# Patient Record
Sex: MALE | Race: WHITE | NOT HISPANIC OR LATINO | Employment: OTHER | URBAN - METROPOLITAN AREA
[De-identification: names, ages, dates, MRNs, and addresses within clinical notes are randomized per-mention and may not be internally consistent; named-entity substitution may affect disease eponyms.]

---

## 2020-10-22 ENCOUNTER — OFFICE VISIT (OUTPATIENT)
Dept: NEUROLOGY | Facility: MEDICAL CENTER | Age: 75
End: 2020-10-22
Payer: MEDICARE

## 2020-10-22 VITALS
WEIGHT: 210.76 LBS | RESPIRATION RATE: 16 BRPM | TEMPERATURE: 98.5 F | BODY MASS INDEX: 28.55 KG/M2 | SYSTOLIC BLOOD PRESSURE: 118 MMHG | HEART RATE: 61 BPM | OXYGEN SATURATION: 97 % | DIASTOLIC BLOOD PRESSURE: 64 MMHG | HEIGHT: 72 IN

## 2020-10-22 DIAGNOSIS — R51.9 CHRONIC NONINTRACTABLE HEADACHE, UNSPECIFIED HEADACHE TYPE: ICD-10-CM

## 2020-10-22 DIAGNOSIS — G89.29 CHRONIC NONINTRACTABLE HEADACHE, UNSPECIFIED HEADACHE TYPE: ICD-10-CM

## 2020-10-22 DIAGNOSIS — G44.40 MEDICATION OVERUSE HEADACHE: ICD-10-CM

## 2020-10-22 PROCEDURE — 99205 OFFICE O/P NEW HI 60 MIN: CPT | Performed by: NURSE PRACTITIONER

## 2020-10-22 RX ORDER — AMLODIPINE BESYLATE 5 MG/1
5 TABLET ORAL DAILY
Status: ON HOLD | COMMUNITY
End: 2021-02-03

## 2020-10-22 RX ORDER — TOPIRAMATE 25 MG/1
50 TABLET ORAL
Qty: 180 TAB | Refills: 3 | Status: SHIPPED | OUTPATIENT
Start: 2020-10-22 | End: 2021-10-22

## 2020-10-22 RX ORDER — LOSARTAN POTASSIUM 50 MG/1
50 TABLET ORAL DAILY
COMMUNITY
End: 2023-05-18

## 2020-10-22 ASSESSMENT — ENCOUNTER SYMPTOMS
MYALGIAS: 0
DEPRESSION: 1
CHILLS: 0
HEADACHES: 1
WHEEZING: 0
COUGH: 0
BRUISES/BLEEDS EASILY: 1
DIARRHEA: 0
NERVOUS/ANXIOUS: 1
BLURRED VISION: 0
PALPITATIONS: 1
DOUBLE VISION: 0
FEVER: 0
NECK PAIN: 1
CONSTIPATION: 0
HEARTBURN: 0
BACK PAIN: 1
NAUSEA: 0

## 2020-10-22 ASSESSMENT — PATIENT HEALTH QUESTIONNAIRE - PHQ9
CLINICAL INTERPRETATION OF PHQ2 SCORE: 1
SUM OF ALL RESPONSES TO PHQ QUESTIONS 1-9: 14
5. POOR APPETITE OR OVEREATING: 0 - NOT AT ALL
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
## 2020-10-22 NOTE — PATIENT INSTRUCTIONS
Headaches are likely made worse by overuse of acetaminophen (tylenol) and caffeine    Wean caffeine to no more than 1-2 cups per day    Wean acetaminophen slowly (wean by 1 pill per day until you are at no more than 2000mg  4 of the extra strength tablets) per week.      I sent a prescription for a medication called topiramate, this is a medication used for prevention of headaches, you take 25mg every night for the first week, then take 50mg every night( 2 tabs).  The side effects are usually mild at these low doses; however, if you develop problems with your speech or painful burning and tingling of your hands or feet, go back down to 1 tablet (25mg) every night for 1 week and then stop the medication.  Also call me if this occurs so that I can try something else.      Take the following supplements every evening for prevention:     Start magnesium oxide 400mg gel cap (Nature Made)  by mouth every night, may take extra dose if needed for headache (over the counter), hold for diarrhea         Start Riboflavin (Vitamin B2) 400mg by mouth every night (over the counter),may turn urine bright yellow         Start COQ 10, take 300mg every night. (over the counter)          Attempt to go to bed and get up at the same time every night           Eat meals on regular basis            Stay hydrated.             Aerobic exercise 30 minutes daily             Avoid aged or smoked foods, avoid processed foods, red wine, aged cheese              Keep headache diary, include foods that you may have eaten.             Avoid overusing over the counter medications:  Do not take more than 500mg acetaminophen (tylenol), more than 4 times weekly, more frequent or larger doses are associated with medication overuse headache.              Migraine Headache  A migraine headache is a very strong throbbing pain on one side or both sides of your head. This type of headache can also cause other symptoms. It can last from 4 hours to 3 days. Talk  with your doctor about what things may bring on (trigger) this condition.  What are the causes?  The exact cause of this condition is not known. This condition may be triggered or caused by:  · Drinking alcohol.  · Smoking.  · Taking medicines, such as:  ? Medicine used to treat chest pain (nitroglycerin).  ? Birth control pills.  ? Estrogen.  ? Some blood pressure medicines.  · Eating or drinking certain products.  · Doing physical activity.  Other things that may trigger a migraine headache include:  · Having a menstrual period.  · Pregnancy.  · Hunger.  · Stress.  · Not getting enough sleep or getting too much sleep.  · Weather changes.  · Tiredness (fatigue).  What increases the risk?  · Being 25-55 years old.  · Being female.  · Having a family history of migraine headaches.  · Being .  · Having depression or anxiety.  · Being very overweight.  What are the signs or symptoms?  · A throbbing pain. This pain may:  ? Happen in any area of the head, such as on one side or both sides.  ? Make it hard to do daily activities.  ? Get worse with physical activity.  ? Get worse around bright lights or loud noises.  · Other symptoms may include:  ? Feeling sick to your stomach (nauseous).  ? Vomiting.  ? Dizziness.  ? Being sensitive to bright lights, loud noises, or smells.  · Before you get a migraine headache, you may get warning signs (an aura). An aura may include:  ? Seeing flashing lights or having blind spots.  ? Seeing bright spots, halos, or zigzag lines.  ? Having tunnel vision or blurred vision.  ? Having numbness or a tingling feeling.  ? Having trouble talking.  ? Having weak muscles.  · Some people have symptoms after a migraine headache (postdromal phase), such as:  ? Tiredness.  ? Trouble thinking (concentrating).  How is this treated?  · Taking medicines that:  ? Relieve pain.  ? Relieve the feeling of being sick to your stomach.  ? Prevent migraine headaches.  · Treatment may also  include:  ? Having acupuncture.  ? Avoiding foods that bring on migraine headaches.  ? Learning ways to control your body functions (biofeedback).  ? Therapy to help you know and deal with negative thoughts (cognitive behavioral therapy).  Follow these instructions at home:  Medicines  · Take over-the-counter and prescription medicines only as told by your doctor.  · Ask your doctor if the medicine prescribed to you:  ? Requires you to avoid driving or using heavy machinery.  ? Can cause trouble pooping (constipation). You may need to take these steps to prevent or treat trouble pooping:  § Drink enough fluid to keep your pee (urine) pale yellow.  § Take over-the-counter or prescription medicines.  § Eat foods that are high in fiber. These include beans, whole grains, and fresh fruits and vegetables.  § Limit foods that are high in fat and sugar. These include fried or sweet foods.  Lifestyle  · Do not drink alcohol.  · Do not use any products that contain nicotine or tobacco, such as cigarettes, e-cigarettes, and chewing tobacco. If you need help quitting, ask your doctor.  · Get at least 8 hours of sleep every night.  · Limit and deal with stress.  General instructions         · Keep a journal to find out what may bring on your migraine headaches. For example, write down:  ? What you eat and drink.  ? How much sleep you get.  ? Any change in what you eat or drink.  ? Any change in your medicines.  · If you have a migraine headache:  ? Avoid things that make your symptoms worse, such as bright lights.  ? It may help to lie down in a dark, quiet room.  ? Do not drive or use heavy machinery.  ? Ask your doctor what activities are safe for you.  · Keep all follow-up visits as told by your doctor. This is important.  Contact a doctor if:  · You get a migraine headache that is different or worse than others you have had.  · You have more than 15 headache days in one month.  Get help right away if:  · Your migraine  headache gets very bad.  · Your migraine headache lasts longer than 72 hours.  · You have a fever.  · You have a stiff neck.  · You have trouble seeing.  · Your muscles feel weak or like you cannot control them.  · You start to lose your balance a lot.  · You start to have trouble walking.  · You pass out (faint).  · You have a seizure.  Summary  · A migraine headache is a very strong throbbing pain on one side or both sides of your head. These headaches can also cause other symptoms.  · This condition may be treated with medicines and changes to your lifestyle.  · Keep a journal to find out what may bring on your migraine headaches.  · Contact a doctor if you get a migraine headache that is different or worse than others you have had.  · Contact your doctor if you have more than 15 headache days in a month.  This information is not intended to replace advice given to you by your health care provider. Make sure you discuss any questions you have with your health care provider.  Document Released: 09/26/2009 Document Revised: 04/10/2020 Document Reviewed: 01/30/2020  Elsevier Patient Education © 2020 [employer]    Analgesic Rebound Headache  An analgesic rebound headache, sometimes called a medication overuse headache, is a headache that comes after pain medicine (analgesic) taken to treat the original (primary) headache has worn off. Any type of primary headache can return as a rebound headache if a person regularly takes analgesics more than three times a week to treat it. The types of primary headaches that are commonly associated with rebound headaches include:  · Migraines.  · Headaches that arise from tense muscles in the head and neck area (tension headaches).  · Headaches that develop and happen again (recur) on one side of the head and around the eye (cluster headaches).  If rebound headaches continue, they become chronic daily headaches.  What are the causes?  This condition may be caused by frequent use  of:  · Over-the-counter medicines such as aspirin, ibuprofen, and acetaminophen.  · Sinus relief medicines and other medicines that contain caffeine.  · Narcotic pain medicines such as codeine and oxycodone.  What are the signs or symptoms?  The symptoms of a rebound headache are the same as the symptoms of the original headache. Some of the symptoms of specific types of headaches include:  Migraine headache  · Pulsing or throbbing pain on one or both sides of the head.  · Severe pain that interferes with daily activities.  · Pain that is worsened by physical activity.  · Nausea, vomiting, or both.  · Pain with exposure to bright light, loud noises, or strong smells.  · General sensitivity to bright light, loud noises, or strong smells.  · Visual changes.  · Numbness of one or both arms.  Tension headache  · Pressure around the head.  · Dull, aching head pain.  · Pain felt over the front and sides of the head.  · Tenderness in the muscles of the head, neck, and shoulders.  Cluster headache  · Severe pain that begins in or around one eye or temple.  · Redness and tearing in the eye on the same side as the pain.  · Droopy or swollen eyelid.  · One-sided head pain.  · Nausea.  · Runny nose.  · Sweaty, pale facial skin.  · Restlessness.  How is this diagnosed?  This condition is diagnosed by:  · Reviewing your medical history. This includes the nature of your primary headaches.  · Reviewing the types of pain medicines that you have been using to treat your headaches and how often you take them.  How is this treated?  This condition may be treated or managed by:  · Discontinuing frequent use of the analgesic medicine. Doing this may worsen your headaches at first, but the pain should eventually become more manageable, less frequent, and less severe.  · Seeing a headache specialist. He or she may be able to help you manage your headaches and help make sure there is not another cause of the headaches.  · Using methods of  stress relief, such as acupuncture, counseling, biofeedback, and massage. Talk with your health care provider about which methods might be good for you.  Follow these instructions at home:  · Take over-the-counter and prescription medicines only as told by your health care provider.  · Stop the repeated use of pain medicine as told by your health care provider. Stopping can be difficult. Carefully follow instructions from your health care provider.  · Avoid triggers that are known to cause your primary headaches.  · Keep all follow-up visits as told by your health care provider. This is important.  Contact a health care provider if:  · You continue to experience headaches after following treatments that your health care provider recommended.  Get help right away if:  · You develop new headache pain.  · You develop headache pain that is different than what you have experienced in the past.  · You develop numbness or tingling in your arms or legs.  · You develop changes in your speech or vision.  This information is not intended to replace advice given to you by your health care provider. Make sure you discuss any questions you have with your health care provider.  Document Released: 03/09/2005 Document Revised: 11/30/2018 Document Reviewed: 05/22/2017  Elsevier Patient Education © 2020 Elsevier Inc.

## 2020-11-12 ENCOUNTER — OFFICE VISIT (OUTPATIENT)
Dept: NEUROLOGY | Facility: MEDICAL CENTER | Age: 75
End: 2020-11-12
Payer: MEDICARE

## 2020-11-12 VITALS
HEART RATE: 61 BPM | DIASTOLIC BLOOD PRESSURE: 64 MMHG | OXYGEN SATURATION: 97 % | RESPIRATION RATE: 16 BRPM | TEMPERATURE: 98.2 F | SYSTOLIC BLOOD PRESSURE: 108 MMHG

## 2020-11-12 DIAGNOSIS — M54.81 BILATERAL OCCIPITAL NEURALGIA: ICD-10-CM

## 2020-11-12 DIAGNOSIS — G89.29 CHRONIC NONINTRACTABLE HEADACHE, UNSPECIFIED HEADACHE TYPE: ICD-10-CM

## 2020-11-12 DIAGNOSIS — R51.9 CHRONIC NONINTRACTABLE HEADACHE, UNSPECIFIED HEADACHE TYPE: ICD-10-CM

## 2020-11-12 PROCEDURE — 96372 THER/PROPH/DIAG INJ SC/IM: CPT | Mod: 59 | Performed by: NURSE PRACTITIONER

## 2020-11-12 PROCEDURE — 64405 NJX AA&/STRD GR OCPL NRV: CPT | Mod: 50 | Performed by: NURSE PRACTITIONER

## 2020-11-12 PROCEDURE — S0020 INJECTION, BUPIVICAINE HYDRO: HCPCS | Performed by: NURSE PRACTITIONER

## 2020-11-12 RX ORDER — BUPIVACAINE HYDROCHLORIDE 5 MG/ML
8 INJECTION, SOLUTION EPIDURAL; INTRACAUDAL ONCE
Status: COMPLETED | OUTPATIENT
Start: 2020-11-12 | End: 2020-11-12

## 2020-11-12 RX ORDER — KETOROLAC TROMETHAMINE 30 MG/ML
30 INJECTION, SOLUTION INTRAMUSCULAR; INTRAVENOUS ONCE
Status: COMPLETED | OUTPATIENT
Start: 2020-11-12 | End: 2020-11-12

## 2020-11-12 RX ORDER — TRIAMCINOLONE ACETONIDE 40 MG/ML
80 INJECTION, SUSPENSION INTRA-ARTICULAR; INTRAMUSCULAR ONCE
Status: COMPLETED | OUTPATIENT
Start: 2020-11-12 | End: 2020-11-12

## 2020-11-12 RX ADMIN — BUPIVACAINE HYDROCHLORIDE 8 ML: 5 INJECTION, SOLUTION EPIDURAL; INTRACAUDAL at 09:35

## 2020-11-12 RX ADMIN — TRIAMCINOLONE ACETONIDE 80 MG: 40 INJECTION, SUSPENSION INTRA-ARTICULAR; INTRAMUSCULAR at 09:36

## 2020-11-12 RX ADMIN — KETOROLAC TROMETHAMINE 30 MG: 30 INJECTION, SOLUTION INTRAMUSCULAR; INTRAVENOUS at 11:51

## 2020-11-12 NOTE — PROGRESS NOTES
Diagnosis: Bilateral occipital neuralgia right >left and medication overuse for chronic headache.    After obtaining consent, the patient received GONB's with 40 mg of Kenalog and 4-5 ml of Marcaine 0.5% over the trajectory of each greater occipital nerve. The patient tolerated the procedure well.    Toradol 30mg IM provided per MA under direct physician supervision.    I explained to the patient the relativess risks and benefits of the procedure. I also discussed possible side effects and allowed time to answer all questions to their satisfaction. They agree to the plan of action.    Very irritable and agitated prior to procedure.  Ambulated out of office with wife pain free!

## 2021-01-13 DIAGNOSIS — Z23 NEED FOR VACCINATION: ICD-10-CM

## 2021-01-26 ENCOUNTER — APPOINTMENT (OUTPATIENT)
Dept: CARDIOLOGY | Facility: MEDICAL CENTER | Age: 76
DRG: 250 | End: 2021-01-26
Attending: INTERNAL MEDICINE
Payer: MEDICARE

## 2021-01-26 ENCOUNTER — APPOINTMENT (OUTPATIENT)
Dept: RADIOLOGY | Facility: MEDICAL CENTER | Age: 76
DRG: 250 | End: 2021-01-26
Attending: INTERNAL MEDICINE
Payer: MEDICARE

## 2021-01-26 ENCOUNTER — APPOINTMENT (OUTPATIENT)
Dept: RADIOLOGY | Facility: MEDICAL CENTER | Age: 76
DRG: 250 | End: 2021-01-26
Attending: EMERGENCY MEDICINE
Payer: MEDICARE

## 2021-01-26 ENCOUNTER — HOSPITAL ENCOUNTER (OUTPATIENT)
Dept: RADIOLOGY | Facility: MEDICAL CENTER | Age: 76
End: 2021-01-26
Payer: MEDICARE

## 2021-01-26 ENCOUNTER — HOSPITAL ENCOUNTER (INPATIENT)
Facility: MEDICAL CENTER | Age: 76
LOS: 8 days | DRG: 250 | End: 2021-02-03
Attending: EMERGENCY MEDICINE | Admitting: INTERNAL MEDICINE
Payer: MEDICARE

## 2021-01-26 DIAGNOSIS — J96.00 ACUTE RESPIRATORY FAILURE, UNSPECIFIED WHETHER WITH HYPOXIA OR HYPERCAPNIA (HCC): ICD-10-CM

## 2021-01-26 DIAGNOSIS — I46.9 CARDIAC ARREST (HCC): ICD-10-CM

## 2021-01-26 DIAGNOSIS — I21.3 ACUTE ST ELEVATION MYOCARDIAL INFARCTION (STEMI), UNSPECIFIED ARTERY (HCC): ICD-10-CM

## 2021-01-26 DIAGNOSIS — I21.11 ST ELEVATION MYOCARDIAL INFARCTION INVOLVING RIGHT CORONARY ARTERY (HCC): ICD-10-CM

## 2021-01-26 PROBLEM — J96.01 ACUTE RESPIRATORY FAILURE WITH HYPOXIA (HCC): Status: ACTIVE | Noted: 2021-01-26

## 2021-01-26 PROBLEM — I47.20 V-TACH (HCC): Status: ACTIVE | Noted: 2021-01-26

## 2021-01-26 LAB
ACT BLD: 131 SEC (ref 74–137)
ACTION RANGE TRIGGERED IACRT: YES
ALBUMIN SERPL BCP-MCNC: 3.4 G/DL (ref 3.2–4.9)
ALBUMIN/GLOB SERPL: 1.3 G/DL
ALP SERPL-CCNC: 147 U/L (ref 30–99)
ALT SERPL-CCNC: 574 U/L (ref 2–50)
ANION GAP SERPL CALC-SCNC: 12 MMOL/L (ref 7–16)
APTT PPP: 70.4 SEC (ref 24.7–36)
AST SERPL-CCNC: 787 U/L (ref 12–45)
BASE EXCESS BLDA CALC-SCNC: -7 MMOL/L (ref -4–3)
BASOPHILS # BLD AUTO: 0.9 % (ref 0–1.8)
BASOPHILS # BLD: 0.19 K/UL (ref 0–0.12)
BILIRUB SERPL-MCNC: 0.5 MG/DL (ref 0.1–1.5)
BODY TEMPERATURE: ABNORMAL DEGREES
BUN SERPL-MCNC: 17 MG/DL (ref 8–22)
BURR CELLS BLD QL SMEAR: NORMAL
CA-I BLD ISE-SCNC: 1.25 MMOL/L (ref 1.1–1.3)
CALCIUM SERPL-MCNC: 9 MG/DL (ref 8.5–10.5)
CHLORIDE SERPL-SCNC: 109 MMOL/L (ref 96–112)
CO2 BLDA-SCNC: 21 MMOL/L (ref 20–33)
CO2 SERPL-SCNC: 19 MMOL/L (ref 20–33)
CREAT SERPL-MCNC: 0.91 MG/DL (ref 0.5–1.4)
EKG IMPRESSION: NORMAL
EKG IMPRESSION: NORMAL
EOSINOPHIL # BLD AUTO: 0.19 K/UL (ref 0–0.51)
EOSINOPHIL NFR BLD: 0.9 % (ref 0–6.9)
ERYTHROCYTE [DISTWIDTH] IN BLOOD BY AUTOMATED COUNT: 46.5 FL (ref 35.9–50)
GLOBULIN SER CALC-MCNC: 2.6 G/DL (ref 1.9–3.5)
GLUCOSE SERPL-MCNC: 217 MG/DL (ref 65–99)
HCO3 BLDA-SCNC: 19.3 MMOL/L (ref 17–25)
HCT VFR BLD AUTO: 46.6 % (ref 42–52)
HCT VFR BLD CALC: 46 % (ref 42–52)
HGB BLD-MCNC: 15.3 G/DL (ref 14–18)
HGB BLD-MCNC: 15.6 G/DL (ref 14–18)
HOROWITZ INDEX BLDA+IHG-RTO: 150 MM[HG]
INR PPP: 1.16 (ref 0.87–1.13)
INST. QUALIFIED PATIENT IIQPT: YES
LG PLATELETS BLD QL SMEAR: NORMAL
LIPASE SERPL-CCNC: 29 U/L (ref 11–82)
LYMPHOCYTES # BLD AUTO: 0.72 K/UL (ref 1–4.8)
LYMPHOCYTES NFR BLD: 3.4 % (ref 22–41)
MANUAL DIFF BLD: NORMAL
MCH RBC QN AUTO: 31.3 PG (ref 27–33)
MCHC RBC AUTO-ENTMCNC: 32.8 G/DL (ref 33.7–35.3)
MCV RBC AUTO: 95.3 FL (ref 81.4–97.8)
MONOCYTES # BLD AUTO: 0.36 K/UL (ref 0–0.85)
MONOCYTES NFR BLD AUTO: 1.7 % (ref 0–13.4)
MORPHOLOGY BLD-IMP: NORMAL
NEUTROPHILS # BLD AUTO: 19.67 K/UL (ref 1.82–7.42)
NEUTROPHILS NFR BLD: 93.2 % (ref 44–72)
NRBC # BLD AUTO: 0 K/UL
NRBC BLD-RTO: 0 /100 WBC
NT-PROBNP SERPL IA-MCNC: 617 PG/ML (ref 0–125)
O2/TOTAL GAS SETTING VFR VENT: 50 %
PCO2 BLDA: 41.5 MMHG (ref 26–37)
PCO2 TEMP ADJ BLDA: 36.5 MMHG (ref 26–37)
PH BLDA: 7.28 [PH] (ref 7.4–7.5)
PH TEMP ADJ BLDA: 7.32 [PH] (ref 7.4–7.5)
PLATELET # BLD AUTO: 505 K/UL (ref 164–446)
PLATELET BLD QL SMEAR: NORMAL
PMV BLD AUTO: 11.6 FL (ref 9–12.9)
PO2 BLDA: 75 MMHG (ref 64–87)
PO2 TEMP ADJ BLDA: 62 MMHG (ref 64–87)
POIKILOCYTOSIS BLD QL SMEAR: NORMAL
POTASSIUM BLD-SCNC: 3.2 MMOL/L (ref 3.6–5.5)
POTASSIUM SERPL-SCNC: 3.1 MMOL/L (ref 3.6–5.5)
PROT SERPL-MCNC: 6 G/DL (ref 6–8.2)
PROTHROMBIN TIME: 15.2 SEC (ref 12–14.6)
RBC # BLD AUTO: 4.89 M/UL (ref 4.7–6.1)
RBC BLD AUTO: PRESENT
SAO2 % BLDA: 93 % (ref 93–99)
SODIUM BLD-SCNC: 141 MMOL/L (ref 135–145)
SODIUM SERPL-SCNC: 140 MMOL/L (ref 135–145)
SPECIMEN DRAWN FROM PATIENT: ABNORMAL
TROPONIN T SERPL-MCNC: 130 NG/L (ref 6–19)
WBC # BLD AUTO: 21.1 K/UL (ref 4.8–10.8)

## 2021-01-26 PROCEDURE — 700101 HCHG RX REV CODE 250

## 2021-01-26 PROCEDURE — 92941 PRQ TRLML REVSC TOT OCCL AMI: CPT | Mod: RC | Performed by: INTERNAL MEDICINE

## 2021-01-26 PROCEDURE — B2111ZZ FLUOROSCOPY OF MULTIPLE CORONARY ARTERIES USING LOW OSMOLAR CONTRAST: ICD-10-PCS | Performed by: INTERNAL MEDICINE

## 2021-01-26 PROCEDURE — 93005 ELECTROCARDIOGRAM TRACING: CPT | Performed by: INTERNAL MEDICINE

## 2021-01-26 PROCEDURE — 99223 1ST HOSP IP/OBS HIGH 75: CPT | Performed by: INTERNAL MEDICINE

## 2021-01-26 PROCEDURE — B2151ZZ FLUOROSCOPY OF LEFT HEART USING LOW OSMOLAR CONTRAST: ICD-10-PCS | Performed by: INTERNAL MEDICINE

## 2021-01-26 PROCEDURE — 84295 ASSAY OF SERUM SODIUM: CPT

## 2021-01-26 PROCEDURE — A9270 NON-COVERED ITEM OR SERVICE: HCPCS

## 2021-01-26 PROCEDURE — 85347 COAGULATION TIME ACTIVATED: CPT

## 2021-01-26 PROCEDURE — 82330 ASSAY OF CALCIUM: CPT

## 2021-01-26 PROCEDURE — 5A1945Z RESPIRATORY VENTILATION, 24-96 CONSECUTIVE HOURS: ICD-10-PCS | Performed by: INTERNAL MEDICINE

## 2021-01-26 PROCEDURE — 71045 X-RAY EXAM CHEST 1 VIEW: CPT

## 2021-01-26 PROCEDURE — 36600 WITHDRAWAL OF ARTERIAL BLOOD: CPT

## 2021-01-26 PROCEDURE — 02HV33Z INSERTION OF INFUSION DEVICE INTO SUPERIOR VENA CAVA, PERCUTANEOUS APPROACH: ICD-10-PCS | Performed by: INTERNAL MEDICINE

## 2021-01-26 PROCEDURE — 36556 INSERT NON-TUNNEL CV CATH: CPT | Mod: RT | Performed by: INTERNAL MEDICINE

## 2021-01-26 PROCEDURE — 84484 ASSAY OF TROPONIN QUANT: CPT

## 2021-01-26 PROCEDURE — 94002 VENT MGMT INPAT INIT DAY: CPT

## 2021-01-26 PROCEDURE — 84478 ASSAY OF TRIGLYCERIDES: CPT

## 2021-01-26 PROCEDURE — 93010 ELECTROCARDIOGRAM REPORT: CPT | Mod: 59 | Performed by: INTERNAL MEDICINE

## 2021-01-26 PROCEDURE — 700105 HCHG RX REV CODE 258: Performed by: INTERNAL MEDICINE

## 2021-01-26 PROCEDURE — 36556 INSERT NON-TUNNEL CV CATH: CPT

## 2021-01-26 PROCEDURE — 4A023N7 MEASUREMENT OF CARDIAC SAMPLING AND PRESSURE, LEFT HEART, PERCUTANEOUS APPROACH: ICD-10-PCS | Performed by: INTERNAL MEDICINE

## 2021-01-26 PROCEDURE — 94799 UNLISTED PULMONARY SVC/PX: CPT

## 2021-01-26 PROCEDURE — 99291 CRITICAL CARE FIRST HOUR: CPT | Mod: 25 | Performed by: INTERNAL MEDICINE

## 2021-01-26 PROCEDURE — 700101 HCHG RX REV CODE 250: Performed by: INTERNAL MEDICINE

## 2021-01-26 PROCEDURE — 700117 HCHG RX CONTRAST REV CODE 255: Performed by: INTERNAL MEDICINE

## 2021-01-26 PROCEDURE — 84132 ASSAY OF SERUM POTASSIUM: CPT

## 2021-01-26 PROCEDURE — 85014 HEMATOCRIT: CPT

## 2021-01-26 PROCEDURE — 02703ZZ DILATION OF CORONARY ARTERY, ONE ARTERY, PERCUTANEOUS APPROACH: ICD-10-PCS | Performed by: INTERNAL MEDICINE

## 2021-01-26 PROCEDURE — 99291 CRITICAL CARE FIRST HOUR: CPT

## 2021-01-26 PROCEDURE — 93005 ELECTROCARDIOGRAM TRACING: CPT | Performed by: EMERGENCY MEDICINE

## 2021-01-26 PROCEDURE — 3E043XZ INTRODUCTION OF VASOPRESSOR INTO CENTRAL VEIN, PERCUTANEOUS APPROACH: ICD-10-PCS | Performed by: INTERNAL MEDICINE

## 2021-01-26 PROCEDURE — C1751 CATH, INF, PER/CENT/MIDLINE: HCPCS

## 2021-01-26 PROCEDURE — 83690 ASSAY OF LIPASE: CPT

## 2021-01-26 PROCEDURE — 83880 ASSAY OF NATRIURETIC PEPTIDE: CPT

## 2021-01-26 PROCEDURE — 700111 HCHG RX REV CODE 636 W/ 250 OVERRIDE (IP)

## 2021-01-26 PROCEDURE — 85007 BL SMEAR W/DIFF WBC COUNT: CPT

## 2021-01-26 PROCEDURE — 700111 HCHG RX REV CODE 636 W/ 250 OVERRIDE (IP): Performed by: INTERNAL MEDICINE

## 2021-01-26 PROCEDURE — 02C03ZZ EXTIRPATION OF MATTER FROM CORONARY ARTERY, ONE ARTERY, PERCUTANEOUS APPROACH: ICD-10-PCS | Performed by: INTERNAL MEDICINE

## 2021-01-26 PROCEDURE — 700102 HCHG RX REV CODE 250 W/ 637 OVERRIDE(OP)

## 2021-01-26 PROCEDURE — 770022 HCHG ROOM/CARE - ICU (200)

## 2021-01-26 PROCEDURE — 85610 PROTHROMBIN TIME: CPT

## 2021-01-26 PROCEDURE — 93458 L HRT ARTERY/VENTRICLE ANGIO: CPT | Mod: 26,59 | Performed by: INTERNAL MEDICINE

## 2021-01-26 PROCEDURE — C1887 CATHETER, GUIDING: HCPCS

## 2021-01-26 PROCEDURE — 85027 COMPLETE CBC AUTOMATED: CPT

## 2021-01-26 PROCEDURE — 80053 COMPREHEN METABOLIC PANEL: CPT

## 2021-01-26 PROCEDURE — 82962 GLUCOSE BLOOD TEST: CPT

## 2021-01-26 PROCEDURE — 85730 THROMBOPLASTIN TIME PARTIAL: CPT

## 2021-01-26 PROCEDURE — 700111 HCHG RX REV CODE 636 W/ 250 OVERRIDE (IP): Mod: JG

## 2021-01-26 PROCEDURE — 82803 BLOOD GASES ANY COMBINATION: CPT

## 2021-01-26 PROCEDURE — 3E073PZ INTRODUCTION OF PLATELET INHIBITOR INTO CORONARY ARTERY, PERCUTANEOUS APPROACH: ICD-10-PCS | Performed by: INTERNAL MEDICINE

## 2021-01-26 RX ORDER — HEPARIN SODIUM 1000 [USP'U]/ML
INJECTION, SOLUTION INTRAVENOUS; SUBCUTANEOUS
Status: COMPLETED
Start: 2021-01-26 | End: 2021-01-26

## 2021-01-26 RX ORDER — LIDOCAINE HYDROCHLORIDE 20 MG/ML
INJECTION, SOLUTION INFILTRATION; PERINEURAL
Status: COMPLETED
Start: 2021-01-26 | End: 2021-01-26

## 2021-01-26 RX ORDER — MIDAZOLAM HYDROCHLORIDE 1 MG/ML
INJECTION INTRAMUSCULAR; INTRAVENOUS
Status: COMPLETED
Start: 2021-01-26 | End: 2021-01-26

## 2021-01-26 RX ORDER — CLOPIDOGREL BISULFATE 75 MG/1
75 TABLET ORAL DAILY
Status: DISCONTINUED | OUTPATIENT
Start: 2021-01-27 | End: 2021-01-27

## 2021-01-26 RX ORDER — CLOPIDOGREL 300 MG/1
600 TABLET, FILM COATED ORAL ONCE
Status: DISCONTINUED | OUTPATIENT
Start: 2021-01-26 | End: 2021-01-26

## 2021-01-26 RX ORDER — FAMOTIDINE 20 MG/1
20 TABLET, FILM COATED ORAL EVERY 12 HOURS
Status: DISCONTINUED | OUTPATIENT
Start: 2021-01-26 | End: 2021-01-28

## 2021-01-26 RX ORDER — BISACODYL 10 MG
10 SUPPOSITORY, RECTAL RECTAL
Status: DISCONTINUED | OUTPATIENT
Start: 2021-01-26 | End: 2021-01-28

## 2021-01-26 RX ORDER — ATORVASTATIN CALCIUM 80 MG/1
80 TABLET, FILM COATED ORAL EVERY EVENING
Status: DISCONTINUED | OUTPATIENT
Start: 2021-01-26 | End: 2021-01-26

## 2021-01-26 RX ORDER — SODIUM CHLORIDE, SODIUM LACTATE, POTASSIUM CHLORIDE, CALCIUM CHLORIDE 600; 310; 30; 20 MG/100ML; MG/100ML; MG/100ML; MG/100ML
1000 INJECTION, SOLUTION INTRAVENOUS ONCE
Status: COMPLETED | OUTPATIENT
Start: 2021-01-26 | End: 2021-01-27

## 2021-01-26 RX ORDER — HEPARIN SODIUM 5000 [USP'U]/100ML
0-30 INJECTION, SOLUTION INTRAVENOUS CONTINUOUS
Status: DISPENSED | OUTPATIENT
Start: 2021-01-26 | End: 2021-01-28

## 2021-01-26 RX ORDER — CLOPIDOGREL 300 MG/1
TABLET, FILM COATED ORAL
Status: COMPLETED
Start: 2021-01-26 | End: 2021-01-26

## 2021-01-26 RX ORDER — POTASSIUM CHLORIDE 29.8 MG/ML
40 INJECTION INTRAVENOUS ONCE
Status: COMPLETED | OUTPATIENT
Start: 2021-01-26 | End: 2021-01-27

## 2021-01-26 RX ORDER — IPRATROPIUM BROMIDE AND ALBUTEROL SULFATE 2.5; .5 MG/3ML; MG/3ML
3 SOLUTION RESPIRATORY (INHALATION)
Status: DISCONTINUED | OUTPATIENT
Start: 2021-01-26 | End: 2021-02-03 | Stop reason: HOSPADM

## 2021-01-26 RX ORDER — VERAPAMIL HYDROCHLORIDE 2.5 MG/ML
INJECTION, SOLUTION INTRAVENOUS
Status: COMPLETED
Start: 2021-01-26 | End: 2021-01-26

## 2021-01-26 RX ORDER — HEPARIN SODIUM 1000 [USP'U]/ML
2000 INJECTION, SOLUTION INTRAVENOUS; SUBCUTANEOUS PRN
Status: ACTIVE | OUTPATIENT
Start: 2021-01-26 | End: 2021-01-28

## 2021-01-26 RX ORDER — HEPARIN SODIUM 200 [USP'U]/100ML
INJECTION, SOLUTION INTRAVENOUS
Status: COMPLETED
Start: 2021-01-26 | End: 2021-01-26

## 2021-01-26 RX ORDER — POLYETHYLENE GLYCOL 3350 17 G/17G
1 POWDER, FOR SOLUTION ORAL
Status: DISCONTINUED | OUTPATIENT
Start: 2021-01-26 | End: 2021-01-28

## 2021-01-26 RX ORDER — ASPIRIN 81 MG/1
81 TABLET, CHEWABLE ORAL DAILY
Status: DISCONTINUED | OUTPATIENT
Start: 2021-01-27 | End: 2021-01-27

## 2021-01-26 RX ORDER — NOREPINEPHRINE BITARTRATE 0.03 MG/ML
0-30 INJECTION, SOLUTION INTRAVENOUS CONTINUOUS
Status: DISCONTINUED | OUTPATIENT
Start: 2021-01-26 | End: 2021-01-29

## 2021-01-26 RX ORDER — EPTIFIBATIDE 2 MG/ML
INJECTION, SOLUTION INTRAVENOUS
Status: COMPLETED
Start: 2021-01-26 | End: 2021-01-26

## 2021-01-26 RX ORDER — AMOXICILLIN 250 MG
2 CAPSULE ORAL 2 TIMES DAILY
Status: DISCONTINUED | OUTPATIENT
Start: 2021-01-27 | End: 2021-01-28

## 2021-01-26 RX ADMIN — NITROGLYCERIN 10 ML: 20 INJECTION INTRAVENOUS at 20:13

## 2021-01-26 RX ADMIN — NOREPINEPHRINE BITARTRATE 9 MCG/MIN: 1 INJECTION INTRAVENOUS at 23:30

## 2021-01-26 RX ADMIN — HEPARIN SODIUM 2000 UNITS: 200 INJECTION, SOLUTION INTRAVENOUS at 20:12

## 2021-01-26 RX ADMIN — LIDOCAINE HYDROCHLORIDE: 20 INJECTION, SOLUTION INFILTRATION; PERINEURAL at 20:12

## 2021-01-26 RX ADMIN — CLOPIDOGREL BISULFATE 600 MG: 300 TABLET, FILM COATED ORAL at 20:54

## 2021-01-26 RX ADMIN — HEPARIN SODIUM: 1000 INJECTION, SOLUTION INTRAVENOUS; SUBCUTANEOUS at 20:37

## 2021-01-26 RX ADMIN — EPTIFIBATIDE 40000 MCG: 2 INJECTION, SOLUTION INTRAVENOUS at 20:19

## 2021-01-26 RX ADMIN — VERAPAMIL HYDROCHLORIDE 2.5 MG: 2.5 INJECTION, SOLUTION INTRAVENOUS at 20:12

## 2021-01-26 RX ADMIN — POTASSIUM CHLORIDE 40 MEQ: 29.8 INJECTION, SOLUTION INTRAVENOUS at 23:41

## 2021-01-26 RX ADMIN — FENTANYL CITRATE 50 MCG: 50 INJECTION, SOLUTION INTRAMUSCULAR; INTRAVENOUS at 23:20

## 2021-01-26 RX ADMIN — IOHEXOL 88 ML: 350 INJECTION, SOLUTION INTRAVENOUS at 20:23

## 2021-01-26 RX ADMIN — HEPARIN SODIUM 12 UNITS/KG/HR: 5000 INJECTION, SOLUTION INTRAVENOUS at 23:24

## 2021-01-26 RX ADMIN — SODIUM CHLORIDE, POTASSIUM CHLORIDE, SODIUM LACTATE AND CALCIUM CHLORIDE 1000 ML: 600; 310; 30; 20 INJECTION, SOLUTION INTRAVENOUS at 23:29

## 2021-01-26 RX ADMIN — PROPOFOL 15 MCG/KG/MIN: 10 INJECTION, EMULSION INTRAVENOUS at 20:13

## 2021-01-26 RX ADMIN — HEPARIN SODIUM: 1000 INJECTION, SOLUTION INTRAVENOUS; SUBCUTANEOUS at 20:38

## 2021-01-26 ASSESSMENT — FIBROSIS 4 INDEX: FIB4 SCORE: 5.01

## 2021-01-27 ENCOUNTER — APPOINTMENT (OUTPATIENT)
Dept: RADIOLOGY | Facility: MEDICAL CENTER | Age: 76
DRG: 250 | End: 2021-01-27
Attending: INTERNAL MEDICINE
Payer: MEDICARE

## 2021-01-27 ENCOUNTER — APPOINTMENT (OUTPATIENT)
Dept: CARDIOLOGY | Facility: MEDICAL CENTER | Age: 76
DRG: 250 | End: 2021-01-27
Attending: INTERNAL MEDICINE
Payer: MEDICARE

## 2021-01-27 PROBLEM — I48.19 PERSISTENT ATRIAL FIBRILLATION (HCC): Status: ACTIVE | Noted: 2021-01-27

## 2021-01-27 PROBLEM — R57.0 CARDIOGENIC SHOCK (HCC): Status: ACTIVE | Noted: 2021-01-27

## 2021-01-27 LAB
ACTION RANGE TRIGGERED IACRT: NO
ANION GAP SERPL CALC-SCNC: 10 MMOL/L (ref 7–16)
APTT PPP: 115.4 SEC (ref 24.7–36)
APTT PPP: 148.8 SEC (ref 24.7–36)
BASE EXCESS BLDA CALC-SCNC: -7 MMOL/L (ref -4–3)
BASOPHILS # BLD AUTO: 1.7 % (ref 0–1.8)
BASOPHILS # BLD: 0.3 K/UL (ref 0–0.12)
BODY TEMPERATURE: ABNORMAL DEGREES
BUN SERPL-MCNC: 19 MG/DL (ref 8–22)
CALCIUM SERPL-MCNC: 8.3 MG/DL (ref 8.5–10.5)
CHLORIDE SERPL-SCNC: 106 MMOL/L (ref 96–112)
CO2 BLDA-SCNC: 18 MMOL/L (ref 20–33)
CO2 SERPL-SCNC: 19 MMOL/L (ref 20–33)
CREAT SERPL-MCNC: 0.97 MG/DL (ref 0.5–1.4)
EOSINOPHIL # BLD AUTO: 0.14 K/UL (ref 0–0.51)
EOSINOPHIL NFR BLD: 0.8 % (ref 0–6.9)
ERYTHROCYTE [DISTWIDTH] IN BLOOD BY AUTOMATED COUNT: 45.9 FL (ref 35.9–50)
FLUAV RNA SPEC QL NAA+PROBE: NEGATIVE
FLUBV RNA SPEC QL NAA+PROBE: NEGATIVE
GLUCOSE BLD-MCNC: 183 MG/DL (ref 65–99)
GLUCOSE SERPL-MCNC: 172 MG/DL (ref 65–99)
HCO3 BLDA-SCNC: 16.9 MMOL/L (ref 17–25)
HCT VFR BLD AUTO: 43.8 % (ref 42–52)
HGB BLD-MCNC: 14.8 G/DL (ref 14–18)
HOROWITZ INDEX BLDA+IHG-RTO: 184 MM[HG]
INR PPP: 1.27 (ref 0.87–1.13)
INST. QUALIFIED PATIENT IIQPT: YES
LV EJECT FRACT  99904: 60
LV EJECT FRACT MOD 4C 99902: 62.03
LYMPHOCYTES # BLD AUTO: 0.14 K/UL (ref 1–4.8)
LYMPHOCYTES NFR BLD: 0.8 % (ref 22–41)
MAGNESIUM SERPL-MCNC: 1.9 MG/DL (ref 1.5–2.5)
MANUAL DIFF BLD: NORMAL
MCH RBC QN AUTO: 31.8 PG (ref 27–33)
MCHC RBC AUTO-ENTMCNC: 33.8 G/DL (ref 33.7–35.3)
MCV RBC AUTO: 94.2 FL (ref 81.4–97.8)
METAMYELOCYTES NFR BLD MANUAL: 1.7 %
MONOCYTES # BLD AUTO: 1.78 K/UL (ref 0–0.85)
MONOCYTES NFR BLD AUTO: 10.1 % (ref 0–13.4)
MORPHOLOGY BLD-IMP: NORMAL
NEUTROPHILS # BLD AUTO: 14.94 K/UL (ref 1.82–7.42)
NEUTROPHILS NFR BLD: 79 % (ref 44–72)
NEUTS BAND NFR BLD MANUAL: 5.9 % (ref 0–10)
NRBC # BLD AUTO: 0 K/UL
NRBC BLD-RTO: 0 /100 WBC
O2/TOTAL GAS SETTING VFR VENT: 70 %
PCO2 BLDA: 28.1 MMHG (ref 26–37)
PCO2 TEMP ADJ BLDA: 27.6 MMHG (ref 26–37)
PH BLDA: 7.39 [PH] (ref 7.4–7.5)
PH TEMP ADJ BLDA: 7.39 [PH] (ref 7.4–7.5)
PHOSPHATE SERPL-MCNC: 1.3 MG/DL (ref 2.5–4.5)
PHOSPHATE SERPL-MCNC: 3.4 MG/DL (ref 2.5–4.5)
PLATELET # BLD AUTO: 531 K/UL (ref 164–446)
PLATELET BLD QL SMEAR: NORMAL
PMV BLD AUTO: 11.4 FL (ref 9–12.9)
PO2 BLDA: 129 MMHG (ref 64–87)
PO2 TEMP ADJ BLDA: 127 MMHG (ref 64–87)
POTASSIUM SERPL-SCNC: 4 MMOL/L (ref 3.6–5.5)
PROTHROMBIN TIME: 16.3 SEC (ref 12–14.6)
RBC # BLD AUTO: 4.65 M/UL (ref 4.7–6.1)
RBC BLD AUTO: NORMAL
RSV RNA SPEC QL NAA+PROBE: NEGATIVE
SAO2 % BLDA: 99 % (ref 93–99)
SARS-COV-2 RNA RESP QL NAA+PROBE: NOTDETECTED
SODIUM SERPL-SCNC: 135 MMOL/L (ref 135–145)
SPECIMEN DRAWN FROM PATIENT: ABNORMAL
SPECIMEN SOURCE: NORMAL
TRIGL SERPL-MCNC: 68 MG/DL (ref 0–149)
UFH PPP CHRO-ACNC: 0.46 IU/ML
UFH PPP CHRO-ACNC: 0.61 IU/ML
UFH PPP CHRO-ACNC: 0.79 IU/ML
WBC # BLD AUTO: 17.6 K/UL (ref 4.8–10.8)

## 2021-01-27 PROCEDURE — 84100 ASSAY OF PHOSPHORUS: CPT

## 2021-01-27 PROCEDURE — 93325 DOPPLER ECHO COLOR FLOW MAPG: CPT | Mod: 26 | Performed by: INTERNAL MEDICINE

## 2021-01-27 PROCEDURE — 700102 HCHG RX REV CODE 250 W/ 637 OVERRIDE(OP): Performed by: INTERNAL MEDICINE

## 2021-01-27 PROCEDURE — A9270 NON-COVERED ITEM OR SERVICE: HCPCS | Performed by: INTERNAL MEDICINE

## 2021-01-27 PROCEDURE — 700111 HCHG RX REV CODE 636 W/ 250 OVERRIDE (IP): Performed by: INTERNAL MEDICINE

## 2021-01-27 PROCEDURE — 85007 BL SMEAR W/DIFF WBC COUNT: CPT

## 2021-01-27 PROCEDURE — 99291 CRITICAL CARE FIRST HOUR: CPT | Performed by: INTERNAL MEDICINE

## 2021-01-27 PROCEDURE — 82803 BLOOD GASES ANY COMBINATION: CPT

## 2021-01-27 PROCEDURE — 700105 HCHG RX REV CODE 258: Performed by: INTERNAL MEDICINE

## 2021-01-27 PROCEDURE — 85027 COMPLETE CBC AUTOMATED: CPT

## 2021-01-27 PROCEDURE — 85610 PROTHROMBIN TIME: CPT

## 2021-01-27 PROCEDURE — 80048 BASIC METABOLIC PNL TOTAL CA: CPT

## 2021-01-27 PROCEDURE — 93308 TTE F-UP OR LMTD: CPT | Mod: 26 | Performed by: INTERNAL MEDICINE

## 2021-01-27 PROCEDURE — 93325 DOPPLER ECHO COLOR FLOW MAPG: CPT

## 2021-01-27 PROCEDURE — 94760 N-INVAS EAR/PLS OXIMETRY 1: CPT

## 2021-01-27 PROCEDURE — 85520 HEPARIN ASSAY: CPT | Mod: 91

## 2021-01-27 PROCEDURE — 700111 HCHG RX REV CODE 636 W/ 250 OVERRIDE (IP): Performed by: EMERGENCY MEDICINE

## 2021-01-27 PROCEDURE — 36600 WITHDRAWAL OF ARTERIAL BLOOD: CPT

## 2021-01-27 PROCEDURE — 0241U HCHG SARS-COV-2 COVID-19 NFCT DS RESP RNA 4 TRGT MIC: CPT

## 2021-01-27 PROCEDURE — 94003 VENT MGMT INPAT SUBQ DAY: CPT

## 2021-01-27 PROCEDURE — 770022 HCHG ROOM/CARE - ICU (200)

## 2021-01-27 PROCEDURE — 700101 HCHG RX REV CODE 250: Performed by: INTERNAL MEDICINE

## 2021-01-27 PROCEDURE — 99233 SBSQ HOSP IP/OBS HIGH 50: CPT | Performed by: INTERNAL MEDICINE

## 2021-01-27 PROCEDURE — 94799 UNLISTED PULMONARY SVC/PX: CPT

## 2021-01-27 PROCEDURE — 85730 THROMBOPLASTIN TIME PARTIAL: CPT | Mod: 91

## 2021-01-27 PROCEDURE — 83735 ASSAY OF MAGNESIUM: CPT

## 2021-01-27 PROCEDURE — 700117 HCHG RX CONTRAST REV CODE 255: Performed by: INTERNAL MEDICINE

## 2021-01-27 RX ORDER — MAGNESIUM SULFATE HEPTAHYDRATE 40 MG/ML
2 INJECTION, SOLUTION INTRAVENOUS ONCE
Status: COMPLETED | OUTPATIENT
Start: 2021-01-27 | End: 2021-01-27

## 2021-01-27 RX ORDER — DEXTROSE MONOHYDRATE 50 MG/ML
INJECTION, SOLUTION INTRAVENOUS CONTINUOUS
Status: DISCONTINUED | OUTPATIENT
Start: 2021-01-27 | End: 2021-01-29

## 2021-01-27 RX ORDER — ASPIRIN 81 MG/1
81 TABLET, CHEWABLE ORAL DAILY
Status: DISCONTINUED | OUTPATIENT
Start: 2021-01-27 | End: 2021-01-28

## 2021-01-27 RX ORDER — ASPIRIN 81 MG/1
81 TABLET, CHEWABLE ORAL DAILY
Status: DISCONTINUED | OUTPATIENT
Start: 2021-01-28 | End: 2021-01-27

## 2021-01-27 RX ORDER — ATORVASTATIN CALCIUM 80 MG/1
80 TABLET, FILM COATED ORAL EVERY EVENING
Status: DISCONTINUED | OUTPATIENT
Start: 2021-01-27 | End: 2021-02-03 | Stop reason: HOSPADM

## 2021-01-27 RX ORDER — CLOPIDOGREL BISULFATE 75 MG/1
75 TABLET ORAL DAILY
Status: DISCONTINUED | OUTPATIENT
Start: 2021-01-28 | End: 2021-01-27

## 2021-01-27 RX ORDER — CLOPIDOGREL BISULFATE 75 MG/1
75 TABLET ORAL DAILY
Status: DISCONTINUED | OUTPATIENT
Start: 2021-01-27 | End: 2021-01-28

## 2021-01-27 RX ADMIN — DOCUSATE SODIUM 50 MG AND SENNOSIDES 8.6 MG 2 TABLET: 8.6; 5 TABLET, FILM COATED ORAL at 17:18

## 2021-01-27 RX ADMIN — DOCUSATE SODIUM 50 MG AND SENNOSIDES 8.6 MG 2 TABLET: 8.6; 5 TABLET, FILM COATED ORAL at 06:06

## 2021-01-27 RX ADMIN — ATORVASTATIN CALCIUM 80 MG: 80 TABLET, FILM COATED ORAL at 18:00

## 2021-01-27 RX ADMIN — DEXTROSE MONOHYDRATE: 50 INJECTION, SOLUTION INTRAVENOUS at 12:37

## 2021-01-27 RX ADMIN — FAMOTIDINE 20 MG: 10 INJECTION INTRAVENOUS at 00:01

## 2021-01-27 RX ADMIN — FENTANYL CITRATE 2500 MCG: 50 INJECTION, SOLUTION INTRAMUSCULAR; INTRAVENOUS at 01:53

## 2021-01-27 RX ADMIN — FAMOTIDINE 20 MG: 10 INJECTION INTRAVENOUS at 17:18

## 2021-01-27 RX ADMIN — METOPROLOL TARTRATE 12.5 MG: 25 TABLET, FILM COATED ORAL at 12:36

## 2021-01-27 RX ADMIN — CLOPIDOGREL BISULFATE 75 MG: 75 TABLET ORAL at 10:28

## 2021-01-27 RX ADMIN — SODIUM PHOSPHATE, MONOBASIC, MONOHYDRATE AND SODIUM PHOSPHATE, DIBASIC, ANHYDROUS 30 MMOL: 276; 142 INJECTION, SOLUTION INTRAVENOUS at 09:57

## 2021-01-27 RX ADMIN — AMIODARONE HYDROCHLORIDE 1 MG/MIN: 1.8 INJECTION, SOLUTION INTRAVENOUS at 12:37

## 2021-01-27 RX ADMIN — AMIODARONE HYDROCHLORIDE 0.5 MG/MIN: 1.8 INJECTION, SOLUTION INTRAVENOUS at 17:48

## 2021-01-27 RX ADMIN — PROPOFOL 15 MCG/KG/MIN: 10 INJECTION, EMULSION INTRAVENOUS at 17:19

## 2021-01-27 RX ADMIN — HUMAN ALBUMIN MICROSPHERES AND PERFLUTREN 3 ML: 10; .22 INJECTION, SOLUTION INTRAVENOUS at 17:18

## 2021-01-27 RX ADMIN — NOREPINEPHRINE BITARTRATE 14 MCG/MIN: 1 INJECTION INTRAVENOUS at 07:08

## 2021-01-27 RX ADMIN — PROPOFOL 30 MCG/KG/MIN: 10 INJECTION, EMULSION INTRAVENOUS at 09:57

## 2021-01-27 RX ADMIN — NOREPINEPHRINE BITARTRATE 14 MCG/MIN: 1 INJECTION INTRAVENOUS at 17:18

## 2021-01-27 RX ADMIN — ASPIRIN 81 MG: 81 TABLET, CHEWABLE ORAL at 10:28

## 2021-01-27 RX ADMIN — SODIUM PHOSPHATE, MONOBASIC, MONOHYDRATE AND SODIUM PHOSPHATE, DIBASIC, ANHYDROUS 15 MMOL: 276; 142 INJECTION, SOLUTION INTRAVENOUS at 13:17

## 2021-01-27 RX ADMIN — FENTANYL CITRATE 100 MCG/HR: 50 INJECTION, SOLUTION INTRAMUSCULAR; INTRAVENOUS at 01:09

## 2021-01-27 RX ADMIN — MAGNESIUM SULFATE 2 G: 2 INJECTION INTRAVENOUS at 08:41

## 2021-01-27 RX ADMIN — PROPOFOL 30 MCG/KG/MIN: 10 INJECTION, EMULSION INTRAVENOUS at 04:32

## 2021-01-27 RX ADMIN — FENTANYL CITRATE 100 MCG/HR: 50 INJECTION, SOLUTION INTRAMUSCULAR; INTRAVENOUS at 17:48

## 2021-01-27 ASSESSMENT — FIBROSIS 4 INDEX: FIB4 SCORE: 4.76

## 2021-01-27 NOTE — PROCEDURES
REFERRING PHYSICIAN: Dr. Montoya    PREOPERATIVE DIAGNOSIS:  1.  Acute inferoposterior STEMI  2.  Cardiac arrest  3.  Ventilator dependent respiratory failure  4.  History of CAD    POSTOPERATIVE DIAGNOSIS:  1.  100% thrombotically occluded right posterior lateral branch  2.  Widely patent previously placed proximal LAD stent  3.  Successful percutaneous coronary intervention with thrombectomy of the right posterior lateral branch with restoration of LONNIE-3 flow and 0% residual stenosis.      PROCEDURE PERFORMED:  Selective coronary angiography of the native vessels  Left heart catheterization  Left ventriculogram  PCI of posterior lateral branch    DESCRIPTION OF PROCEDURE:  Patient presents requiring two-physician consent due to cardiac arrest and emergent intubation related to inferoposterior STEMI.    The patient was transported to the catheterization laboratory in the emergency state. The right radial area and right groin were prepped and draped in the usual fashion. Right radial area was entered with a single through and through puncture under direct ultrasound guidance and a 6F glide sheath was placed. An intra-arterial cocktail of heparin, verapamil and nitroglycerine was administered. Over a wire, a 5F [place]  catheter was passed to the aortic root and used to engage the right and left coronaries without difficulty. Contrast was administered and multiple images obtained. This catheter was then used to cross the aortic valve for LHC and contrast was administered at 8cc/s for 24cc's for left ventriculogram.     DESCRIPTION OF PCI:  The decision was made to intervene on the culprit coronary artery.  Heparin was administered to achieve an adequate ACT.  The diagnostic catheter was exchanged over a wire for an 6 [place] AL 0.75 guide seated appropriately in the RCA ostium. A BMW 0.014 floppy tip wire was navigated across the culprit stenosis.  A manual aspiration thrombectomy catheter was then passed into the  distal vessel and manual aspiration was performed with retrieval of copious quantities of dark red and white thrombus.  Integrilin bolus x2 was initiated.  Following this there was restoration of LONNIE-3 flow to the distal bed.  The previously noted distal RCA stenosis was relieved after administration of medical therapy and consistent with spasm.  No residual stenosis is noted.  All catheters and guidewires were removed and a TR band was applied to achieve patent hemostasis. Patient left the cath lab in guarded condition.      FINDINGS:  I. HEMODYNAMICS:    Ao: 97/73 mmHg   LEDP: 26 mmHg   Gradient on LV pullback: No    II. LEFT VENTRICULOGRAM:   LVEF RANGEL PROJECTION: 40%, possible left ventricular mural thrombus     III. CORONARY ANGIOGRAPHY:  Left Main: Large vessel bifurcating no CAD.  Left Anterior Descending: Large vessel wrapping around the apex.  Mild nonobstructive disease.  There is a widely patent proximal LAD stent.  Usual complement of diagonals.  Left Circumflex: Small to moderate size nondominant no CAD.  Right Coronary: Large dominant vessel supplying a very large posterior lateral and posterior descending vessel.  Posterior lateral vessel is 100 and thrombotically occluded in its distal portion.  There is an 80% midportion stenosis which is relieved with pharmacotherapy consistent with spasm.  Postintervention there is 0% residual stenosis and LONNIE-3 flow.    COMPLICATIONS: none apparent    CONCLUSIONS:  1.  100% thrombotically occluded right posterior lateral branch  2.  Widely patent previously placed proximal LAD stent  3.  Successful percutaneous coronary intervention with thrombectomy of the right posterior lateral branch with restoration of LONNIE-3 flow and 0% residual stenosis.    RECOMMENDATIONS:  DAPT  Continue IV heparin GTT  Echocardiogram with contrast to rule out LV thrombus

## 2021-01-27 NOTE — ASSESSMENT & PLAN NOTE
Secondary to stemi  S/p heart cath with thrombectomy  S/p Amio drip-->cont PO amio  Mg goal > 2  K goal > 4

## 2021-01-27 NOTE — PROGRESS NOTES
TR Band  2320: 2 cc let out. No bleeding present  @2338:2 cc let out.  No bleeding present  @ 2353: 2 cc let out.  No bleeding present  @00:08: 2 cc let out.  No bleeding present  @ 00:23 : 2 cc let out.  No bleeding present  @ 0038: 2 cc let out.  No bleeding present  TR band taken off @ 0038. No bleeding/ hematoma present

## 2021-01-27 NOTE — CONSULTS
Cardiology Initial Consultation    Date of Service  1/26/2021    Referring Physician  LIBORIO Mosqueda II*    Reason for Consultation  STEMI    History of Presenting Illness  Hi Montes De Oca is a 75 y.o. male with a past medical history of CAD, PCI 2012 proximal LAD, mid circumflex, hypertension, dyslipidemia who was transferred from [place], [place] after presenting with an acute inferior/posterior STEMI.    Reportedly developed chest pain approximately 2:30 PM.  Presented to [facility] ER around 3:30 PM.  Apparently stabilized on heparin.  Received 324 mg aspirin.  Immediately prior to transfer patient began seizing and was found to be in ventricular fibrillation.  Received immediate CPR, IV epinephrine x2, defibrillation x2, ROSC and subsequent purposeful movement but agitated and required sedation and intubation.  In route has required IV Levophed.  IV heparin continued.  Sedated with intermittent ketamine and fentanyl.    Review of Systems  Review of Systems   Unable to perform ROS: Intubated       Past Medical History   has a past medical history of Arthritis, CATARACT (2006), Heart murmur (1974), Hypertension, Myocardial infarct (HCC) (7/11/2012), Pain, Renal disorder, Unspecified urinary incontinence, and Urinary bladder disorder.    Surgical History   has a past surgical history that includes pr lap,cholecystectomy (2001); arthroscopy, knee; tonsillectomy; appendectomy (1958); vasectomy (1984); other orthopedic surgery (3/1/2011); knee arthroplasty total (3/1/2011); and recovery (12/21/2012).    Family History  family history includes Heart Attack in his father; Heart Failure in his mother.    Social History   reports that he has never smoked. His smokeless tobacco use includes chew. He reports that he does not drink alcohol or use drugs.    Medications  Prior to Admission Medications   Prescriptions Last Dose Informant Patient Reported? Taking?   amLODIPine (NORVASC) 5 MG  Tab   Yes No   Sig: Take 5 mg by mouth every day.   citalopram (CELEXA) 40 MG TABS   Yes No   Sig: Take 40 mg by mouth every day.    Indications: Panic Disorder   clopidogrel (PLAVIX) 75 MG TABS   Yes No   Sig: Take 75 mg by mouth every day.    Indications: Heart Attack   finasteride (PROSCAR) 5 MG TABS   Yes No   Sig: Take 5 mg by mouth every day.   losartan (COZAAR) 50 MG Tab   Yes No   Sig: Take 50 mg by mouth every day.   magnesium oxide (MAG-OX) 400 MG TABS   Yes No   Sig: Take 400 mg by mouth every day.     metoprolol SR (TOPROL XL) 50 MG TB24   Yes No   Sig: Take 50 mg by mouth every day.   nitroglycerin (NITROSTAT) 0.4 MG SUBL   Yes No   Sig: Place 0.4 mg under tongue every 5 minutes as needed.     simvastatin (ZOCOR) 10 MG TABS   Yes No   Sig: Take 10 mg by mouth every evening.     topiramate (TOPAMAX) 25 MG Tab   No No   Sig: Take 2 Tabs by mouth every bedtime.      Facility-Administered Medications: None       Allergies  No Known Allergies    Vital signs in last 24 hours       Physical Exam  Physical Exam  Constitutional:       General: He is not in acute distress.     Comments: Intubated.  Sedated.  Unresponsive.   HENT:      Head: Normocephalic.   Eyes:      General: No scleral icterus.     Conjunctiva/sclera: Conjunctivae normal.      Comments: Pupils pinpoint   Neck:      Thyroid: No thyromegaly.      Vascular: No carotid bruit.      Comments: Normal jugular venous pressure.  Cardiovascular:      Rate and Rhythm: Normal rate and regular rhythm.      Pulses:           Carotid pulses are 2+ on the right side and 1+ on the left side.       Radial pulses are 2+ on the right side and 2+ on the left side.        Femoral pulses are 2+ on the right side and 2+ on the left side.       Posterior tibial pulses are 1+ on the right side and 1+ on the left side.      Heart sounds: S1 normal and S2 normal. No murmur. No friction rub. No gallop.    Pulmonary:      Effort: Pulmonary effort is normal.      Breath  sounds: Normal breath sounds. No wheezing, rhonchi or rales.   Abdominal:      General: Bowel sounds are normal. There is no abdominal bruit.      Palpations: Abdomen is soft. There is no mass or pulsatile mass.      Tenderness: There is no abdominal tenderness.      Comments: Tortuous right upper quadrant scar   Musculoskeletal:      Comments: Left knee scar   Lymphadenopathy:      Cervical: No cervical adenopathy.   Skin:     General: Skin is dry.      Nails: There is no clubbing.        Comments: Feet cold   Psychiatric:         Behavior: Behavior normal.         Lab Review  Lab Results   Component Value Date/Time    WBC 5.9 12/22/2012 03:57 AM    RBC 4.27 (L) 12/22/2012 03:57 AM    HEMOGLOBIN 13.1 (L) 12/22/2012 03:57 AM    HEMATOCRIT 40.0 (L) 12/22/2012 03:57 AM    MCV 93.6 12/22/2012 03:57 AM    MCH 30.7 12/22/2012 03:57 AM    MCHC 32.8 12/22/2012 03:57 AM    MPV 8.2 12/22/2012 03:57 AM      Lab Results   Component Value Date/Time    SODIUM 133 (L) 12/22/2012 03:57 AM    POTASSIUM 3.5 (L) 12/22/2012 03:57 AM    CHLORIDE 105 12/22/2012 03:57 AM    CO2 20 12/22/2012 03:57 AM    GLUCOSE 107 (H) 12/22/2012 03:57 AM    BUN 12 12/22/2012 03:57 AM    CREATININE 1.03 12/22/2012 03:57 AM      No results found for: ASTSGOT, ALTSGPT  No results found for: CHOLSTRLTOT, LDL, HDL, TRIGLYCERIDE, TROPONINT    No results for input(s): NTPROBNP in the last 72 hours.    Cardiac Imaging and Procedures Review  EKG:  My personal interpretation of the EKG dated 1/26/2021 is normal sinus rhythm inferior posterior injury current.  PVC.    Cardiac Catheterization: :  12/21/2012  REFERRING PHYSICIAN:  Dr. Amber Franz in [place].  PREOPERATIVE DIAGNOSES:  1.  Coronary artery disease with class III Gage cardiovascular [employer]   classification angina.  2.  Status post recent non-ST segment elevation myocardial infarction in July 2012.  3.  Hypertension.  4.  Dyslipidemia.  5.  Benign prostatic hypertrophy in need of prostate  surgery.  POSTOPERATIVE DIAGNOSES:  1.  Two-vessel coronary artery disease with 70-80% proximal left anterior   descending artery stenosis, 60-70% stenosis in the mid left circumflex artery   and 80-90% distal left circumflex artery stenoses.  2.  Status post stenting of the proximal left anterior descending artery with   3.5 mm bare metal stent and 2.25 bare metal stent x2 to the mid and distal   left circumflex artery.  3.  Hypertension.  4.  Dyslipidemia.  5.  Benign prostatic hypertrophy, in need of prostate surgery in the near   future.    Imaging  Chest X-Ray: 1/26/2021  No acute pulmonary abnormality.  Cardiomegaly.    Assessment/Plan  1.  STEMI inferior posterior  2.  Cardiac arrest ventricular fibrillation  3.  Cardiogenic shock on vasopressor.  4.  VDRF  5.  PCI proximal LAD 3.5 x 12 mm BRANDI 12/21/2012  6.  PCI mid/distal circumflex 2.25 x 12 mm/2.25 x 15 mm BMS.  12/21/2012  7.  Hypertension  8.  Dyslipidemia.    Recommendation Discussion  1.  Emergent cardiac catheterization.  2.  IV normal saline 500 cc fluid bolus.  3.  Continue IV Levophed  4.  Continue IV heparin.  5.  Received 324 mg p.o. aspirin prearrival.    Thank you for allowing me to participate in the care of this patient.    Please contact me with any questions.    Gutierrez Montoya M.D.   Cardiologist, [facility]  [phone]

## 2021-01-27 NOTE — ASSESSMENT & PLAN NOTE
Secondary to cardiac arrest with vtach  Intubated on 1/26, extubated on 1/28  Cont RT/O2 protocols  Wean O2 as tolerated

## 2021-01-27 NOTE — PROGRESS NOTES
Reason for consultation /admission : STEMI    Underwent thrombectomy PLB of RCA last PM  No stent placed  Previously placed LAD stent was patent  No VT but has been in AF with normal vent rate since admission  Sedated on vent  BP low normal  HR 70 or so  On IV heparin  Not on antiarrhythmic    EKG from [facility] showed SR      Review of systems; unable to perform due to sedation and intubation    Pulse:  [58-86] 69  Resp:  [18-25] 20  BP: ()/(59-86) 104/68  SpO2:  [92 %-100 %] 100 %  GENERAL not in acute distress, not dyspnic at rest  Head atraumatic, normocephalic  Eyes no dischaarge  ENT neck supple, no JVD, no carotid bruits or thyromegaly  Lung good expansion, distant sound, no rales or wheezing  Heart RRR, normal rate, no murmur, no gallop or rub  Abd soft, no mass or bruits  Ext no edema  Skin no ecchymosis or petechiae  Musculoskeletal no deformity  Neuro unable to perform     Monitor reviewed by me showed AF but no significant ventricular dysrhythmias.    Labs: Cr 0.9, K+ 4  Hb 14.8 Plt 531      Assessment and plans    1. Acute inf MI, s/p thrombectomy of posterolateral branch of RCA  Stable, BP low NL probably mostly from sedation  Continue DAPT and statin  Metoprolol as BP and HR allows  ? Coronary embolism from PAFwith now AF    2. AF, not previously known  On IV heparin, Will swicth to Eliquis after extubate  Start amiodarone    3. Anticoagulation for AF  Now on triple Rx. Will d/c ASA in 1-2 weeks depends on bleeding status    4. S/p OOH VF. Primary VF due to STEMI  Should not require ICD or long tern antiarrhythmic unless VT recurs  Per RN was awake without sedation    Will follow    Please note that this dictation was created using voice recognition software. I have worked with consultants from the vendor as well as technical experts from [email or web address] to optimize the interface. I have made every reasonable attempt to correct obvious errors, but I expect that there are errors of grammar  and possibly content I did not discover before finalizing the note

## 2021-01-27 NOTE — PROCEDURES
Procedures  Procedure: right internal jugular central line insertion, us guided    : Dr Girard    Reason: Shock requiring vasopressors and respiratory failure with hypoxia    The patient's right neck region was prepped and draped in sterile fashion using chlorhexidine scrub. Anesthesia was achieved with 1% lidocaine. The right internal jugular vein was accessed under ultrasound guidance using a finder needle Venous blood was withdrawn and the needle was withdrawn after a guidewire was advanced through the needle catheter. A small incision was made with a blade scalpel and the needle was exchanged for a dilator over the guidewire until appropriate dilation was obtained. The dilator was removed and a central venous triple-lumen catheter was advanced over the guidewire and secured into place with 2 sutures at 17 cm. At time of procedure completion, all ports aspirated and flushed properly. Post-procedure x-ray pending.  Sterile procedure done throughout entire procedure by all participating.    Complications: none    Blood loss: < 3 cc

## 2021-01-27 NOTE — CONSULTS
Critical Care Consultation    Date of consult: 1/26/2021    Referring Physician  LIBORIO Mosqueda II*    Reason for Consultation  STEMI and cardiac arrest    History of Presenting Illness  75 y.o. male who presented 1/26/2021 with transfer from [facility] for posterior STEMI.  Presented with chest pain.  On asa and heparin drip.  V tach arrest prior to transfer and intubated.  2 defibrillations and 2 rounds of epinephrine with cpr.  ROSC with confusion but awake.  On levophed prior to arrival.  No thrombolytics given.  He went to cath lab and had 100% occlusion right posterior lateral branch and patent prior lad stent.  Had pca with thrombectomy of occluded vessel with federico 3 flow with no residual stenosis.  EF 40%.  ON DAPT and on heparin drip.  ECHO with contrast pending to rule out LV thrombus ordered per cards.  ON ventilator. Propofol drip.      Code Status  Full code    Review of Systems  Review of Systems   Unable to perform ROS: Intubated       Past Medical History   has a past medical history of Arthritis, CATARACT (2006), Heart murmur (1974), Hypertension, Myocardial infarct (HCC) (7/11/2012), Pain, Renal disorder, Unspecified urinary incontinence, and Urinary bladder disorder.    Surgical History   has a past surgical history that includes pr lap,cholecystectomy (2001); arthroscopy, knee; tonsillectomy; appendectomy (1958); vasectomy (1984); other orthopedic surgery (3/1/2011); knee arthroplasty total (3/1/2011); and recovery (12/21/2012).    Family History  family history includes Heart Attack in his father; Heart Failure in his mother.    Social History   reports that he has never smoked. His smokeless tobacco use includes chew. He reports that he does not drink alcohol or use drugs.    Medications  Home Medications    **Home medications have not yet been reviewed for this encounter**       Current Facility-Administered Medications   Medication Dose Route Frequency Provider Last Rate Last  Admin   • HEPARIN SODIUM (PORCINE) 1000 UNIT/ML INJ SOLN            • EPTIFIBATIDE 20 MG/10ML IV SOLN            • HEPARIN SODIUM (PORCINE) 1000 UNIT/ML INJ SOLN            • FENTANYL CITRATE (PF) 0.05 MG/ML INJ SOLN (WRAPPED)            • MIDAZOLAM HCL 2 MG/2ML INJ SOLN (WRAPPED)            • iohexol (OMNIPAQUE) 350 mg/mL  1-200 mL Intra-arterial Once Jose Palumbo M.D.       • propofol (DIPRIVAN) injection  0-80 mcg/kg/min Intravenous Continuous Juan Carlos Araya II, M.D. 7.8 mL/hr at 01/26/21 2013 15 mcg/kg/min at 01/26/21 2013     Current Outpatient Medications   Medication Sig Dispense Refill   • amLODIPine (NORVASC) 5 MG Tab Take 5 mg by mouth every day.     • losartan (COZAAR) 50 MG Tab Take 50 mg by mouth every day.     • topiramate (TOPAMAX) 25 MG Tab Take 2 Tabs by mouth every bedtime. 180 Tab 3   • metoprolol SR (TOPROL XL) 50 MG TB24 Take 50 mg by mouth every day.     • finasteride (PROSCAR) 5 MG TABS Take 5 mg by mouth every day.     • citalopram (CELEXA) 40 MG TABS Take 40 mg by mouth every day.    Indications: Panic Disorder     • clopidogrel (PLAVIX) 75 MG TABS Take 75 mg by mouth every day.    Indications: Heart Attack     • nitroglycerin (NITROSTAT) 0.4 MG SUBL Place 0.4 mg under tongue every 5 minutes as needed.       • simvastatin (ZOCOR) 10 MG TABS Take 10 mg by mouth every evening.       • magnesium oxide (MAG-OX) 400 MG TABS Take 400 mg by mouth every day.           Allergies  No Known Allergies    Vital Signs last 24 hours       Physical Exam  Physical Exam  Vitals signs and nursing note reviewed.   Constitutional:       General: He is not in acute distress.     Appearance: He is not ill-appearing, toxic-appearing or diaphoretic.      Comments: On vent sedated   HENT:      Head: Normocephalic and atraumatic.      Right Ear: External ear normal.      Left Ear: External ear normal.      Nose: No congestion or rhinorrhea.      Mouth/Throat:      Mouth: Mucous membranes are moist.       Pharynx: Oropharynx is clear. No oropharyngeal exudate or posterior oropharyngeal erythema.   Eyes:      General: No scleral icterus.        Right eye: No discharge.         Left eye: No discharge.      Extraocular Movements: Extraocular movements intact.      Conjunctiva/sclera: Conjunctivae normal.      Pupils: Pupils are equal, round, and reactive to light.   Neck:      Musculoskeletal: Normal range of motion. No neck rigidity or muscular tenderness.   Cardiovascular:      Rate and Rhythm: Normal rate and regular rhythm.      Pulses: Normal pulses.      Heart sounds: Normal heart sounds. No murmur.   Pulmonary:      Effort: No respiratory distress.      Breath sounds: No stridor. No wheezing, rhonchi or rales.   Chest:      Chest wall: No tenderness.   Abdominal:      General: There is no distension.      Palpations: There is no mass.      Tenderness: There is no abdominal tenderness. There is no guarding.   Musculoskeletal:         General: No swelling, tenderness or deformity.      Right lower leg: No edema.      Left lower leg: No edema.   Lymphadenopathy:      Cervical: No cervical adenopathy.   Skin:     Coloration: Skin is not jaundiced or pale.      Findings: No bruising, erythema, lesion or rash.   Neurological:      Cranial Nerves: No cranial nerve deficit.      Sensory: No sensory deficit.      Motor: No weakness.      Comments: Intubated and sedated         Fluids  No intake or output data in the 24 hours ending 21 2016    Laboratory  Recent Results (from the past 48 hour(s))   EKG    Collection Time: 21  7:26 PM   Result Value Ref Range    Report       [facility]    Test Date:  2021  Pt Name:    MARIAM VANESSA              Department: ER  MRN:        [id number]                      Room:       [facility]  Gender:     Male                         Technician: [id number]  :        1945                   Requested By:KHALIF STOUT  Order #:     [id number]                    Reading MD: Juan Carlos Araya II, MD    Measurements  Intervals                                Axis  Rate:       84                           P:  AK:                                      QRS:        51  QRSD:       90                           T:          96  QT:         348  QTc:        412    Interpretive Statements  ATRIAL FIBRILLATION  Normal rate of 84  MULTIFORM VENTRICULAR PREMATURE COMPLEXES  ST depression in V1-V3. Diffuse twave flattening.  Impression: Atrial fibrillation with findings very concerning for acute  posterior MI.  Compared to ECG 12/22/2012 08:03:56  Ventricular premature complex(es) now present  Low  QRS voltage now present  Possible ischemia now present  Sinus rhythm no longer present  First degree AV block no longer present  Prolonged QT interval no longer present  Electronically Signed On 1- 19:48:52 PST by Juan Carlos Araya II, MD         Imaging  DX-CHEST-PORTABLE (1 VIEW)   Final Result      Limited examination with endotracheal tube in place.      Bibasilar atelectasis.      OUTSIDE IMAGES-DX CHEST   Final Result      CL-LEFT HEART CATHETERIZATION WITH POSSIBLE INTERVENTION    (Results Pending)   CL-LEFT HEART CATHETERIZATION WITH POSSIBLE INTERVENTION    (Results Pending)       Assessment/Plan  V-tach ([facility])  Assessment & Plan  Secondary to stemi, now post heart cath with thrombectomy      Cardiac arrest ([facility])  Assessment & Plan  From vtach secondary to stemi  Stent placed  Responsive post arrest per report  No hypothermic cooling required  S/p cath    Acute respiratory failure with hypoxia ([facility])  Assessment & Plan  Secondary to cardiac arrest with vtach  Propofol and fentanyl drips for sedation  Rt/o2 protocols    STEMI (ST elevation myocardial infarction) ([facility])  Assessment & Plan  Right posterior, thrombectomy  Prior hx lad stent that is open  LVEF 40% per cath  Pending echo  On ventilator  dapt  Statin  On bb, losartan, amlodipine, statin  outpt  Heparin drip  Hold bb as on pressors    Central line placed, per my bedside echo IVC no significant variation, no significant overload.  1 L IVF overnight once.  No LV clot noted, pending formal echo.    Discussed patient condition and risk of morbidity and/or mortality with Hospitalist, RN, RT, Pharmacy, Code status disscussed, Charge nurse / hot rounds, Patient and cardiology.      The patient remains critically ill.  On mechanical ventilator.  Propofol and fentanyl drips for sedation.  Levophed for map > 65 and sbp > 90.  Critical care time = 55 minutes in directly providing and coordinating critical care and extensive data review.  No time overlap and excludes procedures.

## 2021-01-27 NOTE — ASSESSMENT & PLAN NOTE
Right posterior lateral thrombectomy with LONNIE 3 flow-->some reperfusion rhythms noted  Prior hx lad stent that is open  LVEF 40% per cath  ASA/Plavix/Statin  BB   S/p heparin for 48 hours

## 2021-01-27 NOTE — DISCHARGE PLANNING
Medical Social Work    Referral: STEMI Transfer    Intervention: Pt is a 75 year old male brought in by [employer] from [facility] for STEMI.  Pt is Hi Montes De Oca (: 1945).  Per flight crew pt's family will be coming but not until tomorrow.  Per chart pt's sonStevie can be reached at: [phone].  Pt to cath lab per Cardiology.    Plan: SW will follow as needed.

## 2021-01-27 NOTE — HOSPITAL COURSE
Mr. Montes De Oca is a 75 year old male with the past medical history of CAD s/p PCI in 2012 to proximal LAD and mid circumflex, hypertension, and dyslipidemia who was transferred from [facility] in [place], [place] after he presented with chest pain and was found to be having an acute inferior/posterior STEMI.  Prior to transport, the patient had a ventricular fibrillation arrest requiring CPR, epinephrine x 2, defibrillation x 2, and intubated.  The patient went immediately to cath lab s/p thrombectomy of the right posterior lateral branch.  He remains intubated.

## 2021-01-27 NOTE — ED PROVIDER NOTES
ED Provider Note     1/26/2021  7:46 PM    Means of Arrival: EMS transfer via flight from [facility]  History obtained by: EMS and outside facility  Limitations: intubated  PCP: Mario Lindquist  CODE STATUS: Full    CHIEF COMPLAINT  Acute MI    HPI  Hi Montes De Oca is a 75 y.o. male history of hypertension, prior coronary stenting of his LAD and circumflex who presents via transfer from [facility] for posterior STEMI.  He presented to their facility with complaints of chest pain.  EKG showed significant ST depressions in V1 V2 V3.  He was given aspirin, heparin infusion started and arranged were made for transfer to our facility.  While preparing him to be transported he went into V. tach arrest.  He required defibrillation x2.  He was given 2 rounds of epinephrine.  He did awaken after ROSC but was confused with decreased level consciousness.  Decision made to intubate for airway protection.  Norepinephrine infusion was started for hypotension.  Thrombolytics were not given since air transportation was there so quickly.    REVIEW OF SYSTEMS  Review of Systems   Unable to perform ROS: Critical illness     See HPI for further details.    PAST MEDICAL HISTORY   has a past medical history of Arthritis, CATARACT (2006), Heart murmur (1974), Hypertension, Myocardial infarct (HCC) (7/11/2012), Pain, Renal disorder, Unspecified urinary incontinence, and Urinary bladder disorder.    SOCIAL HISTORY  Social History     Tobacco Use   • Smoking status: Never Smoker   • Smokeless tobacco: Current User     Types: Chew   • Tobacco comment: chews snuff / tobacco   Substance and Sexual Activity   • Alcohol use: No   • Drug use: No   • Sexual activity: Not on file       SURGICAL HISTORY   has a past surgical history that includes lap,cholecystectomy (2001); arthroscopy, knee; tonsillectomy; appendectomy (1958); vasectomy (1984); other orthopedic surgery (3/1/2011); knee arthroplasty total (3/1/2011); and recovery  "(2012).    CURRENT MEDICATIONS  Home Medications    **Home medications have not yet been reviewed for this encounter**         ALLERGIES  No Known Allergies    PHYSICAL EXAM  VITAL SIGNS: Ht 1.803 m (5' 11\")   Wt 87 kg (191 lb 12.8 oz)   BMI 26.75 kg/m²   See nursing documentation for full vitals  Pulse ox interpretation: I interpret this pulse ox as normal, with good waveform.  Constitutional: Intubated and sedated  HENT: No signs of trauma, Bilateral external ears normal, Nose normal.  Endotracheal tube in place.  Eyes: Pupils are equal and reactive bilaterally, Conjunctiva normal, Non-icteric.   Neck: Normal range of motion, No tenderness, Supple, No stridor.  Mild JVD bilaterally  Cardiovascular: Irregularly irregular rhythm with a normal rate and rhythm, no murmurs. Symmetric distal pulses. No cyanosis of extremities. No peripheral edema of extremities.  Thorax & Lungs: Clear breath sounds bilaterally, equal chest rise.  Abdomen: Old abdominal surgical scars.  Abdomen is soft and nondistended.  Skin: Warm, Dry, No erythema, No rash.   Musculoskeletal: No deformities  Neurologic: Sedated, no spontaneous movements  Psychiatric: Unable to evaluate  Physical Exam      DIAGNOSTIC STUDIES / PROCEDURES    EKG  Results for orders placed or performed during the hospital encounter of 21   EKG   Result Value Ref Range    Report       [facility] Emergency Dept.    Test Date:  2021  Pt Name:    MARIAM VANESSA              Department: ER  MRN:        [id number]                      Room:       [facility]  Gender:     Male                         Technician: [id number]  :        1945                   Requested By:KHALIF STOUT  Order #:    [id number]                    Reading MD: Juan Carlos Araya II, MD    Measurements  Intervals                                Axis  Rate:       84                           P:  MS:                                      QRS:        51  QRSD:       " 90                           T:          96  QT:         348  QTc:        412    Interpretive Statements  ATRIAL FIBRILLATION  Normal rate of 84  MULTIFORM VENTRICULAR PREMATURE COMPLEXES  ST depression in V1-V3. Diffuse twave flattening.  Impression: Atrial fibrillation with findings very concerning for acute  posterior MI.  Compared to ECG 12/22/2012 08:03:56  Ventricular premature complex(es) now present  Low  QRS voltage now present  Possible ischemia now present  Sinus rhythm no longer present  First degree AV block no longer present  Prolonged QT interval no longer present  Electronically Signed On 1- 19:48:52 PST by Juan Carlos Araya II, MD         LABS  Pertinent Labs & Imaging studies reviewed. (See chart for details)    RADIOLOGY  Pertinent Labs & Imaging studies reviewed. (See chart for details)    COURSE & MEDICAL DECISION MAKING  Pertinent Labs & Imaging studies reviewed. (See chart for details)    7:46 PM This is an emergent evaluation of a  75 y.o. male with known cardiac disease now with acute MI, likely posterior STEMI status post cardiac arrest with V. tach.  ROSC achieved and was intubated for airway protection.  On norepinephrine and our initial blood pressure here between high 80s and 90s systolic.  Will be given a 500 cc bolus of saline.  Heparin is infusing.  EKG here consistent with previous with findings concerning for posterior MI.  Dr. Pineda, cardiology, at bedside.  Plan to take to Cath Lab for intervention.  We will continue propofol for sedation.  Chest x-ray was done to confirm endotracheal tube placement, there is no significant findings of mediastinal widening or fluid overload.  Anticipate ICU hospitalization following procedure.    Cardiac monitoring and pulse ox placed.  Cardiac monitoring shows a irregular irregular rhythm at a normal rate in the 80s.  Pulse ox with good waveform and saturations in the upper 90s.      CRITICAL CARE  The very real possibilty of a  deterioration of this patient's condition required the highest level of my preparedness for sudden, emergent intervention.  I provided critical care services, which included medication orders, frequent reevaluations of the patient's condition and response to treatment, ordering and reviewing test results, and discussing the case with various consultants.  The critical care time associated with the care of the patient was 30 minutes. Review chart for interventions. This time is exclusive of any other billable procedures.          FINAL IMPRESSION    ICD-10-CM   1. Acute ST elevation myocardial infarction (STEMI), unspecified artery ([facility])  I21.3   2. Acute respiratory failure, unspecified whether with hypoxia or hypercapnia ([facility])  J96.00   3. Cardiac arrest ([facility])  I46.9            This dictation was created using voice recognition software. The accuracy of the dictation is limited to the abilities of the software. I expect there may be some errors of grammar and possibly content. The nursing notes were reviewed and certain aspects of this information were incorporated into this note.    Electronically signed by: Juan Carlos Araya II, M.D., 1/26/2021 7:46 PM

## 2021-01-27 NOTE — ASSESSMENT & PLAN NOTE
From vtach secondary to stemi  S/p cath with stent placed  Responsive post arrest per report  No hypothermic cooling required  Cont supportive care.

## 2021-01-27 NOTE — DISCHARGE PLANNING
"Care Transition Team Assessment  NOK dtr Vera @ [phone].  Lsw spoke to dtr, see above, and collected information below. Pt lives w/ his exwife. She is his roommate and assists w/ medication management. Pt has [facility] listed as his pharmacy in [place].    Pt is independent w/ I/ADLs including driving his private vehicle for transportation. Pt has no DME. Pt is sober (ETOH) since 1982. He attended a support group and had therapy in [place] to become sober. Pt had depression 10+ years ago when he was dxed w/ prostate cancer. He was prescribed prozac (?) then, but has not had this concern since.    Dtr indicates she was told by her parents years ago she was the POA. She does not have a copy of this, but believes her mom could acquire the document. Dtr [1st emergency contact on face sheet] indicates it is highly possible her brother [second emergency contact on face sheet] is on the POA as well. Dtr indicates they both/all get along very well. Everyone in the family is friendly with each other.        Information Source  Orientation : Unable to Assess  Information Given By: Relative  Informant's Name: Vera dawkins's dtr(phone [phone])  Who is responsible for making decisions for patient? : Patient    Readmission Evaluation  Is this a readmission?: No(last d/c December 2012 from T[facility])         Interdisciplinary Discharge Planning  Primary Care Physician: Mario Lindquist MD  Lives with - Patient's Self Care Capacity: (roomate/CG is pt's exwife Kimberly)  Support Systems: Family Member(s)(dtr, son and exwife)  Housing / Facility: (mobile home over a basement)  Do You Take your Prescribed Medications Regularly: (nitroglycerin? dtr said \"mom helps him w/ nitrous\"  )  Mobility Issues: No  Durable Medical Equipment: Not Applicable    Discharge Preparedness  What are your discharge supports?: (exwife is roommate and CG when needed)  Prior Functional Level: Ambulatory, Drives Self, Independent with Activities of Daily " Living, Needs Assist with Medication Management  Difficulity with IADLs: (exwife assists w/ med management)    Functional Assesment  Prior Functional Level: Ambulatory, Drives Self, Independent with Activities of Daily Living, Needs Assist with Medication Management    Finances  Prescription Coverage: Yes([facility] in [place])                        Psychological Assessment  History of Substance Abuse: (sober ETOH since 1982, support group/counseling)  History of Psychiatric Problems: (Depression 10+ yrs ago dx prostatecancer and med prozac?)         Anticipated Discharge Information  Discharge Address: physical address: [address] [place] [address]

## 2021-01-27 NOTE — PROGRESS NOTES
Critical Care Progress Note    Date of admission  1/26/2021    Chief Complaint  75 y.o. male admitted 1/26/2021 with STEMI and ventricular fibrillation arrest.    Hospital Course  Mr. Montes De Oca is a 75 year old male with the past medical history of CAD s/p PCI in 2012 to proximal LAD and mid circumflex, hypertension, and dyslipidemia who was transferred from [facility] in [place], [place] after he presented with chest pain and was found to be having an acute inferior/posterior STEMI.  Prior to transport, the patient had a ventricular fibrillation arrest requiring CPR, epinephrine x 2, defibrillation x 2, and intubated.  The patient went immediately to cath lab s/p thrombectomy of the right posterior lateral branch.  He remains intubated.      Interval Problem Update  Reviewed last 24 hour events:   - intubated last night for a STEMI s/p VT arrest    - following commands   - prop at 30, fent at 100   - SR 60-70s with ectopy   - SBP 80-100s   - levo at 14   - afebrile   - OG that is clamped   - raya with marginal UOP overnight   - right IJ TLC   - NPO   - BM pta   - vent day #2   - CXR(reviewed): slight cephalizaiton   - FiO2 50%, PEEP 8   - asa/plavix   - heparin drip   - pepcid   - phos at 1.3   - Mg at 1.9    Review of Systems  Review of Systems   Unable to perform ROS: Intubated        Vital Signs for last 24 hours   Pulse:  [60-86] 75  Resp:  [18-25] 20  BP: ()/(59-86) 102/65  SpO2:  [92 %-100 %] 100 %    Hemodynamic parameters for last 24 hours       Respiratory Information for the last 24 hours  Vent Mode: APVCMV  Rate (breaths/min): 20  Vt Target (mL): 470  PEEP/CPAP: 8  MAP: 12  Control VTE (exp VT): 447    Physical Exam   Physical Exam  Vitals signs and nursing note reviewed.   Constitutional:       General: He is not in acute distress.     Appearance: He is normal weight. He is ill-appearing. He is not toxic-appearing.   HENT:      Head: Normocephalic and atraumatic.      Right Ear:  External ear normal.      Left Ear: External ear normal.      Nose: Nose normal. No rhinorrhea.      Mouth/Throat:      Mouth: Mucous membranes are moist.      Comments: ETT in place  Eyes:      General: No scleral icterus.     Conjunctiva/sclera: Conjunctivae normal.      Pupils: Pupils are equal, round, and reactive to light.   Neck:      Musculoskeletal: Normal range of motion and neck supple.      Comments: Right IJ TLC  Cardiovascular:      Rate and Rhythm: Normal rate. Rhythm irregularly irregular.      Pulses: Normal pulses.      Heart sounds: Normal heart sounds. No murmur.   Pulmonary:      Effort: No respiratory distress.      Breath sounds: Normal breath sounds. No wheezing.   Chest:      Chest wall: No tenderness.   Abdominal:      General: There is no distension.      Palpations: Abdomen is soft.      Tenderness: There is abdominal tenderness. There is rebound. There is no guarding.   Genitourinary:     Comments: Paredes in place  Musculoskeletal: Normal range of motion.      Right lower leg: No edema.      Left lower leg: No edema.   Skin:     General: Skin is warm and dry.      Capillary Refill: Capillary refill takes less than 2 seconds.      Findings: No rash.   Neurological:      Cranial Nerves: No cranial nerve deficit.      Sensory: No sensory deficit.      Motor: No weakness.      Comments: Sedated, but follows   Psychiatric:      Comments: Unable to assess         Medications  Current Facility-Administered Medications   Medication Dose Route Frequency Provider Last Rate Last Admin   • propofol (DIPRIVAN) injection  0-80 mcg/kg/min Intravenous Continuous Juan Carlos Araya II, M.D. 15.7 mL/hr at 01/27/21 0432 30 mcg/kg/min at 01/27/21 0432   • clopidogrel (PLAVIX) tablet 75 mg  75 mg Oral DAILY Jose Palumbo M.D.   Stopped at 01/27/21 0600   • aspirin (ASA) chewable tab 81 mg  81 mg Oral DAILY Jose Palumbo M.D.   Stopped at 01/27/21 0600   • Respiratory Therapy Consult   Nebulization  Continuous RT Jakob Girard M.D.       • ipratropium-albuterol (DUONEB) nebulizer solution  3 mL Nebulization Q2HRS PRN (RT) Jakob Girard M.D.       • famotidine (PEPCID) tablet 20 mg  20 mg Enteral Tube Q12HRS Jakob Girard M.D.   Stopped at 01/27/21 0600    Or   • famotidine (PEPCID) injection 20 mg  20 mg Intravenous Q12HRS Jakob Girard M.D.   20 mg at 01/27/21 0001   • senna-docusate (PERICOLACE or SENOKOT S) 8.6-50 MG per tablet 2 Tab  2 Tab Enteral Tube BID Jakob Girard M.D.   2 Tab at 01/27/21 0606    And   • polyethylene glycol/lytes (MIRALAX) PACKET 1 Packet  1 Packet Enteral Tube QDAY PRN Jakob Girard M.D.        And   • magnesium hydroxide (MILK OF MAGNESIA) suspension 30 mL  30 mL Enteral Tube QDAY PRN Jakob Girard M.D.        And   • bisacodyl (DULCOLAX) suppository 10 mg  10 mg Rectal QDAY PRN Jakob Girard M.D.       • MD Alert...ICU Electrolyte Replacement per Pharmacy   Other PHARMACY TO DOSE Jakob Girard M.D.       • lidocaine (XYLOCAINE) 1 % injection 1-2 mL  1-2 mL Tracheal Tube Q30 MIN PRN Jakob Girard M.D.       • fentaNYL (SUBLIMAZE) injection 25 mcg  25 mcg Intravenous Q HOUR PRN Jakob Girard M.D.        Or   • fentaNYL (SUBLIMAZE) injection 50 mcg  50 mcg Intravenous Q HOUR PRN Jakob Girard M.D.   50 mcg at 01/26/21 2320    Or   • fentaNYL (SUBLIMAZE) injection 100 mcg  100 mcg Intravenous Q HOUR PRN Jakob Girard M.D.       • fentaNYL (SUBLIMAZE) 50 mcg/mL in 50mL   Intravenous Continuous Jakob Girard M.D. 2 mL/hr at 01/27/21 0109 2,500 mcg at 01/27/21 0153   • heparin infusion 25,000 units in 500 mL 0.45% NACL  0-30 Units/kg/hr Intravenous Continuous Jakob Girard M.D. 17.4 mL/hr at 01/27/21 0634 10 Units/kg/hr at 01/27/21 0634   • heparin injection 2,000 Units  2,000 Units Intravenous PRN Jakob Girard M.D.       • norepinephrine (Levophed) infusion 8 mg/250 mL (premix)  0-30 mcg/min Intravenous Continuous Jakob Girard M.D. 26.3 mL/hr at 01/27/21 0353 14  mcg/min at 01/27/21 0353       Fluids    Intake/Output Summary (Last 24 hours) at 1/27/2021 0707  Last data filed at 1/27/2021 0524  Gross per 24 hour   Intake 1235 ml   Output 455 ml   Net 780 ml       Laboratory  Recent Labs     01/26/21 2120 01/27/21  0243   ISTATAPH 7.277* 7.387*   ISTATAPCO2 41.5* 28.1   ISTATAPO2 75 129*   ISTATATCO2 21 18*   [id number] 93 99   ISTATARTHCO3 19.3 16.9*   ISTATARTBE -7* -7*   ISTATTEMP 34.1 C 36.6 C   ISTATFIO2 50 70   ISTATSPEC Arterial Arterial   ISTATAPHTC 7.317* 7.393*   [id number] 62* 127*         Recent Labs     01/26/21 1935 01/27/21  0545   SODIUM 140 135   POTASSIUM 3.1* 4.0   CHLORIDE 109 106   CO2 19* 19*   BUN 17 19   CREATININE 0.91 0.97   MAGNESIUM  --  1.9   PHOSPHORUS  --  1.3*   CALCIUM 9.0 8.3*     Recent Labs     01/26/21 1935 01/27/21  0545   ALTSGPT 574*  --    ASTSGOT 787*  --    ALKPHOSPHAT 147*  --    TBILIRUBIN 0.5  --    LIPASE 29  --    GLUCOSE 217* 172*     Recent Labs     01/26/21 1935 01/27/21  0545   WBC 21.1* 17.6*   NEUTSPOLYS 93.20* 79.00*   LYMPHOCYTES 3.40* 0.80*   MONOCYTES 1.70 10.10   EOSINOPHILS 0.90 0.80   BASOPHILS 0.90 1.70   ASTSGOT 787*  --    ALTSGPT 574*  --    ALKPHOSPHAT 147*  --    TBILIRUBIN 0.5  --      Recent Labs     01/26/21 1935 01/27/21  0006 01/27/21  0545   RBC 4.89  --  4.65*   HEMOGLOBIN 15.3  --  14.8   HEMATOCRIT 46.6  --  43.8   PLATELETCT 505*  --  531*   PROTHROMBTM 15.2* 16.3*  --    APTT 70.4* 148.8*  --    INR 1.16* 1.27*  --        Imaging  Reviewed    Assessment/Plan  Cardiogenic shock ([facility])  Assessment & Plan  Due to STEMI/VF arrest  Cont levo for MAP goal >65  Actively titrating Levo for MAP goal    V-tach ([facility])- (present on admission)  Assessment & Plan  Secondary to stemi  S/p heart cath with thrombectomy  Mg goal > 2  K goal > 4      STEMI (ST elevation myocardial infarction) ([facility])- (present on admission)  Assessment & Plan  Right posterior lateral thrombectomy with LONNIE 3 flow  Prior hx lad stent  that is open  LVEF 40% per cath  ECHO pending  On ventilator  ASA/Plavix/Statin  Cont heparin drip and monitor Xa levels    Persistent atrial fibrillation (HCC)- (present on admission)  Assessment & Plan  Cont amiodarone drip    Cardiac arrest (HCC)  Assessment & Plan  From [facility] secondary to stemi  S/p cath with stent placed  Responsive post arrest per report  No hypothermic cooling required  Cont supportive care      Acute respiratory failure with hypoxia (HCC)  Assessment & Plan  Secondary to cardiac arrest with vtach  Propofol and fentanyl drips for sedation  Rt/o2 protocols  Cont full vent support  SAT/SBTs       VTE:  Heparin  Ulcer: H2 Antagonist  Lines: Central Line  Ongoing indication addressed and Paredes Catheter  Ongoing indication addressed    I have performed a physical exam and reviewed and updated ROS and Plan today (1/27/2021). In review of yesterday's note (1/26/2021), there are no changes except as documented above.     Discussed patient condition and risk of morbidity and/or mortality with RN, RT, Pharmacy, Charge nurse / hot rounds, Patient and cardiology  The patient remains critically ill with ongoing titration of vasopressors for cardiogenic shock as well as active titration of mechanical ventilation settings for acute hypoxic respiratory failure.  Patient remains at high risk of clinical deterioration, worsening vital organ dysfunction, death without the above critical care interventions.    Critical care time = 42 minutes in directly providing and coordinating critical care and extensive data review.  No time overlap and excludes procedures.

## 2021-01-28 ENCOUNTER — APPOINTMENT (OUTPATIENT)
Dept: RADIOLOGY | Facility: MEDICAL CENTER | Age: 76
DRG: 250 | End: 2021-01-28
Attending: INTERNAL MEDICINE
Payer: MEDICARE

## 2021-01-28 LAB
ACTION RANGE TRIGGERED IACRT: NO
ANION GAP SERPL CALC-SCNC: 9 MMOL/L (ref 7–16)
BASE EXCESS BLDA CALC-SCNC: -4 MMOL/L (ref -4–3)
BASOPHILS # BLD AUTO: 1.1 % (ref 0–1.8)
BASOPHILS # BLD: 0.16 K/UL (ref 0–0.12)
BODY TEMPERATURE: NORMAL DEGREES
BUN SERPL-MCNC: 15 MG/DL (ref 8–22)
CALCIUM SERPL-MCNC: 7.7 MG/DL (ref 8.5–10.5)
CHLORIDE SERPL-SCNC: 104 MMOL/L (ref 96–112)
CO2 BLDA-SCNC: 20 MMOL/L (ref 20–33)
CO2 SERPL-SCNC: 21 MMOL/L (ref 20–33)
CREAT SERPL-MCNC: 0.82 MG/DL (ref 0.5–1.4)
EOSINOPHIL # BLD AUTO: 0 K/UL (ref 0–0.51)
EOSINOPHIL NFR BLD: 0 % (ref 0–6.9)
ERYTHROCYTE [DISTWIDTH] IN BLOOD BY AUTOMATED COUNT: 46.5 FL (ref 35.9–50)
GLUCOSE SERPL-MCNC: 165 MG/DL (ref 65–99)
HCO3 BLDA-SCNC: 19.4 MMOL/L (ref 17–25)
HCT VFR BLD AUTO: 39.4 % (ref 42–52)
HGB BLD-MCNC: 13.2 G/DL (ref 14–18)
HOROWITZ INDEX BLDA+IHG-RTO: 211 MM[HG]
IMM GRANULOCYTES # BLD AUTO: 0.08 K/UL (ref 0–0.11)
IMM GRANULOCYTES NFR BLD AUTO: 0.5 % (ref 0–0.9)
INST. QUALIFIED PATIENT IIQPT: YES
LYMPHOCYTES # BLD AUTO: 2.16 K/UL (ref 1–4.8)
LYMPHOCYTES NFR BLD: 14.8 % (ref 22–41)
MAGNESIUM SERPL-MCNC: 2 MG/DL (ref 1.5–2.5)
MCH RBC QN AUTO: 31.5 PG (ref 27–33)
MCHC RBC AUTO-ENTMCNC: 33.5 G/DL (ref 33.7–35.3)
MCV RBC AUTO: 94 FL (ref 81.4–97.8)
MONOCYTES # BLD AUTO: 1.61 K/UL (ref 0–0.85)
MONOCYTES NFR BLD AUTO: 11 % (ref 0–13.4)
NEUTROPHILS # BLD AUTO: 10.57 K/UL (ref 1.82–7.42)
NEUTROPHILS NFR BLD: 72.6 % (ref 44–72)
NRBC # BLD AUTO: 0 K/UL
NRBC BLD-RTO: 0 /100 WBC
O2/TOTAL GAS SETTING VFR VENT: 35 %
PCO2 BLDA: 29.4 MMHG (ref 26–37)
PCO2 TEMP ADJ BLDA: 29.9 MMHG (ref 26–37)
PH BLDA: 7.43 [PH] (ref 7.4–7.5)
PH TEMP ADJ BLDA: 7.42 [PH] (ref 7.4–7.5)
PHOSPHATE SERPL-MCNC: 2.9 MG/DL (ref 2.5–4.5)
PLATELET # BLD AUTO: 435 K/UL (ref 164–446)
PMV BLD AUTO: 11.7 FL (ref 9–12.9)
PO2 BLDA: 74 MMHG (ref 64–87)
PO2 TEMP ADJ BLDA: 76 MMHG (ref 64–87)
POTASSIUM SERPL-SCNC: 3.2 MMOL/L (ref 3.6–5.5)
RBC # BLD AUTO: 4.19 M/UL (ref 4.7–6.1)
SAO2 % BLDA: 95 % (ref 93–99)
SODIUM SERPL-SCNC: 134 MMOL/L (ref 135–145)
SPECIMEN DRAWN FROM PATIENT: NORMAL
TRIGL SERPL-MCNC: 129 MG/DL (ref 0–149)
UFH PPP CHRO-ACNC: <0.1 IU/ML
WBC # BLD AUTO: 14.6 K/UL (ref 4.8–10.8)

## 2021-01-28 PROCEDURE — 770022 HCHG ROOM/CARE - ICU (200)

## 2021-01-28 PROCEDURE — 700102 HCHG RX REV CODE 250 W/ 637 OVERRIDE(OP): Performed by: INTERNAL MEDICINE

## 2021-01-28 PROCEDURE — 700111 HCHG RX REV CODE 636 W/ 250 OVERRIDE (IP): Performed by: INTERNAL MEDICINE

## 2021-01-28 PROCEDURE — 85520 HEPARIN ASSAY: CPT

## 2021-01-28 PROCEDURE — 700111 HCHG RX REV CODE 636 W/ 250 OVERRIDE (IP): Performed by: EMERGENCY MEDICINE

## 2021-01-28 PROCEDURE — 71045 X-RAY EXAM CHEST 1 VIEW: CPT

## 2021-01-28 PROCEDURE — 85025 COMPLETE CBC W/AUTO DIFF WBC: CPT

## 2021-01-28 PROCEDURE — 99291 CRITICAL CARE FIRST HOUR: CPT | Performed by: INTERNAL MEDICINE

## 2021-01-28 PROCEDURE — 94150 VITAL CAPACITY TEST: CPT

## 2021-01-28 PROCEDURE — 94760 N-INVAS EAR/PLS OXIMETRY 1: CPT

## 2021-01-28 PROCEDURE — 84478 ASSAY OF TRIGLYCERIDES: CPT

## 2021-01-28 PROCEDURE — 94799 UNLISTED PULMONARY SVC/PX: CPT

## 2021-01-28 PROCEDURE — 82803 BLOOD GASES ANY COMBINATION: CPT

## 2021-01-28 PROCEDURE — 700101 HCHG RX REV CODE 250: Performed by: INTERNAL MEDICINE

## 2021-01-28 PROCEDURE — 51798 US URINE CAPACITY MEASURE: CPT

## 2021-01-28 PROCEDURE — A9270 NON-COVERED ITEM OR SERVICE: HCPCS | Performed by: INTERNAL MEDICINE

## 2021-01-28 PROCEDURE — 99233 SBSQ HOSP IP/OBS HIGH 50: CPT | Performed by: INTERNAL MEDICINE

## 2021-01-28 PROCEDURE — 700105 HCHG RX REV CODE 258: Performed by: INTERNAL MEDICINE

## 2021-01-28 PROCEDURE — 84100 ASSAY OF PHOSPHORUS: CPT

## 2021-01-28 PROCEDURE — 94003 VENT MGMT INPAT SUBQ DAY: CPT

## 2021-01-28 PROCEDURE — 80048 BASIC METABOLIC PNL TOTAL CA: CPT

## 2021-01-28 PROCEDURE — 83735 ASSAY OF MAGNESIUM: CPT

## 2021-01-28 RX ORDER — AMOXICILLIN 250 MG
2 CAPSULE ORAL 2 TIMES DAILY
Status: DISCONTINUED | OUTPATIENT
Start: 2021-01-28 | End: 2021-02-03 | Stop reason: HOSPADM

## 2021-01-28 RX ORDER — BISACODYL 10 MG
10 SUPPOSITORY, RECTAL RECTAL
Status: DISCONTINUED | OUTPATIENT
Start: 2021-01-28 | End: 2021-02-03 | Stop reason: HOSPADM

## 2021-01-28 RX ORDER — POLYETHYLENE GLYCOL 3350 17 G/17G
1 POWDER, FOR SOLUTION ORAL
Status: DISCONTINUED | OUTPATIENT
Start: 2021-01-28 | End: 2021-02-03 | Stop reason: HOSPADM

## 2021-01-28 RX ORDER — CLOPIDOGREL BISULFATE 75 MG/1
75 TABLET ORAL DAILY
Status: DISCONTINUED | OUTPATIENT
Start: 2021-01-29 | End: 2021-02-03 | Stop reason: HOSPADM

## 2021-01-28 RX ORDER — POTASSIUM CHLORIDE 20 MEQ/1
40 TABLET, EXTENDED RELEASE ORAL EVERY 6 HOURS
Status: COMPLETED | OUTPATIENT
Start: 2021-01-28 | End: 2021-01-28

## 2021-01-28 RX ADMIN — DOCUSATE SODIUM 50 MG AND SENNOSIDES 8.6 MG 2 TABLET: 8.6; 5 TABLET, FILM COATED ORAL at 17:39

## 2021-01-28 RX ADMIN — AMIODARONE HYDROCHLORIDE 0.5 MG/MIN: 1.8 INJECTION, SOLUTION INTRAVENOUS at 07:30

## 2021-01-28 RX ADMIN — NOREPINEPHRINE BITARTRATE 16 MCG/MIN: 1 INJECTION INTRAVENOUS at 02:37

## 2021-01-28 RX ADMIN — POTASSIUM CHLORIDE 40 MEQ: 1500 TABLET, EXTENDED RELEASE ORAL at 17:39

## 2021-01-28 RX ADMIN — APIXABAN 5 MG: 5 TABLET, FILM COATED ORAL at 18:47

## 2021-01-28 RX ADMIN — POTASSIUM CHLORIDE 40 MEQ: 1500 TABLET, EXTENDED RELEASE ORAL at 13:10

## 2021-01-28 RX ADMIN — METOPROLOL TARTRATE 12.5 MG: 25 TABLET, FILM COATED ORAL at 17:39

## 2021-01-28 RX ADMIN — PROPOFOL 20 MCG/KG/MIN: 10 INJECTION, EMULSION INTRAVENOUS at 01:25

## 2021-01-28 RX ADMIN — ASPIRIN 81 MG: 81 TABLET, CHEWABLE ORAL at 05:08

## 2021-01-28 RX ADMIN — HEPARIN SODIUM 10 UNITS/KG/HR: 5000 INJECTION, SOLUTION INTRAVENOUS at 02:15

## 2021-01-28 RX ADMIN — CLOPIDOGREL BISULFATE 75 MG: 75 TABLET ORAL at 05:08

## 2021-01-28 RX ADMIN — AMIODARONE HYDROCHLORIDE 0.5 MG/MIN: 1.8 INJECTION, SOLUTION INTRAVENOUS at 18:15

## 2021-01-28 RX ADMIN — DOCUSATE SODIUM 50 MG AND SENNOSIDES 8.6 MG 2 TABLET: 8.6; 5 TABLET, FILM COATED ORAL at 05:08

## 2021-01-28 RX ADMIN — FAMOTIDINE 20 MG: 20 TABLET ORAL at 05:08

## 2021-01-28 RX ADMIN — ATORVASTATIN CALCIUM 80 MG: 80 TABLET, FILM COATED ORAL at 17:39

## 2021-01-28 RX ADMIN — AMIODARONE HYDROCHLORIDE 150 MG: 1.5 INJECTION, SOLUTION INTRAVENOUS at 18:05

## 2021-01-28 ASSESSMENT — CHA2DS2 SCORE
AGE 65 TO 74: NO
CHA2DS2 VASC SCORE: 3
VASCULAR DISEASE: NO
AGE 75 OR GREATER: YES
DIABETES: NO
HYPERTENSION: YES
SEX: MALE
PRIOR STROKE OR TIA OR THROMBOEMBOLISM: NO
CHF OR LEFT VENTRICULAR DYSFUNCTION: NO

## 2021-01-28 ASSESSMENT — COGNITIVE AND FUNCTIONAL STATUS - GENERAL
MOBILITY SCORE: 20
DRESSING REGULAR LOWER BODY CLOTHING: A LITTLE
SUGGESTED CMS G CODE MODIFIER MOBILITY: CJ
DAILY ACTIVITIY SCORE: 21
HELP NEEDED FOR BATHING: A LITTLE
MOVING FROM LYING ON BACK TO SITTING ON SIDE OF FLAT BED: A LITTLE
CLIMB 3 TO 5 STEPS WITH RAILING: A LITTLE
MOVING TO AND FROM BED TO CHAIR: A LITTLE
SUGGESTED CMS G CODE MODIFIER DAILY ACTIVITY: CJ
STANDING UP FROM CHAIR USING ARMS: A LITTLE
DRESSING REGULAR UPPER BODY CLOTHING: A LITTLE

## 2021-01-28 ASSESSMENT — PAIN DESCRIPTION - PAIN TYPE
TYPE: ACUTE PAIN
TYPE: ACUTE PAIN

## 2021-01-28 ASSESSMENT — PULMONARY FUNCTION TESTS: FVC: 1.2

## 2021-01-28 NOTE — PROGRESS NOTES
Patient passed swallow evaluation with water, no cough. Patient also tolerated pills whole with water. Plan to advance diet.

## 2021-01-28 NOTE — PROGRESS NOTES
Received report from Samreen VILLANUEVA. Pt sponting, sedation paused. Patient tolerating, calm and cooperative.

## 2021-01-28 NOTE — CARE PLAN
Problem: Safety  Goal: Will remain free from injury  Outcome: PROGRESSING AS EXPECTED  Goal: Will remain free from falls  Outcome: PROGRESSING AS EXPECTED  Intervention: Implement fall precautions  Flowsheets  Taken 1/28/2021 0425  Bed Alarm: Yes - Alarm On  Taken 1/27/2021 2000  Environmental Precautions:   Treaded Slipper Socks on Patient   Personal Belongings, Wastebasket, [employer] etc. in Easy Reach   Transferred to Stronger Side   Report Given to Other Health Care Providers Regarding Fall Risk   Bed in Low Position   Communication Sign for Patients & Families   Mobility Assessed & Appropriate Sign Placed     Problem: Skin Integrity  Goal: Risk for impaired skin integrity will decrease  Outcome: PROGRESSING AS EXPECTED  Intervention: Assess risk factors for impaired skin integrity and/or pressure ulcers  Note: Assess pt skin every shift and as needed.  Turn pt every 2 hours.  Keep skin clean and dry.  Use barrier creams as needed.

## 2021-01-28 NOTE — RESPIRATORY CARE
Ventilator Daily Summary     Vent Day # 3     APVCMV  RR: 20  VT: 470  PEEP: 8  FiO2: 50%          Plan: Continue current ventilator settings and wean mechanical ventilation as tolerated per physician orders.

## 2021-01-28 NOTE — PROGRESS NOTES
Critical Care Progress Note    Date of admission  1/26/2021    Chief Complaint  75 y.o. male admitted 1/26/2021 with STEMI and ventricular fibrillation arrest.    Hospital Course  Mr. Montes De Oca is a 75 year old male with the past medical history of CAD s/p PCI in 2012 to proximal LAD and mid circumflex, hypertension, and dyslipidemia who was transferred from [facility] in [place], [place] after he presented with chest pain and was found to be having an acute inferior/posterior STEMI.  Prior to transport, the patient had a ventricular fibrillation arrest requiring CPR, epinephrine x 2, defibrillation x 2, and intubated.  The patient went immediately to cath lab s/p thrombectomy of the right posterior lateral branch.  He remains intubated.  1/27: VD #2, levo at 12, wakes and follows  1/28: VD #3, levo at 1-2, SBT with excellent parameters-->extubated    Interval Problem Update  Reviewed last 24 hour events:   - no events overnight   - following all commands   - SAT this am and off propofol   - SBT and extubated   - amio at 0.5   - levo at 1-2   - a-fib 50-70s   - SBP 90-100s   - heparin drip   - Tamx 99.3   - BM pta   - UOP of 450cc overnight with raya   - mobilizing   - CXR(reviewed): left pleural effusion, mild cephalization   - O2 at 4 lpm NC   - heparin drip   - no abx   - K at 3.2   - WBCs 14    Yesterday's Events:   - intubated last night for a STEMI s/p VT arrest    - following commands   - prop at 30, fent at 100   - SR 60-70s with ectopy   - SBP 80-100s   - levo at 14   - afebrile   - OG that is clamped   - raya with marginal UOP overnight   - right IJ TLC   - NPO   - BM pta   - vent day #2   - CXR(reviewed): slight cephalizaiton   - FiO2 50%, PEEP 8   - asa/plavix   - heparin drip   - pepcid   - phos at 1.3   - Mg at 1.9    Review of Systems  Review of Systems   Unable to perform ROS: Intubated        Vital Signs for last 24 hours   Pulse:  [45-76] 63  Resp:  [9-25] 23  BP: ()/(54-69)  104/66  SpO2:  [98 %-100 %] 99 %    Hemodynamic parameters for last 24 hours       Respiratory Information for the last 24 hours  Vent Mode: APVCMV  Rate (breaths/min): 20  Vt Target (mL): 470  PEEP/CPAP: 8  MAP: 11  Control VTE (exp VT): 467    Physical Exam   Physical Exam  Vitals signs and nursing note reviewed.   Constitutional:       General: He is not in acute distress.     Appearance: He is normal weight. He is ill-appearing. He is not toxic-appearing.      Comments: Appears stated age   HENT:      Head: Normocephalic and atraumatic.      Right Ear: External ear normal.      Left Ear: External ear normal.      Nose: Nose normal. No rhinorrhea.      Mouth/Throat:      Mouth: Mucous membranes are moist.      Comments: ETT in place  Eyes:      General: No scleral icterus.     Conjunctiva/sclera: Conjunctivae normal.      Pupils: Pupils are equal, round, and reactive to light.   Neck:      Musculoskeletal: Normal range of motion and neck supple.      Comments: Right IJ TLC  Cardiovascular:      Rate and Rhythm: Normal rate. Rhythm irregularly irregular.      Pulses: Normal pulses.      Heart sounds: Normal heart sounds. No murmur.   Pulmonary:      Effort: No respiratory distress.      Breath sounds: Normal breath sounds. No wheezing.      Comments: Breathing comfortably on the vent during an SBT  Chest:      Chest wall: No tenderness.   Abdominal:      General: Bowel sounds are normal. There is no distension.      Palpations: Abdomen is soft.      Tenderness: There is no abdominal tenderness. There is no guarding or rebound.   Genitourinary:     Comments: Paredes in place  Musculoskeletal: Normal range of motion.      Right lower leg: No edema.      Left lower leg: No edema.   Skin:     General: Skin is warm and dry.      Capillary Refill: Capillary refill takes less than 2 seconds.      Findings: No rash.   Neurological:      Cranial Nerves: No cranial nerve deficit.      Sensory: No sensory deficit.      Motor:  No weakness.      Comments: Following all commands, strong cough, lifts head off bed, moving all x 4   Psychiatric:      Comments: Unable to assess         Medications  Current Facility-Administered Medications   Medication Dose Route Frequency Provider Last Rate Last Admin   • clopidogrel (PLAVIX) tablet 75 mg  75 mg Enteral Tube DAILY Eugenia Negron M.D.   75 mg at 01/28/21 0508   • aspirin (ASA) chewable tab 81 mg  81 mg Enteral Tube DAILY Eugenia Negron M.D.   81 mg at 01/28/21 0508   • atorvastatin (LIPITOR) tablet 80 mg  80 mg Oral Q EVENING Luz Sawant M.D.   80 mg at 01/27/21 1800   • metoprolol tartrate (LOPRESSOR) tablet 12.5 mg  12.5 mg Oral TWICE DAILY Luz Sawant M.D.   Stopped at 01/28/21 0600   • 5% dextrose infusion   Intravenous Continuous Luz Sawant M.D. 30 mL/hr at 01/27/21 1237 New Bag at 01/27/21 1237   • amiodarone (NEXTERONE) 360 mg/200 mL infusion  0.5-1 mg/min Intravenous Continuous Luz Sawant M.D. 17 mL/hr at 01/27/21 1748 0.5 mg/min at 01/27/21 1748   • propofol (DIPRIVAN) injection  0-80 mcg/kg/min Intravenous Continuous Juan Carlos Araya II, M.D.   Stopped at 01/28/21 0650   • Respiratory Therapy Consult   Nebulization Continuous RT Jakob Girard M.D.       • ipratropium-albuterol (DUONEB) nebulizer solution  3 mL Nebulization Q2HRS PRN (RT) Jakob Girard M.D.       • famotidine (PEPCID) tablet 20 mg  20 mg Enteral Tube Q12HRS Jakob Girard M.D.   20 mg at 01/28/21 0508    Or   • famotidine (PEPCID) injection 20 mg  20 mg Intravenous Q12HRS Jakob Girard M.D.   20 mg at 01/27/21 1718   • senna-docusate (PERICOLACE or SENOKOT S) 8.6-50 MG per tablet 2 Tab  2 Tab Enteral Tube BID Jakob Girard M.D.   2 Tab at 01/28/21 0508    And   • polyethylene glycol/lytes (MIRALAX) PACKET 1 Packet  1 Packet Enteral Tube QDAY PRN Jakob Giarrd M.D.        And   • magnesium hydroxide (MILK OF MAGNESIA) suspension 30 mL  30 mL Enteral Tube QDAY PRN  Jakob Girard M.D.        And   • bisacodyl (DULCOLAX) suppository 10 mg  10 mg Rectal QDAY PRN Jakob Girard M.D.       • MD Alert...ICU Electrolyte Replacement per Pharmacy   Other PHARMACY TO DOSE Jakob Girard M.D.       • lidocaine (XYLOCAINE) 1 % injection 1-2 mL  1-2 mL Tracheal Tube Q30 MIN PRN Jakob Girard M.D.       • fentaNYL (SUBLIMAZE) injection 25 mcg  25 mcg Intravenous Q HOUR PRN Jakob Girard M.D.        Or   • fentaNYL (SUBLIMAZE) injection 50 mcg  50 mcg Intravenous Q HOUR PRN Jakob Girard M.D.   50 mcg at 01/26/21 2320    Or   • fentaNYL (SUBLIMAZE) injection 100 mcg  100 mcg Intravenous Q HOUR PRN Jakob Girard M.D.       • fentaNYL (SUBLIMAZE) 50 mcg/mL in 50mL   Intravenous Continuous Jakob Girard M.D. 2 mL/hr at 01/27/21 1904 100 mcg/hr at 01/27/21 1904   • heparin infusion 25,000 units in 500 mL 0.45% NACL  0-30 Units/kg/hr Intravenous Continuous Jakob Girard M.D. 17.4 mL/hr at 01/28/21 0215 10 Units/kg/hr at 01/28/21 0215   • heparin injection 2,000 Units  2,000 Units Intravenous PRN Jakob Girard M.D.       • norepinephrine (Levophed) infusion 8 mg/250 mL (premix)  0-30 mcg/min Intravenous Continuous Jakob Girard M.D. 30 mL/hr at 01/28/21 0237 16 mcg/min at 01/28/21 0237       Fluids    Intake/Output Summary (Last 24 hours) at 1/28/2021 0656  Last data filed at 1/28/2021 0600  Gross per 24 hour   Intake 2520.75 ml   Output 830 ml   Net 1690.75 ml       Laboratory  Recent Labs     01/26/21  2120 01/27/21  0243 01/28/21  0123   ISTATAPH 7.277* 7.387* 7.427   ISTATAPCO2 41.5* 28.1 29.4   ISTATAPO2 75 129* 74   ISTATATCO2 21 18* 20   [id number] 93 99 95   ISTATARTHCO3 19.3 16.9* 19.4   ISTATARTBE -7* -7* -4   ISTATTEMP 34.1 C 36.6 C 37.4 C   ISTATFIO2 50 70 35   ISTATSPEC Arterial Arterial Arterial   ISTATAPHTC 7.317* 7.393* 7.421   [id number] 62* 127* 76         Recent Labs     01/26/21  1935 01/27/21  0545 01/27/21 2028 01/28/21  0418   SODIUM 140 135  --  134*   POTASSIUM  3.1* 4.0  --  3.2*   CHLORIDE 109 106  --  104   CO2 19* 19*  --  21   BUN 17 19  --  15   CREATININE 0.91 0.97  --  0.82   MAGNESIUM  --  1.9  --  2.0   PHOSPHORUS  --  1.3* 3.4 2.9   CALCIUM 9.0 8.3*  --  7.7*     Recent Labs     01/26/21 1935 01/27/21 0545 01/28/21 0418   ALTSGPT 574*  --   --    ASTSGOT 787*  --   --    ALKPHOSPHAT 147*  --   --    TBILIRUBIN 0.5  --   --    LIPASE 29  --   --    GLUCOSE 217* 172* 165*     Recent Labs     01/26/21 1935 01/27/21 0545 01/28/21 0418   WBC 21.1* 17.6* 14.6*   NEUTSPOLYS 93.20* 79.00* 72.60*   LYMPHOCYTES 3.40* 0.80* 14.80*   MONOCYTES 1.70 10.10 11.00   EOSINOPHILS 0.90 0.80 0.00   BASOPHILS 0.90 1.70 1.10   ASTSGOT 787*  --   --    ALTSGPT 574*  --   --    ALKPHOSPHAT 147*  --   --    TBILIRUBIN 0.5  --   --      Recent Labs     01/26/21 1935 01/27/21 0006 01/27/21 0545 01/28/21 0418   RBC 4.89  --  4.65* 4.19*   HEMOGLOBIN 15.3  --  14.8 13.2*   HEMATOCRIT 46.6  --  43.8 39.4*   PLATELETCT 505*  --  531* 435   PROTHROMBTM 15.2* 16.3*  --   --    APTT 70.4* 148.8* 115.4*  --    INR 1.16* 1.27*  --   --        Imaging  Reviewed    Assessment/Plan  Cardiogenic shock ([facility])  Assessment & Plan  Due to STEMI/VF arrest  Cont levo for MAP goal >65  Actively titrating Levo for MAP goal.    V-tach ([facility])- (present on admission)  Assessment & Plan  Secondary to stemi  S/p heart cath with thrombectomy  Amio drip  Mg goal > 2  K goal > 4      STEMI (ST elevation myocardial infarction) ([facility])- (present on admission)  Assessment & Plan  Right posterior lateral thrombectomy with LONNIE 3 flow-->some reperfusion rhythms noted  Prior hx lad stent that is open  LVEF 40% per cath  ECHO pending  On ventilator  ASA/Plavix/Statin  BB to start once off vasopressors  Cont heparin drip and monitor Xa levels    Persistent atrial fibrillation (HCC)- (present on admission)  Assessment & Plan  Cont amiodarone drip-->? Transition to PO tomorrow  Metoprolol 12.5mg BID to be started when off  vasopressors    Cardiac arrest (HCC)  Assessment & Plan  From [facility] secondary to stemi  S/p cath with stent placed  Responsive post arrest per report  No hypothermic cooling required  Cont supportive care.      Acute respiratory failure with hypoxia (HCC)  Assessment & Plan  Secondary to cardiac arrest with vtach  Propofol and fentanyl drips for sedation  RT/O2 protocols  Cont full vent support  SAT/SBTs-->good parameters and liberated        VTE:  Heparin  Ulcer: H2 Antagonist  Lines: Central Line  Ongoing indication addressed and Paredes Catheter  Ongoing indication addressed    I have performed a physical exam and reviewed and updated ROS and Plan today (1/28/2021). In review of yesterday's note (1/27/2021), there are no changes except as documented above.     Discussed patient condition and risk of morbidity and/or mortality with RN, RT, Pharmacy, Charge nurse / hot rounds, Patient and cardiology     The patient remains critically ill at this time with ongoing titration of vasopressors for cardiogenic shock however the patient was extubated successfully and will monitor this very closely.  The patient remains at high risk of clinical deterioration, worsening vital organ dysfunction, and death without the above critical care interventions..    Critical care time = 38 minutes in directly providing and coordinating critical care and extensive data review.  No time overlap and excludes procedures.

## 2021-01-28 NOTE — PROGRESS NOTES
Patient extubated at 0900, no stridor noted. VSS on 4L via NC. Pt educated to whisper. Following commands and calm.

## 2021-01-28 NOTE — THERAPY
Missed Therapy     Patient Name: Hi Montes De Oca  Age:  75 y.o., Sex:  male  Medical Record #: [id number]  Today's Date: 1/28/2021 01/28/21 0806   Interdisciplinary Plan of Care Collaboration   Collaboration Comments PT cardiac rehab eval order recieved. Pt is currently intubated and sedated, not appropriate for cardiac rehab. Order to be discharged at this time. Please consider ordering PT/OT when patient has stabilized medically and is appropriate to participate in out of bed tasks.

## 2021-01-28 NOTE — PROGRESS NOTES
Reason for consultation /admission : inferior MI    Extubated earlier this morning  Denies chest pain or shortness of breath  Converted to sinus rhythm earlier today  Monitor reviewed by me showed frequent PACs, PVC and occasional triplet    Review of systems;    General: No fever, chills, no weakness  HENT: No sore throat, no neck pain  Eyes: No blurred vision or double vision  Heart: No palpitation, no PND or orthopnea, no claudication, no leg swelling  Lung: No productive cough, no hemoptysis  Abdomen: No abdominal pain, no nausea vomiting diarrhea or blood in stool  : No dysuria, no frequency or hesitancy, no hematuria  Musculoskeletal: No myalgia, no back pain, some joint pain  Hematology: No easy bruising  Skin: No rash or itching  Neurological: No headache, no new focal weakness or numbness  Psychological: Denies depression, anxiety or insomnia  All other review of systems are negative      Pulse:  [45-95] 70  Resp:  [9-25] 21  BP: ()/(54-77) 106/55  SpO2:  [91 %-100 %] 95 %  GENERAL not in acute distress, not dyspnic at rest  Head atraumatic, normocephalic  Eyes EOMI  ENT neck supple, no JVD, no carotid bruits or thyromegaly  Lung good expansion, distant sound, no rales or wheezing  Heart frequent ectopy, normal rate, no murmur,   no gallop or rub  Abd soft, no tenderness, mass or bruits  Ext no edema  Skin no ecchymosis or petechiae  Musculoskeletal no deformity  Neuro grossly intact  Psych normal mood, normal affect    Labs: Cr 0.8, K+ 3.2    Cath film review appeared to have some moderate disease in the mid to distal right coronary artery as well    Assessment and plans    1. Acute inf MI, s/p thrombectomy of posterolateral branch of RCA  Stable, BP low NL   Continue DAPT and statin  Metoprolol as BP and HR allows     2. AF, not previously known  Back in SR  Will cont IV amiodarone today  Will swicth to oral amiodarone tomorrow  D/c IV heparin and start Eliquis     3. Anticoagulation for AF  Now  on triple Rx. Will d/c ASA in 1-2 weeks depends on bleeding status     4. S/p OOH VF. Primary VF due to STEMI  Some triplets, no recurrent complex VT  Should not require ICD or long tern antiarrhythmic unless VT recurs    5. Hypokalemia  Replace     Will follow    Please note that this dictation was created using voice recognition software. I have worked with consultants from the vendor as well as technical experts from [facility] to optimize the interface. I have made every reasonable attempt to correct obvious errors, but I expect that there are errors of grammar and possibly content I did not discover before finalizing the note

## 2021-01-28 NOTE — FLOWSHEET NOTE
Ventilator Daily Summary    Vent Day # 2    APVCMV  RR: 20  VT: 470  PEEP: 8  FiO2: 50%    Weaning trials: SBTx1    Plan: Continue current ventilator settings and wean mechanical ventilation as tolerated per physician orders.

## 2021-01-29 ENCOUNTER — APPOINTMENT (OUTPATIENT)
Dept: RADIOLOGY | Facility: MEDICAL CENTER | Age: 76
DRG: 250 | End: 2021-01-29
Attending: INTERNAL MEDICINE
Payer: MEDICARE

## 2021-01-29 PROBLEM — Z79.01 CURRENT USE OF LONG TERM ANTICOAGULATION: Status: ACTIVE | Noted: 2021-01-29

## 2021-01-29 PROBLEM — I46.9 CARDIAC ARREST WITH VENTRICULAR FIBRILLATION (HCC): Status: ACTIVE | Noted: 2021-01-26

## 2021-01-29 PROBLEM — I46.9 CARDIAC ARREST (HCC): Status: RESOLVED | Noted: 2021-01-26 | Resolved: 2021-01-29

## 2021-01-29 PROBLEM — R31.9 HEMATURIA: Status: ACTIVE | Noted: 2021-01-29

## 2021-01-29 PROBLEM — I49.01 CARDIAC ARREST WITH VENTRICULAR FIBRILLATION (HCC): Status: ACTIVE | Noted: 2021-01-26

## 2021-01-29 PROBLEM — I48.0 PAROXYSMAL ATRIAL FIBRILLATION (HCC): Status: ACTIVE | Noted: 2021-01-27

## 2021-01-29 PROBLEM — R74.01 TRANSAMINITIS: Status: ACTIVE | Noted: 2021-01-29

## 2021-01-29 LAB
ALBUMIN SERPL BCP-MCNC: 2.9 G/DL (ref 3.2–4.9)
ALBUMIN/GLOB SERPL: 1.1 G/DL
ALP SERPL-CCNC: 138 U/L (ref 30–99)
ALT SERPL-CCNC: 255 U/L (ref 2–50)
ANION GAP SERPL CALC-SCNC: 6 MMOL/L (ref 7–16)
ANION GAP SERPL CALC-SCNC: 8 MMOL/L (ref 7–16)
ANISOCYTOSIS BLD QL SMEAR: ABNORMAL
AST SERPL-CCNC: 60 U/L (ref 12–45)
BASOPHILS # BLD AUTO: 0 % (ref 0–1.8)
BASOPHILS # BLD: 0 K/UL (ref 0–0.12)
BILIRUB SERPL-MCNC: 0.7 MG/DL (ref 0.1–1.5)
BUN SERPL-MCNC: 16 MG/DL (ref 8–22)
BUN SERPL-MCNC: 17 MG/DL (ref 8–22)
CALCIUM SERPL-MCNC: 8 MG/DL (ref 8.5–10.5)
CALCIUM SERPL-MCNC: 8.1 MG/DL (ref 8.5–10.5)
CHLORIDE SERPL-SCNC: 103 MMOL/L (ref 96–112)
CHLORIDE SERPL-SCNC: 105 MMOL/L (ref 96–112)
CO2 SERPL-SCNC: 21 MMOL/L (ref 20–33)
CO2 SERPL-SCNC: 21 MMOL/L (ref 20–33)
CREAT SERPL-MCNC: 0.7 MG/DL (ref 0.5–1.4)
CREAT SERPL-MCNC: 0.75 MG/DL (ref 0.5–1.4)
EOSINOPHIL # BLD AUTO: 0.19 K/UL (ref 0–0.51)
EOSINOPHIL NFR BLD: 1.7 % (ref 0–6.9)
ERYTHROCYTE [DISTWIDTH] IN BLOOD BY AUTOMATED COUNT: 46.4 FL (ref 35.9–50)
GLOBULIN SER CALC-MCNC: 2.6 G/DL (ref 1.9–3.5)
GLUCOSE SERPL-MCNC: 121 MG/DL (ref 65–99)
GLUCOSE SERPL-MCNC: 126 MG/DL (ref 65–99)
HCT VFR BLD AUTO: 35.3 % (ref 42–52)
HGB BLD-MCNC: 11.5 G/DL (ref 14–18)
LYMPHOCYTES # BLD AUTO: 0.68 K/UL (ref 1–4.8)
LYMPHOCYTES NFR BLD: 6.1 % (ref 22–41)
MAGNESIUM SERPL-MCNC: 2 MG/DL (ref 1.5–2.5)
MANUAL DIFF BLD: NORMAL
MCH RBC QN AUTO: 30.8 PG (ref 27–33)
MCHC RBC AUTO-ENTMCNC: 32.6 G/DL (ref 33.7–35.3)
MCV RBC AUTO: 94.6 FL (ref 81.4–97.8)
MONOCYTES # BLD AUTO: 1.44 K/UL (ref 0–0.85)
MONOCYTES NFR BLD AUTO: 13 % (ref 0–13.4)
MORPHOLOGY BLD-IMP: NORMAL
NEUTROPHILS # BLD AUTO: 8.78 K/UL (ref 1.82–7.42)
NEUTROPHILS NFR BLD: 79.1 % (ref 44–72)
NRBC # BLD AUTO: 0 K/UL
NRBC BLD-RTO: 0 /100 WBC
PHOSPHATE SERPL-MCNC: 1.6 MG/DL (ref 2.5–4.5)
PLATELET # BLD AUTO: 313 K/UL (ref 164–446)
PLATELET BLD QL SMEAR: NORMAL
PMV BLD AUTO: 11.9 FL (ref 9–12.9)
POTASSIUM SERPL-SCNC: 3.9 MMOL/L (ref 3.6–5.5)
POTASSIUM SERPL-SCNC: 4.1 MMOL/L (ref 3.6–5.5)
PROT SERPL-MCNC: 5.5 G/DL (ref 6–8.2)
RBC # BLD AUTO: 3.73 M/UL (ref 4.7–6.1)
RBC BLD AUTO: PRESENT
SODIUM SERPL-SCNC: 132 MMOL/L (ref 135–145)
SODIUM SERPL-SCNC: 132 MMOL/L (ref 135–145)
WBC # BLD AUTO: 11.1 K/UL (ref 4.8–10.8)

## 2021-01-29 PROCEDURE — 84100 ASSAY OF PHOSPHORUS: CPT

## 2021-01-29 PROCEDURE — 85027 COMPLETE CBC AUTOMATED: CPT

## 2021-01-29 PROCEDURE — 80053 COMPREHEN METABOLIC PANEL: CPT

## 2021-01-29 PROCEDURE — 700102 HCHG RX REV CODE 250 W/ 637 OVERRIDE(OP): Performed by: INTERNAL MEDICINE

## 2021-01-29 PROCEDURE — 80048 BASIC METABOLIC PNL TOTAL CA: CPT

## 2021-01-29 PROCEDURE — 700102 HCHG RX REV CODE 250 W/ 637 OVERRIDE(OP): Performed by: NURSE PRACTITIONER

## 2021-01-29 PROCEDURE — A9270 NON-COVERED ITEM OR SERVICE: HCPCS | Performed by: INTERNAL MEDICINE

## 2021-01-29 PROCEDURE — 770022 HCHG ROOM/CARE - ICU (200)

## 2021-01-29 PROCEDURE — 99233 SBSQ HOSP IP/OBS HIGH 50: CPT | Performed by: INTERNAL MEDICINE

## 2021-01-29 PROCEDURE — 51798 US URINE CAPACITY MEASURE: CPT

## 2021-01-29 PROCEDURE — 71045 X-RAY EXAM CHEST 1 VIEW: CPT

## 2021-01-29 PROCEDURE — 700101 HCHG RX REV CODE 250: Performed by: INTERNAL MEDICINE

## 2021-01-29 PROCEDURE — 700105 HCHG RX REV CODE 258: Performed by: INTERNAL MEDICINE

## 2021-01-29 PROCEDURE — A9270 NON-COVERED ITEM OR SERVICE: HCPCS | Performed by: NURSE PRACTITIONER

## 2021-01-29 PROCEDURE — 85007 BL SMEAR W/DIFF WBC COUNT: CPT

## 2021-01-29 PROCEDURE — 99223 1ST HOSP IP/OBS HIGH 75: CPT | Performed by: HOSPITALIST

## 2021-01-29 PROCEDURE — 700111 HCHG RX REV CODE 636 W/ 250 OVERRIDE (IP): Performed by: INTERNAL MEDICINE

## 2021-01-29 PROCEDURE — 83735 ASSAY OF MAGNESIUM: CPT

## 2021-01-29 RX ORDER — AMIODARONE HYDROCHLORIDE 200 MG/1
400 TABLET ORAL TWICE DAILY
Status: DISCONTINUED | OUTPATIENT
Start: 2021-01-29 | End: 2021-02-03 | Stop reason: HOSPADM

## 2021-01-29 RX ADMIN — METOPROLOL TARTRATE 12.5 MG: 25 TABLET, FILM COATED ORAL at 05:04

## 2021-01-29 RX ADMIN — CLOPIDOGREL BISULFATE 75 MG: 75 TABLET ORAL at 05:05

## 2021-01-29 RX ADMIN — APIXABAN 5 MG: 5 TABLET, FILM COATED ORAL at 05:05

## 2021-01-29 RX ADMIN — AMIODARONE HYDROCHLORIDE 400 MG: 200 TABLET ORAL at 10:49

## 2021-01-29 RX ADMIN — ASPIRIN 81 MG: 81 TABLET, COATED ORAL at 05:04

## 2021-01-29 RX ADMIN — POTASSIUM PHOSPHATE, MONOBASIC AND POTASSIUM PHOSPHATE, DIBASIC 30 MMOL: 224; 236 INJECTION, SOLUTION, CONCENTRATE INTRAVENOUS at 08:30

## 2021-01-29 RX ADMIN — AMIODARONE HYDROCHLORIDE 0.5 MG/MIN: 1.8 INJECTION, SOLUTION INTRAVENOUS at 05:04

## 2021-01-29 RX ADMIN — ATORVASTATIN CALCIUM 80 MG: 80 TABLET, FILM COATED ORAL at 18:36

## 2021-01-29 RX ADMIN — AMIODARONE HYDROCHLORIDE 400 MG: 200 TABLET ORAL at 18:36

## 2021-01-29 RX ADMIN — METOPROLOL TARTRATE 12.5 MG: 25 TABLET, FILM COATED ORAL at 18:35

## 2021-01-29 ASSESSMENT — PAIN DESCRIPTION - PAIN TYPE
TYPE: ACUTE PAIN

## 2021-01-29 ASSESSMENT — ENCOUNTER SYMPTOMS
DIARRHEA: 0
APNEA: 0
WEAKNESS: 0
WHEEZING: 0
DEPRESSION: 0
CHEST TIGHTNESS: 0
CHOKING: 0
VOMITING: 0
SHORTNESS OF BREATH: 0
CHILLS: 0
BRUISES/BLEEDS EASILY: 0
EYE PAIN: 0
STRIDOR: 0
EYE DISCHARGE: 0
SENSORY CHANGE: 0
NERVOUS/ANXIOUS: 0
SPEECH CHANGE: 0
COUGH: 0
ABDOMINAL PAIN: 0
SHORTNESS OF BREATH: 1
NAUSEA: 0
PALPITATIONS: 1
FOCAL WEAKNESS: 0
SORE THROAT: 0
NECK PAIN: 0
DIZZINESS: 0
FEVER: 0
BACK PAIN: 0

## 2021-01-29 ASSESSMENT — FIBROSIS 4 INDEX: FIB4 SCORE: 8.08

## 2021-01-29 NOTE — PROGRESS NOTES
Monitor Summary  Rhythm: A.fib/SR  HR: 80-90s   Ectopy: frequent PVCs, bigeminy/couplets, MD Santiago made aware.   Measurements while in SR: 0.20/.10/.48  A.fib: -/.10/.    12 hour chart check

## 2021-01-29 NOTE — CONSULTS
Hospital Medicine Consultation    Date of Service  1/29/2021    Referring Physician  Dr. Negron, critical care    Consulting Physician  Chino Alvarenga M.D.    Reason for Consultation  STEMI    History of Presenting Illness  75 y.o. male who presented 1/26/2021 with chest pain.  Mr. Montes De Oca has a past medical history of coronary artery disease with prior stenting to the LAD and circumflex then in the emergency room in [facility] with chest pain.  Is found to have a posterior myocardial infarction was initiated on aspirin therapy with IV heparin drip transferred here for higher level of care.  To transfer, he developed ventricular tachycardia and underwent defibrillation x2 given epinephrine x2 with return of spontaneous circulation.  Is intubated for airway protection.  Due to hypotension, he was initiated on a norepinephrine drip.  He went emergently to the cardiac Cath Lab found to have 100% occlusion of the right posterior lateral branch underwent thrombectomy with restoration of flow.  Found to have patent LAD stent.  He was subsequently mated to the cardiac intensive care unit on a ventilator.  Due to her fibrillation, he has been on triple therapy with Eliquis, aspirin, and Plavix.  He was subsequently extubated on 1/28/2021.    1/29: patient seen and evaluated in the ICU. His son, Stevie, is at bedside. Mr. Montes De Oca lives in [place] with his wife. We discussed that we will need to get him up and walking and assess how his strength and balance is prior to discharge; he may benefit from a SNF but await PT/OT eval. He has hematuria thus CBI initiated. I discussed with Dr. Negron, critical care, and Dr. Sawant, cardiology.    Review of Systems  Review of Systems   Constitutional: Negative for chills and fever.   Respiratory: Negative for cough and shortness of breath.    Cardiovascular: Negative for chest pain and leg swelling.   Genitourinary: Positive for hematuria.   All other systems  reviewed and are negative.      Past Medical History   has a past medical history of Arthritis, CATARACT (2006), Heart murmur (1974), Hypertension, Myocardial infarct (HCC) (7/11/2012), Pain, Renal disorder, Unspecified urinary incontinence, and Urinary bladder disorder.    Surgical History   has a past surgical history that includes pr lap,cholecystectomy (2001); arthroscopy, knee; tonsillectomy; appendectomy (1958); vasectomy (1984); other orthopedic surgery (3/1/2011); knee arthroplasty total (3/1/2011); and recovery (12/21/2012).    Family History  family history includes Heart Attack in his father; Heart Failure in his mother.    Social History   reports that he has never smoked. His smokeless tobacco use includes chew. He reports that he does not drink alcohol or use drugs.    Medications  Prior to Admission Medications   Prescriptions Last Dose Informant Patient Reported? Taking?   amLODIPine (NORVASC) 5 MG Tab   Yes No   Sig: Take 5 mg by mouth every day.   citalopram (CELEXA) 40 MG TABS   Yes No   Sig: Take 40 mg by mouth every day.    Indications: Panic Disorder   clopidogrel (PLAVIX) 75 MG TABS   Yes No   Sig: Take 75 mg by mouth every day.    Indications: Heart Attack   finasteride (PROSCAR) 5 MG TABS   Yes No   Sig: Take 5 mg by mouth every day.   losartan (COZAAR) 50 MG Tab   Yes No   Sig: Take 50 mg by mouth every day.   magnesium oxide (MAG-OX) 400 MG TABS   Yes No   Sig: Take 400 mg by mouth every day.     metoprolol SR (TOPROL XL) 50 MG TB24   Yes No   Sig: Take 50 mg by mouth every day.   nitroglycerin (NITROSTAT) 0.4 MG SUBL   Yes No   Sig: Place 0.4 mg under tongue every 5 minutes as needed.     simvastatin (ZOCOR) 10 MG TABS   Yes No   Sig: Take 10 mg by mouth every evening.     topiramate (TOPAMAX) 25 MG Tab   No No   Sig: Take 2 Tabs by mouth every bedtime.      Facility-Administered Medications: None       Allergies  No Known Allergies    Physical Exam  Temp:  [36.6 °C (97.8 °F)] 36.6 °C  (97.8 °F)  Pulse:  [] 77  Resp:  [12-24] 23  BP: ()/(51-75) 107/57  SpO2:  [88 %-98 %] 94 %    Physical Exam  Vitals signs and nursing note reviewed.   Constitutional:       Appearance: Normal appearance.   HENT:      Head: Normocephalic and atraumatic.      Mouth/Throat:      Mouth: Mucous membranes are dry.      Pharynx: Oropharynx is clear.   Eyes:      General: No scleral icterus.     Conjunctiva/sclera: Conjunctivae normal.   Neck:      Musculoskeletal: Normal range of motion and neck supple.   Cardiovascular:      Rate and Rhythm: Rhythm irregular.      Heart sounds: No murmur.   Pulmonary:      Effort: Pulmonary effort is normal. No respiratory distress.      Breath sounds: Normal breath sounds.   Abdominal:      General: There is no distension.      Tenderness: There is no abdominal tenderness.   Musculoskeletal:      Right lower leg: No edema.      Left lower leg: No edema.      Comments: No hematoma cath site   Skin:     General: Skin is warm and dry.   Neurological:      General: No focal deficit present.      Mental Status: He is alert and oriented to person, place, and time.   Psychiatric:         Mood and Affect: Mood normal.         Behavior: Behavior normal.         Fluids      Laboratory  Recent Labs     01/27/21  0545 01/28/21  0418 01/29/21  0430   WBC 17.6* 14.6* 11.1*   RBC 4.65* 4.19* 3.73*   HEMOGLOBIN 14.8 13.2* 11.5*   HEMATOCRIT 43.8 39.4* 35.3*   MCV 94.2 94.0 94.6   MCH 31.8 31.5 30.8   MCHC 33.8 33.5* 32.6*   RDW 45.9 46.5 46.4   PLATELETCT 531* 435 313   MPV 11.4 11.7 11.9     Recent Labs     01/27/21  0545 01/28/21  0418 01/29/21  0430   SODIUM 135 134* 132*   POTASSIUM 4.0 3.2* 3.9   CHLORIDE 106 104 105   CO2 19* 21 21   GLUCOSE 172* 165* 121*   BUN 19 15 16   CREATININE 0.97 0.82 0.75   CALCIUM 8.3* 7.7* 8.0*     Recent Labs     01/26/21  1935 01/27/21  0006 01/27/21  0545   APTT 70.4* 148.8* 115.4*   INR 1.16* 1.27*  --           Recent Labs     01/26/21  7137  01/28/21  0418   TRIGLYCERIDE 68 129        Imaging  DX-CHEST-PORTABLE (1 VIEW)   Final Result         1.  Minimal residual left basilar atelectasis or infiltrate.   2.  Cardiomegaly         DX-CHEST-PORTABLE (1 VIEW)   Final Result         1.  Pulmonary edema and/or infiltrates are identified, which are stable since the prior exam.      EC-ECHOCARDIOGRAM LTD W/ CONT   Final Result      DX-CHEST-FOR LINE PLACEMENT Perform procedure in: Patient's Room   Final Result         1.  Pulmonary edema and/or infiltrates.      DX-CHEST-PORTABLE (1 VIEW)   Final Result      Limited examination with endotracheal tube in place.      Bibasilar atelectasis.      OUTSIDE IMAGES-DX CHEST   Final Result      CL-LEFT HEART CATHETERIZATION WITH POSSIBLE INTERVENTION    (Results Pending)       Assessment/Plan  * STEMI (ST elevation myocardial infarction) (HCC)- (present on admission)  Assessment & Plan  With successful thrombectomy of the right posterior lateral branch with 100% restoration of flow  Dual antiplatelet therapy  Metoprolol as tolerated  High intensity statin  With history of prior stenting though LAD stent was found to be patent on heart catheterization  Post cardiac catheterization echocardiogram reveals an ejection fraction of 60%    Cardiac arrest with ventricular fibrillation (HCC)- (present on admission)  Assessment & Plan  With amiodarone drip    Hematuria  Assessment & Plan  In the setting of anticoagulation.  Continuous bladder irrigation ordered.      Paroxysmal atrial fibrillation (HCC)- (present on admission)  Assessment & Plan  Change amiodarone to oral  Continuous tele monitoring  Eliquis for anticoagulation    Transaminitis- (present on admission)  Assessment & Plan  AST was 787 and  on admit likely due to shocked liver in the setting of cardiogenic shock  Repeat CMP ordered for the morning    Acute respiratory failure with hypoxia (HCC)- (present on admission)  Assessment & Plan  Requiring mechanical  ventilation prior to admission due to airway protection  Successfully extubated 1/28/2021  Pulmonology has consulted

## 2021-01-29 NOTE — DIETARY
"Nutrition Services: Day 3 of admit.  Hi Montes De Oca is a 75 y.o. male with admitting DX of STEMI (ST elevation myocardial infarction) (HCC)    Consult received for cardiac rehab diet education.  Reviewed chart prior to visit.  Attempted diet education with pt, but he needed to get up to use the restroom. RN en route to facilitate.  Left pertinent dietary handouts with pt's belongings.  Of note, Cardiology APN note from earlier today indicated: \"Forgetful with mild short-term memory loss post cardiac arrest.\"  PO intake has been <50% of meals since starting diet yesterday; prior to that, pt was NPO on vent support.   Will add Boost supplements to meals.    RD will monitor PO intake for adequacy.  RD is available to discuss cardiac diet recommendations if needed/appropriate.    "

## 2021-01-29 NOTE — ASSESSMENT & PLAN NOTE
Ventricular tachycardia x2 prior to transport requiring for ablation x2 and epinephrine wishes  With return of spontaneous circulation

## 2021-01-29 NOTE — PROGRESS NOTES
The patients decreased urinary output, and adam red blood was discussed with Dr. Negron. Orders for CBI have been placed.

## 2021-01-29 NOTE — ASSESSMENT & PLAN NOTE
AST was 787 and  on admit likely due to shocked liver in the setting of cardiogenic shock  Repeat CMP reveals that enzymes of trended down appropriately

## 2021-01-29 NOTE — DISCHARGE PLANNING
Care Transition Team Discharge Planning    Anticipated Discharge Disposition: d/c home w/ heart meds    Action: Lsw received IPCSS to f/u w/ pt's pharmacy d/t escription of eliquis/apixaban. [facility] in [place] indicates the pt's insurance does not cover the med. He will have to pay $546.98 monthly.    Lsw sent text to Heart APRN and both listed Bs RNs to update as above.    Lsw suggested the script be sent through meds to beds for the 1 free month supply, but questions what pt will do for every month following.      Barriers to Discharge: affordable heart med    Plan: f/u w/ medical team, pharmacy, etc.

## 2021-01-29 NOTE — PROGRESS NOTES
Patient with frequent bigeminy and couplet PVCs, increased frequency throughout shift. Repleted K. MD Santiago made aware. Ordered for 150mg Amiodarone bolus and to continue amiodarone gtt at 0.5. Plan to call cardiology on call if no improvement.

## 2021-01-29 NOTE — ASSESSMENT & PLAN NOTE
Requiring mechanical ventilation prior to admission due to airway protection  Successfully extubated 1/28/2021  He is now on room air  Pulmonology has consulted

## 2021-01-29 NOTE — ASSESSMENT & PLAN NOTE
"In the setting of anticoagulation which was stopped.  Continuous bladder irrigation with improvement thus stop CBI on 1/31.  He states he has a history of big prostate for which she had a \"Roto-Rooter\" thinks he had a history of prostate cancer but is not really sure.  He is followed by Dr. Burgos, urology, in [place]    - blood noted in raya today resume CBI  - CT urogram revealed thickened bladder and enlarged prostate  - Urology rec outpatient cystoscopy     "

## 2021-01-29 NOTE — PROGRESS NOTES
Cardiology Follow Up Progress Note    Date of Service  1/29/2021    Attending Physician  Gutierrez Montoya M.D.    Cardiology consultation for inferoposterior STEMI      Past medical history PCI to proximal LAD and mid circumflex 2012, hypertension, dyslipidemia      Transfer from [facility] with inferoposterior STEMI, patient presented with chest pain, prior to transfer found to be in V. fib cardiac arrest received immediate CPR, IV epinephrine x2, defibrillation x2, required intubation, ROSC achieved.    Interim Events    No overnight cardiac events  Converted to sinus rhythm overnight  Maintaining sinus rhythm on amiodarone  Labs reviewed    K3.9  Mag 2.0  Phosphorus 1.6, replete with potassium phosphate  /57      Review of Systems  Review of Systems   Respiratory: Negative for apnea, cough, choking, chest tightness, shortness of breath, wheezing and stridor.        Vital signs in last 24 hours  Temp:  [36.6 °C (97.8 °F)] 36.6 °C (97.8 °F)  Pulse:  [] 77  Resp:  [12-24] 23  BP: (100-130)/(51-75) 107/57  SpO2:  [88 %-96 %] 94 %    Physical Exam  Physical Exam  Constitutional:       Comments: Forgetful with mild short-term memory loss post cardiac arrest   Cardiovascular:      Rate and Rhythm: Normal rate and regular rhythm.      Pulses: Normal pulses.   Pulmonary:      Effort: Pulmonary effort is normal.   Skin:     General: Skin is warm.      Comments: Right radial cath site with good circulation, mild ecchymotic area   Neurological:      Mental Status: He is alert.   Psychiatric:         Mood and Affect: Mood normal.         Lab Review  Lab Results   Component Value Date/Time    WBC 11.1 (H) 01/29/2021 04:30 AM    RBC 3.73 (L) 01/29/2021 04:30 AM    HEMOGLOBIN 11.5 (L) 01/29/2021 04:30 AM    HEMATOCRIT 35.3 (L) 01/29/2021 04:30 AM    MCV 94.6 01/29/2021 04:30 AM    MCH 30.8 01/29/2021 04:30 AM    MCHC 32.6 (L) 01/29/2021 04:30 AM    MPV 11.9 01/29/2021 04:30 AM       Lab Results   Component Value Date/Time    SODIUM 132 (L) 01/29/2021 04:30 AM    POTASSIUM 3.9 01/29/2021 04:30 AM    CHLORIDE 105 01/29/2021 04:30 AM    CO2 21 01/29/2021 04:30 AM    GLUCOSE 121 (H) 01/29/2021 04:30 AM    BUN 16 01/29/2021 04:30 AM    CREATININE 0.75 01/29/2021 04:30 AM      Lab Results   Component Value Date/Time    ASTSGOT 787 (H) 01/26/2021 07:35 PM    ALTSGPT 574 (H) 01/26/2021 07:35 PM     Lab Results   Component Value Date/Time    TRIGLYCERIDE 129 01/28/2021 04:18 AM    TROPONINT 130 (H) 01/26/2021 07:35 PM       Recent Labs     01/26/21  1935   NTPROBNP 617*       Cardiac Imaging and Procedures Review  EKG: Atrial fibrillation 1/26/2021    Echocardiogram: 1/27/2021  No prior study is available for comparison.   Left ventricular ejection fraction is visually estimated to be 60%.  Technically difficult study - adequate information is obtained.      Cardiac Catheterization:    1/26/2021  POSTOPERATIVE DIAGNOSIS:  1.  100% thrombotically occluded right posterior lateral branch  2.  Widely patent previously placed proximal LAD stent  3.  Successful percutaneous coronary intervention with thrombectomy of the right posterior lateral branch with restoration of LONNIE-3 flow and 0% residual stenosis.         Imaging  Chest X-Ray: Minimal residual left basilar atelectasis or infiltrate.  2.  Cardiomegaly     Stress Test: Not applicable    Assessment/Plan    Acute inferoposterior STEMI  -100% thrombotically occluded right posterior lateral branch  -Status post successful PCI/thrombectomy right posterolateral branch, patent previously placed proximal LAD stent.  -DAPT, aspirin 81, Plavix 75 mg, along with atorvastatin 80 mg, metoprolol 12.5 mg p.o. twice daily      V. fib cardiac arrest  -Secondary to STEMI  -Extubated 1/28/2021  -Maintaining sinus rhythm    Cardiogenic shock  -Secondary to STEMI  -Resolved  -LVEF 60%    New A. Fib (1/26/2021 paced EKG)  -IV amiodarone 1/27/2021  -switch to p.o.  amiodarone 400 mg p.o. twice daily x2 weeks, then 400 mg daily x2 weeks, then 200 mg daily.  -Apixaban 5 mg twice daily    Dyslipidemia  -Atorvastatin    Okay to be transferred to floor  PT/OT  Currently on triple therapy, aspirin, Plavix, Eliquis, continue with aspirin x2 weeks then stop aspirin and proceed with Plavix and Eliquis.  Watch hematuria  Monitor H&H    Cardiology will follow along    Thank you for allowing me to participate in the care of this patient.      Please contact me with any questions.    PARAMJIT Gallagher.   Cardiologist, [facility]  [phone]

## 2021-01-29 NOTE — ASSESSMENT & PLAN NOTE
Change amiodarone to oral  Continuous tele monitoring  Hold on anticoagulation given his hematuria

## 2021-01-29 NOTE — CARE PLAN
Problem: Nutritional:  Goal: Achieve adequate nutritional intake  Description: Patient will consume ~50% of meals/supplements.  Outcome: PROGRESSING SLOWER THAN EXPECTED

## 2021-01-29 NOTE — ASSESSMENT & PLAN NOTE
Secondary to myocardial infarction and ventricular tachycardia arrest  He had required for venous pressors

## 2021-01-29 NOTE — PROGRESS NOTES
Critical Care Progress Note    Date of admission  1/26/2021    Chief Complaint  75 y.o. male admitted 1/26/2021 with STEMI and ventricular fibrillation arrest.    Hospital Course  Mr. Montes De Oca is a 75 year old male with the past medical history of CAD s/p PCI in 2012 to proximal LAD and mid circumflex, hypertension, and dyslipidemia who was transferred from [facility] in [place], [place] after he presented with chest pain and was found to be having an acute inferior/posterior STEMI.  Prior to transport, the patient had a ventricular fibrillation arrest requiring CPR, epinephrine x 2, defibrillation x 2, and intubated.  The patient went immediately to cath lab s/p thrombectomy of the right posterior lateral branch.  He remains intubated.  1/27: VD #2, levo at 12, wakes and follows  1/28: VD #3, levo at 1-2, SBT with excellent parameters-->extubated      Interval Problem Update  Reviewed last 24 hour events:   - extubated yesterday to O2 1-2 lpm NC   - a/ox4   - a-fib 60-70s   - -130s   - afebrile   - bloody urine overnight with difficulty urinating-->CBI   - BM pta    - cardiac diet   - no CXR today   - off Levo   - amio at 0.5   - heparin stopped-->Eliquis   - asa/plavix/statin   - up to chair   - K 3.9   - Phos 1.6   - Mg 2.0       Yesterday's Events:   - no events overnight   - following all commands   - SAT this am and off propofol   - SBT and extubated   - amio at 0.5   - levo at 1-2   - a-fib 50-70s   - SBP 90-100s   - heparin drip   - Tamx 99.3   - BM pta   - UOP of 450cc overnight with raya   - mobilizing   - CXR(reviewed): left pleural effusion, mild cephalization   - O2 at 4 lpm NC   - heparin drip   - no abx   - K at 3.2   - WBCs 14      Review of Systems  Review of Systems   Constitutional: Negative for chills, fever and malaise/fatigue.   HENT: Negative for congestion and sore throat.    Eyes: Negative for pain and discharge.   Respiratory: Positive for shortness of breath. Negative  for cough.    Cardiovascular: Positive for chest pain and palpitations. Negative for leg swelling.   Gastrointestinal: Negative for abdominal pain, diarrhea, nausea and vomiting.   Genitourinary: Positive for hematuria. Negative for urgency.   Musculoskeletal: Negative for back pain and neck pain.   Skin: Negative for rash.   Neurological: Negative for dizziness, sensory change, speech change, focal weakness and weakness.   Endo/Heme/Allergies: Does not bruise/bleed easily.   Psychiatric/Behavioral: Negative for depression. The patient is not nervous/anxious.         Vital Signs for last 24 hours   Temp:  [36.6 °C (97.8 °F)] 36.6 °C (97.8 °F)  Pulse:  [] 77  Resp:  [11-24] 23  BP: ()/(51-77) 107/57  SpO2:  [88 %-99 %] 94 %    Hemodynamic parameters for last 24 hours       Respiratory Information for the last 24 hours  Length of Weaning Trial (Hours): 1    Physical Exam   Physical Exam  Vitals signs and nursing note reviewed.   Constitutional:       General: He is not in acute distress.     Appearance: He is obese. He is ill-appearing. He is not toxic-appearing.   HENT:      Head: Normocephalic and atraumatic.      Right Ear: External ear normal.      Left Ear: External ear normal.      Nose: Nose normal. No rhinorrhea.      Mouth/Throat:      Mouth: Mucous membranes are moist.      Pharynx: Oropharynx is clear. No oropharyngeal exudate.   Eyes:      General: No scleral icterus.     Extraocular Movements: Extraocular movements intact.      Conjunctiva/sclera: Conjunctivae normal.      Pupils: Pupils are equal, round, and reactive to light.   Neck:      Musculoskeletal: Normal range of motion and neck supple.   Cardiovascular:      Rate and Rhythm: Normal rate. Rhythm irregularly irregular.      Pulses:           Dorsalis pedis pulses are 2+ on the right side and 2+ on the left side.      Heart sounds: Heart sounds not distant. No murmur.   Pulmonary:      Effort: No respiratory distress.      Breath  sounds: No wheezing.   Chest:      Chest wall: No tenderness.   Abdominal:      General: Bowel sounds are normal. There is no distension.      Palpations: Abdomen is soft.      Tenderness: There is no abdominal tenderness. There is no guarding.   Musculoskeletal: Normal range of motion.      Right lower leg: No edema.      Left lower leg: No edema.   Lymphadenopathy:      Cervical: No cervical adenopathy.   Skin:     General: Skin is warm and dry.      Capillary Refill: Capillary refill takes less than 2 seconds.      Findings: No rash.   Neurological:      Mental Status: He is alert and oriented to person, place, and time.      Cranial Nerves: No cranial nerve deficit.      Sensory: No sensory deficit.      Motor: No weakness.   Psychiatric:         Mood and Affect: Mood normal.         Behavior: Behavior normal.         Thought Content: Thought content normal.         Medications  Current Facility-Administered Medications   Medication Dose Route Frequency Provider Last Rate Last Admin   • clopidogrel (PLAVIX) tablet 75 mg  75 mg Oral DAILY Gutierrez Montoya M.D.   75 mg at 01/29/21 0505   • senna-docusate (PERICOLACE or SENOKOT S) 8.6-50 MG per tablet 2 Tab  2 Tab Oral BID Gutierrez Montoya M.D.   Stopped at 01/29/21 0600    And   • polyethylene glycol/lytes (MIRALAX) PACKET 1 Packet  1 Packet Oral QDAY PRN Gutierrez Montoya M.D.        And   • magnesium hydroxide (MILK OF MAGNESIA) suspension 30 mL  30 mL Oral QDAY PRN Gutierrez Montoya M.D.        And   • bisacodyl (DULCOLAX) suppository 10 mg  10 mg Rectal QDAY PRN Gutierrez Montoya M.D.       • aspirin EC (ECOTRIN) tablet 81 mg  81 mg Oral DAILY Gutierrez Montoya M.D.   81 mg at 01/29/21 0504   • apixaban (ELIQUIS) tablet 5 mg  5 mg Oral BID Luz Sawant M.D.   5 mg at 01/29/21 0505   • atorvastatin (LIPITOR) tablet 80 mg  80 mg Oral Q EVENING Luz Sawant M.D.   80 mg at 01/28/21 1739   • metoprolol tartrate (LOPRESSOR)  tablet 12.5 mg  12.5 mg Oral TWICE DAILY Luz Sawant M.D.   12.5 mg at 01/29/21 0504   • 5% dextrose infusion   Intravenous Continuous Luz Sawant M.D.   Stopped at 01/28/21 0700   • amiodarone (NEXTERONE) 360 mg/200 mL infusion  0.5-1 mg/min Intravenous Continuous Luz Sawant M.D. 17 mL/hr at 01/29/21 0504 0.5 mg/min at 01/29/21 0504   • propofol (DIPRIVAN) injection  0-80 mcg/kg/min Intravenous Continuous Juan Carlos Araya II, M.D.   Stopped at 01/28/21 0650   • Respiratory Therapy Consult   Nebulization Continuous RT Jakob Girard M.D.       • ipratropium-albuterol (DUONEB) nebulizer solution  3 mL Nebulization Q2HRS PRN (RT) Jakob Girard M.D.       • MD Alert...ICU Electrolyte Replacement per Pharmacy   Other PHARMACY TO DOSE Jakob Girard M.D.       • lidocaine (XYLOCAINE) 1 % injection 1-2 mL  1-2 mL Tracheal Tube Q30 MIN PRN Jakob Girard M.D.       • fentaNYL (SUBLIMAZE) injection 25 mcg  25 mcg Intravenous Q HOUR PRN Jakob Girard M.D.        Or   • fentaNYL (SUBLIMAZE) injection 50 mcg  50 mcg Intravenous Q HOUR PRN Jakob Girard M.D.   50 mcg at 01/26/21 2320    Or   • fentaNYL (SUBLIMAZE) injection 100 mcg  100 mcg Intravenous Q HOUR PRN Jakob Girard M.D.       • fentaNYL (SUBLIMAZE) 50 mcg/mL in 50mL   Intravenous Continuous Jakob Girard M.D.   Stopped at 01/28/21 0900   • norepinephrine (Levophed) infusion 8 mg/250 mL (premix)  0-30 mcg/min Intravenous Continuous Jakob Girard M.D.   Stopped at 01/28/21 0930       Fluids    Intake/Output Summary (Last 24 hours) at 1/29/2021 0706  Last data filed at 1/29/2021 0600  Gross per 24 hour   Intake 1219.79 ml   Output 860 ml   Net 359.79 ml       Laboratory  Recent Labs     01/26/21 2120 01/27/21  0243 01/28/21  0123   ISTATAPH 7.277* 7.387* 7.427   ISTATAPCO2 41.5* 28.1 29.4   ISTATAPO2 75 129* 74   ISTATATCO2 21 18* 20   YPBFZFL8QSP 93 99 95   ISTATARTHCO3 19.3 16.9* 19.4   ISTATARTBE -7* -7* -4   ISTATTEMP 34.1 C  36.6 C 37.4 C   ISTATFIO2 50 70 35   ISTATSPEC Arterial Arterial Arterial   ISTATAPHTC 7.317* 7.393* 7.421   IAUMGDBA1BL 62* 127* 76         Recent Labs     01/27/21 0545 01/27/21 2028 01/28/21 0418 01/29/21 0430   SODIUM 135  --  134* 132*   POTASSIUM 4.0  --  3.2* 3.9   CHLORIDE 106  --  104 105   CO2 19*  --  21 21   BUN 19  --  15 16   CREATININE 0.97  --  0.82 0.75   MAGNESIUM 1.9  --  2.0 2.0   PHOSPHORUS 1.3* 3.4 2.9 1.6*   CALCIUM 8.3*  --  7.7* 8.0*     Recent Labs     01/26/21 1935 01/27/21 0545 01/28/21 0418 01/29/21 0430   ALTSGPT 574*  --   --   --    ASTSGOT 787*  --   --   --    ALKPHOSPHAT 147*  --   --   --    TBILIRUBIN 0.5  --   --   --    LIPASE 29  --   --   --    GLUCOSE 217* 172* 165* 121*     Recent Labs     01/26/21 1935 01/27/21 0545 01/28/21 0418 01/29/21 0430   WBC 21.1* 17.6* 14.6* 11.1*   NEUTSPOLYS 93.20* 79.00* 72.60* 79.10*   LYMPHOCYTES 3.40* 0.80* 14.80* 6.10*   MONOCYTES 1.70 10.10 11.00 13.00   EOSINOPHILS 0.90 0.80 0.00 1.70   BASOPHILS 0.90 1.70 1.10 0.00   ASTSGOT 787*  --   --   --    ALTSGPT 574*  --   --   --    ALKPHOSPHAT 147*  --   --   --    TBILIRUBIN 0.5  --   --   --      Recent Labs     01/26/21 1935 01/27/21 0006 01/27/21 0545 01/28/21 0418 01/29/21 0430   RBC 4.89  --  4.65* 4.19* 3.73*   HEMOGLOBIN 15.3  --  14.8 13.2* 11.5*   HEMATOCRIT 46.6  --  43.8 39.4* 35.3*   PLATELETCT 505*  --  531* 435 313   PROTHROMBTM 15.2* 16.3*  --   --   --    APTT 70.4* 148.8* 115.4*  --   --    INR 1.16* 1.27*  --   --   --        Imaging  Reviewed    Assessment/Plan  * STEMI (ST elevation myocardial infarction) (HCC)- (present on admission)  Assessment & Plan  Right posterior lateral thrombectomy with LONNIE 3 flow-->some reperfusion rhythms noted  Prior hx lad stent that is open  LVEF 40% per cath  ASA/Plavix/Statin  BB   S/p heparin for 48 hours    Cardiac arrest with ventricular fibrillation ([facility])- (present on admission)  Assessment & Plan  Secondary to  stemi  S/p heart cath with thrombectomy  S/p Amio drip-->cont PO amio  Mg goal > 2  K goal > 4      Paroxysmal atrial fibrillation (HCC)- (present on admission)  Assessment & Plan  Cont amiodarone drip-->transitioned to PO amio 400mg BID  Metoprolol 12.5mg BID  Eliquis    Acute respiratory failure with hypoxia (HCC)- (present on admission)  Assessment & Plan  Secondary to cardiac arrest with vtach  Intubated on 1/26, extubated on 1/28  Cont RT/O2 protocols  Wean O2 as tolerated       VTE:  Heparin  Ulcer: H2 Antagonist  Lines: Central Line  Ongoing indication addressed and Paredes Catheter  Ongoing indication addressed    I have performed a physical exam and reviewed and updated ROS and Plan today (1/29/2021). In review of yesterday's note (1/28/2021), there are no changes except as documented above.     Discussed patient condition and risk of morbidity and/or mortality with RN, RT, Pharmacy, Charge nurse / hot rounds, Patient and cardiology     The patient has successfully extubated and weaned off vasopressors.  He was transitioned to oral amiodarone and Eliquis.  He has no further ICU needs.  Case discussed with Dr. Alvarenga who will take over care at this time.  The critical care team will sign off at this time.

## 2021-01-29 NOTE — CARE PLAN
Problem: Safety  Goal: Will remain free from falls  Outcome: PROGRESSING AS EXPECTED  Note: The patient is currently in bed with three bed rails raised, and bed locked in lowest position. The patient has all personal belongings within reach and is wearing non-skid footwear. The patient has been educated on the call light, has it within reach, and utilizes it appropriately. The patient has been educated on falls precautions, and has verbalized understanding. Bed alarm is on. Will continue hourly rounding.      Problem: Respiratory:  Goal: Respiratory status will improve  Outcome: PROGRESSING AS EXPECTED  Note: The patient is currently on 2L nasal cannula, and has been educated on the need to turn, cough, and deep breathe. The patient has also been educated on the IS and needs further coaching.

## 2021-01-29 NOTE — PROGRESS NOTES
According to day shift the patient's raya catheter was removed at 1600 and had 460cc of urinary output. Upon removal there was blood at the end of the catheter. The patient has had approximately 10cc urinary output of adam red blood since 1600. A bladder scan was ordered and resulted with no residual urine. Dr. Girard notified and new orders were given for a bladder scan at 0200. Will continue to monitor and assess.

## 2021-01-29 NOTE — ASSESSMENT & PLAN NOTE
With successful thrombectomy of the right posterior lateral branch with 100% restoration of flow  Dual antiplatelet therapy  Metoprolol as tolerated  High intensity statin  With history of prior stenting though LAD stent was found to be patent on heart catheterization  Post cardiac catheterization echocardiogram reveals an ejection fraction of 60%

## 2021-01-30 LAB
ALBUMIN SERPL BCP-MCNC: 2.7 G/DL (ref 3.2–4.9)
ALBUMIN/GLOB SERPL: 1.4 G/DL
ALP SERPL-CCNC: 124 U/L (ref 30–99)
ALT SERPL-CCNC: 155 U/L (ref 2–50)
ANION GAP SERPL CALC-SCNC: 11 MMOL/L (ref 7–16)
AST SERPL-CCNC: 38 U/L (ref 12–45)
BILIRUB SERPL-MCNC: 0.9 MG/DL (ref 0.1–1.5)
BUN SERPL-MCNC: 14 MG/DL (ref 8–22)
CALCIUM SERPL-MCNC: 7.9 MG/DL (ref 8.5–10.5)
CHLORIDE SERPL-SCNC: 106 MMOL/L (ref 96–112)
CO2 SERPL-SCNC: 20 MMOL/L (ref 20–33)
CREAT SERPL-MCNC: 0.64 MG/DL (ref 0.5–1.4)
ERYTHROCYTE [DISTWIDTH] IN BLOOD BY AUTOMATED COUNT: 46.8 FL (ref 35.9–50)
GLOBULIN SER CALC-MCNC: 1.9 G/DL (ref 1.9–3.5)
GLUCOSE SERPL-MCNC: 94 MG/DL (ref 65–99)
HCT VFR BLD AUTO: 31.9 % (ref 42–52)
HGB BLD-MCNC: 10.5 G/DL (ref 14–18)
MCH RBC QN AUTO: 31.3 PG (ref 27–33)
MCHC RBC AUTO-ENTMCNC: 32.9 G/DL (ref 33.7–35.3)
MCV RBC AUTO: 94.9 FL (ref 81.4–97.8)
PHOSPHATE SERPL-MCNC: 2.4 MG/DL (ref 2.5–4.5)
PLATELET # BLD AUTO: 334 K/UL (ref 164–446)
PMV BLD AUTO: 11.6 FL (ref 9–12.9)
POTASSIUM SERPL-SCNC: 4.1 MMOL/L (ref 3.6–5.5)
PROT SERPL-MCNC: 4.6 G/DL (ref 6–8.2)
RBC # BLD AUTO: 3.36 M/UL (ref 4.7–6.1)
SODIUM SERPL-SCNC: 137 MMOL/L (ref 135–145)
WBC # BLD AUTO: 10.3 K/UL (ref 4.8–10.8)

## 2021-01-30 PROCEDURE — A9270 NON-COVERED ITEM OR SERVICE: HCPCS | Performed by: INTERNAL MEDICINE

## 2021-01-30 PROCEDURE — 770022 HCHG ROOM/CARE - ICU (200)

## 2021-01-30 PROCEDURE — A9270 NON-COVERED ITEM OR SERVICE: HCPCS | Performed by: NURSE PRACTITIONER

## 2021-01-30 PROCEDURE — 700102 HCHG RX REV CODE 250 W/ 637 OVERRIDE(OP): Performed by: NURSE PRACTITIONER

## 2021-01-30 PROCEDURE — 80053 COMPREHEN METABOLIC PANEL: CPT

## 2021-01-30 PROCEDURE — 84100 ASSAY OF PHOSPHORUS: CPT

## 2021-01-30 PROCEDURE — 99233 SBSQ HOSP IP/OBS HIGH 50: CPT | Performed by: INTERNAL MEDICINE

## 2021-01-30 PROCEDURE — 700102 HCHG RX REV CODE 250 W/ 637 OVERRIDE(OP): Performed by: INTERNAL MEDICINE

## 2021-01-30 PROCEDURE — 99233 SBSQ HOSP IP/OBS HIGH 50: CPT | Performed by: HOSPITALIST

## 2021-01-30 PROCEDURE — 85027 COMPLETE CBC AUTOMATED: CPT

## 2021-01-30 RX ADMIN — METOPROLOL TARTRATE 25 MG: 25 TABLET, FILM COATED ORAL at 17:44

## 2021-01-30 RX ADMIN — CLOPIDOGREL BISULFATE 75 MG: 75 TABLET ORAL at 05:15

## 2021-01-30 RX ADMIN — ASPIRIN 81 MG: 81 TABLET, COATED ORAL at 05:15

## 2021-01-30 RX ADMIN — AMIODARONE HYDROCHLORIDE 400 MG: 200 TABLET ORAL at 06:39

## 2021-01-30 RX ADMIN — ATORVASTATIN CALCIUM 80 MG: 80 TABLET, FILM COATED ORAL at 17:44

## 2021-01-30 RX ADMIN — AMIODARONE HYDROCHLORIDE 400 MG: 200 TABLET ORAL at 17:44

## 2021-01-30 RX ADMIN — METOPROLOL TARTRATE 12.5 MG: 25 TABLET, FILM COATED ORAL at 05:15

## 2021-01-30 RX ADMIN — DOCUSATE SODIUM 50 MG AND SENNOSIDES 8.6 MG 2 TABLET: 8.6; 5 TABLET, FILM COATED ORAL at 05:15

## 2021-01-30 ASSESSMENT — ENCOUNTER SYMPTOMS
NAUSEA: 0
SHORTNESS OF BREATH: 0
COUGH: 0
CHILLS: 0
STRIDOR: 0
APNEA: 0
CHEST TIGHTNESS: 0
FEVER: 0
WHEEZING: 0
VOMITING: 0
CHOKING: 0

## 2021-01-30 ASSESSMENT — FIBROSIS 4 INDEX: FIB4 SCORE: 0.92

## 2021-01-30 NOTE — PROGRESS NOTES
2300   Raya leaked and irrigation drainage slowed. 1Large clot and a few small clots removed via 60ml syringe in collection port of raya after hub cleaned with alcohol. Leaking resolved drainage resumed.    0000  Pt desating to 88-89% when he falls into a deep sleep. Placed back on NC, O2 at 1lpm.     0300  Raya leaked with decreased to absent drainage into bag. Multiple large clots removed from port with 10 & 60ml syringe. Flow reestablished, pt cleaned.     0430-5:30  Raya leaked again with bladder pressure buildup. Only a small clot irrigated out. Charge nurse Erika at bedside to assist. Withdrew 600ml output via syringe.  Power flushed lushed with sterile water with small clot return.  Attempted to restart CBI. Pt developed bladder pressure with drainage around raya within 5 minutes.   Reached out to on call intensivist Dr. Geoffrey Larios via voalte. He states he is involved in emergency at the moment. Discussed with both demarco James and David. I will change out raya.

## 2021-01-30 NOTE — PROGRESS NOTES
"Hospital Medicine Daily Progress Note    Date of Service  1/30/2021    Chief Complaint  75 y.o. male admitted 1/26/2021 with chest pain    Hospital Course  75 y.o. male who presented 1/26/2021 with chest pain.  Mr. Montes De Oca has a past medical history of coronary artery disease with prior stenting to the LAD and circumflex then in the emergency room in [facility] with chest pain.  Is found to have a posterior myocardial infarction was initiated on aspirin therapy with IV heparin drip transferred here for higher level of care.  To transfer, he developed ventricular tachycardia and underwent defibrillation x2 given epinephrine x2 with return of spontaneous circulation.  Is intubated for airway protection.  Due to hypotension, he was initiated on a norepinephrine drip.  He went emergently to the cardiac Cath Lab found to have 100% occlusion of the right posterior lateral branch underwent thrombectomy with restoration of flow.  Found to have patent LAD stent.  He was subsequently mated to the cardiac intensive care unit on a ventilator.  Due to her fibrillation, he has been on triple therapy with Eliquis, aspirin, and Plavix.  He was subsequently extubated on 1/28/2021.         Interval Problem Update  1/29: patient seen and evaluated in the ICU. His son, Stevie, is at bedside. Mr. Montes De Oca lives in [place] with his wife. We discussed that we will need to get him up and walking and assess how his strength and balance is prior to discharge; he may benefit from a SNF but await PT/OT eval. He has hematuria thus CBI initiated. I discussed with Dr. Negron, critical care, and Dr. Sawant, cardiology.  1/30: Mr. Montes De Oca was evaluated and examined in the ICU.  Continues to have continuous bladder irrigation with hematuria.  Eliquis was stopped yesterday.  Had a long discussion and patient is not sure if he has had prostate cancer or simply benign prostatic hyperplasia as he has had a \"Roto-Rooter\" which is a " procedure used for BPH.  He has a poor appetite.  Nursing will get him out of bed and ambulate to see if he is candidate for going home will either he will need physical therapy and Occupational Therapy for skilled nursing facility.    Consultants/Specialty  Cardiology  Critical care.     Code Status  Full Code    Disposition  Tele     Review of Systems  Review of Systems   Constitutional: Negative for chills and fever.   Respiratory: Negative for cough and shortness of breath.    Cardiovascular: Negative for chest pain.   Gastrointestinal: Negative for nausea and vomiting.   All other systems reviewed and are negative.       Physical Exam  Temp:  [36 °C (96.8 °F)] 36 °C (96.8 °F)  Pulse:  [] 76  Resp:  [15-28] 20  BP: (102-142)/(55-77) 135/65  SpO2:  [92 %-99 %] 99 %    Physical Exam  Vitals signs and nursing note reviewed.   Constitutional:       Appearance: Normal appearance. He is not toxic-appearing.   HENT:      Head: Normocephalic and atraumatic.      Mouth/Throat:      Mouth: Mucous membranes are dry.      Pharynx: Oropharynx is clear.   Eyes:      General: No scleral icterus.     Conjunctiva/sclera: Conjunctivae normal.   Cardiovascular:      Rate and Rhythm: Normal rate and regular rhythm.      Heart sounds: No murmur.   Pulmonary:      Effort: Pulmonary effort is normal. No respiratory distress.      Breath sounds: Normal breath sounds.   Abdominal:      General: There is no distension.      Tenderness: There is no abdominal tenderness.   Genitourinary:     Comments: Paredes catheter  Musculoskeletal:      Right lower leg: No edema.      Left lower leg: No edema.   Skin:     General: Skin is warm and dry.   Neurological:      Mental Status: He is alert and oriented to person, place, and time.   Psychiatric:         Mood and Affect: Mood normal.         Behavior: Behavior normal.         Fluids    Intake/Output Summary (Last 24 hours) at 1/30/2021 0988  Last data filed at 1/30/2021 0500  Gross per 24  hour   Intake 1101.8 ml   Output --   Net 1101.8 ml       Laboratory  Recent Labs     01/28/21  0418 01/29/21  0430   WBC 14.6* 11.1*   RBC 4.19* 3.73*   HEMOGLOBIN 13.2* 11.5*   HEMATOCRIT 39.4* 35.3*   MCV 94.0 94.6   MCH 31.5 30.8   MCHC 33.5* 32.6*   RDW 46.5 46.4   PLATELETCT 435 313   MPV 11.7 11.9     Recent Labs     01/28/21  0418 01/29/21  0430 01/29/21  0900   SODIUM 134* 132* 132*   POTASSIUM 3.2* 3.9 4.1   CHLORIDE 104 105 103   CO2 21 21 21   GLUCOSE 165* 121* 126*   BUN 15 16 17   CREATININE 0.82 0.75 0.70   CALCIUM 7.7* 8.0* 8.1*             Recent Labs     01/28/21  0418   TRIGLYCERIDE 129       Imaging  DX-CHEST-PORTABLE (1 VIEW)   Final Result         1.  Minimal residual left basilar atelectasis or infiltrate.   2.  Cardiomegaly         DX-CHEST-PORTABLE (1 VIEW)   Final Result         1.  Pulmonary edema and/or infiltrates are identified, which are stable since the prior exam.      EC-ECHOCARDIOGRAM LTD W/ CONT   Final Result      DX-CHEST-FOR LINE PLACEMENT Perform procedure in: Patient's Room   Final Result         1.  Pulmonary edema and/or infiltrates.      DX-CHEST-PORTABLE (1 VIEW)   Final Result      Limited examination with endotracheal tube in place.      Bibasilar atelectasis.      OUTSIDE IMAGES-DX CHEST   Final Result      CL-LEFT HEART CATHETERIZATION WITH POSSIBLE INTERVENTION    (Results Pending)        Assessment/Plan  * STEMI (ST elevation myocardial infarction) ([facility])- (present on admission)  Assessment & Plan  With successful thrombectomy of the right posterior lateral branch with 100% restoration of flow  Dual antiplatelet therapy  Metoprolol as tolerated  High intensity statin  With history of prior stenting though LAD stent was found to be patent on heart catheterization  Post cardiac catheterization echocardiogram reveals an ejection fraction of 60%    Cardiac arrest with ventricular fibrillation (HCC)- (present on admission)  Assessment & Plan  Status post amiodarone  "drip    Hematuria  Assessment & Plan  In the setting of anticoagulation.  Continuous bladder irrigation ordered.  He states he has a history of big prostate for which she had a \"Roto-Rooter\" thinks he had a history of prostate cancer but is not really sure.  He is followed by Dr. Burgos, urology, in [place]  He denies hematuria prior to this hospitalization      Paroxysmal atrial fibrillation (HCC)- (present on admission)  Assessment & Plan  Change amiodarone to oral  Continuous tele monitoring  Hold on anticoagulation given his hematuria     Transaminitis- (present on admission)  Assessment & Plan  AST was 787 and  on admit likely due to shocked liver in the setting of cardiogenic shock  Repeat CMP reveals that enzymes of trended down appropriately    Acute respiratory failure with hypoxia (HCC)- (present on admission)  Assessment & Plan  Requiring mechanical ventilation prior to admission due to airway protection  Successfully extubated 1/28/2021  Pulmonology has consulted       VTE prophylaxis: SCDs      "

## 2021-01-30 NOTE — CARE PLAN
Problem: Urinary Elimination:  Goal: Ability to reestablish a normal urinary elimination pattern will improve  Outcome: NOT MET     Problem: Safety  Goal: Will remain free from injury  Outcome: PROGRESSING AS EXPECTED     Problem: Infection  Goal: Will remain free from infection  Outcome: PROGRESSING AS EXPECTED     Problem: Fluid Volume:  Goal: Will maintain balanced intake and output  Outcome: PROGRESSING AS EXPECTED     Problem: Respiratory:  Goal: Respiratory status will improve  Outcome: PROGRESSING AS EXPECTED     Problem: Communication  Goal: The ability to communicate needs accurately and effectively will improve  Outcome: MET

## 2021-01-30 NOTE — CARE PLAN
Problem: Safety  Goal: Will remain free from injury  Outcome: PROGRESSING AS EXPECTED  Goal: Will remain free from falls  Outcome: PROGRESSING AS EXPECTED     Problem: Infection  Goal: Will remain free from infection  Outcome: PROGRESSING AS EXPECTED     Problem: Venous Thromboembolism (VTW)/Deep Vein Thrombosis (DVT) Prevention:  Goal: Patient will participate in Venous Thrombosis (VTE)/Deep Vein Thrombosis (DVT)Prevention Measures  Outcome: PROGRESSING AS EXPECTED     Problem: Bowel/Gastric:  Goal: Normal bowel function is maintained or improved  Outcome: PROGRESSING AS EXPECTED  Goal: Will not experience complications related to bowel motility  Outcome: PROGRESSING AS EXPECTED     Problem: Respiratory:  Goal: Respiratory status will improve  Outcome: PROGRESSING AS EXPECTED     Problem: Urinary Elimination:  Goal: Ability to reestablish a normal urinary elimination pattern will improve  Outcome: PROGRESSING AS EXPECTED

## 2021-01-30 NOTE — PROGRESS NOTES
Cardiology Follow Up Progress Note    Date of Service  1/30/2021    Attending Physician  Gutierrez Montoya M.D.    Cardiology consultation for inferoposterior STEMI      Past medical history PCI to proximal LAD and mid circumflex 2012, hypertension, dyslipidemia      Transfer from [facility] with inferoposterior STEMI, patient presented with chest pain, prior to transfer found to be in V. fib cardiac arrest received immediate CPR, IV epinephrine x2, defibrillation x2, required intubation, ROSC achieved.    Interim Events    No overnight cardiac events  Maintaining sinus rhythm on amiodarone, per RN report brief episode of A. fib but looks more like PACs  CBC, CMP pending  Mildly hypertensive this morning, /65  Hematuria noted, on continuous bladder irrigation  No recurrent chest pain          Review of Systems  Review of Systems   Respiratory: Negative for apnea, cough, choking, chest tightness, shortness of breath, wheezing and stridor.        Vital signs in last 24 hours  Temp:  [36 °C (96.8 °F)] 36 °C (96.8 °F)  Pulse:  [] 76  Resp:  [15-28] 20  BP: (102-142)/(55-77) 135/65  SpO2:  [92 %-99 %] 99 %    Physical Exam  Physical Exam  Cardiovascular:      Rate and Rhythm: Normal rate and regular rhythm.      Pulses: Normal pulses.   Pulmonary:      Effort: Pulmonary effort is normal.   Skin:     General: Skin is warm.      Comments: Right radial cath site with good circulation, mild ecchymotic area   Neurological:      Mental Status: He is alert.   Psychiatric:         Mood and Affect: Mood normal.         Lab Review  Lab Results   Component Value Date/Time    WBC 11.1 (H) 01/29/2021 04:30 AM    RBC 3.73 (L) 01/29/2021 04:30 AM    HEMOGLOBIN 11.5 (L) 01/29/2021 04:30 AM    HEMATOCRIT 35.3 (L) 01/29/2021 04:30 AM    MCV 94.6 01/29/2021 04:30 AM    MCH 30.8 01/29/2021 04:30 AM    MCHC 32.6 (L) 01/29/2021 04:30 AM    MPV 11.9 01/29/2021 04:30 AM      Lab Results   Component Value  Date/Time    SODIUM 132 (L) 01/29/2021 09:00 AM    POTASSIUM 4.1 01/29/2021 09:00 AM    CHLORIDE 103 01/29/2021 09:00 AM    CO2 21 01/29/2021 09:00 AM    GLUCOSE 126 (H) 01/29/2021 09:00 AM    BUN 17 01/29/2021 09:00 AM    CREATININE 0.70 01/29/2021 09:00 AM      Lab Results   Component Value Date/Time    ASTSGOT 60 (H) 01/29/2021 09:00 AM    ALTSGPT 255 (H) 01/29/2021 09:00 AM     Lab Results   Component Value Date/Time    TRIGLYCERIDE 129 01/28/2021 04:18 AM    TROPONINT 130 (H) 01/26/2021 07:35 PM       No results for input(s): NTPROBNP in the last 72 hours.    Cardiac Imaging and Procedures Review  EKG: Atrial fibrillation 1/26/2021    Echocardiogram: 1/27/2021  No prior study is available for comparison.   Left ventricular ejection fraction is visually estimated to be 60%.  Technically difficult study - adequate information is obtained.      Cardiac Catheterization:    1/26/2021  POSTOPERATIVE DIAGNOSIS:  1.  100% thrombotically occluded right posterior lateral branch  2.  Widely patent previously placed proximal LAD stent  3.  Successful percutaneous coronary intervention with thrombectomy of the right posterior lateral branch with restoration of LONNIE-3 flow and 0% residual stenosis.         Imaging  Chest X-Ray: Minimal residual left basilar atelectasis or infiltrate.  2.  Cardiomegaly     Stress Test: Not applicable    Assessment/Plan    Acute inferoposterior STEMI  -100% thrombotically occluded right posterior lateral branch  -Status post successful PCI/thrombectomy right posterolateral branch, patent previously placed proximal LAD stent.  -DAPT, aspirin 81, Plavix 75 mg, along with atorvastatin 80 mg, metoprolol 12.5 mg p.o. twice daily      V. fib cardiac arrest  -Secondary to STEMI  -Extubated 1/28/2021  -Maintaining sinus rhythm    Cardiogenic shock  -Secondary to STEMI  -Resolved  -LVEF 60%    New A. Fib (1/26/2021 paced EKG)  -IV amiodarone 1/27/2021  -switch to p.o. amiodarone 400 mg p.o. twice daily  x2 weeks, then 400 mg daily x2 weeks, then 200 mg daily.  -Apixaban 5 mg twice daily, on hold due to gross hematuria, discussed with  very costly for the patient,$500/month    Dyslipidemia  -Atorvastatin    Hematuria  -Patient reports history of hematuria last week for 3 to 4 days,  was scheduled to follow-up with PCP but hospitalized due to STEMI  -Consider urology consult    Cardiology will follow along    Thank you for allowing me to participate in the care of this patient.      Please contact me with any questions.    PARAMJIT Gallagher.   Cardiologist, [facility]  [phone]

## 2021-01-31 PROCEDURE — 99232 SBSQ HOSP IP/OBS MODERATE 35: CPT | Performed by: HOSPITALIST

## 2021-01-31 PROCEDURE — 700102 HCHG RX REV CODE 250 W/ 637 OVERRIDE(OP): Performed by: INTERNAL MEDICINE

## 2021-01-31 PROCEDURE — A9270 NON-COVERED ITEM OR SERVICE: HCPCS | Performed by: INTERNAL MEDICINE

## 2021-01-31 PROCEDURE — 99232 SBSQ HOSP IP/OBS MODERATE 35: CPT | Performed by: INTERNAL MEDICINE

## 2021-01-31 PROCEDURE — 770020 HCHG ROOM/CARE - TELE (206)

## 2021-01-31 PROCEDURE — 700102 HCHG RX REV CODE 250 W/ 637 OVERRIDE(OP): Performed by: NURSE PRACTITIONER

## 2021-01-31 PROCEDURE — A9270 NON-COVERED ITEM OR SERVICE: HCPCS | Performed by: NURSE PRACTITIONER

## 2021-01-31 RX ADMIN — AMIODARONE HYDROCHLORIDE 400 MG: 200 TABLET ORAL at 05:01

## 2021-01-31 RX ADMIN — ATORVASTATIN CALCIUM 80 MG: 80 TABLET, FILM COATED ORAL at 16:55

## 2021-01-31 RX ADMIN — METOPROLOL TARTRATE 25 MG: 25 TABLET, FILM COATED ORAL at 05:02

## 2021-01-31 RX ADMIN — AMIODARONE HYDROCHLORIDE 400 MG: 200 TABLET ORAL at 16:55

## 2021-01-31 RX ADMIN — DOCUSATE SODIUM 50 MG AND SENNOSIDES 8.6 MG 2 TABLET: 8.6; 5 TABLET, FILM COATED ORAL at 16:55

## 2021-01-31 RX ADMIN — METOPROLOL TARTRATE 25 MG: 25 TABLET, FILM COATED ORAL at 16:55

## 2021-01-31 RX ADMIN — ASPIRIN 81 MG: 81 TABLET, COATED ORAL at 05:00

## 2021-01-31 RX ADMIN — CLOPIDOGREL BISULFATE 75 MG: 75 TABLET ORAL at 05:00

## 2021-01-31 ASSESSMENT — ENCOUNTER SYMPTOMS
NAUSEA: 0
VOMITING: 0
SHORTNESS OF BREATH: 0
FEVER: 0
COUGH: 1
ROS GI COMMENTS: SOMEWHAT POOR APPETITE
CHILLS: 0

## 2021-01-31 ASSESSMENT — FIBROSIS 4 INDEX
FIB4 SCORE: 0.7
FIB4 SCORE: 0.7

## 2021-01-31 ASSESSMENT — PAIN DESCRIPTION - PAIN TYPE: TYPE: ACUTE PAIN

## 2021-01-31 NOTE — PROGRESS NOTES
"Hospital Medicine Daily Progress Note    Date of Service  1/31/2021    Chief Complaint  75 y.o. male admitted 1/26/2021 with chest pain    Hospital Course  75 y.o. male who presented 1/26/2021 with chest pain.  Mr. Montes De Oca has a past medical history of coronary artery disease with prior stenting to the LAD and circumflex then in the emergency room in [facility] with chest pain.  Is found to have a posterior myocardial infarction was initiated on aspirin therapy with IV heparin drip transferred here for higher level of care.  To transfer, he developed ventricular tachycardia and underwent defibrillation x2 given epinephrine x2 with return of spontaneous circulation.  Is intubated for airway protection.  Due to hypotension, he was initiated on a norepinephrine drip.  He went emergently to the cardiac Cath Lab found to have 100% occlusion of the right posterior lateral branch underwent thrombectomy with restoration of flow.  Found to have patent LAD stent.  He was subsequently mated to the cardiac intensive care unit on a ventilator.  Due to her fibrillation, he has been on triple therapy with Eliquis, aspirin, and Plavix.  He was subsequently extubated on 1/28/2021.         Interval Problem Update  1/29: patient seen and evaluated in the ICU. His son, Stevie, is at bedside. Mr. Montes De Oca lives in [place] with his wife. We discussed that we will need to get him up and walking and assess how his strength and balance is prior to discharge; he may benefit from a SNF but await PT/OT eval. He has hematuria thus CBI initiated. I discussed with Dr. Negron, critical care, and Dr. Sawant, cardiology.  1/30: Mr. Montes De Oca was evaluated and examined in the ICU.  Continues to have continuous bladder irrigation with hematuria.  Eliquis was stopped yesterday.  Had a long discussion and patient is not sure if he has had prostate cancer or simply benign prostatic hyperplasia as he has had a \"Roto-Rooter\" which is a " procedure used for BPH.  He has a poor appetite.  Nursing will get him out of bed and ambulate to see if he is candidate for going home will either he will need physical therapy and Occupational Therapy for skilled nursing facility.  1/31: patient seen and evaluated in the ICU. He remains on continuous bladder irrigation though urine is clear this morning thus CBI will be stopped. He remains off of Eliquis. I discussed with his nurse about seeing if Mr. Montes De Oca can get out of bed and ambulate with the raya today. We discussed that he may require a SNF based on PT/OT evals. He complains of a cough.     Consultants/Specialty  Cardiology  Critical care.     Code Status  Full Code    Disposition  Tele     Review of Systems  Review of Systems   Constitutional: Negative for chills and fever.        Generally weak   Respiratory: Positive for cough. Negative for shortness of breath.    Cardiovascular: Negative for chest pain.   Gastrointestinal: Negative for nausea and vomiting.        Somewhat poor appetite   Genitourinary:        Raya    All other systems reviewed and are negative.       Physical Exam  Temp:  [36.1 °C (96.9 °F)-36.7 °C (98 °F)] 36.6 °C (97.8 °F)  Pulse:  [] 58  Resp:  [15-25] 20  BP: (105-143)/(47-75) 109/60  SpO2:  [92 %-99 %] 96 %    Physical Exam  Vitals signs and nursing note reviewed.   Constitutional:       Appearance: Normal appearance. He is not toxic-appearing.   HENT:      Head: Normocephalic and atraumatic.      Mouth/Throat:      Mouth: Mucous membranes are moist.      Pharynx: Oropharynx is clear.   Neck:      Musculoskeletal: Neck supple.   Cardiovascular:      Rate and Rhythm: Normal rate and regular rhythm.      Heart sounds: No murmur.   Pulmonary:      Effort: Pulmonary effort is normal. No respiratory distress.      Breath sounds: Normal breath sounds.   Abdominal:      General: There is no distension.      Tenderness: There is no abdominal tenderness.   Genitourinary:      Comments: Paredes catheter--urine is clear  Musculoskeletal:      Right lower leg: Edema present.      Left lower leg: Edema present.   Skin:     General: Skin is warm and dry.   Neurological:      General: No focal deficit present.      Mental Status: He is alert and oriented to person, place, and time.   Psychiatric:         Mood and Affect: Mood normal.         Behavior: Behavior normal.         Thought Content: Thought content normal.         Fluids    Intake/Output Summary (Last 24 hours) at 1/31/2021 0757  Last data filed at 1/30/2021 2000  Gross per 24 hour   Intake 200 ml   Output --   Net 200 ml       Laboratory  Recent Labs     01/29/21  0430 01/30/21  1148   WBC 11.1* 10.3   RBC 3.73* 3.36*   HEMOGLOBIN 11.5* 10.5*   HEMATOCRIT 35.3* 31.9*   MCV 94.6 94.9   MCH 30.8 31.3   MCHC 32.6* 32.9*   RDW 46.4 46.8   PLATELETCT 313 334   MPV 11.9 11.6     Recent Labs     01/29/21  0430 01/29/21  0900 01/30/21  1148   SODIUM 132* 132* 137   POTASSIUM 3.9 4.1 4.1   CHLORIDE 105 103 106   CO2 21 21 20   GLUCOSE 121* 126* 94   BUN 16 17 14   CREATININE 0.75 0.70 0.64   CALCIUM 8.0* 8.1* 7.9*                   Imaging  DX-CHEST-PORTABLE (1 VIEW)   Final Result         1.  Minimal residual left basilar atelectasis or infiltrate.   2.  Cardiomegaly         DX-CHEST-PORTABLE (1 VIEW)   Final Result         1.  Pulmonary edema and/or infiltrates are identified, which are stable since the prior exam.      EC-ECHOCARDIOGRAM LTD W/ CONT   Final Result      DX-CHEST-FOR LINE PLACEMENT Perform procedure in: Patient's Room   Final Result         1.  Pulmonary edema and/or infiltrates.      DX-CHEST-PORTABLE (1 VIEW)   Final Result      Limited examination with endotracheal tube in place.      Bibasilar atelectasis.      OUTSIDE IMAGES-DX CHEST   Final Result      CL-LEFT HEART CATHETERIZATION WITH POSSIBLE INTERVENTION    (Results Pending)        Assessment/Plan  * STEMI (ST elevation myocardial infarction) (HCC)- (present on  "admission)  Assessment & Plan  With successful thrombectomy of the right posterior lateral branch with 100% restoration of flow  Dual antiplatelet therapy  Metoprolol as tolerated  High intensity statin  With history of prior stenting though LAD stent was found to be patent on heart catheterization  Post cardiac catheterization echocardiogram reveals an ejection fraction of 60%  PT/OT and SNF referral placed    Cardiac arrest with ventricular fibrillation (HCC)- (present on admission)  Assessment & Plan  Status post amiodarone drip    Hematuria  Assessment & Plan  In the setting of anticoagulation which was stopped.  Continuous bladder irrigation with improvement thus stop CBI on 1/31.  He states he has a history of big prostate for which she had a \"Roto-Rooter\" thinks he had a history of prostate cancer but is not really sure.  He is followed by Dr. Burgos, urology, in [place]  He denies hematuria prior to this hospitalization      Paroxysmal atrial fibrillation (HCC)- (present on admission)  Assessment & Plan  Change amiodarone to oral  Continuous tele monitoring  Hold on anticoagulation given his hematuria     Transaminitis- (present on admission)  Assessment & Plan  AST was 787 and  on admit likely due to shocked liver in the setting of cardiogenic shock  Repeat CMP reveals that enzymes of trended down appropriately    Acute respiratory failure with hypoxia (HCC)- (present on admission)  Assessment & Plan  Requiring mechanical ventilation prior to admission due to airway protection  Successfully extubated 1/28/2021  He remains on supplemental oxygen  Pulmonology has consulted       VTE prophylaxis: SCDs      "

## 2021-01-31 NOTE — PROGRESS NOTES
2200 Paredes leaked, both ports injected with 60ml sterile water and clot removal attempted. No clots noted, ports reattached and placed on CBI. No further leaking noted. Paredes drainage very light pink to clear. Decreased irrigation flow rate.

## 2021-01-31 NOTE — CARE PLAN
Problem: Urinary Elimination:  Goal: Ability to reestablish a normal urinary elimination pattern will improve  Outcome: NOT MET     Problem: Skin Integrity  Goal: Risk for impaired skin integrity will decrease  Outcome: PROGRESSING AS EXPECTED

## 2021-01-31 NOTE — PROGRESS NOTES
Reason for consultation /admission : STEMI, PAF      The patient is doing well from cardiac standpoint.  Denies chest pain, palpitations, syncope or HF symptoms.  Denies side effects from cardiac medications.  Some hematuria with clot.  Per RN, bladder aggravation has been discontinued.    BP has been in the range       Review of systems;    General: No fever, chills, no weakness  HENT: No sore throat, no neck pain  Eyes: No blurred vision or double vision  Heart: No palpitation, no PND or orthopnea, no leg swelling  Lung: No productive cough, no hemoptysis  Abdomen: No n/v or blood in stool  : No dysuria, no frequency or hesitancy, no hematuria  Musculoskeletal: No myalgia, no back pain, some joint pain  Hematology: No easy bruising  Skin: No rash or itching  Neurological: No headache, no new focal weakness or numbness  Psychological: Denies depression, anxiety or insomnia  All other review of systems are negative      Temp:  [36.1 °C (97 °F)-36.8 °C (98.3 °F)] 36.8 °C (98.3 °F)  Pulse:  [] 65  Resp:  [15-25] 22  BP: (105-143)/(47-70) 139/69  SpO2:  [92 %-97 %] 95 %  GENERAL not in acute distress, not dyspnic at rest  Head atraumatic, normocephalic  Eyes EOMI  ENT neck supple, no JVD, no carotid bruits or thyromegaly  Lung good expansion, distant sound, no rales or wheezing  Heart RRR, normal rate, no murmur,   no gallop or rub  Abd soft, no tenderness, mass or bruits  Ext no edema  Genital Paredes in place  Skin no ecchymosis or petechiae  Musculoskeletal no deformity  Neuro grossly intact  Psych normal mood, normal affect    Monitor reviewed by me showed no significant ventricular or atrial dysrhythmias.      Assessment and plans    Acute inferoposterior STEMI  -100% thrombotically occluded right posterior lateral branch  -Status post successful PCI/thrombectomy right posterolateral branch, patent previously placed proximal LAD stent.  -DAPT, aspirin 81, Plavix 75 mg, along with atorvastatin 80 mg,  metoprolol 12.5 mg p.o. twice daily     S/p V. fib cardiac arrest due to #1  No sig recurrent VT   Noindication for long term antiarrhythmic     Paroxysmal A. Fib (1/26/2021)  -IV amiodarone 1/27/2021  -switch to p.o. amiodarone 400 mg p.o. twice daily x2 weeks, then 400 mg daily x2 weeks, then 200 mg daily.  No recurrence after converted back to sinus rhythm  With hematuria, we will hold off on long-term anticoagulation at least for now     Dyslipidemia  -Atorvastatin     Hematuria  +Hx of TURP and prior hematuria  Rx perprimary  Hold off NOAC as above    May d/c from cardiac standpoint one no indication to remain in the hospital for other issue    Cardiology will sign off if hospitalist team agreeable with taking over his care       Please note that this dictation was created using voice recognition software. I have worked with consultants from the vendor as well as technical experts from [facility] to optimize the interface. I have made every reasonable attempt to correct obvious errors, but I expect that there are errors of grammar and possibly content I did not discover before finalizing the note

## 2021-02-01 ENCOUNTER — APPOINTMENT (OUTPATIENT)
Dept: RADIOLOGY | Facility: MEDICAL CENTER | Age: 76
DRG: 250 | End: 2021-02-01
Attending: INTERNAL MEDICINE
Payer: MEDICARE

## 2021-02-01 PROCEDURE — 700117 HCHG RX CONTRAST REV CODE 255: Performed by: INTERNAL MEDICINE

## 2021-02-01 PROCEDURE — 700102 HCHG RX REV CODE 250 W/ 637 OVERRIDE(OP): Performed by: INTERNAL MEDICINE

## 2021-02-01 PROCEDURE — A9270 NON-COVERED ITEM OR SERVICE: HCPCS | Performed by: NURSE PRACTITIONER

## 2021-02-01 PROCEDURE — 770020 HCHG ROOM/CARE - TELE (206)

## 2021-02-01 PROCEDURE — A9270 NON-COVERED ITEM OR SERVICE: HCPCS | Performed by: INTERNAL MEDICINE

## 2021-02-01 PROCEDURE — 99232 SBSQ HOSP IP/OBS MODERATE 35: CPT | Performed by: INTERNAL MEDICINE

## 2021-02-01 PROCEDURE — 97161 PT EVAL LOW COMPLEX 20 MIN: CPT

## 2021-02-01 PROCEDURE — 700102 HCHG RX REV CODE 250 W/ 637 OVERRIDE(OP): Performed by: NURSE PRACTITIONER

## 2021-02-01 PROCEDURE — 74178 CT ABD&PLV WO CNTR FLWD CNTR: CPT

## 2021-02-01 PROCEDURE — 97165 OT EVAL LOW COMPLEX 30 MIN: CPT

## 2021-02-01 RX ORDER — FINASTERIDE 5 MG/1
5 TABLET, FILM COATED ORAL DAILY
Status: DISCONTINUED | OUTPATIENT
Start: 2021-02-01 | End: 2021-02-03 | Stop reason: HOSPADM

## 2021-02-01 RX ADMIN — ASPIRIN 81 MG: 81 TABLET, COATED ORAL at 04:53

## 2021-02-01 RX ADMIN — CLOPIDOGREL BISULFATE 75 MG: 75 TABLET ORAL at 04:53

## 2021-02-01 RX ADMIN — IOHEXOL 100 ML: 350 INJECTION, SOLUTION INTRAVENOUS at 18:54

## 2021-02-01 RX ADMIN — METOPROLOL TARTRATE 25 MG: 25 TABLET, FILM COATED ORAL at 04:53

## 2021-02-01 RX ADMIN — AMIODARONE HYDROCHLORIDE 400 MG: 200 TABLET ORAL at 19:23

## 2021-02-01 RX ADMIN — ATORVASTATIN CALCIUM 80 MG: 80 TABLET, FILM COATED ORAL at 19:23

## 2021-02-01 RX ADMIN — AMIODARONE HYDROCHLORIDE 400 MG: 200 TABLET ORAL at 04:53

## 2021-02-01 RX ADMIN — METOPROLOL TARTRATE 25 MG: 25 TABLET, FILM COATED ORAL at 19:23

## 2021-02-01 RX ADMIN — FINASTERIDE 5 MG: 5 TABLET, FILM COATED ORAL at 16:46

## 2021-02-01 ASSESSMENT — COGNITIVE AND FUNCTIONAL STATUS - GENERAL
SUGGESTED CMS G CODE MODIFIER MOBILITY: CI
MOBILITY SCORE: 23
DAILY ACTIVITIY SCORE: 22
HELP NEEDED FOR BATHING: A LITTLE
DRESSING REGULAR LOWER BODY CLOTHING: A LITTLE
CLIMB 3 TO 5 STEPS WITH RAILING: A LITTLE
SUGGESTED CMS G CODE MODIFIER DAILY ACTIVITY: CJ

## 2021-02-01 ASSESSMENT — ENCOUNTER SYMPTOMS
ROS GI COMMENTS: SOMEWHAT POOR APPETITE
FLANK PAIN: 0
HEADACHES: 0
CHILLS: 0
NAUSEA: 0
SHORTNESS OF BREATH: 0
DIZZINESS: 0
FEVER: 0
VOMITING: 0

## 2021-02-01 ASSESSMENT — GAIT ASSESSMENTS
ASSISTIVE DEVICE: SINGLE POINT CANE
DISTANCE (FEET): 200
GAIT LEVEL OF ASSIST: SUPERVISED

## 2021-02-01 ASSESSMENT — FIBROSIS 4 INDEX: FIB4 SCORE: 0.7

## 2021-02-01 ASSESSMENT — PATIENT HEALTH QUESTIONNAIRE - PHQ9
1. LITTLE INTEREST OR PLEASURE IN DOING THINGS: NOT AT ALL
SUM OF ALL RESPONSES TO PHQ9 QUESTIONS 1 AND 2: 0
2. FEELING DOWN, DEPRESSED, IRRITABLE, OR HOPELESS: NOT AT ALL

## 2021-02-01 ASSESSMENT — ACTIVITIES OF DAILY LIVING (ADL): TOILETING: INDEPENDENT

## 2021-02-01 NOTE — PROGRESS NOTES
Cardiovascular Nurse Navigator (ext [phone] or via Voalte) Note:    Acute Inferoposterior STEMI s/p thrombectomy of RPL branch    Reviewed ACS/PCI medications:  Dual Antiplatelet Therapy (DAPT):  aspirin + Plavix  Beta-Blocker:  Lopressor  Statin:  Lipitor 80mg  ACEI/ARB:  EF 60% n/a  Aldosterone blocking agent:   EF 60% n/a    Intensive Cardiac Rehab (ICR) Referral:  Referred on January 26; has current inpatient orders for nutrition consult & PT for Phase I ICR  ICR follow-up and contact info added to AVS:  yes    Cardiology Follow-Up:  Feb 11, 2021 3:15 PM   Established Patient with Austin Zamudio M.D.   [facility] (--) [address] [place] [id number] [phone]      Meds to Beds:  Please consider opting in to Meds-to-Bed program via the admission navigator.    Inpatient & Discharge Patient Education:  Bedside nursing to continually provide patient education on ACS meds, signs and symptoms to monitor for, and risk factor modification. Also at discharge please select and complete the “ACS” special instructions on the AVS.      Thank you and please contact me at ext [phone] or via Voalte with questions.

## 2021-02-01 NOTE — THERAPY
Physical Therapy   Initial Evaluation     Patient Name: Hi Montes De Oca  Age:  75 y.o., Sex:  male  Medical Record #: [id number]  Today's Date: 2/1/2021     Precautions: Fall Risk, Cardiac Precautions (See Comments)    Assessment  Patient is 75 y.o. male with a diagnosis of STEMI, s/p PCI.  Additional factors influencing patient status / progress : Today pt is supervised for OOB, supervised for transfers, supervised for ambulation using a SPC x 200 feet in hallway and supervised to go up/down 4 stairs. Education done re phase I cardiac rehab concepts, emphasis on gentle return to his former exercise program (pt describes weight lifting--ed done re walking program best for now until follow up with cardiologist).      Plan    Recommend Physical Therapy for Evaluation only  DC Equipment Recommendations: (Pended) None  Discharge Recommendations: (Pended) Anticipate that the patient will have no further physical therapy needs after discharge from the hospital(needs to be up walking with nsg to maintain endurance. )Patient will not be actively followed for physical therapy services at this time, however may be seen if requested by physician for 1 more visit within 30 days to address any discharge or equipment needs            Objective       02/01/21 1002   Precautions   Precautions Fall Risk;Cardiac Precautions (See Comments)   Comments EF=60%    Prior Living Situation   Prior Services None   Housing / Facility 2 Story House   Steps Into Home 5   Steps In Home   (flight)   Rail Both Rail (Steps in Home);Both Rail (Steps into Home)   Elevator No   Equipment Owned Single Point Cane   Lives with - Patient's Self Care Capacity Spouse  (home as needed, elderly. Son lives in same town. )   Prior Level of Functional Mobility   Bed Mobility Independent   Transfer Status Independent   Ambulation Independent   Assistive Devices Used None   Stairs Independent   Gait Analysis   Gait Level Of Assist Supervised   Assistive Device  Single Point Cane   Distance (Feet) 200   Bed Mobility    Supine to Sit Supervised   Sit to Supine   (up chair)   Scooting Supervised   Rolling Supervised   Functional Mobility   Sit to Stand Supervised   Bed, Chair, Wheelchair Transfer Supervised   Transfer Method Stand Pivot   Comments pt with large, watery,  incontinent stool with sit to stand at end. Nsg updated.    Anticipated Discharge Equipment and Recommendations   DC Equipment Recommendations None   Discharge Recommendations Anticipate that the patient will have no further physical therapy needs after discharge from the hospital  (needs to be up walking with nsg to maintain endurance. )

## 2021-02-01 NOTE — PROGRESS NOTES
Pt transferred from [facility] to [email or web address] All belongings and chart sent with patient. Report given to RICH Nelson

## 2021-02-01 NOTE — PROGRESS NOTES
[facility] Medicine Daily Progress Note    Date of Service  2/1/2021    Chief Complaint  75 y.o. male admitted 1/26/2021 with chest pain    Hospital Course  75 y.o. male who presented 1/26/2021 with chest pain.  Mr. Montes De Oca has a past medical history of coronary artery disease with prior stenting to the LAD and circumflex then in the emergency room in [facility] with chest pain.  Is found to have a posterior myocardial infarction was initiated on aspirin therapy with IV heparin drip transferred here for higher level of care.  To transfer, he developed ventricular tachycardia and underwent defibrillation x2 given epinephrine x2 with return of spontaneous circulation.  Is intubated for airway protection.  Due to hypotension, he was initiated on a norepinephrine drip.  He went emergently to the cardiac Cath Lab found to have 100% occlusion of the right posterior lateral branch underwent thrombectomy with restoration of flow.  Found to have patent LAD stent.  Cardiac intensive care unit on a ventilator.  Due to her fibrillation, he has been on triple therapy with Eliquis, aspirin, and Plavix.  He was subsequently extubated on 1/28/2021. Required CBI due to hematuria and eliquis was held.     Interval Problem Update  Patient was seen and examined at bedside.  No acute events overnight. Patient is resting comfortably in bed and in no acute distress. Continues to experience hematuria.     DAPT  S/p thrombectomy for 100% thrombotic occlusion of the R PLB   PT/OT to eval   Follow up with cardiology  PT - no needs  Consult to urology    Consultants/Specialty  Cardiology  Critical care.     Code Status  Full Code    Disposition  Tele     Review of Systems  Review of Systems   Constitutional: Negative for chills and fever.        Generally weak   Respiratory: Negative for shortness of breath.    Cardiovascular: Negative for chest pain.   Gastrointestinal: Negative for nausea and vomiting.        Somewhat poor appetite    Genitourinary: Negative for dysuria and frequency.        Paredes    Neurological: Negative for dizziness and headaches.   All other systems reviewed and are negative.       Physical Exam  Temp:  [36.2 °C (97.2 °F)-37 °C (98.6 °F)] 37 °C (98.6 °F)  Pulse:  [61-85] 77  Resp:  [16-18] 18  BP: (113-145)/(57-96) 113/57  SpO2:  [91 %-95 %] 93 %    Physical Exam  Vitals signs and nursing note reviewed.   Constitutional:       Appearance: Normal appearance. He is not toxic-appearing.   HENT:      Head: Normocephalic and atraumatic.      Right Ear: External ear normal.      Left Ear: External ear normal.      Mouth/Throat:      Mouth: Mucous membranes are moist.      Pharynx: Oropharynx is clear.   Eyes:      Extraocular Movements: Extraocular movements intact.      Conjunctiva/sclera: Conjunctivae normal.      Pupils: Pupils are equal, round, and reactive to light.   Neck:      Musculoskeletal: Neck supple.   Cardiovascular:      Rate and Rhythm: Normal rate and regular rhythm.      Heart sounds: No murmur.   Pulmonary:      Effort: Pulmonary effort is normal. No respiratory distress.      Breath sounds: Normal breath sounds. No wheezing.   Abdominal:      Tenderness: There is no abdominal tenderness. There is no guarding or rebound.   Genitourinary:     Comments: Paredes catheter--urine is blood tinged  Musculoskeletal:         General: No swelling.      Right lower leg: Edema present.      Left lower leg: Edema present.   Skin:     General: Skin is warm and dry.      Coloration: Skin is not jaundiced.      Findings: No bruising.   Neurological:      General: No focal deficit present.      Mental Status: He is alert and oriented to person, place, and time.      Cranial Nerves: No cranial nerve deficit.   Psychiatric:         Mood and Affect: Mood normal.         Behavior: Behavior normal.         Thought Content: Thought content normal.         Fluids    Intake/Output Summary (Last 24 hours) at 2/1/2021 1414  Last data  filed at 1/31/2021 1620  Gross per 24 hour   Intake 100 ml   Output --   Net 100 ml       Laboratory  Recent Labs     01/30/21  1148   WBC 10.3   RBC 3.36*   HEMOGLOBIN 10.5*   HEMATOCRIT 31.9*   MCV 94.9   MCH 31.3   MCHC 32.9*   RDW 46.8   PLATELETCT 334   MPV 11.6     Recent Labs     01/30/21  1148   SODIUM 137   POTASSIUM 4.1   CHLORIDE 106   CO2 20   GLUCOSE 94   BUN 14   CREATININE 0.64   CALCIUM 7.9*                   Imaging  DX-CHEST-PORTABLE (1 VIEW)   Final Result         1.  Minimal residual left basilar atelectasis or infiltrate.   2.  Cardiomegaly         DX-CHEST-PORTABLE (1 VIEW)   Final Result         1.  Pulmonary edema and/or infiltrates are identified, which are stable since the prior exam.      EC-ECHOCARDIOGRAM LTD W/ CONT   Final Result      DX-CHEST-FOR LINE PLACEMENT Perform procedure in: Patient's Room   Final Result         1.  Pulmonary edema and/or infiltrates.      DX-CHEST-PORTABLE (1 VIEW)   Final Result      Limited examination with endotracheal tube in place.      Bibasilar atelectasis.      OUTSIDE IMAGES-DX CHEST   Final Result      CL-LEFT HEART CATHETERIZATION WITH POSSIBLE INTERVENTION    (Results Pending)   CT-ABDOMEN & PELVIS UROGRAM    (Results Pending)        Assessment/Plan  * STEMI (ST elevation myocardial infarction) (HCC)- (present on admission)  Assessment & Plan  With successful thrombectomy of the right posterior lateral branch with 100% restoration of flow  Dual antiplatelet therapy  Metoprolol as tolerated  High intensity statin  With history of prior stenting though LAD stent was found to be patent on heart catheterization  Post cardiac catheterization echocardiogram reveals an ejection fraction of 60%      Cardiac arrest with ventricular fibrillation (HCC)- (present on admission)  Assessment & Plan  Status post amiodarone drip    Hematuria  Assessment & Plan  In the setting of anticoagulation which was stopped.  Continuous bladder irrigation with improvement thus  "stop CBI on 1/31.  He states he has a history of big prostate for which she had a \"Roto-Rooter\" thinks he had a history of prostate cancer but is not really sure.  He is followed by Dr. Burgos, urology, in [place]    - blood noted in raya today resume CBI  - CT urogram ordered  - consult placed to urology, Dr. Naranjo to eval      Paroxysmal atrial fibrillation (HCC)- (present on admission)  Assessment & Plan  Change amiodarone to oral  Continuous tele monitoring  Hold on anticoagulation given his hematuria     Transaminitis- (present on admission)  Assessment & Plan  AST was 787 and  on admit likely due to shocked liver in the setting of cardiogenic shock  Repeat CMP reveals that enzymes of trended down appropriately    Acute respiratory failure with hypoxia (HCC)- (present on admission)  Assessment & Plan  Requiring mechanical ventilation prior to admission due to airway protection  Successfully extubated 1/28/2021  He remains on supplemental oxygen  Pulmonology has consulted       VTE prophylaxis: SCDs      "

## 2021-02-01 NOTE — CONSULTS
"[facility] [place] Consult/H&P Note    Primary Service:   Attending: Joel Galloway D.O.  Patient's Name/MRN: Hi Montes De Oca, [id number]    Admit Date:1/26/2021  Today's Date: 2/1/2021   Length of stay:  LOS: 6 days   Room #: T711/02      Reason for consult/chief complaint: gross hematuria  ID/HPI: Hi Montes De Oca is a 75 y.o. male patient found to have a posterior MI on 1/26/21 in [place]. Started on heparin at that time and transferred to our facility for higher level of care. Subsequently developed gross hematuria. Urology consulted.     Continuous bladder irrigation with improvement thus stop CBI on 1/31. 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 No prostate cancer history. Prior gross hematuria history which is why he underwent TURP in the past. Notes possible infection history. No smoking, radiation or chemical/environmental exposure. No kidney stone history. He is followed by Dr. Burgos, urology, in [place].      Currently, no pain. Paredes in place and draining       Past Medical History:   Past Medical History:   Diagnosis Date   • Arthritis     all over   • CATARACT 2006    removed   • Heart murmur 1974   • Hypertension    • Myocardial infarct (HCC) 7/11/2012   • Pain    • Renal disorder    • Unspecified urinary incontinence     prostate issues, self cathing currently   • Urinary bladder disorder         Past Surgical History:   Past Surgical History:   Procedure Laterality Date   • RECOVERY  12/21/2012    Performed by Cath-Recovery Surgery at SURGERY SAME DAY [facility] ORS   • OTHER ORTHOPEDIC SURGERY  3/1/2011    left knee total   • KNEE ARTHROPLASTY TOTAL  3/1/2011    Performed by KHALIF TREJO at SURGERY [facility] ORS   • PB LAP,CHOLECYSTECTOMY  2001   • VASECTOMY  1984   • APPENDECTOMY  1958   • ARTHROSCOPY, KNEE      2 x left, 1 x right   • TONSILLECTOMY          Family History:   Family History   Problem Relation Age of Onset   • " "Heart Failure Mother    • Heart Attack Father          Social History:   Social History     Tobacco Use   • Smoking status: Never Smoker   • Smokeless tobacco: Current User     Types: Chew   • Tobacco comment: chews snuff / tobacco   Substance Use Topics   • Alcohol use: No   • Drug use: No      Social History     Social History Narrative   • Not on file        Allergies: he Patient has no known allergies.    Medications:   Medications Prior to Admission   Medication Sig Dispense Refill Last Dose   • amLODIPine (NORVASC) 5 MG Tab Take 5 mg by mouth every day.      • losartan (COZAAR) 50 MG Tab Take 50 mg by mouth every day.      • topiramate (TOPAMAX) 25 MG Tab Take 2 Tabs by mouth every bedtime. 180 Tab 3    • metoprolol SR (TOPROL XL) 50 MG TB24 Take 50 mg by mouth every day.      • finasteride (PROSCAR) 5 MG TABS Take 5 mg by mouth every day.      • citalopram (CELEXA) 40 MG TABS Take 40 mg by mouth every day.    Indications: Panic Disorder      • clopidogrel (PLAVIX) 75 MG TABS Take 75 mg by mouth every day.    Indications: Heart Attack      • nitroglycerin (NITROSTAT) 0.4 MG SUBL Place 0.4 mg under tongue every 5 minutes as needed.        • simvastatin (ZOCOR) 10 MG TABS Take 10 mg by mouth every evening.        • magnesium oxide (MAG-OX) 400 MG TABS Take 400 mg by mouth every day.              Review of Systems  Review of Systems   Constitutional: Negative for chills and fever.   Respiratory: Negative for shortness of breath.    Cardiovascular: Negative for chest pain.   Gastrointestinal: Negative for nausea and vomiting.   Genitourinary: Positive for hematuria. Negative for dysuria, flank pain, frequency and urgency.        Physical Exam  VITAL SIGNS: /57   Pulse 77   Temp 37 °C (98.6 °F) (Temporal)   Resp 18   Ht 1.803 m (5' 11\")   Wt 92.9 kg (204 lb 12.9 oz)   SpO2 93%   BMI 28.56 kg/m²   Physical Exam   Constitutional: He is oriented to person, place, and time and well-developed, " well-nourished, and in no distress.   HENT:   Head: Normocephalic and atraumatic.   Eyes: EOM are normal.   Neck: Normal range of motion.   Pulmonary/Chest: Effort normal.   Abdominal: Soft.   Genitourinary:    Genitourinary Comments: Paredes in place with dark red, florence output. CBI disconnected     Musculoskeletal: Normal range of motion.   Neurological: He is alert and oriented to person, place, and time.   Skin: Skin is warm and dry.   Ecchymosis bilateral UE   Nursing note and vitals reviewed.        Labs:  Recent Labs     01/30/21  1148   WBC 10.3   RBC 3.36*   HEMOGLOBIN 10.5*   HEMATOCRIT 31.9*   MCV 94.9   MCH 31.3   MCHC 32.9*   RDW 46.8   PLATELETCT 334   MPV 11.6     Recent Labs     01/30/21  1148   SODIUM 137   POTASSIUM 4.1   CHLORIDE 106   CO2 20   GLUCOSE 94   BUN 14   CREATININE 0.64   CALCIUM 7.9*         Glucose:  Recent Labs     01/30/21  1148   GLUCOSE 94     Coags:  No results for input(s): INR in the last 72 hours.      Urinalysis:   No results for input(s): COLORURINE, CLARITY, SPECGRAVITY, PHURINE, GLUCOSEUR, KETONES, NITRITE, OCCULTBLOOD, RBCURINE, BACTERIA, EPITHELCELL in the last 72 hours.    Invalid input(s): BILRUBINUR, LEUESTERASE    Imaging:                                                                                        Assessment/Recommendation   75 y.o.male with gross hematuria    · Restart CBI, titrate to clear to light pink, hand irrigate prn clots or decreased urine output  · Monitor labs  · CT urogram to evaluate for upper tract abnormalities  · To start and continue finasteride  · May consider prostatic artery embolization if CT normal and continues to have gross hematuria      This case was discussed with Dr. Naranjo and he is in agreement with aforementioned plan.        Ricardo Toscano, P.A.-JOSSELIN.   [address] [place] [address]   [phone]

## 2021-02-01 NOTE — PROGRESS NOTES
[facility] Cardiology Follow-up Note    Date of Service:    2/1/2021      Name:   Hi Montes De Oca   YOB: 1945  Age:   75 y.o.  male   MRN:   [id number]      Chief Complaint: STEMI    Primary Cardiologist:  Dr. Sawant     HPI:  Mr Montes De Oca is a 74 y/o fellow with PMH including CAD with stents to the LAD and LCx in 2012, BPH, Dyslipidemia, Essential hypertension who presented to [facility] on 1/26/21 from [facility] in [place] with inferior STEMI out of hospital VF arrest, ended up intubated.   He was found to have 100% thrombotic occlusion of the R PLB, which was treated with thrombectomy.   His EF is preserved, has been hypotensive.  He also had paroxysm of AFIB, not currently anticoagulated given significant hematuria (which was present prior to admission).  He has appt with urologist in [place].    Interim Events:  This morning he denies CP or shortness of breath   Still with some hematuria per raya, but he says it's a little better  No dysuria.     ROS  Constitutional:  denies fatigue.  Respiratory:  Denies shortness of breath, no cough.  Cardiovascular:  No chest pain.  no lower extremity edema.  Denies orthopnea or PND.  : + hematuria, no dysuria.  GI:  Denies nausea/vomiting.  No abdominal distention.  Neuro:  Denies dizziness, syncope.  Hem/lymph: Denies easy bleeding/bruising.      All other review of systems reviewed and negative.    Past medical, surgical, social, and family history reviewed and unchanged from admission except as noted in HPI.    Medications: Reviewed in MAR  Current Facility-Administered Medications   Medication Dose Frequency Provider Last Rate Last Admin   • metoprolol tartrate (LOPRESSOR) tablet 25 mg  25 mg TWICE DAILY Luz Sawant M.D.   25 mg at 02/01/21 0453   • amiodarone (Cordarone) tablet 400 mg  400 mg TWICE DAILY MARGARITA Gallagher   400 mg at 02/01/21 0453   • clopidogrel (PLAVIX) tablet 75 mg  75 mg DAILY  "Gutierrez Montoya M.D.   75 mg at 02/01/21 0453   • senna-docusate (PERICOLACE or SENOKOT S) 8.6-50 MG per tablet 2 Tab  2 Tab BID Gutierrez Montoya M.D.   Stopped at 02/01/21 0453    And   • polyethylene glycol/lytes (MIRALAX) PACKET 1 Packet  1 Packet QDAY PRN Gutierrez Montoya M.D.        And   • magnesium hydroxide (MILK OF MAGNESIA) suspension 30 mL  30 mL QDAY PRN Gutierrez Montoya M.D.        And   • bisacodyl (DULCOLAX) suppository 10 mg  10 mg QDAY PRN Gutierrez Montoya M.D.       • aspirin EC (ECOTRIN) tablet 81 mg  81 mg DAILY Gutierrez Montoya M.D.   81 mg at 02/01/21 0453   • atorvastatin (LIPITOR) tablet 80 mg  80 mg Q EVENING Luz Sawant M.D.   80 mg at 01/31/21 1655   • Respiratory Therapy Consult   Continuous RT Jakob Girard M.D.       • ipratropium-albuterol (DUONEB) nebulizer solution  3 mL Q2HRS PRN (RT) Jakob Girard M.D.       • lidocaine (XYLOCAINE) 1 % injection 1-2 mL  1-2 mL Q30 MIN PRN Jakob Girard M.D.       Last reviewed on 11/12/2020 11:09 AM by Marleny Lopez, A.P.N.    No Known Allergies    Physical Exam  Body mass index is 28.56 kg/m². /63   Pulse 64   Temp 36.2 °C (97.2 °F) (Temporal)   Resp 18   Ht 1.803 m (5' 11\")   Wt 92.9 kg (204 lb 12.9 oz)   SpO2 93%    Vitals:    01/31/21 1925 01/31/21 2333 02/01/21 0347 02/01/21 0845   BP: 131/67 129/64 134/67 133/63   Pulse: 66 72 61 64   Resp: 18 16 16 18   Temp: 36.3 °C (97.4 °F) 36.8 °C (98.2 °F) 36.3 °C (97.3 °F) 36.2 °C (97.2 °F)   TempSrc: Temporal Temporal Temporal Temporal   SpO2: 95% 91% 94% 93%   Weight: 92.9 kg (204 lb 12.9 oz)      Height:        Oxygen Therapy:  Pulse Oximetry: 93 %, O2 (LPM): 0, O2 Delivery Device: None - Room Air    General: no apparent distress  Neck: no JVD   Lungs: normal effort,  without crackles, no wheezing or rhonchi  Heart: normal rate, regular rhythm, no murmur, no rub  EXT: no lower extremity edema  Abdomen: soft, non tender, non distended.  Dark " urine, reddish tint per raya.  Neurological: No focal deficits, no facial asymmetry.  Normal speech  Psychiatric: Appropriate affect, alert and oriented x 3  Skin: Warm extremities, no rash    Labs (personally reviewed):     Lab Results   Component Value Date/Time    SODIUM 137 01/30/2021 11:48 AM    POTASSIUM 4.1 01/30/2021 11:48 AM    CHLORIDE 106 01/30/2021 11:48 AM    CO2 20 01/30/2021 11:48 AM    GLUCOSE 94 01/30/2021 11:48 AM    BUN 14 01/30/2021 11:48 AM    CREATININE 0.64 01/30/2021 11:48 AM     Lab Results   Component Value Date/Time    ALKPHOSPHAT 124 (H) 01/30/2021 11:48 AM    ASTSGOT 38 01/30/2021 11:48 AM    ALTSGPT 155 (H) 01/30/2021 11:48 AM    TBILIRUBIN 0.9 01/30/2021 11:48 AM      Lab Results   Component Value Date/Time    TRIGLYCERIDE 129 01/28/2021 04:18 AM         Cardiac Imaging and Procedures Review:      Personal Telemetry Review: SR in the 60-70s, no VT .    Echo 1/27/21:  CONCLUSIONS  No prior study is available for comparison.   Left ventricular ejection fraction is visually estimated to be 60%.  Technically difficult study - adequate information is obtained.     [facility] 12/21/12:  POSTOPERATIVE DIAGNOSES:  1.  Two-vessel coronary artery disease with 70-80% proximal left anterior   descending artery stenosis, 60-70% stenosis in the mid left circumflex artery   and 80-90% distal left circumflex artery stenoses.  2.  Status post stenting of the proximal left anterior descending artery with   3.5 mm bare metal stent and 2.25 bare metal stent x2 to the mid and distal   left circumflex artery.  3.  Hypertension.  4.  Dyslipidemia.  5.  Benign prostatic hypertrophy, in need of prostate surgery in the near   Future.    [facility] 1/26/21:  Cardiac Catheterization:    1/26/2021  POSTOPERATIVE DIAGNOSIS:  1.  100% thrombotically occluded right posterior lateral branch  2.  Widely patent previously placed proximal LAD stent  3.  Successful percutaneous coronary intervention with thrombectomy of the right  posterior lateral branch with restoration of LONNIE-3 flow and 0% residual stenosis.      Assessment and Medical Decision Makin  Inferior STEMI, CAD  -   S/p thrombectomy to the R PLB  -   Patent prior LAD stent  -   Continue DAPT with ASA, Plavix x 1 year without interruption.  -   Continue high intensity statin, BB    2  Vfib arrest  -   Secondary to #1    3  Paroxysmal AFIB  -   Maintaining SR.  -   Holding off OAC given hematuria  -   Continue amiodarone PO load    4  Hematuria   -   Workup and treatment per primary team, appreciate their assistance.   -   Dr. Burgos in [place]    From CV perspective, patient is stable and may be d/c at any time.  We will sign off.  His care will be transitioned to the internal medicine team given his ongoing hematuria.     Patient also requested I speak with his son and his wife.   I will call them with cardiovascular update.    I spoke with his wife, Kimberly, gave her an update.   She state she will call sonStevie and relay info.    Charla Wells PA-C  [facility]

## 2021-02-01 NOTE — THERAPY
"Occupational Therapy   Initial Evaluation     Patient Name: Hi Montes De Oca  Age:  75 y.o., Sex:  male  Medical Record #: [id number]  Today's Date: 2/1/2021     Precautions  Precautions: (P) Fall Risk, Cardiac Precautions (See Comments)  Comments: (P) 60% EF    Assessment  Patient is 75 y.o. male admitted for STEMI s/p PCI. Pt lives in a H wiht spouse, has stairs to basement pt pt states rarely ever used. Pt normally independent with all ADLs and ambulating with a SPC at times. Pt highly motivated to return home and begin exercising again, cautioned pt to ease back into activity and follow up with cardiologist prior to more strenous activities. Pt had one episode of bowel incontinence but able to manage care and direct help as needed. Anticipate pt will be safe to return home when medically cleared.    Plan    Recommend Occupational Therapy for Evaluation only.    DC Equipment Recommendations: (P) None  Discharge Recommendations: (P) Anticipate that the patient will have no further occupational therapy needs after discharge from the hospital     Subjective    \"Have you ever been caveing?\"     Objective       02/01/21 1005   Prior Living Situation   Prior Services None   Housing / Facility 2 Story House   Steps Into Home 5   Steps In Home   (FOS down to basement)   Rail Both Rail (Steps in Home);Both Rail (Steps into Home)   Elevator No   Bathroom Set up Walk In Shower;Bathtub / Shower Combination   Equipment Owned Single Point Cane   Lives with - Patient's Self Care Capacity Spouse   Prior Level of ADL Function   Self Feeding Independent   Grooming / Hygiene Independent   Bathing Independent   Dressing Independent   Toileting Independent   Prior Level of IADL Function   Medication Management Independent   Laundry Independent   Kitchen Mobility Independent   Finances Independent   Home Management Independent   Shopping Independent   Prior Level Of Mobility Independent Without Device in Community   Driving / " Transportation Driving Independent   Occupation (Pre-Hospital Vocational) Retired Due To Age   Leisure Interests Other (Comments)  (cave exploration)   History of Falls   History of Falls Yes   Date of Last Fall   (fell in snow)   Precautions   Precautions Fall Risk;Cardiac Precautions (See Comments)   Comments 60% EF   Pain   Non Verbal Scale  Calm   Pain 0 - 10 Group   Therapist Pain Assessment 0;Post Activity Pain Same as Prior to Activity;Nurse Notified   Cognition    Cognition / Consciousness WDL   Speech/ Communication Hard of Hearing   Level of Consciousness Alert   Comments pleasant, cooperative   Active ROM Upper Body   Active ROM Upper Body  WDL   Dominant Hand Right   Strength Upper Body   Upper Body Strength  WDL   Sensation Upper Body   Upper Extremity Sensation  WDL   Upper Body Muscle Tone   Upper Body Muscle Tone  WDL   Neurological Concerns   Neurological Concerns No   Coordination Upper Body   Coordination WDL   Balance Assessment   Sitting Balance (Static) Fair   Sitting Balance (Dynamic) Fair   Standing Balance (Static) Fair   Standing Balance (Dynamic) Fair   Weight Shift Sitting Good   Weight Shift Standing Good   Comments w/ SPC   Bed Mobility    Supine to Sit Supervised   Scooting Supervised   Rolling Supervised   ADL Assessment   Grooming Supervision;Standing   Upper Body Dressing Supervision   Lower Body Dressing Supervision   Toileting Supervision   How much help from another person does the patient currently need...   Putting on and taking off regular lower body clothing? 3   Bathing (including washing, rinsing, and drying)? 3   Toileting, which includes using a toilet, bedpan, or urinal? 4   Putting on and taking off regular upper body clothing? 4   Taking care of personal grooming such as brushing teeth? 4   Eating meals? 4   6 Clicks Daily Activity Score 22   Functional Mobility   Sit to Stand Supervised   Bed, Chair, Wheelchair Transfer Supervised   Toilet Transfers Supervised    Transfer Method Stand Step   Mobility bed mobility, ambulating in hallway, bathroom mobility, up to chair   Comments w/ SPC   Visual Perception   Visual Perception  WDL   Activity Tolerance   Sitting in Chair 5   Sitting Edge of Bed 5   Standing 5   Education Group   Education Provided Role of Occupational Therapist   Role of Occupational Therapist Patient Response Patient;Acceptance;Explanation   Interdisciplinary Plan of Care Collaboration   IDT Collaboration with  Nursing   Patient Position at End of Therapy Seated;Call Light within Reach;Tray Table within Reach;Phone within Reach   Collaboration Comments Rn updated

## 2021-02-01 NOTE — CARE PLAN
Problem: Safety  Goal: Will remain free from injury  Outcome: PROGRESSING AS EXPECTED     Problem: Respiratory:  Goal: Respiratory status will improve  Outcome: PROGRESSING AS EXPECTED     Problem: Skin Integrity  Goal: Risk for impaired skin integrity will decrease  Outcome: PROGRESSING AS EXPECTED    Assumed care of patient at 1620, from Grant VILLANUEVA on T6. Bed is locked and lowest position, call light within reach. Treaded socks in place. Patient updated on plan of care, no complaints or pain at this time. White board updated. Pt AxOx4 Patient breathing pattern is unlabored, on RA. Pt is on tele NSR. All needs met at this time. Will continue care and monitoring as ordered.       Spoke with Michelle at [facility] pharmacy.  She advised we will need to submit a new prescription and do a PA for the child because the pack they had was filled under the  and there are zero refills on the prescription.     Letter written and pended until Jeanie returns to the office on Monday.

## 2021-02-02 ENCOUNTER — TELEPHONE (OUTPATIENT)
Dept: CARDIOLOGY | Facility: MEDICAL CENTER | Age: 76
End: 2021-02-02

## 2021-02-02 LAB
ALBUMIN SERPL BCP-MCNC: 2.6 G/DL (ref 3.2–4.9)
BASOPHILS # BLD AUTO: 1.1 % (ref 0–1.8)
BASOPHILS # BLD: 0.09 K/UL (ref 0–0.12)
BUN SERPL-MCNC: 16 MG/DL (ref 8–22)
CALCIUM SERPL-MCNC: 7.9 MG/DL (ref 8.5–10.5)
CHLORIDE SERPL-SCNC: 107 MMOL/L (ref 96–112)
CHOLEST SERPL-MCNC: 88 MG/DL (ref 100–199)
CO2 SERPL-SCNC: 23 MMOL/L (ref 20–33)
CREAT SERPL-MCNC: 0.61 MG/DL (ref 0.5–1.4)
EOSINOPHIL # BLD AUTO: 0.36 K/UL (ref 0–0.51)
EOSINOPHIL NFR BLD: 4.3 % (ref 0–6.9)
ERYTHROCYTE [DISTWIDTH] IN BLOOD BY AUTOMATED COUNT: 46.6 FL (ref 35.9–50)
EST. AVERAGE GLUCOSE BLD GHB EST-MCNC: 117 MG/DL
GLUCOSE SERPL-MCNC: 99 MG/DL (ref 65–99)
HBA1C MFR BLD: 5.7 % (ref 0–5.6)
HCT VFR BLD AUTO: 30.9 % (ref 42–52)
HDLC SERPL-MCNC: 28 MG/DL
HGB BLD-MCNC: 10 G/DL (ref 14–18)
IMM GRANULOCYTES # BLD AUTO: 0.06 K/UL (ref 0–0.11)
IMM GRANULOCYTES NFR BLD AUTO: 0.7 % (ref 0–0.9)
LDLC SERPL CALC-MCNC: 43 MG/DL
LYMPHOCYTES # BLD AUTO: 1.01 K/UL (ref 1–4.8)
LYMPHOCYTES NFR BLD: 12.1 % (ref 22–41)
MAGNESIUM SERPL-MCNC: 2 MG/DL (ref 1.5–2.5)
MCH RBC QN AUTO: 31.3 PG (ref 27–33)
MCHC RBC AUTO-ENTMCNC: 32.4 G/DL (ref 33.7–35.3)
MCV RBC AUTO: 96.6 FL (ref 81.4–97.8)
MONOCYTES # BLD AUTO: 1.05 K/UL (ref 0–0.85)
MONOCYTES NFR BLD AUTO: 12.6 % (ref 0–13.4)
NEUTROPHILS # BLD AUTO: 5.79 K/UL (ref 1.82–7.42)
NEUTROPHILS NFR BLD: 69.2 % (ref 44–72)
NRBC # BLD AUTO: 0 K/UL
NRBC BLD-RTO: 0 /100 WBC
PHOSPHATE SERPL-MCNC: 3.1 MG/DL (ref 2.5–4.5)
PLATELET # BLD AUTO: 374 K/UL (ref 164–446)
PMV BLD AUTO: 11.1 FL (ref 9–12.9)
POTASSIUM SERPL-SCNC: 3.1 MMOL/L (ref 3.6–5.5)
RBC # BLD AUTO: 3.2 M/UL (ref 4.7–6.1)
SODIUM SERPL-SCNC: 139 MMOL/L (ref 135–145)
TRIGL SERPL-MCNC: 87 MG/DL (ref 0–149)
WBC # BLD AUTO: 8.4 K/UL (ref 4.8–10.8)

## 2021-02-02 PROCEDURE — 700102 HCHG RX REV CODE 250 W/ 637 OVERRIDE(OP): Performed by: INTERNAL MEDICINE

## 2021-02-02 PROCEDURE — A9270 NON-COVERED ITEM OR SERVICE: HCPCS | Performed by: INTERNAL MEDICINE

## 2021-02-02 PROCEDURE — 99232 SBSQ HOSP IP/OBS MODERATE 35: CPT | Performed by: INTERNAL MEDICINE

## 2021-02-02 PROCEDURE — 770020 HCHG ROOM/CARE - TELE (206)

## 2021-02-02 PROCEDURE — 85025 COMPLETE CBC W/AUTO DIFF WBC: CPT

## 2021-02-02 PROCEDURE — 83036 HEMOGLOBIN GLYCOSYLATED A1C: CPT

## 2021-02-02 PROCEDURE — 80069 RENAL FUNCTION PANEL: CPT

## 2021-02-02 PROCEDURE — 83735 ASSAY OF MAGNESIUM: CPT

## 2021-02-02 PROCEDURE — A9270 NON-COVERED ITEM OR SERVICE: HCPCS | Performed by: NURSE PRACTITIONER

## 2021-02-02 PROCEDURE — 700102 HCHG RX REV CODE 250 W/ 637 OVERRIDE(OP): Performed by: NURSE PRACTITIONER

## 2021-02-02 PROCEDURE — 80061 LIPID PANEL: CPT

## 2021-02-02 RX ORDER — POTASSIUM CHLORIDE 20 MEQ/1
40 TABLET, EXTENDED RELEASE ORAL ONCE
Status: COMPLETED | OUTPATIENT
Start: 2021-02-02 | End: 2021-02-02

## 2021-02-02 RX ADMIN — AMIODARONE HYDROCHLORIDE 400 MG: 200 TABLET ORAL at 17:51

## 2021-02-02 RX ADMIN — METOPROLOL TARTRATE 25 MG: 25 TABLET, FILM COATED ORAL at 04:49

## 2021-02-02 RX ADMIN — AMIODARONE HYDROCHLORIDE 400 MG: 200 TABLET ORAL at 04:50

## 2021-02-02 RX ADMIN — FINASTERIDE 5 MG: 5 TABLET, FILM COATED ORAL at 04:50

## 2021-02-02 RX ADMIN — ATORVASTATIN CALCIUM 80 MG: 80 TABLET, FILM COATED ORAL at 17:51

## 2021-02-02 RX ADMIN — POTASSIUM CHLORIDE 40 MEQ: 1500 TABLET, EXTENDED RELEASE ORAL at 10:06

## 2021-02-02 RX ADMIN — CLOPIDOGREL BISULFATE 75 MG: 75 TABLET ORAL at 04:50

## 2021-02-02 RX ADMIN — METOPROLOL TARTRATE 25 MG: 25 TABLET, FILM COATED ORAL at 17:51

## 2021-02-02 RX ADMIN — ASPIRIN 81 MG: 81 TABLET, COATED ORAL at 04:50

## 2021-02-02 ASSESSMENT — ENCOUNTER SYMPTOMS
HEADACHES: 0
DIZZINESS: 0
VOMITING: 0
ROS GI COMMENTS: SOMEWHAT POOR APPETITE
FEVER: 0
SHORTNESS OF BREATH: 0
NAUSEA: 0
CHILLS: 0

## 2021-02-02 ASSESSMENT — FIBROSIS 4 INDEX
FIB4 SCORE: 0.7
FIB4 SCORE: 0.63
FIB4 SCORE: 0.7

## 2021-02-02 NOTE — PROGRESS NOTES
"[facility]    Progress Note    Service: [facility]  Patient's Name: Hi Montes De Oca  MRN: [id number]  Admit Date:1/26/2021  Today's Date: 2/2/2021   Room #: T711/02      Identification:  75 y.o. male with gross hematuria, BPH, prior TURP, recent anticoagulation for MI       Overnight-Interval events:   - none Subjective/ROS:   Patient seen and examined. Paredes in place, florence urine. CBI not needed yesterday. No current pain or complaints. Had follow up scheduled with Dr. Burgos prior to his MI  No CP/SOB/F/C     Physical Exam:  Current Vitals:   /61   Pulse 61   Temp 36.4 °C (97.6 °F) (Temporal)   Resp 20   Ht 1.803 m (5' 11\")   Wt 91.6 kg (201 lb 15.1 oz)   SpO2 94%   BMI 28.17 kg/m²     01/31 1900 - 02/02 0659  In: -   Out: 350 [Urine:350]   GEN : NAD, A&O X4   RES:  no acute respiratory distress  ABD: Soft ND,  :   Paredes with dark florence output, no clots  INC:     EXT:  No edema, SCD's in place     Labs:   Lab Results   Component Value Date/Time    WBC 8.4 02/02/2021 05:22 AM    HEMATOCRIT 30.9 (L) 02/02/2021 05:22 AM    SODIUM 139 02/02/2021 05:22 AM    POTASSIUM 3.1 (L) 02/02/2021 05:22 AM    CHLORIDE 107 02/02/2021 05:22 AM    CO2 23 02/02/2021 05:22 AM    BUN 16 02/02/2021 05:22 AM    CREATININE 0.61 02/02/2021 05:22 AM    CALCIUM 7.9 (L) 02/02/2021 05:22 AM    MAGNESIUM 2.0 02/02/2021 05:22 AM        Lab Results   Component Value Date/Time    GLUCOSE 99 02/02/2021 05:22 AM    GLUCOSE 94 01/30/2021 11:48 AM    GLUCOSE 126 (H) 01/29/2021 09:00 AM    GLUCOSE 121 (H) 01/29/2021 04:30 AM     CT-ABDOMEN & PELVIS UROGRAM   Final Result      1.  Marked thickening of the wall of the bladder in the apex or indentation on the bladder apex from the prostate. Findings may represent benign prostatic hyperplasia or malignancy.      2.  Diverticulosis.      3.  Atherosclerosis.      4.  Bilateral pleural effusions with adjacent airspace disease, likely atelectasis.               DX-CHEST-PORTABLE (1 " VIEW)   Final Result         1.  Minimal residual left basilar atelectasis or infiltrate.   2.  Cardiomegaly         DX-CHEST-PORTABLE (1 VIEW)   Final Result         1.  Pulmonary edema and/or infiltrates are identified, which are stable since the prior exam.      EC-ECHOCARDIOGRAM LTD W/ CONT   Final Result      DX-CHEST-FOR LINE PLACEMENT Perform procedure in: Patient's Room   Final Result         1.  Pulmonary edema and/or infiltrates.      DX-CHEST-PORTABLE (1 VIEW)   Final Result      Limited examination with endotracheal tube in place.      Bibasilar atelectasis.      OUTSIDE IMAGES-DX CHEST   Final Result      CL-LEFT HEART CATHETERIZATION WITH POSSIBLE INTERVENTION    (Results Pending)          Assessment/Plan  Hi Montes De Oca is a 75 y.o. male with gross hematuria, BPH, prior TURP, recent anticoagulation for MI.    - recommend home on finasteride and raya, needs leg bag  - will arrange follow up with Dr. Burgos for cystoscopy and further consideration  - CT with bladder wall thickening and markedly enlarged prostate  - urology okay with discharge when medically clear  - urology signing off    Ricardo Toscano PA-C  [facility]

## 2021-02-02 NOTE — CARE PLAN
"  Problem: Nutritional:  Goal: Achieve adequate nutritional intake  Description: Patient will consume ~50% of meals/supplements.  Outcome: PROGRESSING SLOWER THAN EXPECTED     PO 0% x 1 meal, 25-50% x 1 meal per flow sheet since 1/29. Pt seen this morning with Boost Plus supplement 100% consumed and breakfast tray untouched. Pt stated he consumes supplements intermittently and eats about one \"good\" meal a day (like when he is home). Pt was unable to specify further how many supplements he is consuming and on average how much of meal tray he is consuming. Pt did not have an meal preferences to report. Will continue to send Boost Plus TID. RN/CNA to please document intake of all meals as % taken in ADL's to provide interdisciplinary communication across all shifts.     Weight is 91.6 kg today. 4.2 kg (9.24 lb) weight loss x 5 days. Pt is +3.7 L per I/O. Will continue to monitor weight to ensure trend is accurate and not related to fluid shifts/scale inaccuracy.       "

## 2021-02-02 NOTE — PROGRESS NOTES
Shift summary:   Monitor: .18/.08/.50  Pt ambulated with Pt down hallway, see notes for details. Pt with increased hematuria in raya POC CBI and Renal CT for Eval.   Pt tolerating diet. Unable to start CBI on day shift d/t awaiting for CBI supplies from central supply

## 2021-02-02 NOTE — PROGRESS NOTES
With the order to restart CBI, this RN assessed urine output. This RN emptied 425mL dark brown urine. After bag was emptied, patient's urine is now clear and yellow. Notified Dr. Femi MONDRAGON. Per MD, will monitor closely for clots and color change and to restart CBI if necessary. Will flush raya periodically throughout the shift.

## 2021-02-02 NOTE — TELEPHONE ENCOUNTER
Medical records have been requested from [facility] per  hospital visit note. Fax: [phone], Phone: [phone].    Fax confirmation has been received and sent to scan.

## 2021-02-02 NOTE — PROGRESS NOTES
Pt's raya with no hematuria, clearing florence colored urine noted. POC DC tomorrow, pt agreeable. VSS.  Monitor summary: .20/.08/.52 A fib w/ PVC's

## 2021-02-02 NOTE — PROGRESS NOTES
[facility] Medicine Daily Progress Note    Date of Service  2/2/2021    Chief Complaint  75 y.o. male admitted 1/26/2021 with chest pain    Hospital Course  75 y.o. male who presented 1/26/2021 with chest pain.  Mr. Montes De Oca has a past medical history of coronary artery disease with prior stenting to the LAD and circumflex then in the emergency room in [facility] with chest pain.  Is found to have a posterior myocardial infarction was initiated on aspirin therapy with IV heparin drip transferred here for higher level of care.  To transfer, he developed ventricular tachycardia and underwent defibrillation x2 given epinephrine x2 with return of spontaneous circulation.  Is intubated for airway protection.  Due to hypotension, he was initiated on a norepinephrine drip.  He went emergently to the cardiac Cath Lab found to have 100% occlusion of the right posterior lateral branch underwent thrombectomy with restoration of flow.  Found to have patent LAD stent.  Cardiac intensive care unit on a ventilator.  Due to her fibrillation, he has been on triple therapy with Eliquis, aspirin, and Plavix.  He was subsequently extubated on 1/28/2021. Required CBI due to hematuria and eliquis was held.     Interval Problem Update  Patient was seen and examined at bedside.  No acute events overnight. Patient is resting comfortably in bed and in no acute distress. Continues to experience hematuria.     DAPT  S/p thrombectomy for 100% thrombotic occlusion of the R PLB   PT/OT to eval   Follow up with cardiology  PT - no needs  Consult to urology    2/2 No acute events. Urine in raya appears dark. Hb stable at 10. Urology recommends outpatient cystoscopy for further evaluation. Pt will be sent home with raya. Continue DAPT. Monitor Hb. If stable tomorrow will dc home.     Consultants/Specialty  Cardiology  Critical care.     Code Status  Full Code    Disposition  DC home tomorrow. PT OT    Review of Systems  Review of Systems    Constitutional: Negative for chills and fever.        Generally weak   Respiratory: Negative for shortness of breath.    Cardiovascular: Negative for chest pain.   Gastrointestinal: Negative for nausea and vomiting.        Somewhat poor appetite   Genitourinary: Negative for dysuria and frequency.        Paredes    Neurological: Negative for dizziness and headaches.   All other systems reviewed and are negative.       Physical Exam  Temp:  [36.4 °C (97.6 °F)-37.2 °C (98.9 °F)] 36.5 °C (97.7 °F)  Pulse:  [60-72] 62  Resp:  [18-24] 20  BP: (132-154)/(61-74) 141/74  SpO2:  [92 %-95 %] 95 %    Physical Exam  Vitals signs and nursing note reviewed.   Constitutional:       Appearance: Normal appearance. He is not toxic-appearing.   HENT:      Head: Normocephalic and atraumatic.      Right Ear: External ear normal.      Left Ear: External ear normal.      Mouth/Throat:      Mouth: Mucous membranes are moist.      Pharynx: Oropharynx is clear.   Eyes:      Extraocular Movements: Extraocular movements intact.      Conjunctiva/sclera: Conjunctivae normal.      Pupils: Pupils are equal, round, and reactive to light.   Neck:      Musculoskeletal: Neck supple.   Cardiovascular:      Rate and Rhythm: Normal rate and regular rhythm.      Heart sounds: No murmur.   Pulmonary:      Effort: Pulmonary effort is normal. No respiratory distress.      Breath sounds: Normal breath sounds. No wheezing.   Abdominal:      Tenderness: There is no abdominal tenderness. There is no guarding or rebound.   Genitourinary:     Comments: Paredes catheter--urine is blood tinged  Musculoskeletal:         General: No swelling.      Right lower leg: Edema present.      Left lower leg: Edema present.   Skin:     General: Skin is warm and dry.      Coloration: Skin is not jaundiced.      Findings: No bruising.   Neurological:      General: No focal deficit present.      Mental Status: He is alert and oriented to person, place, and time.      Cranial Nerves:  No cranial nerve deficit.   Psychiatric:         Mood and Affect: Mood normal.         Behavior: Behavior normal.         Thought Content: Thought content normal.         Fluids    Intake/Output Summary (Last 24 hours) at 2/2/2021 1445  Last data filed at 2/2/2021 0305  Gross per 24 hour   Intake --   Output 350 ml   Net -350 ml       Laboratory  Recent Labs     02/02/21 0522   WBC 8.4   RBC 3.20*   HEMOGLOBIN 10.0*   HEMATOCRIT 30.9*   MCV 96.6   MCH 31.3   MCHC 32.4*   RDW 46.6   PLATELETCT 374   MPV 11.1     Recent Labs     02/02/21 0522   SODIUM 139   POTASSIUM 3.1*   CHLORIDE 107   CO2 23   GLUCOSE 99   BUN 16   CREATININE 0.61   CALCIUM 7.9*             Recent Labs     02/02/21 0522   TRIGLYCERIDE 87   HDL 28*   LDL 43       Imaging  CT-ABDOMEN & PELVIS UROGRAM   Final Result      1.  Marked thickening of the wall of the bladder in the apex or indentation on the bladder apex from the prostate. Findings may represent benign prostatic hyperplasia or malignancy.      2.  Diverticulosis.      3.  Atherosclerosis.      4.  Bilateral pleural effusions with adjacent airspace disease, likely atelectasis.               DX-CHEST-PORTABLE (1 VIEW)   Final Result         1.  Minimal residual left basilar atelectasis or infiltrate.   2.  Cardiomegaly         DX-CHEST-PORTABLE (1 VIEW)   Final Result         1.  Pulmonary edema and/or infiltrates are identified, which are stable since the prior exam.      EC-ECHOCARDIOGRAM LTD W/ CONT   Final Result      DX-CHEST-FOR LINE PLACEMENT Perform procedure in: Patient's Room   Final Result         1.  Pulmonary edema and/or infiltrates.      DX-CHEST-PORTABLE (1 VIEW)   Final Result      Limited examination with endotracheal tube in place.      Bibasilar atelectasis.      OUTSIDE IMAGES-DX CHEST   Final Result      CL-LEFT HEART CATHETERIZATION WITH POSSIBLE INTERVENTION    (Results Pending)        Assessment/Plan  * STEMI (ST elevation myocardial infarction) (HCC)- (present on  "admission)  Assessment & Plan  With successful thrombectomy of the right posterior lateral branch with 100% restoration of flow  Dual antiplatelet therapy  Metoprolol as tolerated  High intensity statin  With history of prior stenting though LAD stent was found to be patent on heart catheterization  Post cardiac catheterization echocardiogram reveals an ejection fraction of 60%      Cardiac arrest with ventricular fibrillation (HCC)- (present on admission)  Assessment & Plan  Status post amiodarone drip    Hematuria  Assessment & Plan  In the setting of anticoagulation which was stopped.  Continuous bladder irrigation with improvement thus stop CBI on 1/31.  He states he has a history of big prostate for which she had a \"Roto-Rooter\" thinks he had a history of prostate cancer but is not really sure.  He is followed by Dr. Burgos, urology, in [place]    - blood noted in raya today resume CBI  - CT urogram revealed thickened bladder and enlarged prostate  - Urology rec outpatient cystoscopy       Paroxysmal atrial fibrillation (HCC)- (present on admission)  Assessment & Plan  Change amiodarone to oral  Continuous tele monitoring  Hold on anticoagulation given his hematuria     Transaminitis- (present on admission)  Assessment & Plan  AST was 787 and  on admit likely due to shocked liver in the setting of cardiogenic shock  Repeat CMP reveals that enzymes of trended down appropriately    Acute respiratory failure with hypoxia (HCC)- (present on admission)  Assessment & Plan  Requiring mechanical ventilation prior to admission due to airway protection  Successfully extubated 1/28/2021  He is now on room air  Pulmonology has consulted       VTE prophylaxis: SCDs      "

## 2021-02-03 VITALS
HEART RATE: 65 BPM | DIASTOLIC BLOOD PRESSURE: 67 MMHG | TEMPERATURE: 96.7 F | OXYGEN SATURATION: 97 % | HEIGHT: 71 IN | WEIGHT: 201.72 LBS | BODY MASS INDEX: 28.24 KG/M2 | SYSTOLIC BLOOD PRESSURE: 139 MMHG | RESPIRATION RATE: 18 BRPM

## 2021-02-03 PROBLEM — J96.01 ACUTE RESPIRATORY FAILURE WITH HYPOXIA (HCC): Status: RESOLVED | Noted: 2021-01-26 | Resolved: 2021-02-03

## 2021-02-03 PROBLEM — I21.3 STEMI (ST ELEVATION MYOCARDIAL INFARCTION) (HCC): Status: RESOLVED | Noted: 2021-01-26 | Resolved: 2021-02-03

## 2021-02-03 PROBLEM — I49.01 CARDIAC ARREST WITH VENTRICULAR FIBRILLATION (HCC): Status: RESOLVED | Noted: 2021-01-26 | Resolved: 2021-02-03

## 2021-02-03 PROBLEM — I46.9 CARDIAC ARREST WITH VENTRICULAR FIBRILLATION (HCC): Status: RESOLVED | Noted: 2021-01-26 | Resolved: 2021-02-03

## 2021-02-03 PROBLEM — R74.01 TRANSAMINITIS: Status: RESOLVED | Noted: 2021-01-29 | Resolved: 2021-02-03

## 2021-02-03 PROBLEM — R31.9 HEMATURIA: Status: RESOLVED | Noted: 2021-01-29 | Resolved: 2021-02-03

## 2021-02-03 LAB
BASOPHILS # BLD AUTO: 1.3 % (ref 0–1.8)
BASOPHILS # BLD: 0.12 K/UL (ref 0–0.12)
EOSINOPHIL # BLD AUTO: 0.01 K/UL (ref 0–0.51)
EOSINOPHIL NFR BLD: 0.1 % (ref 0–6.9)
ERYTHROCYTE [DISTWIDTH] IN BLOOD BY AUTOMATED COUNT: 48.1 FL (ref 35.9–50)
HCT VFR BLD AUTO: 32.1 % (ref 42–52)
HGB BLD-MCNC: 10.5 G/DL (ref 14–18)
IMM GRANULOCYTES # BLD AUTO: 0.05 K/UL (ref 0–0.11)
IMM GRANULOCYTES NFR BLD AUTO: 0.5 % (ref 0–0.9)
LYMPHOCYTES # BLD AUTO: 1.12 K/UL (ref 1–4.8)
LYMPHOCYTES NFR BLD: 12.1 % (ref 22–41)
MCH RBC QN AUTO: 31.7 PG (ref 27–33)
MCHC RBC AUTO-ENTMCNC: 32.7 G/DL (ref 33.7–35.3)
MCV RBC AUTO: 97 FL (ref 81.4–97.8)
MONOCYTES # BLD AUTO: 0.99 K/UL (ref 0–0.85)
MONOCYTES NFR BLD AUTO: 10.7 % (ref 0–13.4)
NEUTROPHILS # BLD AUTO: 6.98 K/UL (ref 1.82–7.42)
NEUTROPHILS NFR BLD: 75.3 % (ref 44–72)
NRBC # BLD AUTO: 0 K/UL
NRBC BLD-RTO: 0 /100 WBC
PLATELET # BLD AUTO: 381 K/UL (ref 164–446)
PMV BLD AUTO: 10.7 FL (ref 9–12.9)
RBC # BLD AUTO: 3.31 M/UL (ref 4.7–6.1)
WBC # BLD AUTO: 9.3 K/UL (ref 4.8–10.8)

## 2021-02-03 PROCEDURE — 700102 HCHG RX REV CODE 250 W/ 637 OVERRIDE(OP): Performed by: INTERNAL MEDICINE

## 2021-02-03 PROCEDURE — A9270 NON-COVERED ITEM OR SERVICE: HCPCS | Performed by: INTERNAL MEDICINE

## 2021-02-03 PROCEDURE — 99239 HOSP IP/OBS DSCHRG MGMT >30: CPT | Performed by: INTERNAL MEDICINE

## 2021-02-03 PROCEDURE — 90471 IMMUNIZATION ADMIN: CPT

## 2021-02-03 PROCEDURE — 3E02340 INTRODUCTION OF INFLUENZA VACCINE INTO MUSCLE, PERCUTANEOUS APPROACH: ICD-10-PCS | Performed by: HOSPITALIST

## 2021-02-03 PROCEDURE — 700102 HCHG RX REV CODE 250 W/ 637 OVERRIDE(OP): Performed by: NURSE PRACTITIONER

## 2021-02-03 PROCEDURE — 700111 HCHG RX REV CODE 636 W/ 250 OVERRIDE (IP): Performed by: HOSPITALIST

## 2021-02-03 PROCEDURE — A9270 NON-COVERED ITEM OR SERVICE: HCPCS | Performed by: NURSE PRACTITIONER

## 2021-02-03 PROCEDURE — 85025 COMPLETE CBC W/AUTO DIFF WBC: CPT

## 2021-02-03 PROCEDURE — 90662 IIV NO PRSV INCREASED AG IM: CPT | Performed by: HOSPITALIST

## 2021-02-03 RX ORDER — ASPIRIN 81 MG/1
81 TABLET ORAL DAILY
Qty: 30 TAB | Refills: 0 | Status: SHIPPED | OUTPATIENT
Start: 2021-02-04 | End: 2023-05-18

## 2021-02-03 RX ORDER — CLOPIDOGREL BISULFATE 75 MG/1
75 TABLET ORAL DAILY
Qty: 30 TAB | Refills: 0 | Status: SHIPPED | OUTPATIENT
Start: 2021-02-04 | End: 2021-03-22 | Stop reason: SDUPTHER

## 2021-02-03 RX ORDER — AMIODARONE HYDROCHLORIDE 400 MG/1
TABLET ORAL
Qty: 42 TAB | Refills: 0 | Status: SHIPPED | OUTPATIENT
Start: 2021-02-03 | End: 2021-03-03

## 2021-02-03 RX ORDER — ATORVASTATIN CALCIUM 80 MG/1
80 TABLET, FILM COATED ORAL EVERY EVENING
Qty: 30 TAB | Refills: 0 | Status: SHIPPED | OUTPATIENT
Start: 2021-02-03

## 2021-02-03 RX ORDER — AMIODARONE HYDROCHLORIDE 200 MG/1
200 TABLET ORAL DAILY
Qty: 30 TAB | Refills: 0 | Status: SHIPPED | OUTPATIENT
Start: 2021-03-03 | End: 2021-03-22

## 2021-02-03 RX ADMIN — ASPIRIN 81 MG: 81 TABLET, COATED ORAL at 06:00

## 2021-02-03 RX ADMIN — CLOPIDOGREL BISULFATE 75 MG: 75 TABLET ORAL at 06:00

## 2021-02-03 RX ADMIN — FINASTERIDE 5 MG: 5 TABLET, FILM COATED ORAL at 06:00

## 2021-02-03 RX ADMIN — METOPROLOL TARTRATE 25 MG: 25 TABLET, FILM COATED ORAL at 06:00

## 2021-02-03 RX ADMIN — AMIODARONE HYDROCHLORIDE 400 MG: 200 TABLET ORAL at 06:00

## 2021-02-03 RX ADMIN — INFLUENZA A VIRUS A/MICHIGAN/45/2015 X-275 (H1N1) ANTIGEN (FORMALDEHYDE INACTIVATED), INFLUENZA A VIRUS A/SINGAPORE/INFIMH-16-0019/2016 IVR-186 (H3N2) ANTIGEN (FORMALDEHYDE INACTIVATED), INFLUENZA B VIRUS B/PHUKET/3073/2013 ANTIGEN (FORMALDEHYDE INACTIVATED), AND INFLUENZA B VIRUS B/MARYLAND/15/2016 BX-69A ANTIGEN (FORMALDEHYDE INACTIVATED) 0.7 ML: 60; 60; 60; 60 INJECTION, SUSPENSION INTRAMUSCULAR at 07:21

## 2021-02-03 NOTE — CARE PLAN
Problem: Safety  Goal: Will remain free from falls  Note: Safety precautions and fall prevention in place. Fall prevention education provided. Patient verbalized understanding. Bed in low locked position, bed alarm on, treaded socks on patient, call bell within reach. Patient calls appropriately as needed.      Problem: Skin Integrity  Goal: Risk for impaired skin integrity will decrease  Intervention: Assess and monitor skin integrity, appearance and/or temperature  Note: Educated patient on importance of turning at least every two hours.

## 2021-02-03 NOTE — CARE PLAN
Problem: Safety  Goal: Will remain free from falls  Outcome: PROGRESSING AS EXPECTED  Intervention: Implement fall precautions  Flowsheets (Taken 2/3/2021 0323)  Environmental Precautions:   Treaded Slipper Socks on Patient   Personal Belongings, Wastebasket, Call Bell etc. in Easy Reach   Bed in Low Position   Communication Sign for Patients & Families     Problem: Infection  Goal: Will remain free from infection  2/3/2021 0325 by Cristin Schmitt [email or web address]  Outcome: PROGRESSING AS EXPECTED

## 2021-02-03 NOTE — DISCHARGE INSTRUCTIONS
Discharge Instructions    Discharged to home by car with relative. Discharged via wheelchair, hospital escort: Yes.  Special equipment needed: Not Applicable    Be sure to schedule a follow-up appointment with your primary care doctor or any specialists as instructed.     Discharge Plan:        I understand that a diet low in cholesterol, fat, and sodium is recommended for good health. Unless I have been given specific instructions below for another diet, I accept this instruction as my diet prescription.   Other diet: Cardiac    Special Instructions: Diagnosis:  Acute Coronary Syndrome (ACS) is a diagnosis that encompasses cardiac-related chest pain and heart attack. ACS occurs when the blood flow to the heart muscle is severely reduced or cut off completely due to a slow process called atherosclerosis.  Atherosclerosis is a disease in which the coronary arteries become narrow from a buildup of fat, cholesterol, and other substances that combine to form plaque. If the plaque breaks, a blood clot will form and block the blood flow to the heart muscle. This lack of blood flow can cause damage or death to the heart muscle which is called a heart attack or Myocardial Infarction (MI). There are two different types of MIs:  ST Elevation Myocardial Infarction or STEMI (the most severe type of heart attack) and Non-ST Elevation Myocardial Infarction or NSTEMI.    Treatment Plan:  · Cardiac Diet  - Low fat, low salt, low cholesterol   · Cardiac Rehab  - Your doctor has ordered you a referral to [facility].  Call [phone] to schedule an appointment.  · Attend my follow-up appointment with my Cardiologist.  · Take my medications as prescribed by my doctor  · Exercise daily  · Lower my bad cholesterol and raise my good cholesterol, lower my blood pressure and Reduce stress    Medications:  Certain medications are used to treat ACS.  Remember to always take medications as prescribed and never stop talking medications unless  told by your doctor.    You have been prescribed the following medicatons:    Aspirin - Aspirin is used as a blood thinning medication and you will require this medication indefinitely.  Beta-Blocker - Beta-Blocker  is used to lower blood pressure and heart rate, and/or helps your heart heal after a heart attack.  Statin - Statin  is used to lower cholesterol.    · Is patient discharged on Warfarin / Coumadin?   No     Depression / Suicide Risk    As you are discharged from this [facility] facility, it is important to learn how to keep safe from harming yourself.    Recognize the warning signs:  · Abrupt changes in personality, positive or negative- including increase in energy   · Giving away possessions  · Change in eating patterns- significant weight changes-  positive or negative  · Change in sleeping patterns- unable to sleep or sleeping all the time   · Unwillingness or inability to communicate  · Depression  · Unusual sadness, discouragement and loneliness  · Talk of wanting to die  · Neglect of personal appearance   · Rebelliousness- reckless behavior  · Withdrawal from people/activities they love  · Confusion- inability to concentrate     If you or a loved one observes any of these behaviors or has concerns about self-harm, here's what you can do:  · Talk about it- your feelings and reasons for harming yourself  · Remove any means that you might use to hurt yourself (examples: pills, rope, extension cords, firearm)  · Get professional help from the community (Mental Health, Substance Abuse, psychological counseling)  · Do not be alone:Call your Safe Contact- someone whom you trust who will be there for you.  · Call your local CRISIS HOTLINE 655-7342 or 616-356-2249  · Call your local Children's Mobile Crisis Response Team [place] (417) 086-3594 or www.[employer]  · Call the toll free National Suicide Prevention Hotlines   · National Suicide Prevention Lifeline 485-681-WFHD (1553)  · National  [facility] 800-SUICIDE (016-1253)        Heart Attack  A heart attack occurs when blood and oxygen supply to the heart is cut off. A heart attack causes damage to the heart that cannot be fixed. A heart attack is also called a myocardial infarction, or MI. If you think you are having a heart attack, do not wait to see if the symptoms will go away. Get medical help right away.  What are the causes?  This condition may be caused by:  · A fatty substance (plaque) in the blood vessels (arteries). This can block the flow of blood to the heart.  · A blood clot in the blood vessels that go to the heart. The blood clot blocks blood flow.  · Low blood pressure.  · An abnormal heartbeat.  · Some diseases, such as problems in red blood cells (anemia)orproblems in breathing (respiratory failure).  · Tightening (spasm) of a blood vessel that cuts off blood to the heart.  · A tear in a blood vessel of the heart.  · High blood pressure.  What increases the risk?  The following factors may make you more likely to develop this condition:  · Aging. The older you are, the higher your risk.  · Having a personal or family history of chest pain, heart attack, stroke, or narrowing of the arteries in the legs, arms, head, or stomach (peripheral artery disease).  · Being male.  · Smoking.  · Not getting regular exercise.  · Being overweight or obese.  · Having high blood pressure.  · Having high cholesterol.  · Having diabetes.  · Drinking too much alcohol.  · Using illegal drugs, such as cocaine or methamphetamine.  What are the signs or symptoms?  Symptoms of this condition include:  · Chest pain. It may feel like:  ? Crushing or squeezing.  ? Tightness, pressure, fullness, or heaviness.  · Pain in the arm, neck, jaw, back, or upper body.  · Shortness of breath.  · Heartburn.  · Upset stomach (indigestion).  · Feeling like you may vomit (nauseous).  · Cold sweats.  · Feeling tired.  · Sudden light-headedness.  How is this  treated?  A heart attack must be treated as soon as possible. Treatment may include:  · Medicines to:  ? Break up or dissolve blood clots.  ? Thin blood and help prevent blood clots.  ? Treat blood pressure.  ? Improve blood flow to the heart.  ? Reduce pain.  ? Reduce cholesterol.  · Procedures to widen a blocked artery and keep it open.  · Open heart surgery.  · Receiving oxygen.  · Making your heart strong again (cardiac rehabilitation) through exercise, education, and counseling.  Follow these instructions at home:  Medicines  · Take over-the-counter and prescription medicines only as told by your doctor. You may need to take medicine:  ? To keep your blood from clotting too easily.  ? To control blood pressure.  ? To lower cholesterol.  ? To control heart rhythms.  · Do not take these medicines unless your doctor says it is okay:  ? NSAIDs, such as ibuprofen.  ? Supplements that have vitamin A, vitamin E, or both.  ? Hormone replacement therapy that has estrogen with or without progestin.  Lifestyle         · Do not use any products that have nicotine or tobacco, such as cigarettes, e-cigarettes, and chewing tobacco. If you need help quitting, ask your doctor.  · Avoid secondhand smoke.  · Exercise regularly. Ask your doctor about a cardiac rehab program.  · Eat heart-healthy foods. Your doctor will tell you what foods to eat.  · Stay at a healthy weight.  · Lower your stress level.  · Do not use illegal drugs.  Alcohol use  · Do not drink alcohol if:  ? Your doctor tells you not to drink.  ? You are pregnant, may be pregnant, or are planning to become pregnant.  · If you drink alcohol:  ? Limit how much you use to:  § 0-1 drink a day for women.  § 0-2 drinks a day for men.  ? Know how much alcohol is in your drink. In the [place] one drink equals one 12 oz bottle of beer (355 mL), one 5 oz glass of wine (148 mL), or one 1½ oz glass of hard liquor (44 mL).  General instructions  · Work with your doctor to treat  other problems you may have, such as diabetes or high blood pressure.  · Get screened for depression. Get treatment if needed.  · Keep your vaccines up to date. Get the flu shot (influenza vaccine) every year.  · Keep all follow-up visits as told by your doctor. This is important.  Contact a doctor if:  · You feel very sad.  · You have trouble doing your daily activities.  Get help right away if:  · You have sudden, unexplained discomfort in your chest, arms, back, neck, jaw, or upper body.  · You have shortness of breath.  · You have sudden sweating or clammy skin.  · You feel like you may vomit.  · You vomit.  · You feel tired or weak.  · You get light-headed or dizzy.  · You feel your heart beating fast.  · You feel your heart skipping beats.  · You have blood pressure that is higher than 180/120.  These symptoms may be an emergency. Do not wait to see if the symptoms will go away. Get medical help right away. Call your local emergency services (911 in the [place] Do not drive yourself to the hospital.  Summary  · A heart attack occurs when blood and oxygen supply to the heart is cut off.  · Do not take NSAIDs unless your doctor says it is okay.  · Do not smoke. Avoid secondhand smoke.  · Exercise regularly. Ask your doctor about a cardiac rehab program.  This information is not intended to replace advice given to you by your health care provider. Make sure you discuss any questions you have with your health care provider.  Document Released: 06/18/2013 Document Revised: 03/30/2020 Document Reviewed: 03/30/2020  Elsevier Patient Education © 2020 [employer]        Hematuria, Adult  Hematuria is blood in the urine. Blood may be visible in the urine, or it may be identified with a test. This condition can be caused by infections of the bladder, urethra, kidney, or prostate. Other possible causes include:  · Kidney stones.  · Cancer of the urinary tract.  · Too much calcium in the urine.  · Conditions that are  passed from parent to child (inherited conditions).  · Exercise that requires a lot of energy.  Infections can usually be treated with medicine, and a kidney stone usually will pass through your urine. If neither of these is the cause of your hematuria, more tests may be needed to identify the cause of your symptoms.  It is very important to tell your health care provider about any blood in your urine, even if it is painless or the blood stops without treatment. Blood in the urine, when it happens and then stops and then happens again, can be a symptom of a very serious condition, including cancer. There is no pain in the initial stages of many urinary cancers.  Follow these instructions at home:  Medicines  · Take over-the-counter and prescription medicines only as told by your health care provider.  · If you were prescribed an antibiotic medicine, take it as told by your health care provider. Do not stop taking the antibiotic even if you start to feel better.  Eating and drinking  · Drink enough fluid to keep your urine clear or pale yellow. It is recommended that you drink 3-4 quarts (2.8-3.8 L) a day. If you have been diagnosed with an infection, it is recommended that you drink cranberry juice in addition to large amounts of water.  · Avoid caffeine, tea, and carbonated beverages. These tend to irritate the bladder.  · Avoid alcohol because it may irritate the prostate (men).  General instructions  · If you have been diagnosed with a kidney stone, follow your health care provider's instructions about straining your urine to catch the stone.  · Empty your bladder often. Avoid holding urine for long periods of time.  · If you are female:  ? After a bowel movement, wipe from front to back and use each piece of toilet paper only once.  ? Empty your bladder before and after sex.  · Pay attention to any changes in your symptoms. Tell your health care provider about any changes or any new symptoms.  · It is your  responsibility to get your test results. Ask your health care provider, or the department performing the test, when your results will be ready.  · Keep all follow-up visits as told by your health care provider. This is important.  Contact a health care provider if:  · You develop back pain.  · You have a fever.  · You have nausea or vomiting.  · Your symptoms do not improve after 3 days.  · Your symptoms get worse.  Get help right away if:  · You develop severe vomiting and are unable take medicine without vomiting.  · You develop severe pain in your back or abdomen even though you are taking medicine.  · You pass a large amount of blood in your urine.  · You pass blood clots in your urine.  · You feel very weak or like you might faint.  · You faint.  Summary  · Hematuria is blood in the urine. It has many possible causes.  · It is very important that you tell your health care provider about any blood in your urine, even if it is painless or the blood stops without treatment.  · Take over-the-counter and prescription medicines only as told by your health care provider.  · Drink enough fluid to keep your urine clear or pale yellow.  This information is not intended to replace advice given to you by your health care provider. Make sure you discuss any questions you have with your health care provider.  Document Released: 12/18/2006 Document Revised: 11/30/2018 Document Reviewed: 01/20/2018  Elsevier Patient Education © 2020 [employer]

## 2021-02-03 NOTE — PROGRESS NOTES
Received report from NOC shift RN. Patient is A&Ox4, no complaints of pain, VSS, no signs of distress. Plans for discharge, awaiting DC orders. Will continue to monitor.

## 2021-02-03 NOTE — PROGRESS NOTES
Received report and assumed care of patient. Patient was updated on the plan of care for the day. Call light within reach, bed in low position, 3 side rails up. Fall precautions in place and tele box is on. Will round hourly.

## 2021-02-03 NOTE — PROGRESS NOTES
Monitor summary: SR: 63-77  .22/.08/.40  (R) PVC; (O) PAC  Per strip from monitor room    12 hour chart check!

## 2021-02-04 NOTE — DISCHARGE SUMMARY
Discharge Summary    CHIEF COMPLAINT ON ADMISSION  No chief complaint on file.      Reason for Admission  STEMI     Admission Date  1/26/2021    CODE STATUS  Prior    HPI & HOSPITAL COURSE  75 y.o. male who presented 1/26/2021 with chest pain.  Mr. Montes De Oca has a past medical history of coronary artery disease with prior stenting to the LAD and circumflex then in the emergency room in [facility] with chest pain.  Is found to have a posterior myocardial infarction was initiated on aspirin therapy with IV heparin drip transferred here for higher level of care.  To transfer, he developed ventricular tachycardia and underwent defibrillation x2 given epinephrine x2 with return of spontaneous circulation.  Is intubated for airway protection.  Due to hypotension, he was initiated on a norepinephrine drip.  He went emergently to the cardiac Cath Lab found to have 100% occlusion of the right posterior lateral branch underwent thrombectomy with restoration of flow.  Found to have patent LAD stent.  Cardiac intensive care unit on a ventilator.  Due to her fibrillation, he has been on triple therapy with Eliquis, aspirin, and Plavix.  He was subsequently extubated on 1/28/2021. Required CBI due to hematuria and eliquis was held.  Cardiology recommended continuing dual antiplatelet therapy and discontinuing anticoagulation due to hematuria.  Urology was consulted.  The patient underwent CT urogram revealed thickened bladder and enlarged prostate.  The following day his hematuria resolved.  Urology recommended keeping Paredes in and outpatient follow-up for cystoscopy.      Therefore, he is discharged in fair and stable condition to home with close outpatient follow-up.    The patient met 2-midnight criteria for an inpatient stay at the time of discharge.    Discharge Date  2/3/2021    FOLLOW UP ITEMS POST DISCHARGE  2/3/21    DISCHARGE DIAGNOSES  Principal Problem (Resolved):    STEMI (ST elevation myocardial infarction)  (HCC) POA: Yes  Active Problems:    Paroxysmal atrial fibrillation (HCC) POA: Yes    Current use of long term anticoagulation POA: No  Resolved Problems:    Cardiac arrest with ventricular fibrillation (HCC) POA: Yes    Hematuria POA: No    Acute respiratory failure with hypoxia (HCC) POA: Yes    Cardiac arrest (HCC) POA: Yes    Transaminitis POA: Yes      FOLLOW UP  Future Appointments   Date Time Provider Department Center   2/9/2021 10:20 AM JYOTI Valladares [facility] None   2/11/2021  3:15 PM Austin Zamudio M.D. [facility] None   3/22/2021 10:00 AM Austin Zamudio M.D. [facility] None     [facility]  [address]  [address]  [facility] [id number] [phone]  Call  Your doctor has referred you to Cardiac Rehab, which is important to your recovery. Please call to make an appointment.    [facility]  [address] [facility] [address]  [phone]  In 1 week      Mario Lindquist M.D.  [address] [place] [id number]  [phone]    In 1 week        MEDICATIONS ON DISCHARGE     Medication List      START taking these medications      Instructions   * amiodarone 400 MG tablet  Start taking on: February 3, 2021  Commonly known as: PACERONE   Take 1 Tab by mouth 2 Times a Day for 14 days, THEN 1 Tab every day for 14 days.     * amiodarone 200 MG Tabs  Start taking on: March 3, 2021  Commonly known as: Cordarone   Take 1 Tab by mouth every day.  Dose: 200 mg     aspirin 81 MG EC tablet  Start taking on: February 4, 2021   Take 1 Tab by mouth every day.  Dose: 81 mg     atorvastatin 80 MG tablet  Commonly known as: LIPITOR   Take 1 Tab by mouth every evening.  Dose: 80 mg         * This list has 2 medication(s) that are the same as other medications prescribed for you. Read the directions carefully, and ask your doctor or other care provider to review them with you.            CONTINUE taking these medications      Instructions   citalopram 40 MG Tabs  Commonly known  as: CeleXA   Take 40 mg by mouth every day.    Indications: Panic Disorder  Dose: 40 mg     clopidogrel 75 MG Tabs  Start taking on: February 4, 2021  Commonly known as: PLAVIX   Take 1 Tab by mouth every day.  Dose: 75 mg     finasteride 5 MG Tabs  Commonly known as: PROSCAR   Take 5 mg by mouth every day.  Dose: 5 mg     losartan 50 MG Tabs  Commonly known as: COZAAR   Take 50 mg by mouth every day.  Dose: 50 mg     magnesium oxide 400 MG Tabs tablet  Commonly known as: MAG-OX   Take 400 mg by mouth every day.  Dose: 400 mg     metoprolol SR 50 MG Tb24  Commonly known as: TOPROL XL   Take 50 mg by mouth every day.  Dose: 50 mg     nitroglycerin 0.4 MG Subl  Commonly known as: NITROSTAT   Place 0.4 mg under tongue every 5 minutes as needed.  Dose: 0.4 mg     topiramate 25 MG Tabs  Commonly known as: TOPAMAX   Take 2 Tabs by mouth every bedtime.  Dose: 50 mg        STOP taking these medications    amLODIPine 5 MG Tabs  Commonly known as: NORVASC     simvastatin 10 MG Tabs  Commonly known as: ZOCOR            Allergies  No Known Allergies    DIET  No orders of the defined types were placed in this encounter.      ACTIVITY  As tolerated.  Weight bearing as tolerated    CONSULTATIONS  Cardiology  Urology    PROCEDURES  Cardiac cath    LABORATORY  Lab Results   Component Value Date    SODIUM 139 02/02/2021    POTASSIUM 3.1 (L) 02/02/2021    CHLORIDE 107 02/02/2021    CO2 23 02/02/2021    GLUCOSE 99 02/02/2021    BUN 16 02/02/2021    CREATININE 0.61 02/02/2021        Lab Results   Component Value Date    WBC 9.3 02/03/2021    HEMOGLOBIN 10.5 (L) 02/03/2021    HEMATOCRIT 32.1 (L) 02/03/2021    PLATELETCT 381 02/03/2021        Total time of the discharge process exceeds 34 minutes.

## 2021-02-08 ENCOUNTER — TELEPHONE (OUTPATIENT)
Dept: NEUROLOGY | Facility: MEDICAL CENTER | Age: 76
End: 2021-02-08

## 2021-02-08 NOTE — TELEPHONE ENCOUNTER
Patient called needs pain med refilled. Not sure what it was as he doesn't have bottle. Patient had heart attack 2 weeks ago and they cannot drive into town. I asked him to pharmacy for refill. Patient verbalized understanding.

## 2021-03-17 ENCOUNTER — TELEPHONE (OUTPATIENT)
Dept: CARDIOLOGY | Facility: MEDICAL CENTER | Age: 76
End: 2021-03-17

## 2021-03-17 DIAGNOSIS — I10 ESSENTIAL HYPERTENSION, BENIGN: ICD-10-CM

## 2021-03-17 DIAGNOSIS — I48.0 PAROXYSMAL ATRIAL FIBRILLATION (HCC): ICD-10-CM

## 2021-03-17 NOTE — TELEPHONE ENCOUNTER
----- Message from Myrna Andrade sent at 3/16/2021  8:03 AM PDT -----  Regarding: EKG Order for Dr. Zamudio's Telemed clinic 3/22/2021  Please provide an EKG order by the end of today.  Thanks, Myrna

## 2021-03-22 ENCOUNTER — TELEMEDICINE2 (OUTPATIENT)
Dept: CARDIOLOGY | Facility: MEDICAL CENTER | Age: 76
End: 2021-03-22
Payer: MEDICARE

## 2021-03-22 VITALS
SYSTOLIC BLOOD PRESSURE: 123 MMHG | BODY MASS INDEX: 28.11 KG/M2 | HEART RATE: 51 BPM | RESPIRATION RATE: 10 BRPM | DIASTOLIC BLOOD PRESSURE: 66 MMHG | HEIGHT: 71 IN | TEMPERATURE: 98.6 F | WEIGHT: 200.8 LBS

## 2021-03-22 DIAGNOSIS — I25.2 HISTORY OF MI (MYOCARDIAL INFARCTION): ICD-10-CM

## 2021-03-22 DIAGNOSIS — I25.10 CORONARY ARTERY DISEASE DUE TO LIPID RICH PLAQUE: ICD-10-CM

## 2021-03-22 DIAGNOSIS — I25.83 CORONARY ARTERY DISEASE DUE TO LIPID RICH PLAQUE: ICD-10-CM

## 2021-03-22 PROCEDURE — 99214 OFFICE O/P EST MOD 30 MIN: CPT | Mod: 95,CR | Performed by: INTERNAL MEDICINE

## 2021-03-22 RX ORDER — CLOPIDOGREL BISULFATE 75 MG/1
75 TABLET ORAL DAILY
Qty: 90 TABLET | Refills: 2 | Status: ON HOLD | OUTPATIENT
Start: 2021-03-22 | End: 2023-05-29

## 2021-03-22 RX ORDER — AMLODIPINE BESYLATE 5 MG/1
5 TABLET ORAL DAILY
Qty: 30 TABLET | Status: SHIPPED | DISCHARGE
Start: 2021-03-22 | End: 2023-05-18

## 2021-03-22 RX ORDER — AMIODARONE HYDROCHLORIDE 200 MG/1
200 TABLET ORAL DAILY
Qty: 90 TABLET | Refills: 2 | Status: SHIPPED | OUTPATIENT
Start: 2021-03-22 | End: 2021-12-30 | Stop reason: SDUPTHER

## 2021-03-22 ASSESSMENT — ENCOUNTER SYMPTOMS
CLAUDICATION: 0
FALLS: 0
COUGH: 0
WEAKNESS: 0
PALPITATIONS: 0
SORE THROAT: 0
BRUISES/BLEEDS EASILY: 0
CHILLS: 0
BLURRED VISION: 0
SHORTNESS OF BREATH: 0
PND: 0
DIZZINESS: 0
NAUSEA: 0
ABDOMINAL PAIN: 0
FOCAL WEAKNESS: 0
FEVER: 0

## 2021-03-22 ASSESSMENT — FIBROSIS 4 INDEX: FIB4 SCORE: 0.62

## 2021-03-22 NOTE — PATIENT INSTRUCTIONS
A - Antiplatelet - Aspirin 81 mg daily or other antiplatelets (clopidogrel (PLAVIX), prasrugrel (EFFIENT), ticagrelor (BRILINTA)) reduce your risk.  B - Blood Pressure Control - reduces your risk or heart attack and stroke.  C - Cholesterol Management - statins reduce your risk; for those that are intolerant to statins, there are alternatives.  D - Diet - Cardiac rehab diets, Cardiosmart.org.  E - Exercise - at least 5 hours of moderate exercise weekly  (typical brisk walking or similar activity).  F - Fats - VASCEPA,  or Fish oil (if Vascepa too expensive) for elevated triglycerides (REDUCE IT trial showed reduction from 22% 5 year MACE to 17%).  G - Good Vibes - meditation, exercise, yoga, mindfulness, stress reduction.  H - Heart Failure - betablockers, sucubatril (ENTRESTO) 16% less risk of dying over 3 years, spironolactone, dapagliflozin (FARXIGA) 17% less risk of dying over 2 years, CRT +/- ICD.  I - Inflammation - Colchicine in the LoDoCo2 study in 2020 reduced the risk of heart attack by 30% in 2.5 year follow up.  R - Rehab - Cardiac rehab reduces risk of dying by 13-24% and need to go to the hospital by 30% within the first year.  S - Smoking - never smoke, if you do smoke ask for help to quit for good.  T - Type II Diabetes - pills empagliflozin (JARDIANCE) 38% less risk of dying over 4 years, and/or weekly injections liraglutide (Victoza), semaglutide (Ozempic), dulaglutide (Trulicity) ~26% less risk of MACE in 2 years.  V - Vaccines - Annual flu shot and COVID vaccine reduces the risk of serious cardiovascular complications from these deadly infections.  W - Weight - maintain a healthy weight.      Work on at least 1.5 - 5 hours a week of moderate exercise    Please look into the following diets and incorporate them into your diet  LOW SALT DIET   KEEP YOUR SODIUM EQUAL TO CALORIES AND NO MORE THAN DOUBLE THE CALORIES FOR A LOW SALT DIET    Cardiosmart.org - great resource for American College of  Cardiology on heart disease prevention and treatment    FOR TREATMENT OR PREVENTION OF CORONARY ARTERY DISEASE  These three programs are approved by Medicare/Insurers for those with heart disease  [facility] - [facility]  Dr. Harding's Program for Reversing Heart Disease - Cole Torres's Cardiologist vegetarian-based  [facility] - [facility]    This is a commonly referenced Program  Dr Vera - [employer] [facility] [employer] (book and documentary) - vegetarian-based    FOR TREATMENT OF BLOOD PRESSURE  DASH DIET - American Heart Association for treatment of HYPERTENSION    FOR TREATMENT OF BAD CHOLESTEROL/FATS  REDUCE PROCESSED SUGAR AS MUCH AS POSSIBLE  INCREASE WHOLE GRAINS/VEGETABLES  INCREASE FIBER    Lowering total cholesterol and LDL (bad) cholesterol:  - Eat leaner cuts of meat, or eliminate altogether if possible red meat, and frequently substitute fish or chicken.  - Limit saturated fat to no more than 7-10% of total calories no more than 10 g per day is recommended. Some sources of saturated fat include butter, animal fats, hydrogenated vegetable fats and oils, many desserts, whole milk dairy products.  - Replaced saturated fats with polyunsaturated fats and monounsaturated fats. Foods high in monounsaturated fat include nuts, canola oil, avocados, and olives.  - Limit trans fat (processed foods) and replaced with fresh fruits and vegetables  - Recommend nonfat dairy products  - Increase substantially the amount of soluble fiber intake (legumes such as beans, fruit, whole grains).  - Consider nutritional supplements: plant sterile spreads such as Benecol, fish oil,  flaxseed oil, omega-3 acids capsules 1000 mg twice a day, or viscous fiber such as Metamucil  - Attain ideal weight and regular exercise (at least 30 minutes per day of moderate exercise)  ASK ABOUT STATIN OR NON STATIN MEDICATION TO REDUCE YOUR LDL AND HEART RISK    Lowering  triglycerides:  - Reduce intake of simple sugar: Desserts, candy, pastries, honey, sodas, sugared cereals, yogurt, Gatorade, sports bars, canned fruit, smoothies, fruit juice, coffee drinks  - Reduced intake of refined starches: Refined Pasta, most bread  - Reduce or abstain from alcohol  - Increase omega-3 fatty acids: Nemours, Trout, Mackerel, Herring, Albacore tuna and supplements  - Attain ideal weight and regular exercise (at least 30 minutes per day of moderate exercise)  ASK ABOUT PURIFIED OMEGA 3 EPA or FISH OIL TO REDUCE YOUR TG AND HEART RISK    Elevating HDL (good) cholesterol:  - Increase physical activity  - Increase omega-3 fatty acids and supplements as listed above  - Incorporating appropriate amounts of monounsaturated fats such as nuts, olive oil, canola oil, avocados, olives  - Stop smoking  - Attain ideal weight and regular exercise (at least 30 minutes per day of moderate exercise)

## 2021-03-22 NOTE — PROGRESS NOTES
Chief Complaint   Patient presents with   • Coronary Artery Disease     F/V   • Atrial Fibrillation     F/V Dx: Paroxysmal atrial fibrillation (HCC)       Subjective:   Hi Montes De Oca is a 76 y.o. male who presents today 6 for follow-up of his history of coronary disease status post remote stenting but then recent myocardial infarction with thrombectomy to the distal RCA at the time he had significant hematuria on the used on blood thinners and had arrhythmia issues with ventricular fibrillation and atrial fibrillation on presentation that improved with antiarrhythmics    Is tolerating his medications well he was able to get them filled and [place]    He was able to follow-up with urology reports taking a course of antibiotics    Past Medical History:   Diagnosis Date   • Arthritis     all over   • CATARACT 2006    removed   • Coronary artery disease due to lipid rich plaque    • Heart murmur 1974   • Hypertension    • Myocardial infarct (HCC) 7/11/2012   • Pain    • Renal disorder    • Unspecified urinary incontinence     prostate issues, self cathing currently   • Urinary bladder disorder      Past Surgical History:   Procedure Laterality Date   • RECOVERY  12/21/2012    Performed by Cath-Recovery Surgery at SURGERY SAME DAY [facility] ORS   • OTHER ORTHOPEDIC SURGERY  3/1/2011    left knee total   • KNEE ARTHROPLASTY TOTAL  3/1/2011    Performed by KHALIF TREJO at SURGERY [facility] ORS   • PB LAP,CHOLECYSTECTOMY  2001   • VASECTOMY  1984   • APPENDECTOMY  1958   • ARTHROSCOPY, KNEE      2 x left, 1 x right   • TONSILLECTOMY       Family History   Problem Relation Age of Onset   • Heart Failure Mother    • Heart Attack Father      Social History     Socioeconomic History   • Marital status:      Spouse name: Not on file   • Number of children: Not on file   • Years of education: Not on file   • Highest education level: Not on file   Occupational History   • Not on file   Tobacco Use   •  Smoking status: Never Smoker   • Smokeless tobacco: Current User     Types: Chew   • Tobacco comment: chews snuff / tobacco   Substance and Sexual Activity   • Alcohol use: No   • Drug use: No   • Sexual activity: Not on file   Other Topics Concern   • Not on file   Social History Narrative   • Not on file     Social Determinants of Health     Financial Resource Strain:    • Difficulty of Paying Living Expenses:    Food Insecurity:    • Worried About Running Out of Food in the Last Year:    • Ran Out of Food in the Last Year:    Transportation Needs:    • Lack of Transportation (Medical):    • Lack of Transportation (Non-Medical):    Physical Activity:    • Days of Exercise per Week:    • Minutes of Exercise per Session:    Stress:    • Feeling of Stress :    Social Connections:    • Frequency of Communication with Friends and Family:    • Frequency of Social Gatherings with Friends and Family:    • Attends [employer] Services:    • Active Member of Clubs or Organizations:    • Attends Club or Organization Meetings:    • Marital Status:    Intimate Partner Violence:    • Fear of Current or Ex-Partner:    • Emotionally Abused:    • Physically Abused:    • Sexually Abused:      No Known Allergies  Outpatient Encounter Medications as of 3/22/2021   Medication Sig Dispense Refill   • amLODIPine (NORVASC) 5 MG Tab Take 1 tablet by mouth every day. 30 tablet    • clopidogrel (PLAVIX) 75 MG Tab Take 1 tablet by mouth every day. 90 tablet 2   • amiodarone (CORDARONE) 200 MG Tab Take 1 tablet by mouth every day. 90 tablet 2   • aspirin EC 81 MG EC tablet Take 1 Tab by mouth every day. 30 Tab 0   • atorvastatin (LIPITOR) 80 MG tablet Take 1 Tab by mouth every evening. 30 Tab 0   • [DISCONTINUED] amiodarone (CORDARONE) 200 MG Tab Take 1 Tab by mouth every day. 30 Tab 0   • [DISCONTINUED] clopidogrel (PLAVIX) 75 MG Tab Take 1 Tab by mouth every day. 30 Tab 0   • losartan (COZAAR) 50 MG Tab Take 50 mg by mouth every day.     •  "topiramate (TOPAMAX) 25 MG Tab Take 2 Tabs by mouth every bedtime. 180 Tab 3   • metoprolol SR (TOPROL XL) 50 MG TB24 Take 50 mg by mouth every day.     • finasteride (PROSCAR) 5 MG TABS Take 5 mg by mouth every day.     • citalopram (CELEXA) 40 MG TABS Take 40 mg by mouth every day.    Indications: Panic Disorder     • nitroglycerin (NITROSTAT) 0.4 MG SUBL Place 0.4 mg under tongue every 5 minutes as needed.       • magnesium oxide (MAG-OX) 400 MG TABS Take 400 mg by mouth every day.         No facility-administered encounter medications on file as of 3/22/2021.     Review of Systems   Constitutional: Negative for chills and fever.   HENT: Negative for sore throat.    Eyes: Negative for blurred vision.   Respiratory: Negative for cough and shortness of breath.    Cardiovascular: Negative for chest pain, palpitations, claudication, leg swelling and PND.   Gastrointestinal: Negative for abdominal pain and nausea.   Musculoskeletal: Negative for falls and joint pain.   Skin: Negative for rash.   Neurological: Negative for dizziness, focal weakness and weakness.   Endo/Heme/Allergies: Does not bruise/bleed easily.        Objective:   /66 (BP Location: Right arm, Patient Position: Sitting, BP Cuff Size: Adult)   Pulse (!) 51   Temp 37 °C (98.6 °F) (Temporal)   Resp (!) 10   Ht 1.803 m (5' 11\")   Wt 91.1 kg (200 lb 12.8 oz)   BMI 28.01 kg/m²     Physical Exam  Vital signs are reported by the patient    General appears well  Eyes no icterus  Extremities no edema  Skin no apparent rashes    This evaluation was conducted via [facility] using secure and encrypted videoconferencing technology. The patient was physically located at [facility] in [place], [place]. The patient was presented by a medical professional at the originating site.   The patient's identity was confirmed and verbal consent was obtained for this telemedicine encounter.  Verbal consent was obtained. Patient's identity was " verified.    EKG today shows sinus bradycardia    Assessment:     1. Coronary artery disease due to lipid rich plaque  REFERRAL TO INTENSIVE CARDIAC REHAB/CARDIAC REHAB   2. History of MI (myocardial infarction)  REFERRAL TO INTENSIVE CARDIAC REHAB/CARDIAC REHAB       Medical Decision Making:  Today's Assessment / Status / Plan:     It was my pleasure to meet with Mr. Montes De Oca.    Blood pressure is well controlled.  We specifically assessed the labs on hypertension treatment    He is on appropriate statin.    We specifically discussed and he understands the importance of dual antiplatelet therapy as well as the risk.    Further concern for atrial fibrillation was seen at the time of his heart attack he did have significant hematuria and so we have avoided long-term anticoagulation perhaps at 6-12 months we will switch him from DAPT to oral anticoagulation    He understands the risks and benefits of amiodarone therapy. We have specifically made sure that his anticoagulation is appropriate, QTc is appropriate and screened for arrhythmias.   I also stressed the importance of regular labs for thyroid and liver, regular lung exam and regular eye exam. He understands that this is a high risk medication and requires frequent monitoring as well as informing providers for any new medication to make sure it is appropriate for QTC prolongation.    We will likely do amiodarone for 6 months    Hopefully we can get him in with cardiac rehab    I will see Mr. Montes De Oca back in 6 months time and encouraged him to follow up with us over the phone or electronically using my MyChart as issues arise.    It is my pleasure to participate in the care of Mr. Montes De Oca.  Please do not hesitate to contact me with questions or concerns.    Austin Zamudio MD PhD FACC  Cardiologist [facility]    Please note that this dictation was created using voice recognition software. There may be errors I did not  discover before finalizing the note.

## 2021-03-22 NOTE — LETTER
[facility]   [address]  [place] [address]  Phone: [phone]  Fax: [phone]              Hi Montes De Oca  1945    Encounter Date: 3/22/2021    Austin Zamudio M.D.          PROGRESS NOTE:  Chief Complaint   Patient presents with   • Coronary Artery Disease     F/V   • Atrial Fibrillation     F/V Dx: Paroxysmal atrial fibrillation (HCC)       Subjective:   Hi Montes De Oca is a 76 y.o. male who presents today 6 for follow-up of his history of coronary disease status post remote stenting but then recent myocardial infarction with thrombectomy to the distal RCA at the time he had significant hematuria on the used on blood thinners and had arrhythmia issues with ventricular fibrillation and atrial fibrillation on presentation that improved with antiarrhythmics    Is tolerating his medications well he was able to get them filled and Alutiiq    He was able to follow-up with urology reports taking a course of antibiotics    Past Medical History:   Diagnosis Date   • Arthritis     all over   • CATARACT 2006    removed   • Coronary artery disease due to lipid rich plaque    • Heart murmur 1974   • Hypertension    • Myocardial infarct (HCC) 7/11/2012   • Pain    • Renal disorder    • Unspecified urinary incontinence     prostate issues, self cathing currently   • Urinary bladder disorder      Past Surgical History:   Procedure Laterality Date   • RECOVERY  12/21/2012    Performed by Cath-Recovery Surgery at SURGERY SAME DAY [facility] ORS   • OTHER ORTHOPEDIC SURGERY  3/1/2011    left knee total   • KNEE ARTHROPLASTY TOTAL  3/1/2011    Performed by KHALIF TREJO at SURGERY [facility] ORS   • PB LAP,CHOLECYSTECTOMY  2001   • VASECTOMY  1984   • APPENDECTOMY  1958   • ARTHROSCOPY, KNEE      2 x left, 1 x right   • TONSILLECTOMY       Family History   Problem Relation Age of Onset   • Heart Failure Mother    • Heart Attack Father          Social History     Socioeconomic History   • Marital status:      Spouse name: Not on file   • Number of children: Not on file   • Years of education: Not on file   • Highest education level: Not on file   Occupational History   • Not on file   Tobacco Use   • Smoking status: Never Smoker   • Smokeless tobacco: Current User     Types: Chew   • Tobacco comment: chews snuff / tobacco   Substance and Sexual Activity   • Alcohol use: No   • Drug use: No   • Sexual activity: Not on file   Other Topics Concern   • Not on file   Social History Narrative   • Not on file     Social Determinants of Health     Financial Resource Strain:    • Difficulty of Paying Living Expenses:    Food Insecurity:    • Worried About Running Out of Food in the Last Year:    • Ran Out of Food in the Last Year:    Transportation Needs:    • Lack of Transportation (Medical):    • Lack of Transportation (Non-Medical):    Physical Activity:    • Days of Exercise per Week:    • Minutes of Exercise per Session:    Stress:    • Feeling of Stress :    Social Connections:    • Frequency of Communication with Friends and Family:    • Frequency of Social Gatherings with Friends and Family:    • Attends [employer] Services:    • Active Member of Clubs or Organizations:    • Attends Club or Organization Meetings:    • Marital Status:    Intimate Partner Violence:    • Fear of Current or Ex-Partner:    • Emotionally Abused:    • Physically Abused:    • Sexually Abused:      No Known Allergies  Outpatient Encounter Medications as of 3/22/2021   Medication Sig Dispense Refill   • amLODIPine (NORVASC) 5 MG Tab Take 1 tablet by mouth every day. 30 tablet    • clopidogrel (PLAVIX) 75 MG Tab Take 1 tablet by mouth every day. 90 tablet 2   • amiodarone (CORDARONE) 200 MG Tab Take 1 tablet by mouth every day. 90 tablet 2   • aspirin EC 81 MG EC tablet Take 1 Tab by mouth every day. 30 Tab 0   • atorvastatin (LIPITOR) 80 MG tablet Take 1 Tab by mouth every  "evening. 30 Tab 0   • [DISCONTINUED] amiodarone (CORDARONE) 200 MG Tab Take 1 Tab by mouth every day. 30 Tab 0   • [DISCONTINUED] clopidogrel (PLAVIX) 75 MG Tab Take 1 Tab by mouth every day. 30 Tab 0   • losartan (COZAAR) 50 MG Tab Take 50 mg by mouth every day.     • topiramate (TOPAMAX) 25 MG Tab Take 2 Tabs by mouth every bedtime. 180 Tab 3   • metoprolol SR (TOPROL XL) 50 MG TB24 Take 50 mg by mouth every day.     • finasteride (PROSCAR) 5 MG TABS Take 5 mg by mouth every day.     • citalopram (CELEXA) 40 MG TABS Take 40 mg by mouth every day.    Indications: Panic Disorder     • nitroglycerin (NITROSTAT) 0.4 MG SUBL Place 0.4 mg under tongue every 5 minutes as needed.       • magnesium oxide (MAG-OX) 400 MG TABS Take 400 mg by mouth every day.         No facility-administered encounter medications on file as of 3/22/2021.     Review of Systems   Constitutional: Negative for chills and fever.   HENT: Negative for sore throat.    Eyes: Negative for blurred vision.   Respiratory: Negative for cough and shortness of breath.    Cardiovascular: Negative for chest pain, palpitations, claudication, leg swelling and PND.   Gastrointestinal: Negative for abdominal pain and nausea.   Musculoskeletal: Negative for falls and joint pain.   Skin: Negative for rash.   Neurological: Negative for dizziness, focal weakness and weakness.   Endo/Heme/Allergies: Does not bruise/bleed easily.        Objective:   /66 (BP Location: Right arm, Patient Position: Sitting, BP Cuff Size: Adult)   Pulse (!) 51   Temp 37 °C (98.6 °F) (Temporal)   Resp (!) 10   Ht 1.803 m (5' 11\")   Wt 91.1 kg (200 lb 12.8 oz)   BMI 28.01 kg/m²     Physical Exam  Vital signs are reported by the patient    General appears well  Eyes no icterus  Extremities no edema  Skin no apparent rashes    This evaluation was conducted via ufindads using secure and encrypted videoconferencing technology. The patient was physically located at [facility] in [place], [place]. The patient was presented by a medical professional at the originating site.   The patient's identity was confirmed and verbal consent was obtained for this telemedicine encounter.  Verbal consent was obtained. Patient's identity was verified.    EKG today shows sinus bradycardia    Assessment:     1. Coronary artery disease due to lipid rich plaque  REFERRAL TO INTENSIVE CARDIAC REHAB/CARDIAC REHAB   2. History of MI (myocardial infarction)  REFERRAL TO INTENSIVE CARDIAC REHAB/CARDIAC REHAB       Medical Decision Making:  Today's Assessment / Status / Plan:     It was my pleasure to meet with Mr. Montes De Oca.    Blood pressure is well controlled.  We specifically assessed the labs on hypertension treatment    He is on appropriate statin.    We specifically discussed and he understands the importance of dual antiplatelet therapy as well as the risk.    Further concern for atrial fibrillation was seen at the time of his heart attack he did have significant hematuria and so we have avoided long-term anticoagulation perhaps at 6-12 months we will switch him from DAPT to oral anticoagulation    He understands the risks and benefits of amiodarone therapy. We have specifically made sure that his anticoagulation is appropriate, QTc is appropriate and screened for arrhythmias.   I also stressed the importance of regular labs for thyroid and liver, regular lung exam and regular eye exam. He understands that this is a high risk medication and requires frequent monitoring as well as informing providers for any new medication to make sure it is appropriate for QTC prolongation.    We will likely do amiodarone for 6 months    Hopefully we can get him in with cardiac rehab    I will see Mr. Montes De Oca back in 6 months time and encouraged him to follow up with us over the phone or electronically using my MyChart as issues arise.    It is my pleasure to participate in the care of Mr. Montes De Oca.  Please do  not hesitate to contact me with questions or concerns.    Austin Zamudio MD PhD [facility]  Cardiologist [facility]    Please note that this dictation was created using voice recognition software. There may be errors I did not discover before finalizing the note.           Mario Lindquist M.D.  [address] [place] [id number]  Via Fax: [phone]

## 2021-12-30 DIAGNOSIS — I48.0 PAROXYSMAL ATRIAL FIBRILLATION (HCC): ICD-10-CM

## 2022-01-04 RX ORDER — AMIODARONE HYDROCHLORIDE 200 MG/1
200 TABLET ORAL DAILY
Qty: 90 TABLET | Refills: 0 | Status: SHIPPED | OUTPATIENT
Start: 2022-01-04 | End: 2023-05-18

## 2022-02-28 ENCOUNTER — TELEPHONE (OUTPATIENT)
Dept: NEUROLOGY | Facility: MEDICAL CENTER | Age: 77
End: 2022-02-28

## 2023-05-18 ENCOUNTER — HOSPITAL ENCOUNTER (INPATIENT)
Facility: MEDICAL CENTER | Age: 78
LOS: 11 days | DRG: 280 | End: 2023-05-29
Attending: EMERGENCY MEDICINE | Admitting: INTERNAL MEDICINE
Payer: MEDICARE

## 2023-05-18 ENCOUNTER — APPOINTMENT (OUTPATIENT)
Dept: RADIOLOGY | Facility: MEDICAL CENTER | Age: 78
DRG: 280 | End: 2023-05-18
Attending: EMERGENCY MEDICINE
Payer: MEDICARE

## 2023-05-18 DIAGNOSIS — D72.829 LEUKOCYTOSIS, UNSPECIFIED TYPE: ICD-10-CM

## 2023-05-18 DIAGNOSIS — I48.0 PAROXYSMAL ATRIAL FIBRILLATION (HCC): ICD-10-CM

## 2023-05-18 DIAGNOSIS — I48.11 LONGSTANDING PERSISTENT ATRIAL FIBRILLATION (HCC): ICD-10-CM

## 2023-05-18 DIAGNOSIS — I21.4 NSTEMI (NON-ST ELEVATED MYOCARDIAL INFARCTION) (HCC): ICD-10-CM

## 2023-05-18 DIAGNOSIS — I82.629 ACUTE DEEP VEIN THROMBOSIS (DVT) OF UPPER EXTREMITY, UNSPECIFIED LATERALITY, UNSPECIFIED VEIN (HCC): ICD-10-CM

## 2023-05-18 DIAGNOSIS — E87.6 HYPOKALEMIA: ICD-10-CM

## 2023-05-18 PROBLEM — I48.91 AF (ATRIAL FIBRILLATION) (HCC): Status: ACTIVE | Noted: 2021-01-27

## 2023-05-18 PROBLEM — R10.13 EPIGASTRIC PAIN: Status: ACTIVE | Noted: 2023-05-18

## 2023-05-18 LAB
ANION GAP SERPL CALC-SCNC: 13 MMOL/L (ref 7–16)
APTT PPP: 41.6 SEC (ref 24.7–36)
BASOPHILS # BLD AUTO: 2.6 % (ref 0–1.8)
BASOPHILS # BLD: 0.46 K/UL (ref 0–0.12)
BUN SERPL-MCNC: 16 MG/DL (ref 8–22)
BURR CELLS BLD QL SMEAR: NORMAL
CALCIUM SERPL-MCNC: 7.9 MG/DL (ref 8.5–10.5)
CHLORIDE SERPL-SCNC: 102 MMOL/L (ref 96–112)
CO2 SERPL-SCNC: 25 MMOL/L (ref 20–33)
CREAT SERPL-MCNC: 0.87 MG/DL (ref 0.5–1.4)
CRP SERPL HS-MCNC: 20.14 MG/DL (ref 0–0.75)
EKG IMPRESSION: NORMAL
EOSINOPHIL # BLD AUTO: 0 K/UL (ref 0–0.51)
EOSINOPHIL NFR BLD: 0 % (ref 0–6.9)
ERYTHROCYTE [DISTWIDTH] IN BLOOD BY AUTOMATED COUNT: 51.7 FL (ref 35.9–50)
GFR SERPLBLD CREATININE-BSD FMLA CKD-EPI: 87 ML/MIN/1.73 M 2
GLUCOSE SERPL-MCNC: 119 MG/DL (ref 65–99)
HCT VFR BLD AUTO: 38.5 % (ref 42–52)
HGB BLD-MCNC: 12.1 G/DL (ref 14–18)
INR PPP: 1.38 (ref 0.87–1.13)
LACTATE SERPL-SCNC: 1.3 MMOL/L (ref 0.5–2)
LIPASE SERPL-CCNC: 12 U/L (ref 11–82)
LYMPHOCYTES # BLD AUTO: 0.62 K/UL (ref 1–4.8)
LYMPHOCYTES NFR BLD: 3.5 % (ref 22–41)
MAGNESIUM SERPL-MCNC: 1.8 MG/DL (ref 1.5–2.5)
MANUAL DIFF BLD: NORMAL
MCH RBC QN AUTO: 30.4 PG (ref 27–33)
MCHC RBC AUTO-ENTMCNC: 31.4 G/DL (ref 33.7–35.3)
MCV RBC AUTO: 96.7 FL (ref 81.4–97.8)
MONOCYTES # BLD AUTO: 1.07 K/UL (ref 0–0.85)
MONOCYTES NFR BLD AUTO: 6 % (ref 0–13.4)
MORPHOLOGY BLD-IMP: NORMAL
NEUTROPHILS # BLD AUTO: 15.65 K/UL (ref 1.82–7.42)
NEUTROPHILS NFR BLD: 87.9 % (ref 44–72)
NRBC # BLD AUTO: 0 K/UL
NRBC BLD-RTO: 0 /100 WBC
PLATELET # BLD AUTO: 250 K/UL (ref 164–446)
PLATELET BLD QL SMEAR: NORMAL
PMV BLD AUTO: 12.2 FL (ref 9–12.9)
POIKILOCYTOSIS BLD QL SMEAR: NORMAL
POLYCHROMASIA BLD QL SMEAR: NORMAL
POTASSIUM SERPL-SCNC: 3.4 MMOL/L (ref 3.6–5.5)
PROCALCITONIN SERPL-MCNC: 0.35 NG/ML
PROTHROMBIN TIME: 16.7 SEC (ref 12–14.6)
RBC # BLD AUTO: 3.98 M/UL (ref 4.7–6.1)
RBC BLD AUTO: PRESENT
SODIUM SERPL-SCNC: 140 MMOL/L (ref 135–145)
TARGETS BLD QL SMEAR: NORMAL
TROPONIN T SERPL-MCNC: 2350 NG/L (ref 6–19)
TROPONIN T SERPL-MCNC: 2565 NG/L (ref 6–19)
TSH SERPL DL<=0.005 MIU/L-ACNC: 4.24 UIU/ML (ref 0.38–5.33)
UFH PPP CHRO-ACNC: <0.1 IU/ML
WBC # BLD AUTO: 17.8 K/UL (ref 4.8–10.8)

## 2023-05-18 PROCEDURE — 700111 HCHG RX REV CODE 636 W/ 250 OVERRIDE (IP): Performed by: INTERNAL MEDICINE

## 2023-05-18 PROCEDURE — 99497 ADVNCD CARE PLAN 30 MIN: CPT | Performed by: INTERNAL MEDICINE

## 2023-05-18 PROCEDURE — 99223 1ST HOSP IP/OBS HIGH 75: CPT | Performed by: INTERNAL MEDICINE

## 2023-05-18 PROCEDURE — 770020 HCHG ROOM/CARE - TELE (206)

## 2023-05-18 PROCEDURE — 84443 ASSAY THYROID STIM HORMONE: CPT

## 2023-05-18 PROCEDURE — 700111 HCHG RX REV CODE 636 W/ 250 OVERRIDE (IP): Performed by: EMERGENCY MEDICINE

## 2023-05-18 PROCEDURE — 80048 BASIC METABOLIC PNL TOTAL CA: CPT

## 2023-05-18 PROCEDURE — 700102 HCHG RX REV CODE 250 W/ 637 OVERRIDE(OP): Performed by: INTERNAL MEDICINE

## 2023-05-18 PROCEDURE — 85007 BL SMEAR W/DIFF WBC COUNT: CPT

## 2023-05-18 PROCEDURE — 85730 THROMBOPLASTIN TIME PARTIAL: CPT

## 2023-05-18 PROCEDURE — 93005 ELECTROCARDIOGRAM TRACING: CPT | Performed by: EMERGENCY MEDICINE

## 2023-05-18 PROCEDURE — 83735 ASSAY OF MAGNESIUM: CPT

## 2023-05-18 PROCEDURE — 96374 THER/PROPH/DIAG INJ IV PUSH: CPT

## 2023-05-18 PROCEDURE — 99223 1ST HOSP IP/OBS HIGH 75: CPT | Mod: AI,25 | Performed by: INTERNAL MEDICINE

## 2023-05-18 PROCEDURE — 84145 PROCALCITONIN (PCT): CPT

## 2023-05-18 PROCEDURE — 99285 EMERGENCY DEPT VISIT HI MDM: CPT

## 2023-05-18 PROCEDURE — 85025 COMPLETE CBC W/AUTO DIFF WBC: CPT

## 2023-05-18 PROCEDURE — 83605 ASSAY OF LACTIC ACID: CPT

## 2023-05-18 PROCEDURE — 86140 C-REACTIVE PROTEIN: CPT

## 2023-05-18 PROCEDURE — 93005 ELECTROCARDIOGRAM TRACING: CPT

## 2023-05-18 PROCEDURE — 83690 ASSAY OF LIPASE: CPT

## 2023-05-18 PROCEDURE — 700105 HCHG RX REV CODE 258: Performed by: INTERNAL MEDICINE

## 2023-05-18 PROCEDURE — 85610 PROTHROMBIN TIME: CPT

## 2023-05-18 PROCEDURE — 36415 COLL VENOUS BLD VENIPUNCTURE: CPT

## 2023-05-18 PROCEDURE — A9270 NON-COVERED ITEM OR SERVICE: HCPCS | Performed by: INTERNAL MEDICINE

## 2023-05-18 PROCEDURE — 71045 X-RAY EXAM CHEST 1 VIEW: CPT

## 2023-05-18 PROCEDURE — 85520 HEPARIN ASSAY: CPT

## 2023-05-18 PROCEDURE — 84484 ASSAY OF TROPONIN QUANT: CPT

## 2023-05-18 PROCEDURE — 87040 BLOOD CULTURE FOR BACTERIA: CPT | Mod: 91

## 2023-05-18 RX ORDER — LABETALOL HYDROCHLORIDE 5 MG/ML
10 INJECTION, SOLUTION INTRAVENOUS EVERY 4 HOURS PRN
Status: DISCONTINUED | OUTPATIENT
Start: 2023-05-18 | End: 2023-05-29 | Stop reason: HOSPADM

## 2023-05-18 RX ORDER — ACETAMINOPHEN 325 MG/1
650 TABLET ORAL EVERY 6 HOURS PRN
Status: DISCONTINUED | OUTPATIENT
Start: 2023-05-18 | End: 2023-05-29 | Stop reason: HOSPADM

## 2023-05-18 RX ORDER — FINASTERIDE 5 MG/1
5 TABLET, FILM COATED ORAL DAILY
Status: DISCONTINUED | OUTPATIENT
Start: 2023-05-19 | End: 2023-05-29 | Stop reason: HOSPADM

## 2023-05-18 RX ORDER — HEPARIN SODIUM 1000 [USP'U]/ML
2000 INJECTION, SOLUTION INTRAVENOUS; SUBCUTANEOUS PRN
Status: DISCONTINUED | OUTPATIENT
Start: 2023-05-18 | End: 2023-05-18

## 2023-05-18 RX ORDER — ATORVASTATIN CALCIUM 80 MG/1
80 TABLET, FILM COATED ORAL EVERY EVENING
Status: DISCONTINUED | OUTPATIENT
Start: 2023-05-18 | End: 2023-05-29 | Stop reason: HOSPADM

## 2023-05-18 RX ORDER — HEPARIN SODIUM 5000 [USP'U]/100ML
0-30 INJECTION, SOLUTION INTRAVENOUS CONTINUOUS
Status: DISCONTINUED | OUTPATIENT
Start: 2023-05-18 | End: 2023-05-18

## 2023-05-18 RX ORDER — CLOTRIMAZOLE 10 MG/1
10 LOZENGE ORAL; TOPICAL DAILY
COMMUNITY

## 2023-05-18 RX ORDER — POTASSIUM CHLORIDE 20 MEQ/1
20 TABLET, EXTENDED RELEASE ORAL ONCE
Status: COMPLETED | OUTPATIENT
Start: 2023-05-18 | End: 2023-05-18

## 2023-05-18 RX ORDER — METOPROLOL TARTRATE 1 MG/ML
5 INJECTION, SOLUTION INTRAVENOUS
Status: DISCONTINUED | OUTPATIENT
Start: 2023-05-18 | End: 2023-05-29 | Stop reason: HOSPADM

## 2023-05-18 RX ORDER — NITROGLYCERIN 0.4 MG/1
0.4 TABLET SUBLINGUAL
Status: DISCONTINUED | OUTPATIENT
Start: 2023-05-18 | End: 2023-05-29 | Stop reason: HOSPADM

## 2023-05-18 RX ORDER — AMIODARONE HYDROCHLORIDE 200 MG/1
200 TABLET ORAL DAILY
Status: DISCONTINUED | OUTPATIENT
Start: 2023-05-18 | End: 2023-05-18

## 2023-05-18 RX ORDER — LANOLIN ALCOHOL/MO/W.PET/CERES
400 CREAM (GRAM) TOPICAL DAILY
Status: DISCONTINUED | OUTPATIENT
Start: 2023-05-18 | End: 2023-05-29 | Stop reason: HOSPADM

## 2023-05-18 RX ORDER — CITALOPRAM 20 MG/1
40 TABLET ORAL DAILY
Status: DISCONTINUED | OUTPATIENT
Start: 2023-05-18 | End: 2023-05-18

## 2023-05-18 RX ORDER — POLYETHYLENE GLYCOL 3350 17 G/17G
1 POWDER, FOR SOLUTION ORAL
Status: DISCONTINUED | OUTPATIENT
Start: 2023-05-18 | End: 2023-05-29 | Stop reason: HOSPADM

## 2023-05-18 RX ORDER — ASPIRIN 81 MG/1
81 TABLET ORAL DAILY
Status: DISCONTINUED | OUTPATIENT
Start: 2023-05-19 | End: 2023-05-21

## 2023-05-18 RX ORDER — ASPIRIN 325 MG
325 TABLET ORAL DAILY
Status: DISCONTINUED | OUTPATIENT
Start: 2023-05-18 | End: 2023-05-18

## 2023-05-18 RX ORDER — OXYCODONE HYDROCHLORIDE 5 MG/1
5 TABLET ORAL EVERY 12 HOURS PRN
Status: ON HOLD | COMMUNITY
End: 2023-05-29

## 2023-05-18 RX ORDER — FERROUS SULFATE 325(65) MG
325 TABLET ORAL DAILY
COMMUNITY

## 2023-05-18 RX ORDER — ASCORBIC ACID 500 MG
500 TABLET ORAL DAILY
COMMUNITY

## 2023-05-18 RX ORDER — AMLODIPINE BESYLATE 5 MG/1
5 TABLET ORAL DAILY
Status: DISCONTINUED | OUTPATIENT
Start: 2023-05-18 | End: 2023-05-18

## 2023-05-18 RX ORDER — CLOPIDOGREL BISULFATE 75 MG/1
75 TABLET ORAL DAILY
Status: DISCONTINUED | OUTPATIENT
Start: 2023-05-18 | End: 2023-05-24

## 2023-05-18 RX ORDER — LOSARTAN POTASSIUM 50 MG/1
50 TABLET ORAL DAILY
Status: DISCONTINUED | OUTPATIENT
Start: 2023-05-19 | End: 2023-05-29 | Stop reason: HOSPADM

## 2023-05-18 RX ORDER — HYDROCHLOROTHIAZIDE 12.5 MG/1
12.5 TABLET ORAL DAILY
COMMUNITY

## 2023-05-18 RX ORDER — AMOXICILLIN 250 MG
2 CAPSULE ORAL 2 TIMES DAILY
Status: DISCONTINUED | OUTPATIENT
Start: 2023-05-19 | End: 2023-05-29 | Stop reason: HOSPADM

## 2023-05-18 RX ORDER — ASPIRIN 300 MG/1
300 SUPPOSITORY RECTAL DAILY
Status: DISCONTINUED | OUTPATIENT
Start: 2023-05-18 | End: 2023-05-18

## 2023-05-18 RX ORDER — ASPIRIN 81 MG/1
324 TABLET, CHEWABLE ORAL DAILY
Status: DISCONTINUED | OUTPATIENT
Start: 2023-05-18 | End: 2023-05-18

## 2023-05-18 RX ORDER — BISACODYL 10 MG
10 SUPPOSITORY, RECTAL RECTAL
Status: DISCONTINUED | OUTPATIENT
Start: 2023-05-18 | End: 2023-05-29 | Stop reason: HOSPADM

## 2023-05-18 RX ORDER — SODIUM CHLORIDE, SODIUM LACTATE, POTASSIUM CHLORIDE, AND CALCIUM CHLORIDE .6; .31; .03; .02 G/100ML; G/100ML; G/100ML; G/100ML
500 INJECTION, SOLUTION INTRAVENOUS
Status: DISCONTINUED | OUTPATIENT
Start: 2023-05-18 | End: 2023-05-29 | Stop reason: HOSPADM

## 2023-05-18 RX ORDER — CHOLECALCIFEROL (VITAMIN D3) 125 MCG
2000 CAPSULE ORAL DAILY
COMMUNITY

## 2023-05-18 RX ORDER — METOPROLOL SUCCINATE 50 MG/1
50 TABLET, EXTENDED RELEASE ORAL DAILY
Status: DISCONTINUED | OUTPATIENT
Start: 2023-05-19 | End: 2023-05-19

## 2023-05-18 RX ORDER — HEPARIN SODIUM 5000 [USP'U]/100ML
0-30 INJECTION, SOLUTION INTRAVENOUS CONTINUOUS
Status: DISCONTINUED | OUTPATIENT
Start: 2023-05-18 | End: 2023-05-19

## 2023-05-18 RX ORDER — TAMSULOSIN HYDROCHLORIDE 0.4 MG/1
0.4 CAPSULE ORAL DAILY
COMMUNITY

## 2023-05-18 RX ADMIN — Medication 400 MG: at 20:49

## 2023-05-18 RX ADMIN — PIPERACILLIN AND TAZOBACTAM 3.38 G: 3; .375 INJECTION, POWDER, LYOPHILIZED, FOR SOLUTION INTRAVENOUS; PARENTERAL at 21:32

## 2023-05-18 RX ADMIN — CLOPIDOGREL BISULFATE 75 MG: 75 TABLET ORAL at 20:49

## 2023-05-18 RX ADMIN — ATORVASTATIN CALCIUM 80 MG: 80 TABLET, FILM COATED ORAL at 18:29

## 2023-05-18 RX ADMIN — HEPARIN SODIUM 12 UNITS/KG/HR: 5000 INJECTION, SOLUTION INTRAVENOUS at 18:35

## 2023-05-18 RX ADMIN — PIPERACILLIN AND TAZOBACTAM 3.38 G: 3; .375 INJECTION, POWDER, LYOPHILIZED, FOR SOLUTION INTRAVENOUS; PARENTERAL at 20:52

## 2023-05-18 RX ADMIN — POTASSIUM CHLORIDE 20 MEQ: 1500 TABLET, EXTENDED RELEASE ORAL at 18:29

## 2023-05-18 RX ADMIN — HEPARIN SODIUM 10.5 UNITS/KG/HR: 5000 INJECTION, SOLUTION INTRAVENOUS at 16:49

## 2023-05-18 ASSESSMENT — COGNITIVE AND FUNCTIONAL STATUS - GENERAL
MOBILITY SCORE: 18
SUGGESTED CMS G CODE MODIFIER MOBILITY: CK
WALKING IN HOSPITAL ROOM: A LITTLE
MOVING TO AND FROM BED TO CHAIR: A LITTLE
SUGGESTED CMS G CODE MODIFIER DAILY ACTIVITY: CJ
CLIMB 3 TO 5 STEPS WITH RAILING: A LOT
HELP NEEDED FOR BATHING: A LITTLE
STANDING UP FROM CHAIR USING ARMS: A LITTLE
DAILY ACTIVITIY SCORE: 20
MOVING FROM LYING ON BACK TO SITTING ON SIDE OF FLAT BED: A LITTLE
DRESSING REGULAR LOWER BODY CLOTHING: A LITTLE
DRESSING REGULAR UPPER BODY CLOTHING: A LITTLE
TOILETING: A LITTLE

## 2023-05-18 ASSESSMENT — PATIENT HEALTH QUESTIONNAIRE - PHQ9
SUM OF ALL RESPONSES TO PHQ9 QUESTIONS 1 AND 2: 0
1. LITTLE INTEREST OR PLEASURE IN DOING THINGS: NOT AT ALL
2. FEELING DOWN, DEPRESSED, IRRITABLE, OR HOPELESS: NOT AT ALL

## 2023-05-18 ASSESSMENT — PAIN DESCRIPTION - DESCRIPTORS: DESCRIPTORS: STABBING

## 2023-05-18 NOTE — ED PROVIDER NOTES
ED Provider Note    Scribed for Sotero Prater M.D. by Kerry Diehl. 5/18/2023  4:12 PM    Primary care provider: Mario Lindquist M.D.  Means of arrival: Ambulance    CHIEF COMPLAINT  Chief Complaint   Patient presents with    Chest Pain       LIMITATION TO HISTORY   None    HPI    Hi Montes De Oca is a 78 y.o. male who presents to the Emergency Department via EMS as a transfer from [facility] for epigastric abdominal pain and right flank pain onset one week ago and a diagnosis of NSTEMI day. He notes that the pain is only occurring when he breathes. The patient has a history of three prior heart attacks and had 3 stents placed. The patient notes that this pain does not feel similar to his prior heart attacks. He endures associated symptoms of right flank pain and vomited one time. He denies any nausea, urinary problems, fever, cough, diarrhea, constipation, leg swelling, calf pain, blood clots, or chest pain. The patient had abdominal surgery to remove his appendix. He denies smoking, and denies taking a blood thinner. There are no known alleviating or exacerbating factors.         OUTSIDE HISTORIAN(S):  None    EXTERNAL RECORDS REVIEWED  Review of records show from [facility] that the patient had Troponin levels in the 5000s, CT angiogram chest and abdomen were negative, the patient has a pacemaker, the patient was given Heparin, Aspirin, and Zosyn. The patient was not on any blood thinners. The patient had a white blood count of 22,000, hemoglobin of 137, potassium 2.8, Glucose of 136, Protein low of 62, 2.7 Albumin, Bilirubin high 2.6. The patient was given Potassium 40 mcq.  The patient was discharged from [facility] in January 2021 for STEMI. The patient had stents to the LAD and circumflex.     REVIEW OF SYSTEMS  Pertinent positives include: right flank pain and vomited one time.  Pertinent negatives include: nausea, urinary problems, fever, cough, diarrhea, constipation, leg swelling, calf pain,  blood clots, or other symptoms.    PAST MEDICAL HISTORY  Past Medical History:   Diagnosis Date    Arthritis     all over    CATARACT 2006    removed    Coronary artery disease due to lipid rich plaque     Heart murmur 1974    Hypertension     Myocardial infarct (HCC) 7/11/2012    Pain     Renal disorder     Unspecified urinary incontinence     prostate issues, self cathing currently    Urinary bladder disorder        FAMILY HISTORY  Family History   Problem Relation Age of Onset    Heart Failure Mother     Heart Attack Father        SOCIAL HISTORY  Social History     Tobacco Use    Smoking status: Never    Smokeless tobacco: Current     Types: Chew    Tobacco comments:     chews snuff / tobacco   Substance Use Topics    Alcohol use: No    Drug use: No     Social History     Substance and Sexual Activity   Drug Use No       SURGICAL HISTORY  Past Surgical History:   Procedure Laterality Date    RECOVERY  12/21/2012    Performed by Cath-Recovery Surgery at SURGERY SAME DAY [facility] ORS    OTHER ORTHOPEDIC SURGERY  3/1/2011    left knee total    KNEE ARTHROPLASTY TOTAL  3/1/2011    Performed by KHALIF TREJO at SURGERY [facility]    GA LAP,CHOLECYSTECTOMY  2001    VASECTOMY  1984    APPENDECTOMY  1958    ARTHROSCOPY, KNEE      2 x left, 1 x right    TONSILLECTOMY         CURRENT MEDICATIONS  No current facility-administered medications for this encounter.    Current Outpatient Medications:     amiodarone (CORDARONE) 200 MG Tab, Take 1 Tablet by mouth every day., Disp: 90 Tablet, Rfl: 0    amLODIPine (NORVASC) 5 MG Tab, Take 1 tablet by mouth every day., Disp: 30 tablet, Rfl:     clopidogrel (PLAVIX) 75 MG Tab, Take 1 tablet by mouth every day., Disp: 90 tablet, Rfl: 2    atorvastatin (LIPITOR) 80 MG tablet, Take 1 Tab by mouth every evening., Disp: 30 Tab, Rfl: 0    aspirin EC 81 MG EC tablet, Take 1 Tab by mouth every day., Disp: 30 Tab, Rfl: 0    losartan (COZAAR) 50 MG Tab, Take 50 mg by mouth every day.,  "Disp: , Rfl:     metoprolol SR (TOPROL XL) 50 MG TB24, Take 50 mg by mouth every day., Disp: , Rfl:     finasteride (PROSCAR) 5 MG TABS, Take 5 mg by mouth every day., Disp: , Rfl:     citalopram (CELEXA) 40 MG TABS, Take 40 mg by mouth every day.    Indications: Panic Disorder, Disp: , Rfl:     nitroglycerin (NITROSTAT) 0.4 MG SUBL, Place 0.4 mg under tongue every 5 minutes as needed.  , Disp: , Rfl:     magnesium oxide (MAG-OX) 400 MG TABS, Take 400 mg by mouth every day.  , Disp: , Rfl:     ALLERGIES  No Known Allergies    PHYSICAL EXAM  VITAL SIGNS: BP (!) 142/92   Pulse 90   Temp 36.7 °C (98 °F) (Temporal)   Resp 16   Ht 1.803 m (5' 11\")   Wt 95.3 kg (210 lb)   SpO2 95%   BMI 29.29 kg/m²   Reviewed and hypertensive, afebrile  Constitutional: Well developed, Well nourished, well-appearing, pain-free.  HENT: Normocephalic, atraumatic, bilateral external ears normal, No intraoral erythema, edema, exudate  Eyes: PERRLA, conjunctiva pink, no scleral icterus.   Cardiovascular:  Regular rate and rhythm. No murmurs, rubs or gallops.  No dependent edema or calf tenderness  Respiratory: Lungs clear to auscultation bilaterally. No wheezes, rales, or rhonchi.  Abdominal:  Abdomen soft, non-tender, non distended. No rebound, or guarding.    Skin: No erythema, no rash. No wounds or bruising.  Genitourinary: No costovertebral angle tenderness.   Musculoskeletal: no deformities.   Neurologic: Alert & oriented x 3, cranial nerves 2-12 intact by passive exam.  No focal deficit noted.  Psychiatric: Affect normal, Judgment normal, Mood normal.     LABS Ordered and Reviewed by Me:  Results for orders placed or performed during the hospital encounter of 05/18/23   CBC WITH DIFFERENTIAL   Result Value Ref Range    WBC 17.8 (H) 4.8 - 10.8 K/uL    RBC 3.98 (L) 4.70 - 6.10 M/uL    Hemoglobin 12.1 (L) 14.0 - 18.0 g/dL    Hematocrit 38.5 (L) 42.0 - 52.0 %    MCV 96.7 81.4 - 97.8 fL    MCH 30.4 27.0 - 33.0 pg    MCHC 31.4 (L) 33.7 - " 35.3 g/dL    RDW 51.7 (H) 35.9 - 50.0 fL    Platelet Count 250 164 - 446 K/uL    MPV 12.2 9.0 - 12.9 fL    Neutrophils-Polys 87.90 (H) 44.00 - 72.00 %    Lymphocytes 3.50 (L) 22.00 - 41.00 %    Monocytes 6.00 0.00 - 13.40 %    Eosinophils 0.00 0.00 - 6.90 %    Basophils 2.60 (H) 0.00 - 1.80 %    Nucleated RBC 0.00 /100 WBC    Neutrophils (Absolute) 15.65 (H) 1.82 - 7.42 K/uL    Lymphs (Absolute) 0.62 (L) 1.00 - 4.80 K/uL    Monos (Absolute) 1.07 (H) 0.00 - 0.85 K/uL    Eos (Absolute) 0.00 0.00 - 0.51 K/uL    Baso (Absolute) 0.46 (H) 0.00 - 0.12 K/uL    NRBC (Absolute) 0.00 K/uL   Basic Metabolic Panel   Result Value Ref Range    Sodium 140 135 - 145 mmol/L    Potassium 3.4 (L) 3.6 - 5.5 mmol/L    Chloride 102 96 - 112 mmol/L    Co2 25 20 - 33 mmol/L    Glucose 119 (H) 65 - 99 mg/dL    Bun 16 8 - 22 mg/dL    Creatinine 0.87 0.50 - 1.40 mg/dL    Calcium 7.9 (L) 8.5 - 10.5 mg/dL    Anion Gap 13.0 7.0 - 16.0   TROPONIN   Result Value Ref Range    Troponin T 2565 (H) 6 - 19 ng/L   APTT   Result Value Ref Range    APTT 41.6 (H) 24.7 - 36.0 sec   Prothrombin Time   Result Value Ref Range    PT 16.7 (H) 12.0 - 14.6 sec    INR 1.38 (H) 0.87 - 1.13   TSH   Result Value Ref Range    TSH 4.240 0.380 - 5.330 uIU/mL   MAGNESIUM   Result Value Ref Range    Magnesium 1.8 1.5 - 2.5 mg/dL   Troponin in two (2) hours   Result Value Ref Range    Troponin T 2350 (H) 6 - 19 ng/L   LACTIC ACID   Result Value Ref Range    Lactic Acid 1.3 0.5 - 2.0 mmol/L   DIFFERENTIAL MANUAL   Result Value Ref Range    Manual Diff Status PERFORMED    PERIPHERAL SMEAR REVIEW   Result Value Ref Range    Peripheral Smear Review see below    PLATELET ESTIMATE   Result Value Ref Range    Plt Estimation Normal    MORPHOLOGY   Result Value Ref Range    RBC Morphology Present     Polychromia 1+     Poikilocytosis 2+     Target Cells 1+     Echinocytes 2+    CRP QUANTITIVE (NON-CARDIAC)   Result Value Ref Range    Stat C-Reactive Protein 20.14 (H) 0.00 - 0.75  mg/dL   LIPASE   Result Value Ref Range    Lipase 12 11 - 82 U/L   ESTIMATED GFR   Result Value Ref Range    GFR (CKD-EPI) 87 >60 mL/min/1.73 m 2   PROCALCITONIN   Result Value Ref Range    Procalcitonin 0.35 (H) <0.25 ng/mL         Rhythm Strip: Interpretation by me rate controlled A-fib with PVCs     EKG Interpretation by me    Indication: Prior Ischemias    Rhythm: Irregularly irregular  Rate: Normal at 77  Axis: normal  Ectopy: VC's  Conduction: normal  ST/T Waves: no acute change  Q Waves: Inferior  R Wave progression: normal  Hypertrophy changes: Absent    Comparison: ST segments improved compared to prior, and AFIB is old.     Clinical Impression: Controlled atrial fibrillation with PVCs    RADIOLOGY  I have independently interpreted the chest x-ray associated with this visit demonstrating a pacemaker, and no pneumonia.  I am awaiting the final reading from the radiologist.     Final Radiology Report  DX-CHEST-LIMITED (1 VIEW)   Final Result         Low lung volume with hypoventilatory change and bibasilar atelectasis.     Radiologist interpretation have been reviewed by me.       ED COURSE:  4:12 PM - Patient seen and examined at bedside.       INTERVENTIONS BY ME:  Medications   heparin infusion 25,000 units in 500 mL 0.45% NACL (10.5 Units/kg/hr × 95.3 kg Intravenous New Bag.(Insulin or Heparin) 5/18/23 9436)   heparin injection 2,000 Units (has no administration in time range)       4:12 PM - On reassessment response to intervention is to admit the patient. Patient agrees to the plan of care. The patient was given the opportunity to ask any questions.     I have discussed management of the patient with the following physicians and sources:      4:23 PM - Spoke with Pharmacy for reinitiating the Heparin drip.     4:26 PM - Paged Hospitalist.    4:26 PM - Paged Cardiology.     4:37 PM - Discussed management with Dr. Castrejon a Hospitalist who will admit patient.    4:42 PM - Discussed management with   Frantz from cardiology.       MEDICAL DECISION MAKING:  PROBLEMS EVALUATED THIS VISIT:    This patient presents with abdominal pain and flank pain that appears to have been an anginal equivalent.  [facility]  He also has leukocytosis and was treated for possible infection but no source of infection has been identified.  The patient's placed on a heparin drip and will likely undergo cardiac catheterization tomorrow.  Since he is pain-free he is a suitable patient for telemetry at this time.  There is no evidence of cardiogenic shock, STEMI, pneumonia, pneumothorax.  PE would be unlikely given this presentation.    RISK:  High given need for IV heparin drip and need for urgent cardiac catheterization       PLAN:  Patient will be admitted to Dr. Castrejon.     CONDITION: Guarded.  Critical care time given heparin drip 35 minutes    FINAL IMPRESSION  1. NSTEMI (non-ST elevated myocardial infarction) (HCC)    2. Longstanding persistent atrial fibrillation (HCC)    3. Hypokalemia    4. Leukocytosis, unspecified type    CRITICALCARe       IKerry ([place]), am scribing for, and in the presence of, Sotero Prater M.D..    Electronically signed by: Kerry Diehl ([place]), 5/18/2023    Sotero BELLO M.D. personally performed the services described in this documentation, as scribed by Kerry Diehl in my presence, and it is both accurate and complete.    The note accurately reflects work and decisions made by me.  Sotero Prater M.D.  5/18/2023  7:27 PM

## 2023-05-18 NOTE — ED TRIAGE NOTES
Pt bib ems transfer from [facility] for abdominal pain radiating to right flank x 1 week. Pt was found to have NSTEMI troponin of 5000.  Pt was started on heparin drip pta and medicated for pain. Pt denies pain at this time. Pt is A&Ox 4. vss

## 2023-05-18 NOTE — ASSESSMENT & PLAN NOTE
Admitted  to inpatient, telemetry  Heart cath on 5/19 reveals a chronically occluded RCA  plavix changed to aspirin, aspirin stopped due to enlarging wrist hematoma with associated bleeding  Echo reveals a decreased EF 45%

## 2023-05-18 NOTE — H&P
"Hospital Medicine History & Physical Note    Date of Service  5/18/2023    Primary Care Physician  Maroi Lindquist M.D.    Consultants  Cardiology    Code Status  Prior    Chief Complaint  Chief Complaint   Patient presents with    Chest Pain       History of Presenting Illness  Hi Montes De Oca is a 78 y.o. male who presented 5/18/2023 as a transfer from [facility] for epigastric pain and abdominal pain.   He has a history of MI on ASA, Plavix and statin, par Afib s/p pacer on BB and amiodarone  but not on anticoagulation based on records, hypertension. He is a poor historian and has cognitive deficits. Per EDP he was transferred here for NSTEMI on hepa gtt. Patient does not know his medications much but did say he was having epigastric pain for the past few days, had one episode of vomiting that is nonbloody and like the food he ate and one episode of diarrhea, which he says is melanotic. He denied smoking or alcohol. I personally reviewed the records. At [facility] he was afebrile and hemodynamically stable. A CTA Chest showed no PE but made comment of possible \"heart failure\". A CTA Ab/P reveled cholecystectomy but made no comment of the bile duct, pancreas and kidneys normal other than some renal cysts and no mention of colitis. His labs were significant for a leukocytosis of 20K, Hb was actually normal at 13, nornal PLTs. His kidney function was normal but he had a mildly elevated AST and a slightly elevated bilirubin. Lipase was normal. His troponin was elevated at 5000 and lactic slightly elevated. He was given a dose of Zosyn and put on hepa gtt and transferred here.  At the ED, he is afebrile, normotensive, HR 80-90s.  Labs are PENDING  EDP is consulting Cardiology.  When I saw him at the ED, he is not having chest pain. Cognitive deficits. He was mildly tender epigastric region.  Dr. Landry, Cardiology came at bedside and tentatively planned cardiac catheterization tomorrow.  I discussed the plan of care " with patient, bedside RN, and cardiology.    Review of Systems  Review of Systems   Unable to perform ROS: Mental acuity       Past Medical History   has a past medical history of Arthritis, CATARACT (2006), Coronary artery disease due to lipid rich plaque, Heart murmur (1974), Hypertension, Myocardial infarct (HCC) (7/11/2012), Pain, Renal disorder, Unspecified urinary incontinence, and Urinary bladder disorder.    Surgical History   has a past surgical history that includes pr lap,cholecystectomy (2001); arthroscopy, knee; tonsillectomy; appendectomy (1958); vasectomy (1984); other orthopedic surgery (3/1/2011); knee arthroplasty total (3/1/2011); and recovery (12/21/2012).     Family History  family history includes Heart Attack in his father; Heart Failure in his mother.   Family history reviewed with patient. There is no family history that is pertinent to the chief complaint.     Social History   reports that he has never smoked. His smokeless tobacco use includes chew. He reports that he does not drink alcohol and does not use drugs.    Allergies  No Known Allergies    Medications  Prior to Admission Medications   Prescriptions Last Dose Informant Patient Reported? Taking?   amLODIPine (NORVASC) 5 MG Tab   No No   Sig: Take 1 tablet by mouth every day.   amiodarone (CORDARONE) 200 MG Tab   No No   Sig: Take 1 Tablet by mouth every day.   aspirin EC 81 MG EC tablet   No No   Sig: Take 1 Tab by mouth every day.   atorvastatin (LIPITOR) 80 MG tablet   No No   Sig: Take 1 Tab by mouth every evening.   citalopram (CELEXA) 40 MG TABS   Yes No   Sig: Take 40 mg by mouth every day.    Indications: Panic Disorder   clopidogrel (PLAVIX) 75 MG Tab   No No   Sig: Take 1 tablet by mouth every day.   finasteride (PROSCAR) 5 MG TABS   Yes No   Sig: Take 5 mg by mouth every day.   losartan (COZAAR) 50 MG Tab   Yes No   Sig: Take 50 mg by mouth every day.   magnesium oxide (MAG-OX) 400 MG TABS   Yes No   Sig: Take 400 mg by  mouth every day.     metoprolol SR (TOPROL XL) 50 MG TB24   Yes No   Sig: Take 50 mg by mouth every day.   nitroglycerin (NITROSTAT) 0.4 MG SUBL   Yes No   Sig: Place 0.4 mg under tongue every 5 minutes as needed.        Facility-Administered Medications: None       Physical Exam  Temp:  [36.7 °C (98 °F)] 36.7 °C (98 °F)  Pulse:  [90] 90  Resp:  [16] 16  BP: (142)/(92) 142/92  SpO2:  [95 %] 95 %  Blood Pressure : (!) 142/92   Temperature: 36.7 °C (98 °F)   Pulse: 90   Respiration: 16   Pulse Oximetry: 95 %       Physical Exam  Vitals and nursing note reviewed.   Constitutional:       Comments: Elderly   HENT:      Head: Normocephalic and atraumatic.      Right Ear: External ear normal.      Left Ear: External ear normal.      Nose: Nose normal.      Mouth/Throat:      Mouth: Mucous membranes are moist.   Eyes:      General: No scleral icterus.     Conjunctiva/sclera: Conjunctivae normal.   Cardiovascular:      Rate and Rhythm: Normal rate. Rhythm irregular.      Heart sounds: Murmur heard.      No friction rub. No gallop.   Pulmonary:      Effort: Pulmonary effort is normal.      Breath sounds: Normal breath sounds.   Abdominal:      General: Abdomen is flat. Bowel sounds are normal. There is no distension.      Palpations: Abdomen is soft.      Tenderness: There is no abdominal tenderness. There is no guarding.   Musculoskeletal:         General: Normal range of motion.      Cervical back: Normal range of motion and neck supple.   Skin:     General: Skin is warm.   Neurological:      Mental Status: He is alert and oriented to person, place, and time. Mental status is at baseline.      Comments: Cognitive deficits.   Psychiatric:         Mood and Affect: Mood normal.         Behavior: Behavior normal.         Thought Content: Thought content normal.         Judgment: Judgment normal.         Laboratory:          No results for input(s): ALTSGPT, ASTSGOT, ALKPHOSPHAT, TBILIRUBIN, DBILIRUBIN, GAMMAGT, AMYLASE, LIPASE,  ALB, PREALBUMIN, GLUCOSE in the last 72 hours.      No results for input(s): NTPROBNP in the last 72 hours.      No results for input(s): TROPONINT in the last 72 hours.    Imaging:  DX-CHEST-LIMITED (1 VIEW)    (Results Pending)       Assessment/Plan:  Justification for Admission Status  I anticipate this patient will require at least two midnights for appropriate medical management, necessitating inpatient admission because cardiac cath planned for NSTEMI and evaluating potential infection    Patient will need a Telemetry bed on EMERGENCY service .  The need is secondary to NSTEMI eval.    * NSTEMI (non-ST elevated myocardial infarction), h/o Afib not on anticoag, leukocytosis (HCC)  Assessment & Plan  Admit to inpatient, telemetry  Ordered ASA, continue Plavix, continue high dose statin, ordered echo, hepa gtt, consitnue ARB, continue BB  Cardiology consulted by EDP  Ordered NPO after MN  Address abdominal issues and leukocytosis  Update: He has a little bit of blood tinged sputum. He complained of melanotic stool but his Hb stable and he has had regular bbowel movement since. I spoke with Pharmacy and we will switch to a heparin protocol for patient's with higher risk of bleed. I will switch to baby aspirin dose    Hypokalemia  Assessment & Plan  Ordered potassium replacement and magnesium level    Epigastric pain  Assessment & Plan  Mild, CTA/P at [facility] unremarkable.  With slightly elevated bilirubin and no commen on bile duct, will get RUQ US  Will give GI cocktail and monitor Hb  Continue Zosyn for now and trend leukocytosis, follow cultures    AF (atrial fibrillation) (HCC)  Assessment & Plan  Continue amiodarone, BB  HR stable prefer HR<80  Currently on anticoagulation    Essential hypertension, benign- (present on admission)  Assessment & Plan  Vitals:    05/18/23 1628   BP: 135/84   Pulse: 83   Resp: (!) 25   Temp:    SpO2: 95%     Stable BP    Advanced care planning/counseling discussion  Assessment &  "Plan  I explained code status to him even with his limited capacity.  He did confirm he had a heart attack and \"he \" but he was resuscitated. He said \"he has a lot of things to do\" and wants to be FULL CODE  I spent 15 minutes discussing with him.        VTE prophylaxis: therapeutic anticoagulation with hepa gtt  "

## 2023-05-19 ENCOUNTER — APPOINTMENT (OUTPATIENT)
Dept: RADIOLOGY | Facility: MEDICAL CENTER | Age: 78
DRG: 280 | End: 2023-05-19
Attending: INTERNAL MEDICINE
Payer: MEDICARE

## 2023-05-19 ENCOUNTER — APPOINTMENT (OUTPATIENT)
Dept: CARDIOLOGY | Facility: MEDICAL CENTER | Age: 78
DRG: 280 | End: 2023-05-19
Attending: INTERNAL MEDICINE
Payer: MEDICARE

## 2023-05-19 LAB
ALBUMIN SERPL BCP-MCNC: 2.4 G/DL (ref 3.2–4.9)
ALBUMIN/GLOB SERPL: 0.8 G/DL
ALP SERPL-CCNC: 73 U/L (ref 30–99)
ALT SERPL-CCNC: 25 U/L (ref 2–50)
ANION GAP SERPL CALC-SCNC: 11 MMOL/L (ref 7–16)
AST SERPL-CCNC: 23 U/L (ref 12–45)
BILIRUB SERPL-MCNC: 0.7 MG/DL (ref 0.1–1.5)
BUN SERPL-MCNC: 16 MG/DL (ref 8–22)
CALCIUM ALBUM COR SERPL-MCNC: 9.1 MG/DL (ref 8.5–10.5)
CALCIUM SERPL-MCNC: 7.8 MG/DL (ref 8.5–10.5)
CHLORIDE SERPL-SCNC: 104 MMOL/L (ref 96–112)
CHOLEST SERPL-MCNC: 71 MG/DL (ref 100–199)
CO2 SERPL-SCNC: 26 MMOL/L (ref 20–33)
CREAT SERPL-MCNC: 0.81 MG/DL (ref 0.5–1.4)
ERYTHROCYTE [DISTWIDTH] IN BLOOD BY AUTOMATED COUNT: 52.7 FL (ref 35.9–50)
GFR SERPLBLD CREATININE-BSD FMLA CKD-EPI: 89 ML/MIN/1.73 M 2
GLOBULIN SER CALC-MCNC: 2.9 G/DL (ref 1.9–3.5)
GLUCOSE SERPL-MCNC: 110 MG/DL (ref 65–99)
HCT VFR BLD AUTO: 36.7 % (ref 42–52)
HDLC SERPL-MCNC: 32 MG/DL
HGB BLD-MCNC: 11.4 G/DL (ref 14–18)
LDLC SERPL CALC-MCNC: 27 MG/DL
MCH RBC QN AUTO: 30.2 PG (ref 27–33)
MCHC RBC AUTO-ENTMCNC: 31.1 G/DL (ref 33.7–35.3)
MCV RBC AUTO: 97.1 FL (ref 81.4–97.8)
MORPHOLOGY BLD-IMP: NORMAL
PHOSPHATE SERPL-MCNC: 2.5 MG/DL (ref 2.5–4.5)
PLATELET # BLD AUTO: 219 K/UL (ref 164–446)
PMV BLD AUTO: 12 FL (ref 9–12.9)
POTASSIUM SERPL-SCNC: 3.4 MMOL/L (ref 3.6–5.5)
PROT SERPL-MCNC: 5.3 G/DL (ref 6–8.2)
RBC # BLD AUTO: 3.78 M/UL (ref 4.7–6.1)
SODIUM SERPL-SCNC: 141 MMOL/L (ref 135–145)
TRIGL SERPL-MCNC: 60 MG/DL (ref 0–149)
UFH PPP CHRO-ACNC: <0.1 IU/ML
WBC # BLD AUTO: 13 K/UL (ref 4.8–10.8)

## 2023-05-19 PROCEDURE — 76705 ECHO EXAM OF ABDOMEN: CPT

## 2023-05-19 PROCEDURE — 700102 HCHG RX REV CODE 250 W/ 637 OVERRIDE(OP): Performed by: HOSPITALIST

## 2023-05-19 PROCEDURE — B2111ZZ FLUOROSCOPY OF MULTIPLE CORONARY ARTERIES USING LOW OSMOLAR CONTRAST: ICD-10-PCS | Performed by: INTERNAL MEDICINE

## 2023-05-19 PROCEDURE — 700105 HCHG RX REV CODE 258: Performed by: INTERNAL MEDICINE

## 2023-05-19 PROCEDURE — 93458 L HRT ARTERY/VENTRICLE ANGIO: CPT

## 2023-05-19 PROCEDURE — 85027 COMPLETE CBC AUTOMATED: CPT

## 2023-05-19 PROCEDURE — 700111 HCHG RX REV CODE 636 W/ 250 OVERRIDE (IP): Performed by: INTERNAL MEDICINE

## 2023-05-19 PROCEDURE — 80053 COMPREHEN METABOLIC PANEL: CPT

## 2023-05-19 PROCEDURE — 700102 HCHG RX REV CODE 250 W/ 637 OVERRIDE(OP): Performed by: INTERNAL MEDICINE

## 2023-05-19 PROCEDURE — A9270 NON-COVERED ITEM OR SERVICE: HCPCS | Performed by: INTERNAL MEDICINE

## 2023-05-19 PROCEDURE — 99233 SBSQ HOSP IP/OBS HIGH 50: CPT | Performed by: HOSPITALIST

## 2023-05-19 PROCEDURE — 770020 HCHG ROOM/CARE - TELE (206)

## 2023-05-19 PROCEDURE — 99152 MOD SED SAME PHYS/QHP 5/>YRS: CPT | Performed by: INTERNAL MEDICINE

## 2023-05-19 PROCEDURE — 80061 LIPID PANEL: CPT

## 2023-05-19 PROCEDURE — 84100 ASSAY OF PHOSPHORUS: CPT

## 2023-05-19 PROCEDURE — A9270 NON-COVERED ITEM OR SERVICE: HCPCS | Performed by: HOSPITALIST

## 2023-05-19 PROCEDURE — 36415 COLL VENOUS BLD VENIPUNCTURE: CPT

## 2023-05-19 PROCEDURE — 700111 HCHG RX REV CODE 636 W/ 250 OVERRIDE (IP)

## 2023-05-19 PROCEDURE — 700101 HCHG RX REV CODE 250

## 2023-05-19 PROCEDURE — 85520 HEPARIN ASSAY: CPT

## 2023-05-19 PROCEDURE — 4A023N7 MEASUREMENT OF CARDIAC SAMPLING AND PRESSURE, LEFT HEART, PERCUTANEOUS APPROACH: ICD-10-PCS | Performed by: INTERNAL MEDICINE

## 2023-05-19 PROCEDURE — 93458 L HRT ARTERY/VENTRICLE ANGIO: CPT | Mod: 26 | Performed by: INTERNAL MEDICINE

## 2023-05-19 PROCEDURE — 99232 SBSQ HOSP IP/OBS MODERATE 35: CPT | Mod: 25 | Performed by: INTERNAL MEDICINE

## 2023-05-19 RX ORDER — MIDAZOLAM HYDROCHLORIDE 1 MG/ML
INJECTION INTRAMUSCULAR; INTRAVENOUS
Status: COMPLETED
Start: 2023-05-19 | End: 2023-05-19

## 2023-05-19 RX ORDER — VERAPAMIL HYDROCHLORIDE 2.5 MG/ML
INJECTION, SOLUTION INTRAVENOUS
Status: COMPLETED
Start: 2023-05-19 | End: 2023-05-19

## 2023-05-19 RX ORDER — HEPARIN SODIUM 1000 [USP'U]/ML
INJECTION, SOLUTION INTRAVENOUS; SUBCUTANEOUS
Status: COMPLETED
Start: 2023-05-19 | End: 2023-05-19

## 2023-05-19 RX ORDER — HEPARIN SODIUM 200 [USP'U]/100ML
INJECTION, SOLUTION INTRAVENOUS
Status: COMPLETED
Start: 2023-05-19 | End: 2023-05-19

## 2023-05-19 RX ORDER — METOPROLOL SUCCINATE 50 MG/1
50 TABLET, EXTENDED RELEASE ORAL 2 TIMES DAILY
Status: DISCONTINUED | OUTPATIENT
Start: 2023-05-19 | End: 2023-05-22

## 2023-05-19 RX ORDER — LIDOCAINE HYDROCHLORIDE 20 MG/ML
INJECTION, SOLUTION INFILTRATION; PERINEURAL
Status: COMPLETED
Start: 2023-05-19 | End: 2023-05-19

## 2023-05-19 RX ORDER — BENZONATATE 100 MG/1
100 CAPSULE ORAL 3 TIMES DAILY PRN
Status: DISCONTINUED | OUTPATIENT
Start: 2023-05-19 | End: 2023-05-29 | Stop reason: HOSPADM

## 2023-05-19 RX ADMIN — METOPROLOL SUCCINATE 50 MG: 50 TABLET, EXTENDED RELEASE ORAL at 17:13

## 2023-05-19 RX ADMIN — PIPERACILLIN AND TAZOBACTAM 3.38 G: 3; .375 INJECTION, POWDER, LYOPHILIZED, FOR SOLUTION INTRAVENOUS; PARENTERAL at 12:57

## 2023-05-19 RX ADMIN — BENZONATATE 100 MG: 100 CAPSULE ORAL at 17:13

## 2023-05-19 RX ADMIN — FENTANYL CITRATE 100 MCG: 50 INJECTION, SOLUTION INTRAMUSCULAR; INTRAVENOUS at 10:45

## 2023-05-19 RX ADMIN — MIDAZOLAM HYDROCHLORIDE 1 MG: 1 INJECTION, SOLUTION INTRAMUSCULAR; INTRAVENOUS at 10:45

## 2023-05-19 RX ADMIN — HEPARIN SODIUM 2000 UNITS: 200 INJECTION, SOLUTION INTRAVENOUS at 10:44

## 2023-05-19 RX ADMIN — METOPROLOL SUCCINATE 50 MG: 50 TABLET, EXTENDED RELEASE ORAL at 05:09

## 2023-05-19 RX ADMIN — NITROGLYCERIN 10 ML: 20 INJECTION INTRAVENOUS at 10:45

## 2023-05-19 RX ADMIN — VERAPAMIL HYDROCHLORIDE 2.5 MG: 2.5 INJECTION, SOLUTION INTRAVENOUS at 10:45

## 2023-05-19 RX ADMIN — PIPERACILLIN AND TAZOBACTAM 3.38 G: 3; .375 INJECTION, POWDER, LYOPHILIZED, FOR SOLUTION INTRAVENOUS; PARENTERAL at 05:14

## 2023-05-19 RX ADMIN — SENNOSIDES AND DOCUSATE SODIUM 2 TABLET: 50; 8.6 TABLET ORAL at 05:09

## 2023-05-19 RX ADMIN — HEPARIN SODIUM 3000 UNITS: 1000 INJECTION, SOLUTION INTRAVENOUS; SUBCUTANEOUS at 10:46

## 2023-05-19 RX ADMIN — LIDOCAINE HYDROCHLORIDE: 20 INJECTION, SOLUTION INFILTRATION; PERINEURAL at 10:44

## 2023-05-19 RX ADMIN — FINASTERIDE 5 MG: 5 TABLET, FILM COATED ORAL at 05:09

## 2023-05-19 RX ADMIN — LOSARTAN POTASSIUM 50 MG: 50 TABLET, FILM COATED ORAL at 05:09

## 2023-05-19 RX ADMIN — Medication 400 MG: at 05:09

## 2023-05-19 RX ADMIN — ATORVASTATIN CALCIUM 80 MG: 80 TABLET, FILM COATED ORAL at 17:12

## 2023-05-19 RX ADMIN — SENNOSIDES AND DOCUSATE SODIUM 2 TABLET: 50; 8.6 TABLET ORAL at 17:12

## 2023-05-19 RX ADMIN — CLOPIDOGREL BISULFATE 75 MG: 75 TABLET ORAL at 05:09

## 2023-05-19 RX ADMIN — ASPIRIN 81 MG: 81 TABLET, COATED ORAL at 05:09

## 2023-05-19 ASSESSMENT — ENCOUNTER SYMPTOMS
ABDOMINAL PAIN: 0
SHORTNESS OF BREATH: 0
COUGH: 1

## 2023-05-19 ASSESSMENT — FIBROSIS 4 INDEX: FIB4 SCORE: 1.68

## 2023-05-19 ASSESSMENT — LIFESTYLE VARIABLES
EVER FELT BAD OR GUILTY ABOUT YOUR DRINKING: NO
TOTAL SCORE: 0
ON A TYPICAL DAY WHEN YOU DRINK ALCOHOL HOW MANY DRINKS DO YOU HAVE: 0
EVER HAD A DRINK FIRST THING IN THE MORNING TO STEADY YOUR NERVES TO GET RID OF A HANGOVER: NO
HAVE YOU EVER FELT YOU SHOULD CUT DOWN ON YOUR DRINKING: NO
ALCOHOL_USE: NO
HAVE PEOPLE ANNOYED YOU BY CRITICIZING YOUR DRINKING: NO
DOES PATIENT WANT TO STOP DRINKING: NO
AVERAGE NUMBER OF DAYS PER WEEK YOU HAVE A DRINK CONTAINING ALCOHOL: 0
TOTAL SCORE: 0
TOTAL SCORE: 0
HOW MANY TIMES IN THE PAST YEAR HAVE YOU HAD 5 OR MORE DRINKS IN A DAY: 0
CONSUMPTION TOTAL: NEGATIVE

## 2023-05-19 ASSESSMENT — PATIENT HEALTH QUESTIONNAIRE - PHQ9
SUM OF ALL RESPONSES TO PHQ9 QUESTIONS 1 AND 2: 0
2. FEELING DOWN, DEPRESSED, IRRITABLE, OR HOPELESS: NOT AT ALL
1. LITTLE INTEREST OR PLEASURE IN DOING THINGS: NOT AT ALL

## 2023-05-19 NOTE — PROCEDURES
Cardiac Catheterization Laboratory Procedure Note    DATE: 5/19/2023    : Nick Ortega MD    PROCEDURES PERFORMED:  Left heart catheterization  Coronary angiography  Moderate conscious sedation    INDICATIONS:  The patient is an 78-year-old gentleman with symptoms consistent with delayed presentation of right coronary artery infarct.    CONSENT:  The complete alternatives, risks, and benefits of the procedure were explained to the patient.  Signed informed consent was obtained and placed in the chart prior to the procedure.  A timeout was performed prior to beginning the procedure.    MEDICATIONS:  Lidocaine  Fentanyl  Midazolam  Nitroglycerin  Verapamil  Heparin    MODERATE CONSCIOUS SEDATION:  I personally supervised the administration of moderate conscious sedation by the nursing staff for 17 minutes.  Sedation start time: 10:31 AM  Sedation end time: 10:48 AM    CONTRAST: Omnipaque 23 cc    ACCESS: 6-Burkinan Glidesheath in the right radial artery.    ESTIMATED BLOOD LOSS: 10 cc    COMPLICATIONS: None    DESCRIPTION OF PROCEDURE:  The patient was brought to the cardiac catheterization laboratory in the fasting state.  The skin over the right wrist was prepped and draped in the usual sterile fashion.  Lidocaine infiltration was used to anesthetize the tissue over the right radial artery.  Using the micropuncture technique, a 6-Burkinan Glidesheath was inserted in the right radial artery.  A 5-Burkinan Tiger catheter was then advanced over a standard J-wire into the left ventricular cavity where it was gently aspirated, flushed, and then withdrawn across the aortic valve with sequential pressures measured.  This catheter was then used to engage the ostium of the right coronary artery and one cineangiogram was obtained as the vessel was proximally occluded with developing left-to-right collaterals.  This catheter was then used to engage the ostium of the left main coronary artery and cineangiograms were  obtained in multiple projections for complete evaluation of the left coronary system.  At the completion of the case all wires, catheters, and sheaths were removed.  A TR band was placed using the patent hemostasis technique.    HEMODYNAMICS:   Aortic pressure: 85/53 mmHg  Pre-A wave pressure: 10  No significant aortic gradient on pullback    CORONARY ANGIOGRAPHY:  The left main coronary artery is patent and bifurcates into left anterior descending and left circumflex coronary arteries.  The left anterior descending coronary artery is a large, transapical vessel with proximal luminal irregularities followed by an underexpanded mid vessel stent with minimal in-stent restenosis and distal luminal irregularities.  It supplies a moderate first diagonal branch and several small distal diagonal branches with no significant disease.  The left circumflex coronary artery is a moderate, nondominant vessel with a patent mid-vessel stent that supplies a moderate first obtuse marginal branch, a large second obtuse marginal branch, and two small distal obtuse marginal branches and has luminal irregularities in the distribution  The right coronary artery appears to be a large, dominant vessel that is proximally occluded.  The distal vessel refills by developing left-to-right collaterals.    IMPRESSION:  Severe obstructive one-vessel coronary artery disease with a likely subacute proximal right coronary artery infarction.  Normal left heart filling pressures.    RECOMMENDATIONS:  Return to floor with continued care per primary service.  TR band release per protocol.  Optimal medical management of subacute proximal right coronary artery infarction.  With developing collaterals and delayed presentation of infarct, intervention was not performed.  If the patient has persistent angina as an outpatient, intervention can be considered in one month.    NOTIFICATION:  The patient's family was notified of the results of his cardiac  catheterization.

## 2023-05-19 NOTE — DIETARY
Nutrition Update: Received cardiac rehab education consult.     Day 1 of admit.  Hi Montes De Oca is a 78 y.o. male with admitting DX of NSTEMI (non-ST elevated myocardial infarction) (HCC) [I21.4].    RD able to visit pt at bedside to provide Heart healthy diet education, providing written and verbal diet education. Pt appeared motivated to want education; however, then jokingly states he is not sure what we talked about. Unsure if pt comprehended diet education. Pt with not other questions/concerns.     No other education needs identified at this time. Consider referral to outpatient nutrition services for continuation of education as indicated or per pt preferences.     Please re-consult RD as indicated.

## 2023-05-19 NOTE — PROGRESS NOTES
Bedside report received from night RN, pt care assumed, assessment completed. Pt is A&O4, pain 0, afib with pvc's on the monitor. Updated on POC, questions answered. Bed in lowest, locked position, treaded socks on, call light and belongings within reach.

## 2023-05-19 NOTE — PROGRESS NOTES
"CARDIOLOGY FOLLOW UP      Impressions:  #. NSTEMI due to newly occluded RCA   - hx of PCI RCA and LAD   - chronic inf IAM following 2021 IMI   - Hx of Normal LVEF   - Neg CT PE    #. Abd pain, tenderness and anorexia     #. PPM - dual chamber    #. PAF    Recommendations:  Stop heparin   Increase Metoprolol  Continue atorvastatin, ASA/Plavix, Losartan    Repeat LVEF pending. Consider adding DAPA/Aldactone if LVEF is low    No OAC at this time given prior intolerance. Defer to primary cardiologist    Will follow    Subjective:   Abd pain improving, CRP elevated. Trop flat. Cath today with RCA occlusion, collaterals.     PE:  /67   Pulse 77   Temp 36.3 °C (97.3 °F) (Temporal)   Resp 18   Ht 1.803 m (5' 11\")   Wt 95.3 kg (210 lb)   SpO2 96%   BMI 29.29 kg/m²   Gen: NAD  Resp: CTAB  CVS: RRR, Normal S1, S2  Abd: Soft, NT/ND  EXT: WWP, No edema      Studies  Lab Results   Component Value Date/Time    CHOLSTRLTOT 71 (L) 05/19/2023 03:19 AM    LDL 27 05/19/2023 03:19 AM    HDL 32 (A) 05/19/2023 03:19 AM    TRIGLYCERIDE 60 05/19/2023 03:19 AM       Lab Results   Component Value Date/Time    SODIUM 141 05/19/2023 03:19 AM    POTASSIUM 3.4 (L) 05/19/2023 03:19 AM    CHLORIDE 104 05/19/2023 03:19 AM    CO2 26 05/19/2023 03:19 AM    GLUCOSE 110 (H) 05/19/2023 03:19 AM    BUN 16 05/19/2023 03:19 AM    CREATININE 0.81 05/19/2023 03:19 AM     Lab Results   Component Value Date/Time    ALKPHOSPHAT 73 05/19/2023 03:19 AM    ASTSGOT 23 05/19/2023 03:19 AM    ALTSGPT 25 05/19/2023 03:19 AM    TBILIRUBIN 0.7 05/19/2023 03:19 AM      No results found for: BNPBTYPENAT    Medical records were reviewed for this encounter    For this encounter I directly reviewed angiograms films. Occluded RCA    Case discussed with Kaitlynn Alvarenga    "

## 2023-05-19 NOTE — ASSESSMENT & PLAN NOTE
"I explained code status to him even with his limited capacity.  He did confirm he had a heart attack and \"he \" but he was resuscitated. He said \"he has a lot of things to do\" and wants to be FULL CODE  I spent 15 minutes discussing with him.  "

## 2023-05-19 NOTE — CONSULTS
"CARDIAC CONSULTATION    Requesting Provider: August Castrejon M.D.  Reason for consultation: NSTEMI    Impressions:  #. NSTEMI   - hx of PCI RCA and LAD   - chronic inf IAM following 2021 IMI   - Hx of Normal LVEF   - Neg CT PE    #. Abd pain, tenderness and anorexia     #. PPM - dual chamber    #. PAF    Recommendations:  Heparin infusion  DAPT  Continue other chronic cardiac medications  Workup of alternate causes of abdominal discomfort per IM  Plan for cath tomorrow    Will follow    History: Hi Montes De Oca is a 78 y.o. male with a past medical history of  CAD, MI, normal LVEF, PAF, PPM  who I have been consulted regarding NSTEMI. He reports one week of abdominal discomfort, anorexia and a single episode of vomiting and diarrhea last Saturday. He sought care from his PCP and was referred to the ER where a troponin is reported to be 5000. He was transferred to [facility] for further care.No ongoing abdominal discomfort but still anorexia and tenderness in the abd.     ROS:   10 point review systems is otherwise negative except as per the HPI    PE:  /84   Pulse 83   Temp 36.7 °C (98 °F) (Temporal)   Resp (!) 25   Ht 1.803 m (5' 11\")   Wt 95.3 kg (210 lb)   SpO2 95%   BMI 29.29 kg/m²   Gen: Well appearing  HEENT: Symmetric face. Anicteric sclerae. Moist mucus membranes  NECK: + JVD. No lymphadenopathy  CARDIAC: Normal PMI, regular, normal S1, S2. without murmur    VASCULATURE: Normal carotid amplitude without bruit.   RESP: Clear to auscultation bilaterally  ABD: Soft, + tender, non-distended  EXT: Trace lower extremity edema, no clubbing or cyanosis  SKIN: Warm and dry  NEURO: No gross deficits   PSYCH: Appropriate affect, participates in conversation      Past Medical History:   Diagnosis Date    Arthritis     all over    CATARACT 2006    removed    Coronary artery disease due to lipid rich plaque     Heart murmur 1974    Hypertension     Myocardial infarct (HCC) 7/11/2012    Pain     Renal " disorder     Unspecified urinary incontinence     prostate issues, self cathing currently    Urinary bladder disorder      Past Surgical History:   Procedure Laterality Date    RECOVERY  12/21/2012    Performed by Cath-Recovery Surgery at SURGERY SAME DAY [facility]    OTHER ORTHOPEDIC SURGERY  3/1/2011    left knee total    KNEE ARTHROPLASTY TOTAL  3/1/2011    Performed by KHALIF TREJO at SURGERY [facility]    PA LAP,CHOLECYSTECTOMY  2001    VASECTOMY  1984    APPENDECTOMY  1958    ARTHROSCOPY, KNEE      2 x left, 1 x right    TONSILLECTOMY         No Known Allergies    Current Facility-Administered Medications:     heparin infusion 25,000 units in 500 mL 0.45% NACL, 0-30 Units/kg/hr, Intravenous, Continuous, Sotero Prater M.D., Last Rate: 20 mL/hr at 05/18/23 1649, 10.5 Units/kg/hr at 05/18/23 1649    heparin injection 2,000 Units, 2,000 Units, Intravenous, PRN, Sotreo Prater M.D.    amiodarone (Cordarone) tablet 200 mg, 200 mg, Oral, DAILY, August Castrejon M.D.    amLODIPine (NORVASC) tablet 5 mg, 5 mg, Oral, DAILY, August Castrejon M.D.    citalopram (CeleXA) tablet 40 mg, 40 mg, Oral, DAILY, August Castrejon M.D.    clopidogrel (PLAVIX) tablet 75 mg, 75 mg, Oral, DAILY, August Castrejon M.D.    finasteride (PROSCAR) tablet 5 mg, 5 mg, Oral, DAILY, August Castrejon M.D.    losartan (COZAAR) tablet 50 mg, 50 mg, Oral, DAILY, August Castrejon M.D.    magnesium oxide (MAG-OX) tablet 400 mg, 400 mg, Oral, DAILY, August Castrejon M.D.    metoprolol SR (TOPROL XL) tablet 50 mg, 50 mg, Oral, DAILY, August Castrejon M.D.    nitroglycerin (NITROSTAT) tablet 0.4 mg, 0.4 mg, Sublingual, Q5 MIN PRN, August Castrejon M.D.    senna-docusate (PERICOLACE or SENOKOT S) 8.6-50 MG per tablet 2 Tablet, 2 Tablet, Oral, BID **AND** polyethylene glycol/lytes (MIRALAX) PACKET 1 Packet, 1 Packet, Oral, QDAY PRN **AND** magnesium hydroxide (MILK OF MAGNESIA) suspension 30 mL, 30 mL, Oral, QDAY PRN **AND** bisacodyl  (DULCOLAX) suppository 10 mg, 10 mg, Rectal, QDAY PRN, August Castrejon M.D.    acetaminophen (Tylenol) tablet 650 mg, 650 mg, Oral, Q6HRS PRN, August Castrejon M.D.    labetalol (NORMODYNE/TRANDATE) injection 10 mg, 10 mg, Intravenous, Q4HRS PRN, August Castrejon M.D.    aspirin (ASA) tablet 325 mg, 325 mg, Oral, DAILY **OR** aspirin (ASA) chewable tab 324 mg, 324 mg, Oral, DAILY **OR** aspirin (ASA) suppository 300 mg, 300 mg, Rectal, DAILY, August Castrejon M.D.    atorvastatin (LIPITOR) tablet 80 mg, 80 mg, Oral, Q EVENING, August Castrejon M.D.    Metoprolol Tartrate (LOPRESSOR) injection 5 mg, 5 mg, Intravenous, Q5 MIN PRN, August Castrejon M.D.    Current Outpatient Medications:     amiodarone (CORDARONE) 200 MG Tab, Take 1 Tablet by mouth every day., Disp: 90 Tablet, Rfl: 0    amLODIPine (NORVASC) 5 MG Tab, Take 1 tablet by mouth every day., Disp: 30 tablet, Rfl:     clopidogrel (PLAVIX) 75 MG Tab, Take 1 tablet by mouth every day., Disp: 90 tablet, Rfl: 2    atorvastatin (LIPITOR) 80 MG tablet, Take 1 Tab by mouth every evening., Disp: 30 Tab, Rfl: 0    aspirin EC 81 MG EC tablet, Take 1 Tab by mouth every day., Disp: 30 Tab, Rfl: 0    losartan (COZAAR) 50 MG Tab, Take 50 mg by mouth every day., Disp: , Rfl:     metoprolol SR (TOPROL XL) 50 MG TB24, Take 50 mg by mouth every day., Disp: , Rfl:     finasteride (PROSCAR) 5 MG TABS, Take 5 mg by mouth every day., Disp: , Rfl:     citalopram (CELEXA) 40 MG TABS, Take 40 mg by mouth every day.    Indications: Panic Disorder, Disp: , Rfl:     nitroglycerin (NITROSTAT) 0.4 MG SUBL, Place 0.4 mg under tongue every 5 minutes as needed.  , Disp: , Rfl:     magnesium oxide (MAG-OX) 400 MG TABS, Take 400 mg by mouth every day.  , Disp: , Rfl:   (Not in a hospital admission)       Social History     Socioeconomic History    Marital status:      Spouse name: Not on file    Number of children: Not on file    Years of education: Not on file    Highest  education level: Not on file   Occupational History    Not on file   Tobacco Use    Smoking status: Never    Smokeless tobacco: Current     Types: Chew    Tobacco comments:     chews snuff / tobacco   Substance and Sexual Activity    Alcohol use: No    Drug use: No    Sexual activity: Not on file   Other Topics Concern    Not on file   Social History Narrative    Not on file     Social Determinants of Health     Financial Resource Strain: Not on file   Food Insecurity: Not on file   Transportation Needs: Not on file   Physical Activity: Not on file   Stress: Not on file   Social Connections: Not on file   Intimate Partner Violence: Not on file   Housing Stability: Not on file       Family History   Problem Relation Age of Onset    Heart Failure Mother     Heart Attack Father           Studies  Lab Results   Component Value Date/Time    CHOLSTRLTOT 88 (L) 02/02/2021 05:22 AM    LDL 43 02/02/2021 05:22 AM    HDL 28 (A) 02/02/2021 05:22 AM    TRIGLYCERIDE 87 02/02/2021 05:22 AM       Lab Results   Component Value Date/Time    SODIUM 139 02/02/2021 05:22 AM    POTASSIUM 3.1 (L) 02/02/2021 05:22 AM    CHLORIDE 107 02/02/2021 05:22 AM    CO2 23 02/02/2021 05:22 AM    GLUCOSE 99 02/02/2021 05:22 AM    BUN 16 02/02/2021 05:22 AM    CREATININE 0.61 02/02/2021 05:22 AM     Lab Results   Component Value Date/Time    ALKPHOSPHAT 124 (H) 01/30/2021 11:48 AM    ASTSGOT 38 01/30/2021 11:48 AM    ALTSGPT 155 (H) 01/30/2021 11:48 AM    TBILIRUBIN 0.9 01/30/2021 11:48 AM      No results found for: BNPBTYPENAT    Medical records were reviewed for this encounter    For this encounter I directly reviewed ECG tracings and Chest X-ray images - chronic inf IAM. Mild pulm edema on CXR    Case discussed with Dr. Castrejon

## 2023-05-19 NOTE — PROGRESS NOTES
"[facility] Medicine Daily Progress Note    Date of Service  5/19/2023    Chief Complaint  Hi Montes De Oca is a 78 y.o. male admitted 5/18/2023 with abdominal pain.    Hospital Course  Mr. Montes eD Oca is a 78 y.o. male who presented 5/18/2023 as a transfer from [facility] for epigastric pain and abdominal pain.   He has a history of MI on ASA, Plavix and statin, par Afib s/p pacer on BB and amiodarone  but not on anticoagulation based on records, hypertension. He is a poor historian and has cognitive deficits. Per EDP he was transferred here for NSTEMI on hepa gtt. Patient does not know his medications much but did say he was having epigastric pain for the past few days, had one episode of vomiting that is nonbloody and like the food he ate and one episode of diarrhea, which he says is melanotic. He denied smoking or alcohol. I personally reviewed the records. At [facility] he was afebrile and hemodynamically stable. A CTA Chest showed no PE but made comment of possible \"heart failure\". A CTA Ab/P reveled cholecystectomy but made no comment of the bile duct, pancreas and kidneys normal other than some renal cysts and no mention of colitis. His labs were significant for a leukocytosis of 20K, Hb was actually normal at 13, nornal PLTs. His kidney function was normal but he had a mildly elevated AST and a slightly elevated bilirubin. Lipase was normal. His troponin was elevated at 5000 and lactic slightly elevated. He was given a dose of Zosyn and put on hepa gtt and transferred here.  At the ED, he is afebrile, normotensive, HR 80-90s.  Labs are PENDING  EDP is consulting Cardiology.  When I saw him at the ED, he is not having chest pain. Cognitive deficits. He was mildly tender epigastric region.  Dr. Landry, Cardiology came at bedside and tentatively planned cardiac catheterization tomorrow    Interval Problem Update  5/19: Mr. Montes De Oca was evaluated and examined on the telemetry floor. His WBC came down from 17.8 " yesterday to 13 today. He has been on IV Zosyn. 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 The heparin drip will be stopped. Hold on long term anticoag in the setting of afib due to hx hematuria.     I have discussed this patient's plan of care and discharge plan at IDT rounds today with Case Management, Nursing, Nursing leadership, and other members of the IDT team.    Consultants/Specialty  Cardiology. I discussed in person with Dr. Landry this morning.    Code Status  Full Code    Disposition  The patient is not medically cleared for discharge to home or a post-acute facility.  Anticipate discharge to: home with close outpatient follow-up    I have placed the appropriate orders for post-discharge needs.    Review of Systems  Review of Systems   Respiratory:  Positive for cough. Negative for shortness of breath.    Cardiovascular:  Negative for chest pain.   Gastrointestinal:  Negative for abdominal pain.   All other systems reviewed and are negative.       Physical Exam  Temp:  [35.9 °C (96.7 °F)-36.7 °C (98 °F)] 36.2 °C (97.1 °F)  Pulse:  [83-92] 85  Resp:  [16-25] 18  BP: (109-142)/(68-92) 120/68  SpO2:  [92 %-95 %] 94 %    Physical Exam  Vitals and nursing note reviewed.   Constitutional:       General: He is not in acute distress.     Appearance: He is not toxic-appearing.   Cardiovascular:      Rate and Rhythm: Normal rate and regular rhythm.   Pulmonary:      Effort: Pulmonary effort is normal.   Musculoskeletal:      Comments: No hematoma from the cath site   Neurological:      Mental Status: He is alert.         Fluids    Intake/Output Summary (Last 24 hours) at 5/19/2023 0800  Last data filed at 5/19/2023 0505  Gross per 24 hour   Intake --   Output 475 ml   Net -475 ml       Laboratory  Recent Labs     05/18/23  1548 05/19/23  0319   WBC 17.8* 13.0*   RBC 3.98* 3.78*   HEMOGLOBIN 12.1* 11.4*   HEMATOCRIT 38.5* 36.7*   MCV 96.7 97.1   MCH 30.4 30.2   MCHC 31.4* 31.1*   RDW 51.7* 52.7*   PLATELETCT  250 219   MPV 12.2 12.0     Recent Labs     05/18/23  1548 05/19/23  0319   SODIUM 140 141   POTASSIUM 3.4* 3.4*   CHLORIDE 102 104   CO2 25 26   GLUCOSE 119* 110*   BUN 16 16   CREATININE 0.87 0.81   CALCIUM 7.9* 7.8*     Recent Labs     05/18/23  1548   APTT 41.6*   INR 1.38*         Recent Labs     05/19/23  0319   TRIGLYCERIDE 60   HDL 32*   LDL 27       Imaging  US-RUQ   Final Result      1.  No acute abnormality.   2.  Status post cholecystectomy.   3.  The pancreas is obscured by overlying bowel gas and not evaluated.      DX-CHEST-LIMITED (1 VIEW)   Final Result         Low lung volume with hypoventilatory change and bibasilar atelectasis.      EC-ECHOCARDIOGRAM COMPLETE W/O CONT    (Results Pending)   CL-LEFT HEART CATHETERIZATION WITH POSSIBLE INTERVENTION    (Results Pending)        Assessment/Plan  * NSTEMI (non-ST elevated myocardial infarction), h/o Afib not on anticoag, leukocytosis (HCC)  Assessment & Plan  Admitted  to inpatient, telemetry  Heart cath on 5/19 reveals a chronically occluded RCA  ASA,Plavix,  high dose statin,  Echo ordered   Cardiology consulted   Continuous telemetry monitoring to evaluate for arrhythmias    Hypokalemia  Assessment & Plan  Ordered potassium replacement and magnesium level    Epigastric pain  Assessment & Plan  Mild, CTA/P at [place] unremarkable.  With slightly elevated bilirubin and no commen on bile duct  Liver ultrasound is negative  Stop IV Zosyn      AF (atrial fibrillation) (HCC)  Assessment & Plan  Continue amiodarone, BB  HR stable prefer HR<80  Refrain from anticoagulation due to previous hematuria.  This can be re-addressed as an outpatient.     Essential hypertension, benign- (present on admission)  Assessment & Plan  Cozaar and Toprol with holding parameters.          VTE prophylaxis: SCDs/TEDs    I have performed a physical exam and reviewed and updated ROS and Plan today (5/19/2023). In review of yesterday's note (5/18/2023), there are no changes  except as documented above.

## 2023-05-19 NOTE — CARE PLAN
The patient is Watcher - Medium risk of patient condition declining or worsening    Shift Goals  Clinical Goals: Perform Cardiac workup  Patient Goals: Go home    Progress made toward(s) clinical / shift goals:  Progressing      Problem: Knowledge Deficit - Standard  Goal: Patient and family/care givers will demonstrate understanding of plan of care, disease process/condition, diagnostic tests and medications  Outcome: Progressing  Note: Pt verbalizes understanding of plan of care     Problem: Respiratory  Goal: Patient will achieve/maintain optimum respiratory ventilation and gas exchange  Outcome: Progressing  Note: Pt maintaining O2 saturation on room air

## 2023-05-19 NOTE — ASSESSMENT & PLAN NOTE
Mild, CTA/P at [place] unremarkable.  With slightly elevated bilirubin and no commen on bile duct  Liver ultrasound is negative  Stopped IV Zosyn  This resolved.

## 2023-05-20 ENCOUNTER — APPOINTMENT (OUTPATIENT)
Dept: RADIOLOGY | Facility: MEDICAL CENTER | Age: 78
DRG: 280 | End: 2023-05-20
Attending: HOSPITALIST
Payer: MEDICARE

## 2023-05-20 PROCEDURE — 71045 X-RAY EXAM CHEST 1 VIEW: CPT

## 2023-05-20 PROCEDURE — 99233 SBSQ HOSP IP/OBS HIGH 50: CPT | Performed by: HOSPITALIST

## 2023-05-20 PROCEDURE — 700102 HCHG RX REV CODE 250 W/ 637 OVERRIDE(OP): Performed by: HOSPITALIST

## 2023-05-20 PROCEDURE — 700102 HCHG RX REV CODE 250 W/ 637 OVERRIDE(OP): Performed by: INTERNAL MEDICINE

## 2023-05-20 PROCEDURE — 97116 GAIT TRAINING THERAPY: CPT

## 2023-05-20 PROCEDURE — A9270 NON-COVERED ITEM OR SERVICE: HCPCS | Performed by: INTERNAL MEDICINE

## 2023-05-20 PROCEDURE — A9270 NON-COVERED ITEM OR SERVICE: HCPCS | Performed by: HOSPITALIST

## 2023-05-20 PROCEDURE — 92610 EVALUATE SWALLOWING FUNCTION: CPT

## 2023-05-20 PROCEDURE — 770020 HCHG ROOM/CARE - TELE (206)

## 2023-05-20 PROCEDURE — 97161 PT EVAL LOW COMPLEX 20 MIN: CPT

## 2023-05-20 RX ADMIN — ATORVASTATIN CALCIUM 80 MG: 80 TABLET, FILM COATED ORAL at 17:31

## 2023-05-20 RX ADMIN — Medication 400 MG: at 06:20

## 2023-05-20 RX ADMIN — BENZONATATE 100 MG: 100 CAPSULE ORAL at 17:32

## 2023-05-20 RX ADMIN — METOPROLOL SUCCINATE 50 MG: 50 TABLET, EXTENDED RELEASE ORAL at 18:00

## 2023-05-20 RX ADMIN — ACETAMINOPHEN 650 MG: 325 TABLET, FILM COATED ORAL at 17:32

## 2023-05-20 RX ADMIN — SENNOSIDES AND DOCUSATE SODIUM 2 TABLET: 50; 8.6 TABLET ORAL at 06:21

## 2023-05-20 RX ADMIN — FINASTERIDE 5 MG: 5 TABLET, FILM COATED ORAL at 06:20

## 2023-05-20 RX ADMIN — ASPIRIN 81 MG: 81 TABLET, COATED ORAL at 06:20

## 2023-05-20 RX ADMIN — CLOPIDOGREL BISULFATE 75 MG: 75 TABLET ORAL at 06:21

## 2023-05-20 RX ADMIN — SENNOSIDES AND DOCUSATE SODIUM 2 TABLET: 50; 8.6 TABLET ORAL at 17:31

## 2023-05-20 ASSESSMENT — COGNITIVE AND FUNCTIONAL STATUS - GENERAL
MOBILITY SCORE: 18
CLIMB 3 TO 5 STEPS WITH RAILING: A LITTLE
MOVING FROM LYING ON BACK TO SITTING ON SIDE OF FLAT BED: A LITTLE
TURNING FROM BACK TO SIDE WHILE IN FLAT BAD: A LITTLE
STANDING UP FROM CHAIR USING ARMS: A LITTLE
WALKING IN HOSPITAL ROOM: A LITTLE
MOVING TO AND FROM BED TO CHAIR: A LITTLE
SUGGESTED CMS G CODE MODIFIER MOBILITY: CK

## 2023-05-20 ASSESSMENT — GAIT ASSESSMENTS
DEVIATION: DECREASED HEEL STRIKE;DECREASED TOE OFF
ASSISTIVE DEVICE: FRONT WHEEL WALKER
GAIT LEVEL OF ASSIST: MINIMAL ASSIST
DISTANCE (FEET): 80

## 2023-05-20 ASSESSMENT — ENCOUNTER SYMPTOMS
SHORTNESS OF BREATH: 0
COUGH: 1
ABDOMINAL PAIN: 0

## 2023-05-20 ASSESSMENT — PATIENT HEALTH QUESTIONNAIRE - PHQ9
2. FEELING DOWN, DEPRESSED, IRRITABLE, OR HOPELESS: NOT AT ALL
SUM OF ALL RESPONSES TO PHQ9 QUESTIONS 1 AND 2: 0
1. LITTLE INTEREST OR PLEASURE IN DOING THINGS: NOT AT ALL

## 2023-05-20 ASSESSMENT — FIBROSIS 4 INDEX: FIB4 SCORE: 1.68

## 2023-05-20 NOTE — ASSESSMENT & PLAN NOTE
He had been requiring 5 liters of oxgyen  There was concern for aspiration thus Speech path consulted for swallow eval  Likely volume overload given his lowered EF  IV lasix and potassium ordered. Follow urine output   Improving, on 2L oxygen

## 2023-05-20 NOTE — THERAPY
Speech Language Pathology   Clinical Swallow Evaluation     Patient Name: Hi Montes De Oca  AGE:  78 y.o., SEX:  male  Medical Record #: [id number]  Date of Service: 5/20/2023    History of Present Illness  Pt is a very pleasant 78 y.o. male admitted 5/18 with abdominal pain as transfer from [place]. Found to have NSTEMI, heart cath on 5/19 reveals a chronically occluded RCA, hypokalemia, epigastric pain with CTA/P at [place] unremarkable with slightly elevated bilirubin and no comment on bile duct, liver ultrasound negative, AF, essential HTN.     Per RN, pt started with wet cough following heart cath yesterday, unclear if isolated/chronic aspiration event.     PMHx: MI on ASA, plavix and statin, par Afib s/p pacer on BB and amiodarone but not on anticoagulation based on report. Per notes, pt is poor historian.     Chest x-ray on 5/20 reports: stable chest x-ray findings siggesting bilateral dependent edema verses atelectasis with trace effusions.     General Information:  Vitals  O2 (LPM): 6  O2 Delivery Device: Nasal Cannula     Prior Living Situation & Level of Function:  Lives with - Patient's Self Care Capacity: Alone and Able to Care For Self     Oral Mechanism Evaluation:  Dentition: Upper denture, Lower partial   Facial Symmetry: Equal  Facial Sensation: Equal     Labial Observations: WFL   Lingual Observations: Midline    Laryngeal Function:  Secretion Management: Expectoration of secretions  Voice Quality: WFL  Cough: Perceptually WNL    Subjective: Pt reports having hearing aides at home. He is very pleasant and making jokes with SLP.      Assessment  Current Method of Nutrition: NPO until cleared by speech pathology  Positioning: Bed - Chair Position  Bolus Administration: Patient  O2 (LPM): 6 O2 Delivery Device: Nasal Cannula     Swallowing Trials:  Swallowing Trials  Thin Liquid (TN0): WFL  Mildly Thick Liquid (MT2): WFL  Liquidised (LQ3): WFL  Pureed (PU4): WFL  Minced & Moist (MM5): WFL  Soft &  Bite Sized (SB6): Impaired    Comments: Clinical swallow evaluation completed this date. Pt AAO x 4. [place], but able to hold conversation, share stories, and follow directions with no difficulty. Per notes, there is cognitive deficit, however this was not subjectively appreciated during SLP eval. Oral mechanism exam completed with no gross deficits appreciated. Of note, pt with wet, mostly unproductive cough prior to PO. He was able to use yankour and suctioned clear secretions x 1 following initial sip of MTL. Initial sip of MTL resulted in wet voicing, however did not occur during subsequent trials. Thins via cup, liquidized, purees, and minced&moist textures resulted in complete hyolaryngeal elevation upon palpation, timely initiation of swallow trigger and no changes in vocal quality. Soft and bite size textures resulted in subtle increase in WOB and intermittent coughing, concerning for possible penetration/aspiration. Pt did require intermittent cueing to take small bites/sips.     Clinical Impressions  Pt presenting with mild s/sx concerning for aspiration with more advanced textures, suspect acute, perhaps d/t increased O2 demands with impacts on breath/swallow pattern. Swallow appears appropriate to initiate a modified diet of MM5/TN0 with direction supervision during meals.     Recommendations  Diet Consistency: MM5/TN0. HOLD WITH DIFFICULTY, INCREASE IN UPPER AIRWAY CONGESTION, OR S/SX CONCERNING FOR ASPIRATION   Instrumentation: None indicated at this time  Medication: Whole with puree, Crush with applesauce, as appropriate, As tolerated  Supervision: Direct supervision during meals  Positioning: Fully upright and midline during oral intake  Risk Management : Small bites/sips, Slow rate of intake, Physical mobility, as tolerated  Oral Care: Q8h    SLP Treatment Plan  Treatment Plan: Dysphagia Treatment  SLP Frequency: 3x Per Week  Estimated Duration: Until Therapy Goals Met    Anticipated Discharge  "Needs  Discharge Recommendations: Other (Dependent on status at time of d/c)   Therapy Recommendations Upon DC: Other (See Comments) (Dependent on status at time of d/c)      Patient / Family Goals  Patient / Family Goal #1: \"Don't throw that away\"  Short Term Goals  Short Term Goal # 1: Pt will consume SB6/TN0 diet with direct supervision from nursing staff and adherence to posted swallow precautions with no overt s/sx concerning for aspiration.    Jazmyne Belle, SLP   "

## 2023-05-20 NOTE — CARE PLAN
The patient is Stable - Low risk of patient condition declining or worsening    Shift Goals  Clinical Goals: Monitor VS and cardiac status  Patient Goals: get better and rest    Progress made toward(s) clinical / shift goals:  Progressing      Problem: Knowledge Deficit - Standard  Goal: Patient and family/care givers will demonstrate understanding of plan of care, disease process/condition, diagnostic tests and medications  Outcome: Progressing  Note: Pt verbalizes understanding of plan of care     Problem: Respiratory  Goal: Patient will achieve/maintain optimum respiratory ventilation and gas exchange  Outcome: Progressing  Note: Pt maintaining O2 saturation on room air

## 2023-05-20 NOTE — CARE PLAN
"    Problem: Respiratory  Goal: Patient will achieve/maintain optimum respiratory ventilation and gas exchange  Outcome: Progressing      The patient is Stable - Low risk of patient condition declining or worsening    Shift Goals  Clinical Goals: Monitor VS and keep NPO for Procedure  Patient Goals: get better and rest    Progress made toward(s) clinical / shift goals:  Speech Therapist at bedside for swallow evaluation and diet was recommended, continue to monitor hematoma on right radial accessed, cardio NP aware, had PT worked with patient with regards to ambulation and was noted that patient was staggering and worked with breathing was noted, bed alarm was kept on and call light within reach,/66   Pulse 79   Temp 36.1 °C (97 °F) (Temporal)   Resp 18   Ht 1.803 m (5' 11\")   Wt 96.7 kg (213 lb 3 oz)   SpO2 94%  will continue to monitor       Patient is not progressing towards the following goal    Problem: Respiratory  Goal: Patient will achieve/maintain optimum respiratory ventilation and gas exchange  Outcome: not progressing ; patient O2 demand increased and inspiratory wheezing is noted on bilateral upper lobe.       "

## 2023-05-20 NOTE — PROGRESS NOTES
TR Band removed from patient at 2143. Hematoma remains appreciable at site, site is however no longer saturated. Dressing replaced, pulses remain palpable at this time.

## 2023-05-20 NOTE — PROGRESS NOTES
On reassessment of patient at 1949, cath site found to be significantly saturated with visible hematoma. Radial pulses remain equal and palpable. Applied pressure to site and redressed. Consulted with Cardiologist Nisreen, who was on the floor at the time. Per Cardiologist's suggestion, replaced TR Band to cath site and reinitiated TR Band protocol at 2003.

## 2023-05-20 NOTE — PROGRESS NOTES
"[facility] Medicine Daily Progress Note    Date of Service  5/20/2023    Chief Complaint  Hi Montes De Oca is a 78 y.o. male admitted 5/18/2023 with abdominal pain.    Hospital Course  Mr. Montes De Oca is a 78 y.o. male who presented 5/18/2023 as a transfer from [place] for epigastric pain and abdominal pain.   He has a history of MI on ASA, Plavix and statin, par Afib s/p pacer on BB and amiodarone  but not on anticoagulation based on records, hypertension. He is a poor historian and has cognitive deficits. Per EDP he was transferred here for NSTEMI on hepa gtt. Patient does not know his medications much but did say he was having epigastric pain for the past few days, had one episode of vomiting that is nonbloody and like the food he ate and one episode of diarrhea, which he says is melanotic. He denied smoking or alcohol. I personally reviewed the records. At [facility] he was afebrile and hemodynamically stable. A CTA Chest showed no PE but made comment of possible \"heart failure\". A CTA Ab/P reveled cholecystectomy but made no comment of the bile duct, pancreas and kidneys normal other than some renal cysts and no mention of colitis. His labs were significant for a leukocytosis of 20K, Hb was actually normal at 13, nornal PLTs. His kidney function was normal but he had a mildly elevated AST and a slightly elevated bilirubin. Lipase was normal. His troponin was elevated at 5000 and lactic slightly elevated. He was given a dose of Zosyn and put on hepa gtt and transferred here.  At the ED, he is afebrile, normotensive, HR 80-90s.  Labs are PENDING  EDP is consulting Cardiology.  When I saw him at the ED, he is not having chest pain. Cognitive deficits. He was mildly tender epigastric region.  Dr. Landry, Cardiology came at bedside and tentatively planned cardiac catheterization tomorrow    Interval Problem Update  5/19: Mr. Montes De Oca was evaluated and examined on the telemetry floor. His WBC came down from 17.8 " yesterday to 13 today. He has been on IV Zosyn. 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 The heparin drip will be stopped. Hold on long term anticoag in the setting of afib due to hx hematuria.   5/20: Mr. Montes De Oca was seen and evaluated on the tele floor. He has developed a significant cough and now is on 5 liters of oxygen. Speech path saw him and he is on a dysphagia diet.   A chest xray was ordered and did not reveal infiltrates. PT recommends post-acute placement but he is hoping to go home. Echo remains pending.    I have discussed this patient's plan of care and discharge plan at IDT rounds today with Case Management, Nursing, Nursing leadership, and other members of the IDT team.    Consultants/Specialty  Cardiology.     Code Status  Full Code    Disposition  The patient is not medically cleared for discharge to home or a post-acute facility.  Anticipate discharge to: home with close outpatient follow-up    I have placed the appropriate orders for post-discharge needs.    Review of Systems  Review of Systems   Respiratory:  Positive for cough. Negative for shortness of breath.    Cardiovascular:  Negative for chest pain.   Gastrointestinal:  Negative for abdominal pain.   All other systems reviewed and are negative.       Physical Exam  Temp:  [36.1 °C (96.9 °F)-36.4 °C (97.6 °F)] 36.2 °C (97.2 °F)  Pulse:  [77-97] 90  Resp:  [18-20] 20  BP: ()/(55-74) 103/59  SpO2:  [90 %-96 %] 93 %    Physical Exam  Vitals and nursing note reviewed.   Constitutional:       General: He is not in acute distress.     Appearance: He is not toxic-appearing.   Cardiovascular:      Rate and Rhythm: Normal rate and regular rhythm.      Comments: Left chest pacemaker  Pulmonary:      Breath sounds: Rhonchi and rales present.      Comments: 5 liters of oxygen  Musculoskeletal:      Comments: No hematoma from the cath site   Neurological:      Mental Status: He is alert.         Fluids    Intake/Output Summary (Last 24  hours) at 5/20/2023 0855  Last data filed at 5/19/2023 1829  Gross per 24 hour   Intake 420 ml   Output 320 ml   Net 100 ml         Laboratory  Recent Labs     05/18/23  1548 05/19/23  0319   WBC 17.8* 13.0*   RBC 3.98* 3.78*   HEMOGLOBIN 12.1* 11.4*   HEMATOCRIT 38.5* 36.7*   MCV 96.7 97.1   MCH 30.4 30.2   MCHC 31.4* 31.1*   RDW 51.7* 52.7*   PLATELETCT 250 219   MPV 12.2 12.0       Recent Labs     05/18/23  1548 05/19/23  0319   SODIUM 140 141   POTASSIUM 3.4* 3.4*   CHLORIDE 102 104   CO2 25 26   GLUCOSE 119* 110*   BUN 16 16   CREATININE 0.87 0.81   CALCIUM 7.9* 7.8*       Recent Labs     05/18/23  1548   APTT 41.6*   INR 1.38*           Recent Labs     05/19/23 0319   TRIGLYCERIDE 60   HDL 32*   LDL 27         Imaging  US-RUQ   Final Result      1.  No acute abnormality.   2.  Status post cholecystectomy.   3.  The pancreas is obscured by overlying bowel gas and not evaluated.      DX-CHEST-LIMITED (1 VIEW)   Final Result         Low lung volume with hypoventilatory change and bibasilar atelectasis.      EC-ECHOCARDIOGRAM COMPLETE W/O CONT    (Results Pending)   CL-LEFT HEART CATHETERIZATION WITH POSSIBLE INTERVENTION    (Results Pending)          Assessment/Plan  * NSTEMI (non-ST elevated myocardial infarction), h/o Afib not on anticoag, leukocytosis (HCC)  Assessment & Plan  Admitted  to inpatient, telemetry  Heart cath on 5/19 reveals a chronically occluded RCA  ASA,Plavix,  high dose statin,  Echo ordered and is pending  Cardiology consulted   Continuous telemetry monitoring to evaluate for arrhythmias    Hypokalemia  Assessment & Plan  Ordered potassium replacement and magnesium level    Epigastric pain  Assessment & Plan  Mild, CTA/P at [place] unremarkable.  With slightly elevated bilirubin and no commen on bile duct  Liver ultrasound is negative  Stop IV Zosyn      AF (atrial fibrillation) (HCC)  Assessment & Plan  Continue amiodarone, BB  HR stable prefer HR<80  Refrain from anticoagulation due to  previous hematuria.  This can be re-addressed as an outpatient.     Acute respiratory failure with hypoxia (HCC)- (present on admission)  Assessment & Plan  Requiring  5 liters of oxgyen  Query aspiration   Speech path consulted for swallow eval  cxr ordered and does not reveal infiltrates.     Essential hypertension, benign- (present on admission)  Assessment & Plan  Cozaar and Toprol with holding parameters.          VTE prophylaxis: SCDs/TEDs    I have performed a physical exam and reviewed and updated ROS and Plan today (5/20/2023). In review of yesterday's note (5/19/2023), there are no changes except as documented above.

## 2023-05-20 NOTE — PROGRESS NOTES
Bedside report received from night RN, pt care assumed, assessment completed. Pt is A&O4, pain 0, afib on the monitor. Updated on POC, questions answered. Bed in lowest, locked position, treaded socks on, call light and belongings within reach.

## 2023-05-20 NOTE — THERAPY
Physical Therapy   Initial Evaluation     Patient Name: Hi Montes De Oca  Age:  78 y.o., Sex:  male  Medical Record #: [id number]  Today's Date: 5/20/2023     Precautions  Precautions: Fall Risk  Comments: R wrist access for catheterization    Assessment  Patient is 78 y.o. male with a diagnosis of NSTEMI s/p heart catheterization 5/19/23. Other PMH includes paroxysmal afib with pacemaker, HTN, cogntive deficits, Hx MI 2012. Pt reports he lives alone and is independent with self care and mobility at baseline with use of SPC, including community access. Today, pt was limited by activity tolerance as well as balance. Pt with 3 LOB requiring assistance to prevent falls, notably during turns or with head turning. Based on mobility today, pt is not safe to return home alone. He will continue to benefit from skilled PT in the acute setting to address deficits with emphasis on activity tolerance, balance, gait. He will benefit from further PT in the post-acute setting prior to D/C home.    Plan    Physical Therapy Initial Treatment Plan   Treatment Plan : Bed Mobility, Gait Training, Neuro Re-Education / Balance, Stair Training, Therapeutic Activities, Therapeutic Exercise  Treatment Frequency: 4 Times per Week  Duration: Until Therapy Goals Met    DC Equipment Recommendations: Unable to determine at this time  Discharge Recommendations: Recommend post-acute placement for additional physical therapy services prior to discharge home       Subjective    Pt stating he is surprised he is tired from such as short walk.     Objective       05/20/23 1022   Precautions   Precautions Fall Risk   Comments R wrist access for catheterization   Vitals   O2 (LPM) 6   O2 Delivery Device Nasal Cannula   Vitals Comments 93% at rest prior to activity, dropped to 82% with recovery from activity (mouth breathing) requiring cues toinhale through nose   Pain 0 - 10 Group   Therapist Pain Assessment 0;Post Activity Pain Same as Prior to  Activity   Prior Living Situation   Prior Services Home-Independent   Housing / Facility 1 Story House   Steps Into Home 7   Steps In Home   (1 flight, spiral, to basement)   Rail Right Rail (Steps into Home);Right Rail  (Steps in Home)   Bathroom Set up Walk In Shower;Bathtub / Shower Combination  (states he uses neither shower or tub for hygeine)   Equipment Owned Front-Wheel Walker;Single Point Cane   Lives with - Patient's Self Care Capacity Alone and Able to Care For Self   Comments reports independence with all self care and mobility with use of SPC; indpendent with driving   Prior Level of Functional Mobility   Bed Mobility Independent   Transfer Status Independent   Ambulation Independent   Ambulation Distance limited community   Assistive Devices Used Single Point Cane   Stairs Independent   History of Falls   History of Falls   (none reported)   Cognition    Cognition / Consciousness X   Speech/ Communication Hard of Hearing   Level of Consciousness Alert   Safety Awareness Impaired   Comments pleasant, cooperative, decreased insight into limitations   Strength Lower Body   Comments WFL for mobility   Balance Assessment   Sitting Balance (Static) Fair   Sitting Balance (Dynamic) Fair   Standing Balance (Static) Fair -   Standing Balance (Dynamic) Poor +   Weight Shift Sitting Fair   Weight Shift Standing Fair   Comments with FWW   Bed Mobility    Supine to Sit Standby Assist   Sit to Supine Standby Assist   Scooting Standby Assist  (seated scooting SBA; 2 person assist for repositioning in bed)   Comments with bed rails   Gait Analysis   Gait Level Of Assist Minimal Assist   Assistive Device Front Wheel Walker   Distance (Feet) 80   # of Times Distance was Traveled 1   Deviation Decreased Heel Strike;Decreased Toe Off   # of Stairs Climbed 3   Level of Assist with Stairs Contact Guard Assist   Weight Bearing Status no restriction   Comments 3 lateral LOB requiring min A to recover, which occurred when pt  was turning head to look to the side or during turning   Functional Mobility   Sit to Stand Contact Guard Assist   Transfer Method Stand Step   Mobility supine->sit EOB->stand->amb->stairs->amb->sit EOB->supine   Comments cues for hand placement   How much difficulty does the patient currently have...   Turning over in bed (including adjusting bedclothes, sheets and blankets)? 3   Sitting down on and standing up from a chair with arms (e.g., wheelchair, bedside commode, etc.) 3   Moving from lying on back to sitting on the side of the bed? 3   How much help from another person does the patient currently need...   Moving to and from a bed to a chair (including a wheelchair)? 3   Need to walk in a hospital room? 3   Climbing 3-5 steps with a railing? 3   6 clicks Mobility Score 18   Activity Tolerance   Sitting in Chair not tested   Sitting Edge of Bed 2x3 min approx   Standing 1x8 min approx   Comments limited by dyspnea, fatigue   Patient / Family Goals    Patient / Family Goal #1 return home   Short Term Goals    Short Term Goal # 1 Pt will perform supine<->sit with supv within 6 visits in order to return home   Short Term Goal # 2 Pt will transfer bed<->chair with FWW or SPC with supv within 6 visits in order to return home   Short Term Goal # 3 Pt will amb 300' with FWW or SPC with supv within 6 visits in order to return home   Short Term Goal # 4 Pt will perform 6 steps with SPC and supv within 6 visits as required to enter/exit home   Education Group   Education Provided Role of Physical Therapist   Role of Physical Therapist Patient Response Patient;Acceptance;Explanation;Verbal Demonstration   Additional Comments cardiac activity handout   Physical Therapy Initial Treatment Plan    Treatment Plan  Bed Mobility;Gait Training;Neuro Re-Education / Balance;Stair Training;Therapeutic Activities;Therapeutic Exercise   Treatment Frequency 4 Times per Week   Duration Until Therapy Goals Met   Problem List    Problems  Impaired Bed Mobility;Impaired Transfers;Impaired Ambulation;Impaired Balance;Decreased Activity Tolerance;Safety Awareness Deficits / Cognition   Anticipated Discharge Equipment and Recommendations   DC Equipment Recommendations Unable to determine at this time   Discharge Recommendations Recommend post-acute placement for additional physical therapy services prior to discharge home

## 2023-05-21 ENCOUNTER — APPOINTMENT (OUTPATIENT)
Dept: CARDIOLOGY | Facility: MEDICAL CENTER | Age: 78
DRG: 280 | End: 2023-05-21
Attending: INTERNAL MEDICINE
Payer: MEDICARE

## 2023-05-21 ENCOUNTER — HOSPITAL ENCOUNTER (OUTPATIENT)
Dept: RADIOLOGY | Facility: MEDICAL CENTER | Age: 78
End: 2023-05-21

## 2023-05-21 ENCOUNTER — APPOINTMENT (OUTPATIENT)
Dept: RADIOLOGY | Facility: MEDICAL CENTER | Age: 78
DRG: 280 | End: 2023-05-21
Attending: HOSPITALIST
Payer: MEDICARE

## 2023-05-21 PROBLEM — I47.20 VENTRICULAR TACHYCARDIA (HCC): Status: ACTIVE | Noted: 2023-05-21

## 2023-05-21 PROBLEM — I82.629 DVT OF UPPER EXTREMITY (DEEP VEIN THROMBOSIS) (HCC): Status: ACTIVE | Noted: 2023-05-21

## 2023-05-21 LAB — EKG IMPRESSION: NORMAL

## 2023-05-21 PROCEDURE — 93306 TTE W/DOPPLER COMPLETE: CPT | Mod: 26 | Performed by: STUDENT IN AN ORGANIZED HEALTH CARE EDUCATION/TRAINING PROGRAM

## 2023-05-21 PROCEDURE — 93971 EXTREMITY STUDY: CPT | Mod: LT

## 2023-05-21 PROCEDURE — 700102 HCHG RX REV CODE 250 W/ 637 OVERRIDE(OP): Performed by: HOSPITALIST

## 2023-05-21 PROCEDURE — 99233 SBSQ HOSP IP/OBS HIGH 50: CPT | Mod: FS | Performed by: STUDENT IN AN ORGANIZED HEALTH CARE EDUCATION/TRAINING PROGRAM

## 2023-05-21 PROCEDURE — 93005 ELECTROCARDIOGRAM TRACING: CPT | Performed by: HOSPITALIST

## 2023-05-21 PROCEDURE — 93010 ELECTROCARDIOGRAM REPORT: CPT | Performed by: STUDENT IN AN ORGANIZED HEALTH CARE EDUCATION/TRAINING PROGRAM

## 2023-05-21 PROCEDURE — 700105 HCHG RX REV CODE 258: Performed by: HOSPITALIST

## 2023-05-21 PROCEDURE — A9270 NON-COVERED ITEM OR SERVICE: HCPCS | Performed by: INTERNAL MEDICINE

## 2023-05-21 PROCEDURE — 700102 HCHG RX REV CODE 250 W/ 637 OVERRIDE(OP): Performed by: INTERNAL MEDICINE

## 2023-05-21 PROCEDURE — A9270 NON-COVERED ITEM OR SERVICE: HCPCS | Performed by: HOSPITALIST

## 2023-05-21 PROCEDURE — 700111 HCHG RX REV CODE 636 W/ 250 OVERRIDE (IP): Performed by: HOSPITALIST

## 2023-05-21 PROCEDURE — 770020 HCHG ROOM/CARE - TELE (206)

## 2023-05-21 PROCEDURE — 93306 TTE W/DOPPLER COMPLETE: CPT

## 2023-05-21 PROCEDURE — 93971 EXTREMITY STUDY: CPT | Mod: 26,LT | Performed by: INTERNAL MEDICINE

## 2023-05-21 PROCEDURE — 700117 HCHG RX CONTRAST REV CODE 255: Performed by: INTERNAL MEDICINE

## 2023-05-21 PROCEDURE — 99291 CRITICAL CARE FIRST HOUR: CPT | Performed by: HOSPITALIST

## 2023-05-21 RX ORDER — MAGNESIUM SULFATE HEPTAHYDRATE 40 MG/ML
2 INJECTION, SOLUTION INTRAVENOUS ONCE
Status: COMPLETED | OUTPATIENT
Start: 2023-05-21 | End: 2023-05-21

## 2023-05-21 RX ORDER — FUROSEMIDE 10 MG/ML
40 INJECTION INTRAMUSCULAR; INTRAVENOUS
Status: DISCONTINUED | OUTPATIENT
Start: 2023-05-21 | End: 2023-05-28

## 2023-05-21 RX ORDER — POTASSIUM CHLORIDE 20 MEQ/1
40 TABLET, EXTENDED RELEASE ORAL DAILY
Status: DISCONTINUED | OUTPATIENT
Start: 2023-05-21 | End: 2023-05-23

## 2023-05-21 RX ORDER — POTASSIUM CHLORIDE 7.45 MG/ML
10 INJECTION INTRAVENOUS
Status: COMPLETED | OUTPATIENT
Start: 2023-05-21 | End: 2023-05-21

## 2023-05-21 RX ORDER — DEXTROSE MONOHYDRATE 50 MG/ML
INJECTION, SOLUTION INTRAVENOUS CONTINUOUS
Status: DISCONTINUED | OUTPATIENT
Start: 2023-05-21 | End: 2023-05-23

## 2023-05-21 RX ADMIN — SENNOSIDES AND DOCUSATE SODIUM 2 TABLET: 50; 8.6 TABLET ORAL at 05:39

## 2023-05-21 RX ADMIN — APIXABAN 10 MG: 5 TABLET, FILM COATED ORAL at 16:24

## 2023-05-21 RX ADMIN — ATORVASTATIN CALCIUM 80 MG: 80 TABLET, FILM COATED ORAL at 17:51

## 2023-05-21 RX ADMIN — POTASSIUM CHLORIDE 10 MEQ: 7.46 INJECTION, SOLUTION INTRAVENOUS at 16:06

## 2023-05-21 RX ADMIN — DEXTROSE MONOHYDRATE: 50 INJECTION, SOLUTION INTRAVENOUS at 17:56

## 2023-05-21 RX ADMIN — CLOPIDOGREL BISULFATE 75 MG: 75 TABLET ORAL at 05:39

## 2023-05-21 RX ADMIN — LOSARTAN POTASSIUM 50 MG: 50 TABLET, FILM COATED ORAL at 05:39

## 2023-05-21 RX ADMIN — AMIODARONE HYDROCHLORIDE 1 MG/MIN: 1.8 INJECTION, SOLUTION INTRAVENOUS at 17:58

## 2023-05-21 RX ADMIN — POTASSIUM CHLORIDE 10 MEQ: 7.46 INJECTION, SOLUTION INTRAVENOUS at 17:03

## 2023-05-21 RX ADMIN — AMIODARONE HYDROCHLORIDE 150 MG: 1.5 INJECTION, SOLUTION INTRAVENOUS at 17:45

## 2023-05-21 RX ADMIN — AMIODARONE HYDROCHLORIDE 0.5 MG/MIN: 1.8 INJECTION, SOLUTION INTRAVENOUS at 23:39

## 2023-05-21 RX ADMIN — ASPIRIN 81 MG: 81 TABLET, COATED ORAL at 05:38

## 2023-05-21 RX ADMIN — MAGNESIUM SULFATE HEPTAHYDRATE 2 G: 2 INJECTION, SOLUTION INTRAVENOUS at 15:48

## 2023-05-21 RX ADMIN — METOPROLOL SUCCINATE 50 MG: 50 TABLET, EXTENDED RELEASE ORAL at 17:52

## 2023-05-21 RX ADMIN — FINASTERIDE 5 MG: 5 TABLET, FILM COATED ORAL at 05:39

## 2023-05-21 RX ADMIN — HUMAN ALBUMIN MICROSPHERES AND PERFLUTREN 3 ML: 10; .22 INJECTION, SOLUTION INTRAVENOUS at 12:30

## 2023-05-21 RX ADMIN — METOPROLOL SUCCINATE 50 MG: 50 TABLET, EXTENDED RELEASE ORAL at 05:39

## 2023-05-21 RX ADMIN — POTASSIUM CHLORIDE 40 MEQ: 20 TABLET, EXTENDED RELEASE ORAL at 15:00

## 2023-05-21 RX ADMIN — FUROSEMIDE 40 MG: 10 INJECTION INTRAMUSCULAR; INTRAVENOUS at 16:33

## 2023-05-21 RX ADMIN — Medication 400 MG: at 05:39

## 2023-05-21 ASSESSMENT — ENCOUNTER SYMPTOMS
CONSTIPATION: 0
SHORTNESS OF BREATH: 1
MYALGIAS: 0
ARTHRALGIAS: 0
CHEST TIGHTNESS: 0
EYES NEGATIVE: 1
SHORTNESS OF BREATH: 0
ABDOMINAL PAIN: 0
PSYCHIATRIC NEGATIVE: 1
VOMITING: 0
DIAPHORESIS: 0
PALPITATIONS: 0
SPUTUM PRODUCTION: 0
FATIGUE: 0
ENDOCRINE NEGATIVE: 1
COUGH: 0
BRUISES/BLEEDS EASILY: 0
COLOR CHANGE: 0
DIZZINESS: 0
FEVER: 0
LIGHT-HEADEDNESS: 0
DIARRHEA: 0
CHILLS: 0
ABDOMINAL DISTENTION: 0
COUGH: 1
NAUSEA: 0

## 2023-05-21 NOTE — ASSESSMENT & PLAN NOTE
6 minutes of v tach rate 210 on 5/21  IV magnesium and IV potassium were given  Continuous tele monitoring  Pacemaker interrogation shows a fib but no prolonged v tach  On amiodarone

## 2023-05-21 NOTE — CARE PLAN
"  Outcome: Progressing   The patient is Watcher - Medium risk of patient condition declining or worsening    Shift Goals  Clinical Goals: Monitor Breathing and aspiration  Patient Goals: go home    Progress made toward(s) clinical / shift goals:  Patient remains alert and oriented x4 and free from fall as well, sat him on bedside chair for 2 hrs, R wrist still has bruising and hematoma and cardio was aware, DVT on left arm, place a restricted limb band and removed IV, Patient has run of svt, attending was at bedside at that time, hooked on zoll and administered medications as ordered, call light within reach and bed alarm was kept on,/59   Pulse (!) 102   Temp 36.1 °C (97 °F) (Temporal)   Resp (!) 26   Ht 1.803 m (5' 11\")   Wt 97 kg (213 lb 13.5 oz)   SpO2 92%  will continue to monitor       Patient is not progressing towards the following goals:  Problem: Hemodynamics  Goal: Patient's hemodynamics, fluid balance and neurologic status will be stable or improve    Problem: Fluid Volume  Goal: Fluid volume balance will be maintained   Administered lasix   "

## 2023-05-21 NOTE — PROGRESS NOTES
Monitor Summary  Rhythm: afib  Rate: 78-92  Ectopy: pvc's bigeminy; couplets; trigeminy  0 / .07 / 1

## 2023-05-21 NOTE — PROGRESS NOTES
Cardiology Follow Up Progress Note    Date of Service  5/21/2023    Attending Physician  Chino Alvarenga M.D.    Chief Complaint/Reason for Consult  Concerns of V. yolanda    HPI  Hi Montes De Oca is a 78 y.o. male with a past medical history of CAD, MI, cardiomyopathy, paroxysmal A-fib, PPM placement admitted 5/18/2023 as a transfer from [facility] with epigastric abdominal pain, right flank pain and a new diagnosis of NSTEMI.  He was treated with a heparin drip and DAPT.  He underwent coronary angiogram on 5/19/2023 which revealed severe obstructive one-vessel coronary artery disease with a likely subacute proximal right coronary artery infarction and an underexpanded mid vessel stent in the LAD with minimal in-stent restenosis.  Due to developing collaterals and delayed presentation of infarct intervention was not performed.  He was recommended for optimal medical management.  An echocardiogram was performed today which demonstrated an LVEF of 45% down from 60% in January 2021.  He had some hematuria and long-term anticoagulation for atrial fibrillation was held.    Interim Events  5/21/2023: He developed a significant cough yesterday requiring 6 L of oxygen.  His arm became swollen this morning and a duplex ultrasound was performed which demonstrated acute thrombosis of the left jugular, left subclavian, left axillary, and left brachial veins.  Aspirin was stopped and Eliquis was started.  At approximately 1530 this afternoon, V. tach was noted on the monitors and was sustained for approximately 6 minutes at a rate of 200.  It slowly resolved itself and he returned to atrial fibrillation at rates of 90s to 100s.  He did not have any symptoms and was talking throughout the entire episode.  Vital signs otherwise stable.  His oxygen requirements have been titrated down today.  He is now currently at 92% on 3 L nasal cannula.  He is not feeling well overall.  He has some shortness of breath and a bad  cough.  He has some intermittent lower extremity edema.  His nurse is in the room and notes that his urine is mildly decreased.  No chest pain or palpitations. No orthopnea or PND. No dizziness or lightheadedness. No syncope or presyncope.      Review of Systems  Review of Systems   Constitutional:  Negative for chills, diaphoresis, fatigue and fever.   HENT: Negative.     Eyes: Negative.    Respiratory:  Positive for shortness of breath. Negative for cough and chest tightness.    Cardiovascular:  Positive for leg swelling. Negative for chest pain and palpitations.   Gastrointestinal:  Negative for abdominal distention, abdominal pain, constipation, diarrhea, nausea and vomiting.   Endocrine: Negative.    Genitourinary:  Positive for decreased urine volume (stated by his nurse). Negative for difficulty urinating, dysuria, frequency and urgency.   Musculoskeletal:  Negative for arthralgias and myalgias.   Skin:  Negative for color change.   Neurological:  Negative for dizziness, syncope and light-headedness.   Hematological:  Does not bruise/bleed easily.   Psychiatric/Behavioral: Negative.         Vital signs in last 24 hours  Temp:  [36.1 °C (97 °F)-36.7 °C (98.1 °F)] 36.1 °C (97 °F)  Pulse:  [] 102  Resp:  [14-26] 26  BP: ()/(55-66) 122/59  SpO2:  [90 %-94 %] 92 %    Physical Exam  Physical Exam  Vitals and nursing note reviewed.   Constitutional:       General: He is not in acute distress.     Appearance: Normal appearance. He is not toxic-appearing.   HENT:      Head: Normocephalic and atraumatic.      Right Ear: External ear normal.      Left Ear: External ear normal.      Nose: Nose normal.      Mouth/Throat:      Mouth: Mucous membranes are moist.      Pharynx: Oropharynx is clear.   Eyes:      General: No scleral icterus.     Extraocular Movements: Extraocular movements intact.      Conjunctiva/sclera: Conjunctivae normal.      Pupils: Pupils are equal, round, and reactive to light.   Neck:       Vascular: No JVD.      Comments: Visible irregular bulging of the jugular  Cardiovascular:      Rate and Rhythm: Normal rate. Rhythm irregular.      Pulses: Normal pulses.      Heart sounds: Normal heart sounds. No murmur heard.     No friction rub. No gallop.   Pulmonary:      Effort: Pulmonary effort is normal.      Breath sounds: Rhonchi and rales present.   Abdominal:      General: Abdomen is flat. Bowel sounds are normal. There is no distension.      Palpations: Abdomen is soft.      Tenderness: There is no abdominal tenderness.   Musculoskeletal:         General: Swelling (Left arm) present. Normal range of motion.      Cervical back: Normal range of motion and neck supple.      Right lower leg: No edema.      Left lower leg: No edema.      Comments: Golf ball size hematoma right wrist at prior cath access site.  It has not expanded beyond prior drawn borders.  Moderate amount of ecchymosis present.   Skin:     General: Skin is warm and dry.      Capillary Refill: Capillary refill takes less than 2 seconds.      Coloration: Skin is not jaundiced.   Neurological:      General: No focal deficit present.      Mental Status: He is alert and oriented to person, place, and time. Mental status is at baseline.   Psychiatric:         Mood and Affect: Mood normal.         Behavior: Behavior normal.         Judgment: Judgment normal.         Lab Review  Lab Results   Component Value Date/Time    WBC 13.0 (H) 05/19/2023 03:19 AM    RBC 3.78 (L) 05/19/2023 03:19 AM    HEMOGLOBIN 11.4 (L) 05/19/2023 03:19 AM    HEMATOCRIT 36.7 (L) 05/19/2023 03:19 AM    MCV 97.1 05/19/2023 03:19 AM    MCH 30.2 05/19/2023 03:19 AM    MCHC 31.1 (L) 05/19/2023 03:19 AM    MPV 12.0 05/19/2023 03:19 AM      Lab Results   Component Value Date/Time    SODIUM 141 05/19/2023 03:19 AM    POTASSIUM 3.4 (L) 05/19/2023 03:19 AM    CHLORIDE 104 05/19/2023 03:19 AM    CO2 26 05/19/2023 03:19 AM    GLUCOSE 110 (H) 05/19/2023 03:19 AM    BUN 16 05/19/2023  03:19 AM    CREATININE 0.81 05/19/2023 03:19 AM      Lab Results   Component Value Date/Time    ASTSGOT 23 05/19/2023 03:19 AM    ALTSGPT 25 05/19/2023 03:19 AM     Lab Results   Component Value Date/Time    CHOLSTRLTOT 71 (L) 05/19/2023 03:19 AM    LDL 27 05/19/2023 03:19 AM    HDL 32 (A) 05/19/2023 03:19 AM    TRIGLYCERIDE 60 05/19/2023 03:19 AM    TROPONINT 2350 (H) 05/18/2023 05:59 PM       No results for input(s): NTPROBNP in the last 72 hours.    Cardiac Imaging and Procedures Review  EKG:  My personal interpretation of the EKG dated 5/21/2023 is atrial fibrillation with paired ventricular premature complexes.    Echocardiogram: 5/21/2023  CONCLUSIONS  Technically difficult study.  Mildly depressed left ventricular systolic function. The left   ventricular ejection fraction is visually estimated to be 45%.  Mildly dilated right ventricle. Reduced right ventricular systolic   function.  Mild tricuspid regurgitation. Right ventricular systolic pressure is   estimated to be 33 mmHg (normal).  Compared to prior study on 1/27/2021, LVEF is now mildly depressed.  Primary team is notified of findings.    Cardiac Catheterization:  5/19/2023  HEMODYNAMICS:   Aortic pressure: 85/53 mmHg  Pre-A wave pressure: 10  No significant aortic gradient on pullback  CORONARY ANGIOGRAPHY:  The left main coronary artery is patent and bifurcates into left anterior descending and left circumflex coronary arteries.  The left anterior descending coronary artery is a large, transapical vessel with proximal luminal irregularities followed by an underexpanded mid vessel stent with minimal in-stent restenosis and distal luminal irregularities.  It supplies a moderate first diagonal branch and several small distal diagonal branches with no significant disease.  The left circumflex coronary artery is a moderate, nondominant vessel with a patent mid-vessel stent that supplies a moderate first obtuse marginal branch, a large second obtuse  marginal branch, and two small distal obtuse marginal branches and has luminal irregularities in the distribution  The right coronary artery appears to be a large, dominant vessel that is proximally occluded.  The distal vessel refills by developing left-to-right collaterals.  IMPRESSION:  Severe obstructive one-vessel coronary artery disease with a likely subacute proximal right coronary artery infarction.  Normal left heart filling pressures.  RECOMMENDATIONS:  Return to floor with continued care per primary service.  TR band release per protocol.  Optimal medical management of subacute proximal right coronary artery infarction.  With developing collaterals and delayed presentation of infarct, intervention was not performed.  If the patient has persistent angina as an outpatient, intervention can be considered in one month.    Imaging  Chest X-Ray:  5/20/2023   FINDINGS:  Dual-lead left-sided pacemaker is in place. There is hypoventilatory change. The cardiac silhouette is enlarged. There is bibasilar consolidation. There are trace effusions. No pneumothorax.  IMPRESSION:  Stable chest x-ray findings suggesting bilateral dependent edema versus atelectasis with trace effusions.    Assessment/Plan  #Atrial fibrillation with RVR  #S/p PPM-Nerveda serial #[id number] implanted 11/3/2021  #DVT of upper extremity (left jugular, subclavian vein, axillary vein, and brachial veins from the proximal to distal bicep  #NSTEMI  #HFrEF AHA C NYHA III LVEF 45%  #Hypokalemia  #Acute respiratory failure with hypoxia    Recommendations:  -He reports not feeling well today and has developed a cough along with shortness of breath.  -Episode of possible V. tach noted on telemetry reaching a rate of 210.  He was alert and oriented with no new complaints during this episode.  The rate is not typical of V. tach.  PPM was interrogated and verified there was no V. tach, but rather it was atrial fibrillation with RVR.  -We will start an  amiodarone drip for rhythm control.  -Continue metoprolol for rate control and titrate as necessary.  -Given this was A-fib with RVR and no further concerns of V. tach, there is no indication for repeat coronary angiogram at this time.  Continue with Plavix 75 mg daily.  -There was prior concern for hematuria and intolerance to OAC. He was placed on anticoagulation with Eliquis 10 mg twice daily x7 days for DVT earlier today which will also provide CVA coverage.  He appears to be tolerating thus far.  -Diffuse rhonchi and rales on auscultation.  Recommend considering repeat CT given recent decompensation and newly found extensive DVT.  It is possible this may also be due to fluid overload, agree with continuing IV Lasix.  -Hypokalemic at 3.4 on CMP performed 5/19/23 with no repeat.  Recommend daily BMP while inpatient and repletion.  Keep K+> 4 and mag >2.  -For mildly reduced ejection fraction, continue losartan and metoprolol XL.  Consider the addition of spironolactone once his electrolyte status is known.  SGLT2 inhibitor not indicated with current LVEF.  -Pneumonia and flu vaccine per pharmacy.  -Continue strict I's and O's.  Nursing stated they will be performing a bladder scan for concerns of decreased urination.  Recommendations per primary.    Cardiology will continue to follow.    Thank you for allowing me to participate in the care of Hi Montes De Oca .    Cheryl Ivy PA-C, Cardiology  [facility], [facility]  [address] [address]  Phone: [phone]  Fax: [phone]    PLEASE NOTE: This note was created using voice recognition software. I have made every reasonable attempt to correct obvious errors, but I expect that there are errors of grammar and possibly content that I did not discover before finalizing the note.     I personally spent a total of 20 minutes which includes face-to-face time and  non-face-to-face time spent on preparing to see the patient, reviewing hospital notes and tests, obtaining history from the patient, performing a medically appropriate exam, counseling and educating the patient, ordering medications/tests/procedures/referrals as clinically indicated, and documenting information in the electronic medical record.

## 2023-05-21 NOTE — PROGRESS NOTES
"[facility] Medicine Daily Progress Note    Date of Service  5/21/2023    Chief Complaint  Hi Montes De Oca is a 78 y.o. male admitted 5/18/2023 with abdominal pain.    Hospital Course  Mr. Montes De Oca is a 78 y.o. male who presented 5/18/2023 as a transfer from [place] for epigastric pain and abdominal pain.   He has a history of MI on ASA, Plavix and statin, par Afib s/p pacer on BB and amiodarone  but not on anticoagulation based on records, hypertension. He is a poor historian and has cognitive deficits. Per EDP he was transferred here for NSTEMI on hepa gtt. Patient does not know his medications much but did say he was having epigastric pain for the past few days, had one episode of vomiting that is nonbloody and like the food he ate and one episode of diarrhea, which he says is melanotic. He denied smoking or alcohol. I personally reviewed the records. At [place] he was afebrile and hemodynamically stable. A CTA Chest showed no PE but made comment of possible \"heart failure\". A CTA Ab/P reveled cholecystectomy but made no comment of the bile duct, pancreas and kidneys normal other than some renal cysts and no mention of colitis. His labs were significant for a leukocytosis of 20K, Hb was actually normal at 13, nornal PLTs. His kidney function was normal but he had a mildly elevated AST and a slightly elevated bilirubin. Lipase was normal. His troponin was elevated at 5000 and lactic slightly elevated. He was given a dose of Zosyn and put on hepa gtt and transferred here.  At the ED, he is afebrile, normotensive, HR 80-90s.  Labs are PENDING  EDP is consulting Cardiology.  When I saw him at the ED, he is not having chest pain. Cognitive deficits. He was mildly tender epigastric region.  Dr. Landry, Cardiology came at bedside and tentatively planned cardiac catheterization tomorrow    Interval Problem Update  5/19: Mr. Montes De Oca was evaluated and examined on the telemetry floor. His WBC came down from 17.8 " yesterday to 13 today. He has been on IV Zosyn. 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 The heparin drip will be stopped. Hold on long term anticoag in the setting of afib due to hx hematuria.   5/20: Mr. Montes De Oca was seen and evaluated on the tele floor. He has developed a significant cough and now is on 5 liters of oxygen. Speech path saw him and he is on a dysphagia diet.   A chest xray was ordered and did not reveal infiltrates. PT recommends post-acute placement but he is hoping to go home. Echo remains pending.  5/21: Mr. Montes De Oca remains on 6 liters of oxygen.  The echo reveals an EF 45% down from 60% January 2021. IV lasix will be initiated. His left arm is quite swollen. A duplex reveals acute thrombosis from the jugular, subclavian, axillary, and brachial veins. Aspirin will be stopped and Eliquis will be started. Monitor for bleeding.   He developed ventricular tachycardia rate 210 for 6 minutes. I was called to the room and evaluated the rhythm strip. Pads were placed for possible cardioversion. 2 grams of IV magnesium and 20 mEq IV K were given. An IV amiodarone bolus of 150 mg was given followed by a drip. I discussed with Dr. Schultz cardiology for re-consultation.     I have discussed this patient's plan of care and discharge plan at IDT rounds today with Case Management, Nursing, Nursing leadership, and other members of the IDT team.    Consultants/Specialty  Cardiology. I discussed with Dr. Hogan    Code Status  Full Code    Disposition  The patient is not medically cleared for discharge to home or a post-acute facility.  Anticipate discharge to: home with close outpatient follow-up    I have placed the appropriate orders for post-discharge needs.    Review of Systems  Review of Systems   Respiratory:  Positive for cough. Negative for sputum production and shortness of breath.    Cardiovascular:  Negative for chest pain.   Gastrointestinal:  Negative for abdominal pain.    Musculoskeletal:         Left arm swelling   All other systems reviewed and are negative.       Physical Exam  Temp:  [36.1 °C (97 °F)-36.7 °C (98.1 °F)] 36.7 °C (98.1 °F)  Pulse:  [79-94] 93  Resp:  [14-20] 16  BP: ()/(53-66) 110/62  SpO2:  [90 %-94 %] 91 %    Physical Exam  Vitals and nursing note reviewed.   Constitutional:       General: He is not in acute distress.     Appearance: He is not toxic-appearing.   Cardiovascular:      Rate and Rhythm: Normal rate and regular rhythm.      Comments: Left chest pacemaker  Pulmonary:      Breath sounds: Rhonchi and rales present.      Comments: 5 liters of oxygen  Musculoskeletal:      Comments: + bruising and hematoma from the cath site  Left arm swelling.    Neurological:      Mental Status: He is alert.   Psychiatric:         Mood and Affect: Mood normal.         Behavior: Behavior normal.         Fluids    Intake/Output Summary (Last 24 hours) at 5/21/2023 0741  Last data filed at 5/20/2023 1927  Gross per 24 hour   Intake 240 ml   Output 100 ml   Net 140 ml         Laboratory  Recent Labs     05/18/23  1548 05/19/23  0319   WBC 17.8* 13.0*   RBC 3.98* 3.78*   HEMOGLOBIN 12.1* 11.4*   HEMATOCRIT 38.5* 36.7*   MCV 96.7 97.1   MCH 30.4 30.2   MCHC 31.4* 31.1*   RDW 51.7* 52.7*   PLATELETCT 250 219   MPV 12.2 12.0       Recent Labs     05/18/23  1548 05/19/23  0319   SODIUM 140 141   POTASSIUM 3.4* 3.4*   CHLORIDE 102 104   CO2 25 26   GLUCOSE 119* 110*   BUN 16 16   CREATININE 0.87 0.81   CALCIUM 7.9* 7.8*       Recent Labs     05/18/23  1548   APTT 41.6*   INR 1.38*           Recent Labs     05/19/23 0319   TRIGLYCERIDE 60   HDL 32*   LDL 27         Imaging  OUTSIDE IMAGES-CT CHEST   Final Result      OUTSIDE IMAGES-DX CHEST   Final Result      OUTSIDE IMAGES-CT ABDOMEN /PELVIS   Final Result      DX-CHEST-LIMITED (1 VIEW)   Final Result         Stable chest x-ray findings suggesting bilateral dependent edema versus atelectasis with trace effusions.       US-RUQ   Final Result      1.  No acute abnormality.   2.  Status post cholecystectomy.   3.  The pancreas is obscured by overlying bowel gas and not evaluated.      DX-CHEST-LIMITED (1 VIEW)   Final Result         Low lung volume with hypoventilatory change and bibasilar atelectasis.      EC-ECHOCARDIOGRAM COMPLETE W/O CONT    (Results Pending)   CL-LEFT HEART CATHETERIZATION WITH POSSIBLE INTERVENTION    (Results Pending)          Assessment/Plan  * NSTEMI (non-ST elevated myocardial infarction), h/o Afib not on anticoag, leukocytosis (HCC)  Assessment & Plan  Admitted  to inpatient, telemetry  Heart cath on 5/19 reveals a chronically occluded RCA  Plavix,  high dose statin,  Echo reveals a decreased EF 45%  Cardiology consulted   Continuous telemetry monitoring to evaluate for arrhythmias    DVT of upper extremity (deep vein thrombosis) ([facility])- (present on admission)  Assessment & Plan  New diagnosis  Start Eliquis and stop aspirin  Monitor for bleeding in the setting of full dose anticoagulation and plavix    Acute respiratory failure with hypoxia (HCC)- (present on admission)  Assessment & Plan  Requiring  5 liters of oxgyen  Query aspiration thus Speech path consulted for swallow eval  cxr ordered and does not reveal infiltrates.   Likely volume overload given his lowered EF  IV lasix and potassium ordered. Follow urine output and check BMP tomorrow to follow potassium while on diuretics.     Hypokalemia- (present on admission)  Assessment & Plan  Ordered potassium replacement and magnesium level    Epigastric pain  Assessment & Plan  Mild, CTA/P at [place] unremarkable.  With slightly elevated bilirubin and no commen on bile duct  Liver ultrasound is negative  Stopped IV Zosyn  This resolved.    AF (atrial fibrillation) ([facility])  Assessment & Plan  Continue amiodarone, BB  Eliquis    Essential hypertension, benign- (present on admission)  Assessment & Plan  Cozaar and Toprol with holding parameters.           VTE prophylaxis: SCDs/TEDs Eliquis    I have performed a physical exam and reviewed and updated ROS and Plan today (5/21/2023). In review of yesterday's note (5/20/2023), there are no changes except as documented above.    Mr. Montes De Oca is critically ill. 40 minutes of critical care time were spent with patient, nursing, and pharmacy and in specific management of unstable ventricular tachycardia post-heart catheterization. See orders. Prognosis is guarded.

## 2023-05-21 NOTE — CARE PLAN
The patient is Stable - Low risk of patient condition declining or worsening    Shift Goals  Clinical Goals: Monitor Breathing and aspiration  Patient Goals: go home    Progress made toward(s) clinical / shift goals:  Progressing      Problem: Knowledge Deficit - Standard  Goal: Patient and family/care givers will demonstrate understanding of plan of care, disease process/condition, diagnostic tests and medications  Outcome: Progressing  Note: Pt verbalizes understanding of plan of care     Problem: Respiratory  Goal: Patient will achieve/maintain optimum respiratory ventilation and gas exchange  Outcome: Progressing  Flowsheets (Taken 5/20/2023 1956)  Suction Frequency: Suctioned Three or Four Times Per Encounter  Note: Pt with suction to remove secretions, instructed to use strong cough.     Problem: Pain - Standard  Goal: Alleviation of pain or a reduction in pain to the patient’s comfort goal  Outcome: Progressing  Note: Pt reports no pain at this time

## 2023-05-21 NOTE — DISCHARGE PLANNING
CM went to bedside to speak with patient regarding discharge planning needs. Patient was resting with eyes closed, rise and fall of chest noted. RN gave CM number for patient's son, Stevie [phone]. CM called son's number and left a message to return call.

## 2023-05-22 ENCOUNTER — APPOINTMENT (OUTPATIENT)
Dept: RADIOLOGY | Facility: MEDICAL CENTER | Age: 78
DRG: 280 | End: 2023-05-22
Payer: MEDICARE

## 2023-05-22 LAB
ANION GAP SERPL CALC-SCNC: 11 MMOL/L (ref 7–16)
BUN SERPL-MCNC: 30 MG/DL (ref 8–22)
CALCIUM SERPL-MCNC: 8.3 MG/DL (ref 8.5–10.5)
CHLORIDE SERPL-SCNC: 101 MMOL/L (ref 96–112)
CO2 SERPL-SCNC: 26 MMOL/L (ref 20–33)
CREAT SERPL-MCNC: 0.89 MG/DL (ref 0.5–1.4)
GFR SERPLBLD CREATININE-BSD FMLA CKD-EPI: 87 ML/MIN/1.73 M 2
GLUCOSE SERPL-MCNC: 142 MG/DL (ref 65–99)
MAGNESIUM SERPL-MCNC: 2.5 MG/DL (ref 1.5–2.5)
POTASSIUM SERPL-SCNC: 3.9 MMOL/L (ref 3.6–5.5)
SODIUM SERPL-SCNC: 138 MMOL/L (ref 135–145)

## 2023-05-22 PROCEDURE — A9270 NON-COVERED ITEM OR SERVICE: HCPCS | Performed by: INTERNAL MEDICINE

## 2023-05-22 PROCEDURE — 80048 BASIC METABOLIC PNL TOTAL CA: CPT

## 2023-05-22 PROCEDURE — 700105 HCHG RX REV CODE 258: Performed by: HOSPITALIST

## 2023-05-22 PROCEDURE — 700102 HCHG RX REV CODE 250 W/ 637 OVERRIDE(OP): Performed by: INTERNAL MEDICINE

## 2023-05-22 PROCEDURE — 770020 HCHG ROOM/CARE - TELE (206)

## 2023-05-22 PROCEDURE — 700111 HCHG RX REV CODE 636 W/ 250 OVERRIDE (IP): Performed by: HOSPITALIST

## 2023-05-22 PROCEDURE — A9270 NON-COVERED ITEM OR SERVICE: HCPCS | Performed by: HOSPITALIST

## 2023-05-22 PROCEDURE — A9270 NON-COVERED ITEM OR SERVICE: HCPCS

## 2023-05-22 PROCEDURE — 700102 HCHG RX REV CODE 250 W/ 637 OVERRIDE(OP): Performed by: NURSE PRACTITIONER

## 2023-05-22 PROCEDURE — 83735 ASSAY OF MAGNESIUM: CPT

## 2023-05-22 PROCEDURE — A9270 NON-COVERED ITEM OR SERVICE: HCPCS | Performed by: NURSE PRACTITIONER

## 2023-05-22 PROCEDURE — 99232 SBSQ HOSP IP/OBS MODERATE 35: CPT | Performed by: INTERNAL MEDICINE

## 2023-05-22 PROCEDURE — 700102 HCHG RX REV CODE 250 W/ 637 OVERRIDE(OP): Performed by: HOSPITALIST

## 2023-05-22 PROCEDURE — 71045 X-RAY EXAM CHEST 1 VIEW: CPT

## 2023-05-22 PROCEDURE — 36415 COLL VENOUS BLD VENIPUNCTURE: CPT

## 2023-05-22 PROCEDURE — 700102 HCHG RX REV CODE 250 W/ 637 OVERRIDE(OP)

## 2023-05-22 PROCEDURE — 99233 SBSQ HOSP IP/OBS HIGH 50: CPT | Performed by: HOSPITALIST

## 2023-05-22 RX ORDER — GUAIFENESIN/DEXTROMETHORPHAN 100-10MG/5
5 SYRUP ORAL EVERY 6 HOURS PRN
Status: DISCONTINUED | OUTPATIENT
Start: 2023-05-22 | End: 2023-05-29 | Stop reason: HOSPADM

## 2023-05-22 RX ADMIN — METOPROLOL SUCCINATE 75 MG: 50 TABLET, EXTENDED RELEASE ORAL at 17:28

## 2023-05-22 RX ADMIN — AMIODARONE HYDROCHLORIDE 0.5 MG/MIN: 1.8 INJECTION, SOLUTION INTRAVENOUS at 11:26

## 2023-05-22 RX ADMIN — FINASTERIDE 5 MG: 5 TABLET, FILM COATED ORAL at 05:19

## 2023-05-22 RX ADMIN — DEXTROSE MONOHYDRATE: 50 INJECTION, SOLUTION INTRAVENOUS at 23:21

## 2023-05-22 RX ADMIN — APIXABAN 10 MG: 5 TABLET, FILM COATED ORAL at 05:18

## 2023-05-22 RX ADMIN — LOSARTAN POTASSIUM 50 MG: 50 TABLET, FILM COATED ORAL at 05:18

## 2023-05-22 RX ADMIN — METOPROLOL SUCCINATE 50 MG: 50 TABLET, EXTENDED RELEASE ORAL at 05:18

## 2023-05-22 RX ADMIN — APIXABAN 10 MG: 5 TABLET, FILM COATED ORAL at 17:28

## 2023-05-22 RX ADMIN — ATORVASTATIN CALCIUM 80 MG: 80 TABLET, FILM COATED ORAL at 17:27

## 2023-05-22 RX ADMIN — GUAIFENESIN SYRUP AND DEXTROMETHORPHAN 5 ML: 100; 10 SYRUP ORAL at 21:30

## 2023-05-22 RX ADMIN — AMIODARONE HYDROCHLORIDE 0.5 MG/MIN: 1.8 INJECTION, SOLUTION INTRAVENOUS at 23:04

## 2023-05-22 RX ADMIN — POTASSIUM CHLORIDE 40 MEQ: 20 TABLET, EXTENDED RELEASE ORAL at 05:18

## 2023-05-22 RX ADMIN — Medication 400 MG: at 05:18

## 2023-05-22 RX ADMIN — CLOPIDOGREL BISULFATE 75 MG: 75 TABLET ORAL at 05:19

## 2023-05-22 RX ADMIN — FUROSEMIDE 40 MG: 10 INJECTION INTRAMUSCULAR; INTRAVENOUS at 05:20

## 2023-05-22 ASSESSMENT — ENCOUNTER SYMPTOMS
ABDOMINAL PAIN: 0
SHORTNESS OF BREATH: 1
COUGH: 1
SPUTUM PRODUCTION: 0

## 2023-05-22 ASSESSMENT — PAIN DESCRIPTION - PAIN TYPE: TYPE: ACUTE PAIN

## 2023-05-22 ASSESSMENT — FIBROSIS 4 INDEX: FIB4 SCORE: 1.68

## 2023-05-22 NOTE — PROGRESS NOTES
"Interval History:  Feels tired, left arm is swollen.    Physical Exam   Blood pressure 114/74, pulse 93, temperature 36.1 °C (96.9 °F), temperature source Temporal, resp. rate 20, height 1.803 m (5' 11\"), weight 97 kg (213 lb 13.5 oz), SpO2 95 %.    Constitutional:  Appears well-developed.   HENT: Normocephalic and atraumatic. No scleral icterus.   Neck: No JVD present.   Cardiovascular: Normal rate. Exam reveals no gallop and no friction rub. No murmur heard.   Pulmonary/Chest: Coarse breath sounds  Abdominal: S/NT/ND BS+   Musculoskeletal: Exhibits no edema. Pulses present.  Skin: Skin is warm and dry.         Intake/Output Summary (Last 24 hours) at 5/22/2023 1148  Last data filed at 5/22/2023 0800  Gross per 24 hour   Intake 220 ml   Output --   Net 220 ml           Recent Labs     05/22/23  0155   SODIUM 138   POTASSIUM 3.9   CHLORIDE 101   CO2 26   GLUCOSE 142*   BUN 30*   CREATININE 0.89   CALCIUM 8.3*                       No current facility-administered medications on file prior to encounter.     Current Outpatient Medications on File Prior to Encounter   Medication Sig Dispense Refill    ascorbic acid (VITAMIN C) 500 MG tablet Take 500 mg by mouth every day.      Cholecalciferol (VITAMIN D3) 50 MCG (2000 UT) Tab Take 2,000 Units by mouth every day.      ferrous sulfate 325 (65 Fe) MG tablet Take 325 mg by mouth every day.      hydroCHLOROthiazide (HYDRODIURIL) 12.5 MG tablet Take 12.5 mg by mouth every day.      tamsulosin (FLOMAX) 0.4 MG capsule Take 0.4 mg by mouth every day.      clotrimazole (MYCELEX) 10 MG Tye tye Take 10 mg by mouth every day.      oxyCODONE immediate-release (ROXICODONE) 5 MG Tab Take 5 mg by mouth every 12 hours as needed for Severe Pain.      clopidogrel (PLAVIX) 75 MG Tab Take 1 tablet by mouth every day. 90 tablet 2    atorvastatin (LIPITOR) 80 MG tablet Take 1 Tab by mouth every evening. 30 Tab 0    metoprolol SR (TOPROL XL) 50 MG TB24 Take 50 mg by mouth every day.      " finasteride (PROSCAR) 5 MG TABS Take 5 mg by mouth every day.      nitroglycerin (NITROSTAT) 0.4 MG SUBL Place 0.4 mg under tongue every 5 minutes as needed.           Current Facility-Administered Medications   Medication Dose Frequency Provider Last Rate Last Admin    furosemide (LASIX) injection 40 mg  40 mg Q DAY Chino Alvarenga M.D.   40 mg at 05/22/23 0520    potassium chloride SA (Kdur) tablet 40 mEq  40 mEq DAILY Chino Alvarenga M.D.   40 mEq at 05/22/23 0518    apixaban (ELIQUIS) tablet 10 mg  10 mg BID Chino Alvarenga M.D.   10 mg at 05/22/23 0518    Followed by    [START ON 5/28/2023] apixaban (ELIQUIS) tablet 5 mg  5 mg BID Chino Alvarenga M.D.        dextrose 5% infusion   Continuous Chino Alvarenga M.D. 30 mL/hr at 05/21/23 1756 New Bag at 05/21/23 1756    amiodarone (Nexterone) 360 mg/200 mL infusion  0.5 mg/min Continuous Chino Alvarenga M.D. 16.7 mL/hr at 05/22/23 1126 0.5 mg/min at 05/22/23 1126    metoprolol SR (TOPROL XL) tablet 50 mg  50 mg BID Luis Landry M.D.   50 mg at 05/22/23 0518    benzonatate (TESSALON) capsule 100 mg  100 mg TID PRN Chino Alvarenga M.D.   100 mg at 05/20/23 1732    clopidogrel (PLAVIX) tablet 75 mg  75 mg DAILY August Castrejon M.D.   75 mg at 05/22/23 0519    finasteride (PROSCAR) tablet 5 mg  5 mg DAILY August Castrejon M.D.   5 mg at 05/22/23 0519    losartan (COZAAR) tablet 50 mg  50 mg DAILY August Castrejon M.D.   50 mg at 05/22/23 0518    magnesium oxide tablet 400 mg  400 mg DAILY August Castrejon M.D.   400 mg at 05/22/23 0518    nitroglycerin (NITROSTAT) tablet 0.4 mg  0.4 mg Q5 MIN PRN August Castrejon M.D.        senna-docusate (PERICOLACE or SENOKOT S) 8.6-50 MG per tablet 2 Tablet  2 Tablet BID August Castrejon M.D.   2 Tablet at 05/21/23 0539    And    polyethylene glycol/lytes (MIRALAX) PACKET 1 Packet  1 Packet QDAY PRN August Castrejon M.D.        And    magnesium hydroxide (MILK OF MAGNESIA) suspension 30 mL  30 mL QDAY PRN August Castrejon M.D.         And    bisacodyl (DULCOLAX) suppository 10 mg  10 mg QDAY PRN August Castrejon M.D.        acetaminophen (Tylenol) tablet 650 mg  650 mg Q6HRS PRN August Castrejon M.D.   650 mg at 05/20/23 1732    labetalol (NORMODYNE/TRANDATE) injection 10 mg  10 mg Q4HRS PRN August Castrejon M.D.        atorvastatin (LIPITOR) tablet 80 mg  80 mg Q EVENING Kochfranklin Castrejon M.D.   80 mg at 05/21/23 1751    Metoprolol Tartrate (LOPRESSOR) injection 5 mg  5 mg Q5 MIN PRN August Castrejon M.D.        lactated ringers infusion (BOLUS)  500 mL Once PRN August Castrejon M.D.        GI Cocktail (hyoscyamine-lidocaine-Maalox) oral susp cup 15 mL  15 mL Q6HRS PRN August Castrejon M.D.       Last reviewed on 5/18/2023  5:44 PM by Merlene Burnette   Medications reviewed    Imaging reviewed    Echocardiogram 5/21/2023 reviewed, independently interpreted  CONCLUSIONS  Technically difficult study.  Mildly depressed left ventricular systolic function. The left   ventricular ejection fraction is visually estimated to be 45%.  Mildly dilated right ventricle. Reduced right ventricular systolic   function.  Mild tricuspid regurgitation. Right ventricular systolic pressure is   estimated to be 33 mmHg (normal).  Compared to prior study on 1/27/2021, LVEF is now mildly depressed.  Primary team is notified of findings.      Impressions:  78-year-old male patient with history of atrial fibrillation, hypertension, presented with acute inferior MI late presentation, occluded RCA with collaterals, intervention was not performed.  He has recurrent wide-complex tachycardia, pacemaker interrogation showed atrial fibrillation.  Also has left upper extremity DVT    Recommendations:  Continue amiodarone.  We will reinterrogate pacemaker today.  Check CBC, check for HIT.  Continue Eliquis.  Continue dual antiplatelet therapy, statin.  Recommend speech therapy evaluation.  Ambulate.  Continue IV diuresis    Discussed with primary physician and  bedside nursing.    Do not hesitate to call me with questions regarding the patient care.    Fredi Munoz  Interventional cardiologist  Cell: [phone]

## 2023-05-22 NOTE — CARE PLAN
"   The patient is Stable - Low risk of patient condition declining or worsening    Shift Goals  Clinical Goals: Monitor cardiac rate/rhythm  Patient Goals: breath better and go home    Progress made toward(s) clinical / shift goals:  Patient remains free  from fall at this time, alert and orienred, performed partial bed bath and bro care to promote rest,comfort and skin integrity, no pain was reported during rounding, administered medication as ordered, call light within reach and bed alarm was kept on,/67   Pulse 79   Temp 36.1 °C (97 °F) (Temporal)   Resp 20   Ht 1.803 m (5' 11\")   Wt 97 kg (213 lb 13.5 oz)   SpO2 94%  will continue to monitor           "

## 2023-05-22 NOTE — PROGRESS NOTES
"[facility] Medicine Daily Progress Note    Date of Service  5/22/2023    Chief Complaint  Hi Montes De Oca is a 78 y.o. male admitted 5/18/2023 with abdominal pain.    Hospital Course  Mr. Montes De Oca is a 78 y.o. male who presented 5/18/2023 as a transfer from [facility] for epigastric pain and abdominal pain.   He has a history of MI on ASA, Plavix and statin, par Afib s/p pacer on BB and amiodarone  but not on anticoagulation based on records, hypertension. He is a poor historian and has cognitive deficits. Per EDP he was transferred here for NSTEMI on hepa gtt. Patient does not know his medications much but did say he was having epigastric pain for the past few days, had one episode of vomiting that is nonbloody and like the food he ate and one episode of diarrhea, which he says is melanotic. He denied smoking or alcohol. I personally reviewed the records. At [facility] he was afebrile and hemodynamically stable. A CTA Chest showed no PE but made comment of possible \"heart failure\". A CTA Ab/P reveled cholecystectomy but made no comment of the bile duct, pancreas and kidneys normal other than some renal cysts and no mention of colitis. His labs were significant for a leukocytosis of 20K, Hb was actually normal at 13, nornal PLTs. His kidney function was normal but he had a mildly elevated AST and a slightly elevated bilirubin. Lipase was normal. His troponin was elevated at 5000 and lactic slightly elevated. He was given a dose of Zosyn and put on hepa gtt and transferred here.  At the ED, he is afebrile, normotensive, HR 80-90s.  EDP is consulting Cardiology.  When I saw him at the ED, he is not having chest pain. Cognitive deficits. He was mildly tender epigastric region.  Dr. Landry, Cardiology came at bedside and tentatively planned cardiac catheterization tomorrow    Interval Problem Update  5/19: Mr. Montes De Oca was evaluated and examined on the telemetry floor. His WBC came down from 17.8 yesterday to 13 today. " He has been on IV Zosyn. 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 The heparin drip will be stopped. Hold on long term anticoag in the setting of afib due to hx hematuria.   5/20: Mr. Montes De Oca was seen and evaluated on the tele floor. He has developed a significant cough and now is on 5 liters of oxygen. Speech path saw him and he is on a dysphagia diet.   A chest xray was ordered and did not reveal infiltrates. PT recommends post-acute placement but he is hoping to go home. Echo remains pending.  5/21: Mr. Montes De Oca remains on 6 liters of oxygen.  The echo reveals an EF 45% down from 60% January 2021. IV lasix will be initiated. His left arm is quite swollen. A duplex reveals acute thrombosis from the jugular, subclavian, axillary, and brachial veins. Aspirin will be stopped and Eliquis will be started. Monitor for bleeding.   He developed ventricular tachycardia rate 210 for 6 minutes. I was called to the room and evaluated the rhythm strip. Pads were placed for possible cardioversion. 2 grams of IV magnesium and 20 mEq IV K were given. An IV amiodarone bolus of 150 mg was given followed by a drip. I discussed with Dr. Schultz cardiology for re-consultation.   5/22: Mr. Montes De Oca was evaluated and examined on the telemetry floor. His left arm remains quite swollen. He hasn't had any bleeding nor hematuria while on Eliquis. He has a coarse cough and is on 3 liters of oxygen. IV lasix and potassium ordered. He remains on the IV amiodarone drip and no further V tach today.     I have discussed this patient's plan of care and discharge plan at IDT rounds today with Case Management, Nursing, Nursing leadership, and other members of the IDT team.    Consultants/Specialty  Cardiology. I discussed with Dr. Munoz in person.     Code Status  Full Code    Disposition  The patient is not medically cleared for discharge to home or a post-acute facility.  Anticipate discharge to: skilled nursing facility    I have  placed the appropriate orders for post-discharge needs.    Review of Systems  Review of Systems   Constitutional:         Generally weak   Respiratory:  Positive for cough and shortness of breath. Negative for sputum production.    Cardiovascular:  Negative for chest pain.   Gastrointestinal:  Negative for abdominal pain.   Musculoskeletal:         Left arm swelling   All other systems reviewed and are negative.       Physical Exam  Temp:  [36.1 °C (96.9 °F)-36.8 °C (98.2 °F)] 36.1 °C (96.9 °F)  Pulse:  [] 93  Resp:  [18-26] 20  BP: (105-134)/(59-74) 114/74  SpO2:  [91 %-95 %] 95 %    Physical Exam  Vitals and nursing note reviewed.   Constitutional:       General: He is not in acute distress.     Appearance: He is not toxic-appearing.   Cardiovascular:      Rate and Rhythm: Normal rate and regular rhythm.      Comments: Left chest pacemaker  Pulmonary:      Breath sounds: Rhonchi and rales present.      Comments: 5 liters of oxygen  Musculoskeletal:      Comments: + bruising and hematoma from the cath site  Marked left arm swelling.    Neurological:      Mental Status: He is alert.   Psychiatric:         Mood and Affect: Mood normal.         Behavior: Behavior normal.         Fluids    Intake/Output Summary (Last 24 hours) at 5/22/2023 0800  Last data filed at 5/21/2023 1400  Gross per 24 hour   Intake 360 ml   Output --   Net 360 ml         Laboratory        Recent Labs     05/22/23  0155   SODIUM 138   POTASSIUM 3.9   CHLORIDE 101   CO2 26   GLUCOSE 142*   BUN 30*   CREATININE 0.89   CALCIUM 8.3*                         Imaging  US-EXTREMITY VENOUS LOWER UNILAT LEFT   Final Result      EC-ECHOCARDIOGRAM COMPLETE W/ CONT   Final Result      OUTSIDE IMAGES-CT CHEST   Final Result      OUTSIDE IMAGES-DX CHEST   Final Result      OUTSIDE IMAGES-CT ABDOMEN /PELVIS   Final Result      DX-CHEST-LIMITED (1 VIEW)   Final Result         Stable chest x-ray findings suggesting bilateral dependent edema versus  atelectasis with trace effusions.      US-RUQ   Final Result      1.  No acute abnormality.   2.  Status post cholecystectomy.   3.  The pancreas is obscured by overlying bowel gas and not evaluated.      DX-CHEST-LIMITED (1 VIEW)   Final Result         Low lung volume with hypoventilatory change and bibasilar atelectasis.      CL-LEFT HEART CATHETERIZATION WITH POSSIBLE INTERVENTION    (Results Pending)   US-EXTREMITY VENOUS UPPER UNILAT LEFT    (Results Pending)          Assessment/Plan  * NSTEMI (non-ST elevated myocardial infarction), h/o Afib not on anticoag, leukocytosis (HCC)  Assessment & Plan  Admitted  to inpatient, telemetry  Heart cath on 5/19 reveals a chronically occluded RCA  Plavix,  high dose statin,  Echo reveals a decreased EF 45%  Cardiology consulted   Continuous telemetry monitoring to evaluate for arrhythmias    Ventricular tachycardia (HCC)  Assessment & Plan  6 minutes of v tach rate 210 on 5/21  IV magnesium and IV potassium were given  IV amiodarone 150 mg bolus followed by a drip  Continuous tele monitoring  Pacemaker interrogation and possible EP consult      DVT of upper extremity (deep vein thrombosis) (HCC)- (present on admission)  Assessment & Plan  New diagnosis  Start Eliquis and stop aspirin  Monitor for bleeding in the setting of full dose anticoagulation and plavix    Acute respiratory failure with hypoxia (HCC)- (present on admission)  Assessment & Plan  He had been requiring 5 liters of oxgyen now on 3 liters  There was concern for aspiration thus Speech path consulted for swallow eval  Likely volume overload given his lowered EF  IV lasix and potassium ordered. Follow urine output   BMP ordered for the morning to follow Cr and potassium    Hypokalemia- (present on admission)  Assessment & Plan  Ordered potassium replacement and magnesium level    Epigastric pain  Assessment & Plan  Mild, CTA/P at [facility] unremarkable.  With slightly elevated bilirubin and no commen on bile  duct  Liver ultrasound is negative  Stopped IV Zosyn  This resolved.    AF (atrial fibrillation) (HCC)  Assessment & Plan  Continue amiodarone, BB  Eliquis    Essential hypertension, benign- (present on admission)  Assessment & Plan  Cozaar and Toprol with holding parameters.          VTE prophylaxis: SCDs/TEDs Eliquis    I have performed a physical exam and reviewed and updated ROS and Plan today (5/22/2023). In review of yesterday's note (5/21/2023), there are no changes except as documented above.

## 2023-05-22 NOTE — PROGRESS NOTES
Monitor Summary  Rhythm: afib vatch  Rate: 84 102  Ectopy: pvcs; trigeminy; bigeminy; couplet  .0 / .09 / .0

## 2023-05-22 NOTE — PROGRESS NOTES
Bedside report received from night RN, pt care assumed, assessment completed. Pt is A&O4, pain 0 , afib on the monitor. Updated on POC, questions answered. Bed in lowest, locked position, treaded socks on, call light and belongings within reach.

## 2023-05-22 NOTE — CARE PLAN
The patient is Watcher - Medium risk of patient condition declining or worsening    Shift Goals  Clinical Goals: Monitor cardiac rate/rhythm  Patient Goals: breath better and go home    Progress made toward(s) clinical / shift goals:  Progressing      Problem: Knowledge Deficit - Standard  Goal: Patient and family/care givers will demonstrate understanding of plan of care, disease process/condition, diagnostic tests and medications  5/21/2023 2132 by Yan Ramos R.N.  Outcome: Progressing  Note: Pt verbalizes understanding of plan of care  5/21/2023 2132 by Yan Ramos R.N.  Outcome: Progressing     Problem: Pain - Standard  Goal: Alleviation of pain or a reduction in pain to the patient’s comfort goal  Outcome: Progressing  Note: Pt reports no pain at this time     Problem: Fall Risk  Goal: Patient will remain free from falls  Outcome: Progressing  Note: Pt wearing treaded slipper socks, bed alarm in use, call light within reach

## 2023-05-23 ENCOUNTER — APPOINTMENT (OUTPATIENT)
Dept: RADIOLOGY | Facility: MEDICAL CENTER | Age: 78
DRG: 280 | End: 2023-05-23
Attending: NURSE PRACTITIONER
Payer: MEDICARE

## 2023-05-23 LAB
ANION GAP SERPL CALC-SCNC: 11 MMOL/L (ref 7–16)
BACTERIA BLD CULT: NORMAL
BACTERIA BLD CULT: NORMAL
BUN SERPL-MCNC: 24 MG/DL (ref 8–22)
CALCIUM SERPL-MCNC: 8.7 MG/DL (ref 8.5–10.5)
CHLORIDE SERPL-SCNC: 100 MMOL/L (ref 96–112)
CO2 SERPL-SCNC: 23 MMOL/L (ref 20–33)
CREAT SERPL-MCNC: 0.75 MG/DL (ref 0.5–1.4)
ERYTHROCYTE [DISTWIDTH] IN BLOOD BY AUTOMATED COUNT: 50.7 FL (ref 35.9–50)
GFR SERPLBLD CREATININE-BSD FMLA CKD-EPI: 91 ML/MIN/1.73 M 2
GLUCOSE SERPL-MCNC: 147 MG/DL (ref 65–99)
HCT VFR BLD AUTO: 38.2 % (ref 42–52)
HGB BLD-MCNC: 12.3 G/DL (ref 14–18)
MCH RBC QN AUTO: 30.4 PG (ref 27–33)
MCHC RBC AUTO-ENTMCNC: 32.2 G/DL (ref 32.3–36.5)
MCV RBC AUTO: 94.3 FL (ref 81.4–97.8)
PLATELET # BLD AUTO: 493 K/UL (ref 164–446)
PMV BLD AUTO: 12.4 FL (ref 9–12.9)
POTASSIUM SERPL-SCNC: 4.4 MMOL/L (ref 3.6–5.5)
RBC # BLD AUTO: 4.05 M/UL (ref 4.7–6.1)
SIGNIFICANT IND 70042: NORMAL
SIGNIFICANT IND 70042: NORMAL
SITE SITE: NORMAL
SITE SITE: NORMAL
SODIUM SERPL-SCNC: 134 MMOL/L (ref 135–145)
SOURCE SOURCE: NORMAL
SOURCE SOURCE: NORMAL
WBC # BLD AUTO: 15.6 K/UL (ref 4.8–10.8)

## 2023-05-23 PROCEDURE — 700102 HCHG RX REV CODE 250 W/ 637 OVERRIDE(OP): Performed by: HOSPITALIST

## 2023-05-23 PROCEDURE — 700102 HCHG RX REV CODE 250 W/ 637 OVERRIDE(OP): Performed by: NURSE PRACTITIONER

## 2023-05-23 PROCEDURE — 99232 SBSQ HOSP IP/OBS MODERATE 35: CPT | Performed by: INTERNAL MEDICINE

## 2023-05-23 PROCEDURE — 85027 COMPLETE CBC AUTOMATED: CPT

## 2023-05-23 PROCEDURE — A9270 NON-COVERED ITEM OR SERVICE: HCPCS | Performed by: NURSE PRACTITIONER

## 2023-05-23 PROCEDURE — 92526 ORAL FUNCTION THERAPY: CPT

## 2023-05-23 PROCEDURE — 36415 COLL VENOUS BLD VENIPUNCTURE: CPT

## 2023-05-23 PROCEDURE — 700102 HCHG RX REV CODE 250 W/ 637 OVERRIDE(OP): Performed by: INTERNAL MEDICINE

## 2023-05-23 PROCEDURE — A9270 NON-COVERED ITEM OR SERVICE: HCPCS | Performed by: HOSPITALIST

## 2023-05-23 PROCEDURE — 93971 EXTREMITY STUDY: CPT | Mod: RT

## 2023-05-23 PROCEDURE — 97530 THERAPEUTIC ACTIVITIES: CPT

## 2023-05-23 PROCEDURE — 700111 HCHG RX REV CODE 636 W/ 250 OVERRIDE (IP): Performed by: HOSPITALIST

## 2023-05-23 PROCEDURE — A9270 NON-COVERED ITEM OR SERVICE: HCPCS | Performed by: INTERNAL MEDICINE

## 2023-05-23 PROCEDURE — 97166 OT EVAL MOD COMPLEX 45 MIN: CPT

## 2023-05-23 PROCEDURE — 99291 CRITICAL CARE FIRST HOUR: CPT | Performed by: STUDENT IN AN ORGANIZED HEALTH CARE EDUCATION/TRAINING PROGRAM

## 2023-05-23 PROCEDURE — 770020 HCHG ROOM/CARE - TELE (206)

## 2023-05-23 PROCEDURE — 97116 GAIT TRAINING THERAPY: CPT

## 2023-05-23 PROCEDURE — 80048 BASIC METABOLIC PNL TOTAL CA: CPT

## 2023-05-23 RX ORDER — AMIODARONE HYDROCHLORIDE 200 MG/1
400 TABLET ORAL DAILY
Status: DISCONTINUED | OUTPATIENT
Start: 2023-05-23 | End: 2023-05-29 | Stop reason: HOSPADM

## 2023-05-23 RX ORDER — AMIODARONE HYDROCHLORIDE 200 MG/1
200 TABLET ORAL DAILY
Status: DISCONTINUED | OUTPATIENT
Start: 2023-06-02 | End: 2023-05-29 | Stop reason: HOSPADM

## 2023-05-23 RX ADMIN — ATORVASTATIN CALCIUM 80 MG: 80 TABLET, FILM COATED ORAL at 18:00

## 2023-05-23 RX ADMIN — APIXABAN 10 MG: 5 TABLET, FILM COATED ORAL at 17:15

## 2023-05-23 RX ADMIN — METOPROLOL SUCCINATE 75 MG: 50 TABLET, EXTENDED RELEASE ORAL at 17:16

## 2023-05-23 RX ADMIN — METOPROLOL SUCCINATE 75 MG: 50 TABLET, EXTENDED RELEASE ORAL at 04:36

## 2023-05-23 RX ADMIN — FINASTERIDE 5 MG: 5 TABLET, FILM COATED ORAL at 04:36

## 2023-05-23 RX ADMIN — Medication 400 MG: at 04:36

## 2023-05-23 RX ADMIN — CLOPIDOGREL BISULFATE 75 MG: 75 TABLET ORAL at 04:36

## 2023-05-23 RX ADMIN — AMIODARONE HYDROCHLORIDE 400 MG: 200 TABLET ORAL at 13:54

## 2023-05-23 RX ADMIN — FUROSEMIDE 40 MG: 10 INJECTION INTRAMUSCULAR; INTRAVENOUS at 04:36

## 2023-05-23 RX ADMIN — LOSARTAN POTASSIUM 50 MG: 50 TABLET, FILM COATED ORAL at 04:36

## 2023-05-23 RX ADMIN — SENNOSIDES AND DOCUSATE SODIUM 2 TABLET: 50; 8.6 TABLET ORAL at 17:15

## 2023-05-23 RX ADMIN — AMIODARONE HYDROCHLORIDE 0.5 MG/MIN: 1.8 INJECTION, SOLUTION INTRAVENOUS at 10:08

## 2023-05-23 RX ADMIN — POTASSIUM CHLORIDE 40 MEQ: 20 TABLET, EXTENDED RELEASE ORAL at 04:36

## 2023-05-23 RX ADMIN — APIXABAN 10 MG: 5 TABLET, FILM COATED ORAL at 04:36

## 2023-05-23 RX ADMIN — SENNOSIDES AND DOCUSATE SODIUM 2 TABLET: 50; 8.6 TABLET ORAL at 04:36

## 2023-05-23 ASSESSMENT — ENCOUNTER SYMPTOMS
COUGH: 1
SPUTUM PRODUCTION: 0
ABDOMINAL PAIN: 0
SHORTNESS OF BREATH: 1

## 2023-05-23 ASSESSMENT — COGNITIVE AND FUNCTIONAL STATUS - GENERAL
DRESSING REGULAR UPPER BODY CLOTHING: A LITTLE
DAILY ACTIVITIY SCORE: 17
TOILETING: A LITTLE
SUGGESTED CMS G CODE MODIFIER DAILY ACTIVITY: CK
DRESSING REGULAR LOWER BODY CLOTHING: A LOT
HELP NEEDED FOR BATHING: A LOT
SUGGESTED CMS G CODE MODIFIER MOBILITY: CK
MOVING FROM LYING ON BACK TO SITTING ON SIDE OF FLAT BED: A LITTLE
CLIMB 3 TO 5 STEPS WITH RAILING: A LITTLE
STANDING UP FROM CHAIR USING ARMS: A LITTLE
TURNING FROM BACK TO SIDE WHILE IN FLAT BAD: A LITTLE
MOVING TO AND FROM BED TO CHAIR: A LITTLE
PERSONAL GROOMING: A LITTLE
WALKING IN HOSPITAL ROOM: A LITTLE
MOBILITY SCORE: 18

## 2023-05-23 ASSESSMENT — GAIT ASSESSMENTS
DEVIATION: DECREASED HEEL STRIKE;DECREASED TOE OFF
ASSISTIVE DEVICE: FRONT WHEEL WALKER
DISTANCE (FEET): 20
GAIT LEVEL OF ASSIST: CONTACT GUARD ASSIST

## 2023-05-23 ASSESSMENT — PAIN DESCRIPTION - PAIN TYPE: TYPE: ACUTE PAIN

## 2023-05-23 ASSESSMENT — ACTIVITIES OF DAILY LIVING (ADL): TOILETING: INDEPENDENT

## 2023-05-23 NOTE — CARE PLAN
The patient is Watcher - Medium risk of patient condition declining or worsening    Shift Goals  Clinical Goals: Monitor VS, Prevent falls, Watch labs  Patient Goals: go home  Family Goals: na    Progress made toward(s) clinical / shift goals:    Problem: Knowledge Deficit - Standard  Goal: Patient and family/care givers will demonstrate understanding of plan of care, disease process/condition, diagnostic tests and medications  Outcome: Progressing     Problem: Hemodynamics  Goal: Patient's hemodynamics, fluid balance and neurologic status will be stable or improve  Outcome: Progressing     Problem: Respiratory  Goal: Patient will achieve/maintain optimum respiratory ventilation and gas exchange  Outcome: Progressing       Patient is not progressing towards the following goals:

## 2023-05-23 NOTE — THERAPY
"Speech Language Pathology   Daily Treatment     Patient Name: Hi Montes De Oca  AGE:  78 y.o., SEX:  male  Medical Record #: [id number]  Date of Service: 5/23/2023      Precautions:  Precautions: Fall Risk, Swallow Precautions         Subjective  Patient received awake, sitting [facility], participated fully with SLP tasks.      Assessment  Patient seen on this date for dysphagia tx with breakfast tray of minced & moist solids and thin liquids. Pt declined trials of advanced textures on this date. Baseline cough noted, though not exacerbated by PO intake. Adequate bolus acceptance, stripping from utensil and containment. Mild lingual residue resolved with thin liquid rinse. Pt endorsed tolerance and preference of current diet. No overt s/sx of aspiration appreciated.       Clinical Impressions  Swallow appears appropriate to continue with modified diet of MM5/TN0 with distant supervision during meals.       Recommendations  Treatment Completed: Dysphagia Treatment       Dysphagia Treatment  Diet Consistency: Minced & moist solids and thin liquids  Instrumentation: None indicated at this time  Medication: As tolerated  Supervision: Distant supervision - check on patient 2-3 times per meal  Positioning: Fully upright and midline during oral intake, Meals sitting upright in a chair, as tolerated  Risk Management : Small bites/sips, Slow rate of intake, Physical mobility, as tolerated  Oral Care: BID  HOLD PO with difficulty or change in respiratory status              SLP Treatment Plan  Treatment Plan: Dysphagia Treatment  SLP Frequency: 3x Per Week  Estimated Duration: Until Therapy Goals Met      Anticipated Discharge Needs  Discharge Recommendations: Other (Dependent on status at time of d/c)  Therapy Recommendations Upon DC: Dysphagia Training, Patient / Family / Caregiver Education      Patient / Family Goals  Patient / Family Goal #1: \"Don't throw that away\"  Goal #1 Outcome: Progressing as expected  Short Term " Goals  Short Term Goal # 1: Pt will consume MM5/TN0 diet with direct supervision from nursing staff and adherence to posted swallow precautions with no overt s/sx concerning for aspiration.  Goal Outcome # 1: Progressing as expected      Yulijacy Butts MS,CCC-SLP

## 2023-05-23 NOTE — THERAPY
"Occupational Therapy   Initial Evaluation     Patient Name: Hi Montes De Oca  Age:  78 y.o., Sex:  male  Medical Record #: [id number]  Today's Date: 5/23/2023     Precautions  Precautions: Fall Risk, Swallow Precautions  Comments: R wrist access for catheterization    Assessment  Patient is 78 y.o. male who presents to acute w/ abdominal pain. Pt found to have NSTEMI, and DVT in L UE now on Eliquis, is now s/p L heart cath on 5/19. Pt demo'd impaired cognition, functional mobility, and activity tolerance impacting functional independence. Will continue to follow while in house.      Plan    Occupational Therapy Initial Treatment Plan   Treatment Interventions: (P) Self Care / Activities of Daily Living, Neuro Re-Education / Balance, Therapeutic Exercises, Therapeutic Activity, Adaptive Equipment  Treatment Frequency: (P) 3 Times per Week  Duration: (P) Until Therapy Goals Met    DC Equipment Recommendations: (P) Unable to determine at this time  Discharge Recommendations: (P) Recommend post-acute placement for additional occupational therapy services prior to discharge home     Subjective    \"How long will my leg hurt?\"     Objective       05/23/23 0931   Charge Group   OT Evaluation OT Evaluation Mod   Total Time Spent   OT Evaluation (Minutes) 25   OT Total Time Spent (Calculated) 25   Initial Contact Note    Initial Contact Note Order Received and Verified, Occupational Therapy Evaluation in Progress with Full Report to Follow.   Prior Living Situation   Prior Services Home-Independent   Housing / Facility 1 Story House   Bathroom Set up Walk In Shower;Bathtub / Shower Combination  (reports he sponge bathes)   Equipment Owned Front-Wheel Walker;Single Point Cane   Lives with - Patient's Self Care Capacity Alone and Able to Care For Self   Comments Pt appears to have limited to social supports   Prior Level of ADL Function   Self Feeding Independent   Grooming / Hygiene Independent   Bathing Independent "   Dressing Independent   Toileting Independent   Prior Level of IADL Function   Medication Management Independent   Laundry Independent   Kitchen Mobility Independent   Finances Independent   Home Management Independent   Shopping Independent   Prior Level Of Mobility Independent With Device in Community;Independent Without Device in Home   Driving / Transportation Driving Independent   Precautions   Precautions Fall Risk;Swallow Precautions   Vitals   O2 (LPM) 3   O2 Delivery Device Silicone Nasal Cannula   Pain 0 - 10 Group   Therapist Pain Assessment Post Activity Pain Same as Prior to Activity;Nurse Notified  (c/o L UE pain and R LE pain, agreeable)   Cognition    Cognition / Consciousness X   Speech/ Communication Hard of Hearing   Level of Consciousness Alert   Safety Awareness Impaired   Comments pleasent and cooperative   Active ROM Upper Body   Comments WFL   Strength Upper Body   Comments generalized weakness, c/o L UE pain   Coordination Upper Body   Coordination X   Fine Motor Coordination impaired L UE   Balance Assessment   Sitting Balance (Static) Fair   Sitting Balance (Dynamic) Fair   Standing Balance (Static) Fair -   Standing Balance (Dynamic) Poor +   Weight Shift Sitting Fair   Weight Shift Standing Fair   Comments w/ FWW   Bed Mobility    Supine to Sit Minimal Assist   Sit to Supine   (NT in chair post)   Scooting Standby Assist   ADL Assessment   Grooming Minimal Assist;Standing   Upper Body Dressing Minimal Assist   Lower Body Dressing Maximal Assist   How much help from another person does the patient currently need...   Putting on and taking off regular lower body clothing? 2   Bathing (including washing, rinsing, and drying)? 2   Toileting, which includes using a toilet, bedpan, or urinal? 3   Putting on and taking off regular upper body clothing? 3   Taking care of personal grooming such as brushing teeth? 3   Eating meals? 4   6 Clicks Daily Activity Score 17   Functional Mobility   Sit  to Stand Contact Guard Assist   Bed, Chair, Wheelchair Transfer Minimal Assist   Mobility Around the bed w/ FWW   Activity Tolerance   Sitting in Chair 10+ min (up post)   Sitting Edge of Bed 5 min   Standing 3-4 min   Patient / Family Goals   Patient / Family Goal #1 To go home   Short Term Goals   Short Term Goal # 1 Pt will demo LB dressing w/ SPV   Short Term Goal # 2 Pt will demo standing grooming w/ SPV   Short Term Goal # 3 Pt will demo toilet txf w/ SPV   Short Term Goal # 4 Pt will demo toilet hygiene w/ SPV   Education Group   Role of Occupational Therapist Patient Response Patient;Acceptance;Explanation;Demonstration;Verbal Demonstration;Action Demonstration   Occupational Therapy Initial Treatment Plan    Treatment Interventions Self Care / Activities of Daily Living;Neuro Re-Education / Balance;Therapeutic Exercises;Therapeutic Activity;Adaptive Equipment   Treatment Frequency 3 Times per Week   Duration Until Therapy Goals Met   Problem List   Problem List Decreased Active Daily Living Skills;Decreased Homemaking Skills;Decreased Functional Mobility;Decreased Activity Tolerance;Impaired Postural Control / Balance   Anticipated Discharge Equipment and Recommendations   DC Equipment Recommendations Unable to determine at this time   Discharge Recommendations Recommend post-acute placement for additional occupational therapy services prior to discharge home   Interdisciplinary Plan of Care Collaboration   IDT Collaboration with  Nursing   Patient Position at End of Therapy Seated;Chair Alarm On;Call Light within Reach;Phone within Reach;Tray Table within Reach   Collaboration Comments report given   Session Information   Date / Session Number  5/23, 1 (1/3, 5/29)

## 2023-05-23 NOTE — PROGRESS NOTES
Report received from outgoing nurse, care transferred at 1900. Patient currently in afib 80-90 on the monitor and A&Ox 2. Patient found out of bed attempting to get dressed, bed alarm was plugged in but folded in half and not properly working. Patient reports no pain at this time but is disoriented to place and situation and confused in conversation. Whiteboard updated with plan for the day and names of staff. No other questions or concerns at this time from the patient. Call light within reach, fall precautions in place.

## 2023-05-23 NOTE — PROGRESS NOTES
Monitor Summary  Rhythm: afib  Rate:   Ectopy: pvc; trigeminy; bigeminy  .0 / .07 / .0

## 2023-05-23 NOTE — PROGRESS NOTES
"Interval History:  No significant changes from yesterday, Feels tired.  Left arm is swollen but better.    Physical Exam   Blood pressure 120/80, pulse 97, temperature 36.2 °C (97.1 °F), temperature source Temporal, resp. rate 20, height 1.803 m (5' 11\"), weight 95.6 kg (210 lb 12.2 oz), SpO2 92 %.    Constitutional:  Appears well-developed.   HENT: Normocephalic and atraumatic. No scleral icterus.   Neck: No JVD present.   Cardiovascular: Normal rate. Exam reveals no gallop and no friction rub. No murmur heard.   Pulmonary/Chest: Coarse breath sounds  Abdominal: S/NT/ND BS+   Musculoskeletal: Edema +  Skin: Skin is warm and dry.         Intake/Output Summary (Last 24 hours) at 5/23/2023 1242  Last data filed at 5/23/2023 1119  Gross per 24 hour   Intake 400 ml   Output 1300 ml   Net -900 ml       Recent Labs     05/23/23  0316   WBC 15.6*   RBC 4.05*   HEMOGLOBIN 12.3*   HEMATOCRIT 38.2*   MCV 94.3   MCH 30.4   MCHC 32.2*   RDW 50.7*   PLATELETCT 493*   MPV 12.4     Recent Labs     05/22/23  0155 05/23/23  0316   SODIUM 138 134*   POTASSIUM 3.9 4.4   CHLORIDE 101 100   CO2 26 23   GLUCOSE 142* 147*   BUN 30* 24*   CREATININE 0.89 0.75   CALCIUM 8.3* 8.7                       No current facility-administered medications on file prior to encounter.     Current Outpatient Medications on File Prior to Encounter   Medication Sig Dispense Refill    ascorbic acid (VITAMIN C) 500 MG tablet Take 500 mg by mouth every day.      Cholecalciferol (VITAMIN D3) 50 MCG (2000 UT) Tab Take 2,000 Units by mouth every day.      ferrous sulfate 325 (65 Fe) MG tablet Take 325 mg by mouth every day.      hydroCHLOROthiazide (HYDRODIURIL) 12.5 MG tablet Take 12.5 mg by mouth every day.      tamsulosin (FLOMAX) 0.4 MG capsule Take 0.4 mg by mouth every day.      clotrimazole (MYCELEX) 10 MG Tye tye Take 10 mg by mouth every day.      oxyCODONE immediate-release (ROXICODONE) 5 MG Tab Take 5 mg by mouth every 12 hours as needed for " Severe Pain.      clopidogrel (PLAVIX) 75 MG Tab Take 1 tablet by mouth every day. 90 tablet 2    atorvastatin (LIPITOR) 80 MG tablet Take 1 Tab by mouth every evening. 30 Tab 0    metoprolol SR (TOPROL XL) 50 MG TB24 Take 50 mg by mouth every day.      finasteride (PROSCAR) 5 MG TABS Take 5 mg by mouth every day.      nitroglycerin (NITROSTAT) 0.4 MG SUBL Place 0.4 mg under tongue every 5 minutes as needed.           Current Facility-Administered Medications   Medication Dose Frequency Provider Last Rate Last Admin    metoprolol SR (TOPROL XL) tablet 75 mg  75 mg BID Randi Forrester, A.P.R.N.   75 mg at 05/23/23 0436    guaiFENesin dextromethorphan (ROBITUSSIN DM) 100-10 MG/5ML syrup 5 mL  5 mL Q6HRS PRN Rosario Ventura, A.P.R.N.   5 mL at 05/22/23 2130    furosemide (LASIX) injection 40 mg  40 mg Q DAY Chino Alvarenga M.D.   40 mg at 05/23/23 0436    potassium chloride SA (Kdur) tablet 40 mEq  40 mEq DAILY Chino Alvarenga M.D.   40 mEq at 05/23/23 0436    apixaban (ELIQUIS) tablet 10 mg  10 mg BID Chino Alvarenga M.D.   10 mg at 05/23/23 0436    Followed by    [START ON 5/28/2023] apixaban (ELIQUIS) tablet 5 mg  5 mg BID Chino Alvarenga M.D.        dextrose 5% infusion   Continuous Chino Alvarenga M.D. 30 mL/hr at 05/22/23 2321 New Bag at 05/22/23 2321    amiodarone (Nexterone) 360 mg/200 mL infusion  0.5 mg/min Continuous Chino Alvarenga M.D. 16.7 mL/hr at 05/23/23 1008 0.5 mg/min at 05/23/23 1008    benzonatate (TESSALON) capsule 100 mg  100 mg TID PRN Chino Alvarenga M.D.   100 mg at 05/20/23 1732    clopidogrel (PLAVIX) tablet 75 mg  75 mg DAILY August Castrejon M.D.   75 mg at 05/23/23 0436    finasteride (PROSCAR) tablet 5 mg  5 mg DAILY August Castrejon M.D.   5 mg at 05/23/23 0436    losartan (COZAAR) tablet 50 mg  50 mg DAILY August Castrejon M.D.   50 mg at 05/23/23 0436    magnesium oxide tablet 400 mg  400 mg DAILY August Castrejon M.D.   400 mg at 05/23/23 0436    nitroglycerin (NITROSTAT) tablet 0.4  mg  0.4 mg Q5 MIN PRN August Castrejon M.D.        senna-docusate (PERICOLACE or SENOKOT S) 8.6-50 MG per tablet 2 Tablet  2 Tablet BID August Castrejon M.D.   2 Tablet at 05/23/23 0436    And    polyethylene glycol/lytes (MIRALAX) PACKET 1 Packet  1 Packet QDAY PRN August Castrejon M.D.        And    magnesium hydroxide (MILK OF MAGNESIA) suspension 30 mL  30 mL QDAY PRN August Castrejon M.D.        And    bisacodyl (DULCOLAX) suppository 10 mg  10 mg QDAY PRN August Castrjeon M.D.        acetaminophen (Tylenol) tablet 650 mg  650 mg Q6HRS PRN August Castrejon M.D.   650 mg at 05/20/23 1732    labetalol (NORMODYNE/TRANDATE) injection 10 mg  10 mg Q4HRS PRN August Castrejon M.D.        atorvastatin (LIPITOR) tablet 80 mg  80 mg Q EVENING August Castrejon M.D.   80 mg at 05/22/23 1727    Metoprolol Tartrate (LOPRESSOR) injection 5 mg  5 mg Q5 MIN PRN August Castrejon M.D.        lactated ringers infusion (BOLUS)  500 mL Once PRN August Castrejon M.D.        GI Cocktail (hyoscyamine-lidocaine-Maalox) oral susp cup 15 mL  15 mL Q6HRS PRN August Castrejon M.D.       Last reviewed on 5/18/2023  5:44 PM by eMrlene Burnette Medications reviewed    Imaging reviewed    Echocardiogram 5/21/2023 reviewed, independently interpreted  CONCLUSIONS  Technically difficult study.  Mildly depressed left ventricular systolic function. The left   ventricular ejection fraction is visually estimated to be 45%.  Mildly dilated right ventricle. Reduced right ventricular systolic   function.  Mild tricuspid regurgitation. Right ventricular systolic pressure is   estimated to be 33 mmHg (normal).  Compared to prior study on 1/27/2021, LVEF is now mildly depressed.  Primary team is notified of findings.      Impressions:  78-year-old male patient with history of atrial fibrillation, hypertension, presented with acute inferior MI late presentation, occluded RCA with collaterals, intervention was not performed.  He has recurrent  wide-complex tachycardia, pacemaker interrogation showed atrial fibrillation.  Also has left upper extremity DVT    Recommendations:    Afib rates better controlled. Lower extremity swelling is worse, getting US vascular.  Continue eliquis, metoprolol, crestor, pavix.  Continue IV diuresis.    Discussed with primary physician and bedside nursing.    Do not hesitate to call me with questions regarding the patient care.    Fredi Munoz  Interventional cardiologist  Cell: [phone]

## 2023-05-23 NOTE — CARE PLAN
"   The patient is Watcher - Medium risk of patient condition declining or worsening    Shift Goals  Clinical Goals: Monitor VS, Prevent falls, Watch labs  Patient Goals: go home  Family Goals: na    Progress made toward(s) clinical / shift goals:   Patient remains free fromfall at this time, complain of discomfort on his right leg and assisted him to bedside chair to promote comfort and ambulation, tolerated fairly well, still on 4 lpm NC, using urinal and condom cath was discontinued, performed bed bath and bro care, bed alarm was kept on and call light within reach,/69   Pulse 74   Temp 36.2 °C (97.1 °F) (Temporal)   Resp 18   Ht 1.803 m (5' 11\")   Wt 95.6 kg (210 lb 12.2 oz)   SpO2 91%  will continue to monitor       Patient is not progressing towards the following goals:    Problem: Hemodynamics  Goal: Patient's hemodynamics, fluid balance and neurologic status will be stable or improve    Right leg is swollen and US was done and was positive for DVT   "

## 2023-05-23 NOTE — THERAPY
Physical Therapy   Daily Treatment     Patient Name: Hi Montes De Oca  Age:  78 y.o., Sex:  male  Medical Record #: [id number]  Today's Date: 5/23/2023     Precautions  Precautions: (P) Fall Risk;Swallow Precautions    Assessment    Pt tolerated session fairly. He was limited by LE pain today. He declined further ambulation. Continue to anticipate post acute placement.     Plan    Treatment Plan Status: (P) Continue Current Treatment Plan  Type of Treatment: Bed Mobility, Gait Training, Neuro Re-Education / Balance, Stair Training, Therapeutic Activities, Therapeutic Exercise  Treatment Frequency: 4 Times per Week  Treatment Duration: Until Therapy Goals Met    DC Equipment Recommendations: (P) Unable to determine at this time  Discharge Recommendations: (P) Recommend post-acute placement for additional physical therapy services prior to discharge home      Subjective    Pt asleep at start of session, mildly grumpy but ultimately willing to participate in session. He reports LE pain several times throughout session limiting his ability.      Objective       05/23/23 0936   Precautions   Precautions Fall Risk;Swallow Precautions   Pain 0 - 10 Group   Location Leg   Location Orientation Right   Balance   Sitting Balance (Static) Fair   Sitting Balance (Dynamic) Fair   Standing Balance (Static) Fair -   Standing Balance (Dynamic) Poor +   Weight Shift Sitting Fair   Weight Shift Standing Fair   Bed Mobility    Supine to Sit Minimal Assist   Gait Analysis   Gait Level Of Assist Contact Guard Assist   Assistive Device Front Wheel Walker   Distance (Feet) 20   # of Times Distance was Traveled 1   Deviation Decreased Heel Strike;Decreased Toe Off   Comments Pt ambulated 20' within room from bed to bedside chair. He politely declined further ambulation stating LE and fatigue. He demonstrates increased reliance on BUE support and forward flexed posture.   Functional Mobility   Sit to Stand Contact Guard Assist   How much  difficulty does the patient currently have...   Turning over in bed (including adjusting bedclothes, sheets and blankets)? 3   Sitting down on and standing up from a chair with arms (e.g., wheelchair, bedside commode, etc.) 3   Moving from lying on back to sitting on the side of the bed? 3   How much help from another person does the patient currently need...   Moving to and from a bed to a chair (including a wheelchair)? 3   Need to walk in a hospital room? 3   Climbing 3-5 steps with a railing? 3   6 clicks Mobility Score 18   Short Term Goals    Short Term Goal # 1 Pt will perform supine<->sit with supv within 6 visits in order to return home   Goal Outcome # 1 Progressing slower than expected   Short Term Goal # 2 Pt will transfer bed<->chair with FWW or SPC with supv within 6 visits in order to return home   Goal Outcome # 2 Progressing slower than expected   Short Term Goal # 3 Pt will amb 300' with FWW or SPC with supv within 6 visits in order to return home   Goal Outcome # 3 Progressing slower than expected   Short Term Goal # 4 Pt will perform 6 steps with SPC and supv within 6 visits as required to enter/exit home   Goal Outcome # 4 Progressing slower than expected   Education Group   Role of Physical Therapist Patient Response Patient;Acceptance;Explanation;Verbal Demonstration   Physical Therapy Treatment Plan   Physical Therapy Treatment Plan Continue Current Treatment Plan   Anticipated Discharge Equipment and Recommendations   DC Equipment Recommendations Unable to determine at this time   Discharge Recommendations Recommend post-acute placement for additional physical therapy services prior to discharge home   Interdisciplinary Plan of Care Collaboration   IDT Collaboration with  Nursing;Occupational Therapist   Patient Position at End of Therapy Seated;Chair Alarm On;Phone within Reach;Tray Table within Reach;Call Light within Reach   Session Information   Date / Session Number  5/23-1 (2/4, 5/26)

## 2023-05-24 ENCOUNTER — APPOINTMENT (OUTPATIENT)
Dept: CARDIOLOGY | Facility: MEDICAL CENTER | Age: 78
DRG: 280 | End: 2023-05-24
Attending: NURSE PRACTITIONER
Payer: MEDICARE

## 2023-05-24 LAB
ANION GAP SERPL CALC-SCNC: 6 MMOL/L (ref 7–16)
BUN SERPL-MCNC: 19 MG/DL (ref 8–22)
CALCIUM SERPL-MCNC: 8.5 MG/DL (ref 8.5–10.5)
CHLORIDE SERPL-SCNC: 102 MMOL/L (ref 96–112)
CO2 SERPL-SCNC: 30 MMOL/L (ref 20–33)
CREAT SERPL-MCNC: 0.69 MG/DL (ref 0.5–1.4)
ERYTHROCYTE [DISTWIDTH] IN BLOOD BY AUTOMATED COUNT: 51.1 FL (ref 35.9–50)
GFR SERPLBLD CREATININE-BSD FMLA CKD-EPI: 93 ML/MIN/1.73 M 2
GLUCOSE SERPL-MCNC: 115 MG/DL (ref 65–99)
HCT VFR BLD AUTO: 38.3 % (ref 42–52)
HGB BLD-MCNC: 12.1 G/DL (ref 14–18)
MAGNESIUM SERPL-MCNC: 2.2 MG/DL (ref 1.5–2.5)
MCH RBC QN AUTO: 29.6 PG (ref 27–33)
MCHC RBC AUTO-ENTMCNC: 31.6 G/DL (ref 32.3–36.5)
MCV RBC AUTO: 93.6 FL (ref 81.4–97.8)
PLATELET # BLD AUTO: 582 K/UL (ref 164–446)
PMV BLD AUTO: 12.1 FL (ref 9–12.9)
POTASSIUM SERPL-SCNC: 4.6 MMOL/L (ref 3.6–5.5)
RBC # BLD AUTO: 4.09 M/UL (ref 4.7–6.1)
SODIUM SERPL-SCNC: 138 MMOL/L (ref 135–145)
WBC # BLD AUTO: 13.3 K/UL (ref 4.8–10.8)

## 2023-05-24 PROCEDURE — A9270 NON-COVERED ITEM OR SERVICE: HCPCS | Performed by: INTERNAL MEDICINE

## 2023-05-24 PROCEDURE — 700102 HCHG RX REV CODE 250 W/ 637 OVERRIDE(OP): Performed by: NURSE PRACTITIONER

## 2023-05-24 PROCEDURE — 700102 HCHG RX REV CODE 250 W/ 637 OVERRIDE(OP): Performed by: INTERNAL MEDICINE

## 2023-05-24 PROCEDURE — 94760 N-INVAS EAR/PLS OXIMETRY 1: CPT

## 2023-05-24 PROCEDURE — 700102 HCHG RX REV CODE 250 W/ 637 OVERRIDE(OP)

## 2023-05-24 PROCEDURE — 80048 BASIC METABOLIC PNL TOTAL CA: CPT

## 2023-05-24 PROCEDURE — 83735 ASSAY OF MAGNESIUM: CPT

## 2023-05-24 PROCEDURE — A9270 NON-COVERED ITEM OR SERVICE: HCPCS | Performed by: HOSPITALIST

## 2023-05-24 PROCEDURE — A9270 NON-COVERED ITEM OR SERVICE: HCPCS

## 2023-05-24 PROCEDURE — 99232 SBSQ HOSP IP/OBS MODERATE 35: CPT | Performed by: INTERNAL MEDICINE

## 2023-05-24 PROCEDURE — 36415 COLL VENOUS BLD VENIPUNCTURE: CPT

## 2023-05-24 PROCEDURE — 700111 HCHG RX REV CODE 636 W/ 250 OVERRIDE (IP): Performed by: HOSPITALIST

## 2023-05-24 PROCEDURE — 770020 HCHG ROOM/CARE - TELE (206)

## 2023-05-24 PROCEDURE — 85027 COMPLETE CBC AUTOMATED: CPT

## 2023-05-24 PROCEDURE — 99232 SBSQ HOSP IP/OBS MODERATE 35: CPT | Performed by: STUDENT IN AN ORGANIZED HEALTH CARE EDUCATION/TRAINING PROGRAM

## 2023-05-24 PROCEDURE — A9270 NON-COVERED ITEM OR SERVICE: HCPCS | Performed by: NURSE PRACTITIONER

## 2023-05-24 PROCEDURE — 700102 HCHG RX REV CODE 250 W/ 637 OVERRIDE(OP): Performed by: HOSPITALIST

## 2023-05-24 RX ORDER — ASPIRIN 81 MG/1
81 TABLET ORAL DAILY
Status: DISCONTINUED | OUTPATIENT
Start: 2023-05-24 | End: 2023-05-26

## 2023-05-24 RX ADMIN — METOPROLOL SUCCINATE 75 MG: 50 TABLET, EXTENDED RELEASE ORAL at 05:37

## 2023-05-24 RX ADMIN — ATORVASTATIN CALCIUM 80 MG: 80 TABLET, FILM COATED ORAL at 17:53

## 2023-05-24 RX ADMIN — APIXABAN 10 MG: 5 TABLET, FILM COATED ORAL at 17:53

## 2023-05-24 RX ADMIN — Medication 400 MG: at 05:38

## 2023-05-24 RX ADMIN — SENNOSIDES AND DOCUSATE SODIUM 2 TABLET: 50; 8.6 TABLET ORAL at 17:53

## 2023-05-24 RX ADMIN — METOPROLOL SUCCINATE 75 MG: 50 TABLET, EXTENDED RELEASE ORAL at 17:53

## 2023-05-24 RX ADMIN — ASPIRIN 81 MG: 81 TABLET, COATED ORAL at 12:27

## 2023-05-24 RX ADMIN — APIXABAN 10 MG: 5 TABLET, FILM COATED ORAL at 05:38

## 2023-05-24 RX ADMIN — FUROSEMIDE 40 MG: 10 INJECTION INTRAMUSCULAR; INTRAVENOUS at 05:38

## 2023-05-24 RX ADMIN — SENNOSIDES AND DOCUSATE SODIUM 2 TABLET: 50; 8.6 TABLET ORAL at 05:38

## 2023-05-24 RX ADMIN — GUAIFENESIN SYRUP AND DEXTROMETHORPHAN 5 ML: 100; 10 SYRUP ORAL at 12:27

## 2023-05-24 RX ADMIN — LOSARTAN POTASSIUM 50 MG: 50 TABLET, FILM COATED ORAL at 05:38

## 2023-05-24 RX ADMIN — FINASTERIDE 5 MG: 5 TABLET, FILM COATED ORAL at 05:38

## 2023-05-24 RX ADMIN — CLOPIDOGREL BISULFATE 75 MG: 75 TABLET ORAL at 05:38

## 2023-05-24 RX ADMIN — AMIODARONE HYDROCHLORIDE 400 MG: 200 TABLET ORAL at 05:37

## 2023-05-24 ASSESSMENT — PATIENT HEALTH QUESTIONNAIRE - PHQ9
2. FEELING DOWN, DEPRESSED, IRRITABLE, OR HOPELESS: NOT AT ALL
1. LITTLE INTEREST OR PLEASURE IN DOING THINGS: NOT AT ALL
SUM OF ALL RESPONSES TO PHQ9 QUESTIONS 1 AND 2: 0

## 2023-05-24 ASSESSMENT — FIBROSIS 4 INDEX: FIB4 SCORE: 0.63

## 2023-05-24 ASSESSMENT — ENCOUNTER SYMPTOMS
COUGH: 1
SPUTUM PRODUCTION: 0
ABDOMINAL PAIN: 0
SHORTNESS OF BREATH: 1

## 2023-05-24 NOTE — PROGRESS NOTES
"Interval History:  No significant changes from yesterday, Feels tired.  Left arm is swollen but better.    Physical Exam   Blood pressure 112/65, pulse 92, temperature 36.2 °C (97.2 °F), temperature source Temporal, resp. rate 20, height 1.803 m (5' 11\"), weight 95.6 kg (210 lb 12.2 oz), SpO2 92 %.    Constitutional:  Appears well-developed.   HENT: Normocephalic and atraumatic. No scleral icterus.   Neck: No JVD present.   Cardiovascular: Normal rate. Exam reveals no gallop and no friction rub. No murmur heard.   Pulmonary/Chest: Coarse breath sounds  Abdominal: S/NT/ND BS+   Musculoskeletal: Edema +  Skin: Skin is warm and dry.         Intake/Output Summary (Last 24 hours) at 5/24/2023 1039  Last data filed at 5/24/2023 0801  Gross per 24 hour   Intake 360 ml   Output 1820 ml   Net -1460 ml       Recent Labs     05/23/23  0316 05/24/23  0404   WBC 15.6* 13.3*   RBC 4.05* 4.09*   HEMOGLOBIN 12.3* 12.1*   HEMATOCRIT 38.2* 38.3*   MCV 94.3 93.6   MCH 30.4 29.6   MCHC 32.2* 31.6*   RDW 50.7* 51.1*   PLATELETCT 493* 582*   MPV 12.4 12.1     Recent Labs     05/22/23  0155 05/23/23  0316 05/24/23  0404   SODIUM 138 134* 138   POTASSIUM 3.9 4.4 4.6   CHLORIDE 101 100 102   CO2 26 23 30   GLUCOSE 142* 147* 115*   BUN 30* 24* 19   CREATININE 0.89 0.75 0.69   CALCIUM 8.3* 8.7 8.5                       No current facility-administered medications on file prior to encounter.     Current Outpatient Medications on File Prior to Encounter   Medication Sig Dispense Refill    ascorbic acid (VITAMIN C) 500 MG tablet Take 500 mg by mouth every day.      Cholecalciferol (VITAMIN D3) 50 MCG (2000 UT) Tab Take 2,000 Units by mouth every day.      ferrous sulfate 325 (65 Fe) MG tablet Take 325 mg by mouth every day.      hydroCHLOROthiazide (HYDRODIURIL) 12.5 MG tablet Take 12.5 mg by mouth every day.      tamsulosin (FLOMAX) 0.4 MG capsule Take 0.4 mg by mouth every day.      clotrimazole (MYCELEX) 10 MG Tye tye Take 10 mg by " mouth every day.      oxyCODONE immediate-release (ROXICODONE) 5 MG Tab Take 5 mg by mouth every 12 hours as needed for Severe Pain.      clopidogrel (PLAVIX) 75 MG Tab Take 1 tablet by mouth every day. 90 tablet 2    atorvastatin (LIPITOR) 80 MG tablet Take 1 Tab by mouth every evening. 30 Tab 0    metoprolol SR (TOPROL XL) 50 MG TB24 Take 50 mg by mouth every day.      finasteride (PROSCAR) 5 MG TABS Take 5 mg by mouth every day.      nitroglycerin (NITROSTAT) 0.4 MG SUBL Place 0.4 mg under tongue every 5 minutes as needed.           Current Facility-Administered Medications   Medication Dose Frequency Provider Last Rate Last Admin    amiodarone (Cordarone) tablet 400 mg  400 mg DAILY Randi Forrester A.P.R.N.   400 mg at 05/24/23 0537    [START ON 6/2/2023] amiodarone (Cordarone) tablet 200 mg  200 mg DAILY Randi Forrester A.P.R.N.        metoprolol SR (TOPROL XL) tablet 75 mg  75 mg BID Randi Forrester A.P.R.N.   75 mg at 05/24/23 0537    guaiFENesin dextromethorphan (ROBITUSSIN DM) 100-10 MG/5ML syrup 5 mL  5 mL Q6HRS PRN Rosario Ventura A.P.R.N.   5 mL at 05/22/23 2130    furosemide (LASIX) injection 40 mg  40 mg Q DAY Chino Alvarenga M.D.   40 mg at 05/24/23 0538    apixaban (ELIQUIS) tablet 10 mg  10 mg BID Chino Alvarenga M.D.   10 mg at 05/24/23 0538    Followed by    [START ON 5/28/2023] apixaban (ELIQUIS) tablet 5 mg  5 mg BID Chino Alvarenga M.D.        benzonatate (TESSALON) capsule 100 mg  100 mg TID PRN Chino Alvarenga M.D.   100 mg at 05/20/23 1732    clopidogrel (PLAVIX) tablet 75 mg  75 mg DAILY August Castrejon M.D.   75 mg at 05/24/23 0538    finasteride (PROSCAR) tablet 5 mg  5 mg DAILY August Castrejon M.D.   5 mg at 05/24/23 0538    losartan (COZAAR) tablet 50 mg  50 mg DAILY August Castrejon M.D.   50 mg at 05/24/23 0538    magnesium oxide tablet 400 mg  400 mg DAILY August Castrejon M.D.   400 mg at 05/24/23 0538    nitroglycerin (NITROSTAT) tablet 0.4 mg  0.4 mg Q5 MIN PRN August CAPONE  JAMAAL Castrejon        senna-docusate (PERICOLACE or SENOKOT S) 8.6-50 MG per tablet 2 Tablet  2 Tablet BID August Castrejon M.D.   2 Tablet at 05/24/23 0538    And    polyethylene glycol/lytes (MIRALAX) PACKET 1 Packet  1 Packet QDAY PRN August Castrejon M.D.        And    magnesium hydroxide (MILK OF MAGNESIA) suspension 30 mL  30 mL QDAY PRN August Castrejon M.D.        And    bisacodyl (DULCOLAX) suppository 10 mg  10 mg QDAY PRN August Castrejon M.D.        acetaminophen (Tylenol) tablet 650 mg  650 mg Q6HRS PRN August Castrejon M.D.   650 mg at 05/20/23 1732    labetalol (NORMODYNE/TRANDATE) injection 10 mg  10 mg Q4HRS PRN August Castrejon M.D.        atorvastatin (LIPITOR) tablet 80 mg  80 mg Q EVENING August Castrejon M.D.   80 mg at 05/23/23 1800    Metoprolol Tartrate (LOPRESSOR) injection 5 mg  5 mg Q5 MIN PRN August Castrejon M.D.        lactated ringers infusion (BOLUS)  500 mL Once PRN August Castrejon M.D.        GI Cocktail (hyoscyamine-lidocaine-Maalox) oral susp cup 15 mL  15 mL Q6HRS PRN August Castrejon M.D.       Last reviewed on 5/18/2023  5:44 PM by Merlene Burnette Medications reviewed    Imaging reviewed    Echocardiogram 5/21/2023 reviewed, independently interpreted  CONCLUSIONS  Technically difficult study.  Mildly depressed left ventricular systolic function. The left   ventricular ejection fraction is visually estimated to be 45%.  Mildly dilated right ventricle. Reduced right ventricular systolic   function.  Mild tricuspid regurgitation. Right ventricular systolic pressure is   estimated to be 33 mmHg (normal).  Compared to prior study on 1/27/2021, LVEF is now mildly depressed.  Primary team is notified of findings.      Impressions:  78-year-old male patient with history of atrial fibrillation, hypertension, presented with acute inferior MI late presentation, occluded RCA with collaterals, intervention was not performed.  He has recurrent wide-complex tachycardia,  pacemaker interrogation showed atrial fibrillation.  Also has left upper extremity DVT, right lower extremity    Recommendations:    No rapid ventricular response , rates well controlled.  It is not clear why he is developing significant deep venous thrombosis.  Consider heme-onc consultation.  He has thrombocytosis, unlikely HIT or TTP from the medications.  However I will stop clopidogrel, start aspirin 81 mg since he has not had intervention.  I do not see any obvious medications causing this issue.  Amiodarone can cause thrombocytopenia, DVT, thrombocytosis is not reported.  Continue amiodarone, metoprolol,  losartan.  Continue IV diuresis.  Repeat echocardiogram to evaluate RV, LV function.      Discussed with primary physician and bedside nursing.    Do not hesitate to call me with questions regarding the patient care.    Fredi Mnuoz  Interventional cardiologist  Cell: [phone]

## 2023-05-24 NOTE — PROGRESS NOTES
"[facility] Medicine Daily Progress Note    Date of Service  5/23/2023    Chief Complaint  Hi Montes De Oca is a 78 y.o. male admitted 5/18/2023 with abdominal pain.    Hospital Course  Mr. Montes De Oca is a 78 y.o. male who presented 5/18/2023 as a transfer from [place] for epigastric pain and abdominal pain.   He has a history of MI on ASA, Plavix and statin, par Afib s/p pacer on BB and amiodarone  but not on anticoagulation based on records, hypertension. He is a poor historian and has cognitive deficits. Per EDP he was transferred here for NSTEMI on hepa gtt. Patient does not know his medications much but did say he was having epigastric pain for the past few days, had one episode of vomiting that is nonbloody and like the food he ate and one episode of diarrhea, which he says is melanotic. He denied smoking or alcohol. I personally reviewed the records. At [place] he was afebrile and hemodynamically stable. A CTA Chest showed no PE but made comment of possible \"heart failure\". A CTA Ab/P reveled cholecystectomy but made no comment of the bile duct, pancreas and kidneys normal other than some renal cysts and no mention of colitis. His labs were significant for a leukocytosis of 20K, Hb was actually normal at 13, nornal PLTs. His kidney function was normal but he had a mildly elevated AST and a slightly elevated bilirubin. Lipase was normal. His troponin was elevated at 5000 and lactic slightly elevated. He was given a dose of Zosyn and put on hepa gtt and transferred here.  At the ED, he is afebrile, normotensive, HR 80-90s.  EDP is consulting Cardiology.  When I saw him at the ED, he is not having chest pain. Cognitive deficits. He was mildly tender epigastric region.  Dr. Landry, Cardiology came at bedside and tentatively planned cardiac catheterization tomorrow    Interval Problem Update  Patient with some confusion overnight. This has resolved by morning. Patient denies any focal complaints.  Stop " amiodarone drip, start PO amiodarone.  Continue eliquis for acute DVT. DVT also seen in lower extremity today.  Continue lasix IV diuresis.  Continue plavix for NSTEMI.  PT/OT evaluation, patient may need SNF.  Anticipate medical clearance in next 48 hours.    I have discussed this patient's plan of care and discharge plan at IDT rounds today with Case Management, Nursing, Nursing leadership, and other members of the IDT team.    Consultants/Specialty  Cardiology    Code Status  Full Code    Disposition  The patient is not medically cleared for discharge to home or a post-acute facility.  Anticipate discharge to: home with close outpatient follow-up    I have placed the appropriate orders for post-discharge needs.    Review of Systems  Review of Systems   Constitutional:         Generally weak   Respiratory:  Positive for cough and shortness of breath. Negative for sputum production.    Cardiovascular:  Negative for chest pain.   Gastrointestinal:  Negative for abdominal pain.   Musculoskeletal:         Left arm swelling   All other systems reviewed and are negative.       Physical Exam  Temp:  [36.2 °C (97.1 °F)-36.8 °C (98.2 °F)] 36.2 °C (97.1 °F)  Pulse:  [] 74  Resp:  [18-20] 18  BP: (108-131)/(68-80) 108/69  SpO2:  [91 %-95 %] 91 %    Physical Exam  Vitals and nursing note reviewed.   Constitutional:       General: He is not in acute distress.     Appearance: He is not toxic-appearing.   Cardiovascular:      Rate and Rhythm: Normal rate and regular rhythm.      Comments: Left chest pacemaker  Pulmonary:      Breath sounds: Rhonchi and rales present.      Comments: 5 liters of oxygen  Musculoskeletal:      Comments: + bruising and hematoma from the cath site  Marked left arm swelling.    Neurological:      Mental Status: He is alert.   Psychiatric:         Mood and Affect: Mood normal.         Behavior: Behavior normal.         Fluids    Intake/Output Summary (Last 24 hours) at 5/23/2023 4372  Last data  filed at 5/23/2023 1119  Gross per 24 hour   Intake 400 ml   Output 1300 ml   Net -900 ml       Laboratory  Recent Labs     05/23/23  0316   WBC 15.6*   RBC 4.05*   HEMOGLOBIN 12.3*   HEMATOCRIT 38.2*   MCV 94.3   MCH 30.4   MCHC 32.2*   RDW 50.7*   PLATELETCT 493*   MPV 12.4     Recent Labs     05/22/23  0155 05/23/23  0316   SODIUM 138 134*   POTASSIUM 3.9 4.4   CHLORIDE 101 100   CO2 26 23   GLUCOSE 142* 147*   BUN 30* 24*   CREATININE 0.89 0.75   CALCIUM 8.3* 8.7                       Imaging  US-EXTREMITY VENOUS LOWER UNILAT RIGHT         DX-CHEST-PORTABLE (1 VIEW)   Final Result         1.  Pulmonary edema and/or infiltrate, greater on the right, decreased since prior study   2.  Cardiomegaly      US-EXTREMITY VENOUS UPPER UNILAT LEFT   Final Result      US-EXTREMITY VENOUS LOWER UNILAT LEFT   Final Result      EC-ECHOCARDIOGRAM COMPLETE W/ CONT   Final Result      OUTSIDE IMAGES-CT CHEST   Final Result      OUTSIDE IMAGES-DX CHEST   Final Result      OUTSIDE IMAGES-CT ABDOMEN /PELVIS   Final Result      DX-CHEST-LIMITED (1 VIEW)   Final Result         Stable chest x-ray findings suggesting bilateral dependent edema versus atelectasis with trace effusions.      US-RUQ   Final Result      1.  No acute abnormality.   2.  Status post cholecystectomy.   3.  The pancreas is obscured by overlying bowel gas and not evaluated.      DX-CHEST-LIMITED (1 VIEW)   Final Result         Low lung volume with hypoventilatory change and bibasilar atelectasis.      CL-LEFT HEART CATHETERIZATION WITH POSSIBLE INTERVENTION    (Results Pending)          Assessment/Plan  * NSTEMI (non-ST elevated myocardial infarction), h/o Afib not on anticoag, leukocytosis (HCC)  Assessment & Plan  Admitted  to inpatient, telemetry  Heart cath on 5/19 reveals a chronically occluded RCA  Plavix,  high dose statin,  Echo reveals a decreased EF 45%  Cardiology consulted   Continuous telemetry monitoring to evaluate for arrhythmias    Ventricular  tachycardia (HCC)  Assessment & Plan  6 minutes of v tach rate 210 on 5/21  IV magnesium and IV potassium were given  Continuous tele monitoring  Pacemaker interrogation shows a fib but no prolonged v tach  On amiodarone      DVT of upper extremity (deep vein thrombosis) (HCC)- (present on admission)  Assessment & Plan  New diagnosis  Start Eliquis and stop aspirin  Monitor for bleeding in the setting of full dose anticoagulation and plavix    Hypokalemia- (present on admission)  Assessment & Plan  Ordered potassium replacement and magnesium level    Epigastric pain  Assessment & Plan  Mild, CTA/P at [place] unremarkable.  With slightly elevated bilirubin and no commen on bile duct  Liver ultrasound is negative  Stopped IV Zosyn  This resolved.    AF (atrial fibrillation) (HCC)  Assessment & Plan  Continue amiodarone, BB  Eliquis    Acute respiratory failure with hypoxia (HCC)- (present on admission)  Assessment & Plan  He had been requiring 5 liters of oxgyen now on 3 liters  There was concern for aspiration thus Speech path consulted for swallow eval  Likely volume overload given his lowered EF  IV lasix and potassium ordered. Follow urine output   BMP ordered for the morning to follow Cr and potassium    Essential hypertension, benign- (present on admission)  Assessment & Plan  Cozaar and Toprol with holding parameters.          VTE prophylaxis: SCDs/TEDs Eliquis    I have performed a physical exam and reviewed and updated ROS and Plan today (5/23/2023). In review of yesterday's note (5/22/2023), there are no changes except as documented above.    Patient is critically ill.   The patient continues to have: NSTEMI, atrial fibrillation  The vital organ system that is affected is the: cardiac  If untreated there is a high chance of deterioration into: cardiac arrhythmia, and eventually death.   The critical care that I am providing today is: managing IV amiodarone treatment  The critical that has been undertaken is  medically complex.   There has been no overlap in critical care time.   Critical Care Time not including procedures: 32 minutes

## 2023-05-24 NOTE — CARE PLAN
The patient is Watcher - Medium risk of patient condition declining or worsening    Shift Goals  Clinical Goals: Remain free from falls, monitor VS, respiratory  Patient Goals: Go home  Family Goals: na    Progress made toward(s) clinical / shift goals:    Problem: Knowledge Deficit - Standard  Goal: Patient and family/care givers will demonstrate understanding of plan of care, disease process/condition, diagnostic tests and medications  Outcome: Progressing     Problem: Hemodynamics  Goal: Patient's hemodynamics, fluid balance and neurologic status will be stable or improve  Outcome: Progressing     Problem: Respiratory  Goal: Patient will achieve/maintain optimum respiratory ventilation and gas exchange  Outcome: Progressing       Patient is not progressing towards the following goals:

## 2023-05-24 NOTE — DOCUMENTATION QUERY
"                                                                         [facility]                                                                       Query Response Note      PATIENT:               MARIAM VANESSA  ACCT #:                  [id number]  MRN:                     [id number]  :                      1945  ADMIT DATE:       2023 3:28 PM  DISCH DATE:          RESPONDING  PROVIDER #:        [id number]           QUERY TEXT:    Documentation in the medical record indicates that this patient has been diagnosed as having systolic heart failure.  Can the acuity of the heart failure be further specified based on the above clinical indicators and required treatments?    The patient's Clinical Indicators include:  79yo with dx of NSTEMI, systolic heart failure, HTN, cardiomyopathy     Antoine PN \"It is possible this may also be due to fluid overload, agree with continuing IV Lasix.\"   Lyle PN \"Likely volume overload given his lowered EF\"    Risk factors: advanced age, HTN, NSTEMI, systolic heart failure, CAD, afib  Treatments: echocardiogram, IV lasix, monitoring, imaging, labwork, medication    Contact me with questions.     Thank you,  Jocelyne Cuevas, JULIETTE, CDI  [email or web address]  Options provided:   -- Acute   -- Exacerbation or decompensation   -- Chronic   -- Acute on Chronic   -- Other explanation, Please specify   -- Unable to determine      Query created by: Jocelyne Cuevas on 2023 3:45 PM    RESPONSE TEXT:    Acute          Electronically signed by:  KHALIF SHANE MD 2023 8:09 PM              "

## 2023-05-24 NOTE — PROGRESS NOTES
Monitor Summary  Rhythm: 82-99  Ectopy: F PVC, O Big/Trig PVC Coup, 5-9 beats vtach every few hours  Intervals: -/.09/-

## 2023-05-24 NOTE — PROGRESS NOTES
Report received from outgoing nurse, care transferred at 1900. Patient currently in afib 70-90 on the monitor and A&Ox 4, though drowsy. Right lower extremity significantly warmer than left. Patient reports no pain or SOB at this time. Whiteboard updated with plan for the day and names of staff. No other questions or concerns at this time from the patient. Call light within reach, fall precautions in place.

## 2023-05-24 NOTE — PROGRESS NOTES
"[facility] Medicine Daily Progress Note    Date of Service  5/24/2023    Chief Complaint  Hi Montes De Oca is a 78 y.o. male admitted 5/18/2023 with abdominal pain.    Hospital Course  Mr. Montes De Oca is a 78 y.o. male who presented 5/18/2023 as a transfer from [place] for epigastric pain and abdominal pain.   He has a history of MI on ASA, Plavix and statin, par Afib s/p pacer on BB and amiodarone  but not on anticoagulation based on records, hypertension. He is a poor historian and has cognitive deficits. Per EDP he was transferred here for NSTEMI on hepa gtt. Patient does not know his medications much but did say he was having epigastric pain for the past few days, had one episode of vomiting that is nonbloody and like the food he ate and one episode of diarrhea, which he says is melanotic. He denied smoking or alcohol. I personally reviewed the records. At [place] he was afebrile and hemodynamically stable. A CTA Chest showed no PE but made comment of possible \"heart failure\". A CTA Ab/P reveled cholecystectomy but made no comment of the bile duct, pancreas and kidneys normal other than some renal cysts and no mention of colitis. His labs were significant for a leukocytosis of 20K, Hb was actually normal at 13, nornal PLTs. His kidney function was normal but he had a mildly elevated AST and a slightly elevated bilirubin. Lipase was normal. His troponin was elevated at 5000 and lactic slightly elevated. He was given a dose of Zosyn and put on hepa gtt and transferred here.  At the ED, he is afebrile, normotensive, HR 80-90s.  EDP is consulting Cardiology.  When I saw him at the ED, he is not having chest pain. Cognitive deficits. He was mildly tender epigastric region.  Dr. Landry, Cardiology came at bedside and tentatively planned cardiac catheterization tomorrow    Interval Problem Update  No acute events overnight.  On eliquis for DVT's.  Cardiology stopping plavix, starting aspirin.  On amiodarone, " tolerating well.  Repeat echocardiogram ordered by cardiology.  Continue IV lasix diuresis.  Patient with left arm swelling from DVT, but distal pulses intact. Patient denies pain symptoms in arm. Monitor.  Appreciate cardiology recommendations and clearance for patient.  Anticipate discharge to SNF when cleared by cardiology team.      I have discussed this patient's plan of care and discharge plan at IDT rounds today with Case Management, Nursing, Nursing leadership, and other members of the IDT team.    Consultants/Specialty  Cardiology    Code Status  Full Code    Disposition  The patient is not medically cleared for discharge to home or a post-acute facility.  Anticipate discharge to: skilled nursing facility    I have placed the appropriate orders for post-discharge needs.    Review of Systems  Review of Systems   Constitutional:         Generally weak   Respiratory:  Positive for cough and shortness of breath. Negative for sputum production.    Cardiovascular:  Negative for chest pain.   Gastrointestinal:  Negative for abdominal pain.   Musculoskeletal:         Left arm swelling   All other systems reviewed and are negative.       Physical Exam  Temp:  [36 °C (96.8 °F)-37 °C (98.6 °F)] 36.3 °C (97.3 °F)  Pulse:  [64-94] 67  Resp:  [18-22] 18  BP: (108-134)/(65-75) 131/68  SpO2:  [89 %-96 %] 96 %    Physical Exam  Vitals and nursing note reviewed.   Constitutional:       General: He is not in acute distress.     Appearance: He is not ill-appearing or toxic-appearing.   HENT:      Head: Normocephalic and atraumatic.      Right Ear: External ear normal.      Left Ear: External ear normal.      Mouth/Throat:      Pharynx: No oropharyngeal exudate or posterior oropharyngeal erythema.   Eyes:      Extraocular Movements: Extraocular movements intact.      Pupils: Pupils are equal, round, and reactive to light.   Cardiovascular:      Rate and Rhythm: Normal rate. Rhythm irregular.      Pulses: Normal pulses.       Heart sounds: Normal heart sounds.      Comments: Left chest pacemaker  Pulmonary:      Effort: Pulmonary effort is normal. No respiratory distress.      Breath sounds: Normal breath sounds. No wheezing, rhonchi or rales.   Abdominal:      General: Bowel sounds are normal. There is no distension.      Palpations: Abdomen is soft.      Tenderness: There is no abdominal tenderness.   Musculoskeletal:         General: Swelling present. No tenderness.      Cervical back: Normal range of motion and neck supple.      Right lower leg: Edema present.      Left lower leg: No edema.      Comments: + bruising and hematoma from the cath site  Marked left arm swelling.    Skin:     General: Skin is warm and dry.   Neurological:      General: No focal deficit present.      Mental Status: He is alert and oriented to person, place, and time.      Cranial Nerves: No cranial nerve deficit.      Sensory: No sensory deficit.      Motor: No weakness.   Psychiatric:         Mood and Affect: Mood normal.         Behavior: Behavior normal.         Fluids    Intake/Output Summary (Last 24 hours) at 5/24/2023 1442  Last data filed at 5/24/2023 1202  Gross per 24 hour   Intake 360 ml   Output 2120 ml   Net -1760 ml       Laboratory  Recent Labs     05/23/23  0316 05/24/23  0404   WBC 15.6* 13.3*   RBC 4.05* 4.09*   HEMOGLOBIN 12.3* 12.1*   HEMATOCRIT 38.2* 38.3*   MCV 94.3 93.6   MCH 30.4 29.6   MCHC 32.2* 31.6*   RDW 50.7* 51.1*   PLATELETCT 493* 582*   MPV 12.4 12.1     Recent Labs     05/22/23  0155 05/23/23  0316 05/24/23  0404   SODIUM 138 134* 138   POTASSIUM 3.9 4.4 4.6   CHLORIDE 101 100 102   CO2 26 23 30   GLUCOSE 142* 147* 115*   BUN 30* 24* 19   CREATININE 0.89 0.75 0.69   CALCIUM 8.3* 8.7 8.5                       Imaging  US-EXTREMITY VENOUS LOWER UNILAT RIGHT   Final Result      DX-CHEST-PORTABLE (1 VIEW)   Final Result         1.  Pulmonary edema and/or infiltrate, greater on the right, decreased since prior study   2.   Cardiomegaly      US-EXTREMITY VENOUS UPPER UNILAT LEFT   Final Result      US-EXTREMITY VENOUS LOWER UNILAT LEFT   Final Result      EC-ECHOCARDIOGRAM COMPLETE W/ CONT   Final Result      OUTSIDE IMAGES-CT CHEST   Final Result      OUTSIDE IMAGES-DX CHEST   Final Result      OUTSIDE IMAGES-CT ABDOMEN /PELVIS   Final Result      DX-CHEST-LIMITED (1 VIEW)   Final Result         Stable chest x-ray findings suggesting bilateral dependent edema versus atelectasis with trace effusions.      US-RUQ   Final Result      1.  No acute abnormality.   2.  Status post cholecystectomy.   3.  The pancreas is obscured by overlying bowel gas and not evaluated.      DX-CHEST-LIMITED (1 VIEW)   Final Result         Low lung volume with hypoventilatory change and bibasilar atelectasis.      CL-LEFT HEART CATHETERIZATION WITH POSSIBLE INTERVENTION    (Results Pending)   EC-ECHOCARDIOGRAM LTD W/ CONT    (Results Pending)          Assessment/Plan  * NSTEMI (non-ST elevated myocardial infarction), h/o Afib not on anticoag, leukocytosis (HCC)  Assessment & Plan  Admitted  to inpatient, telemetry  Heart cath on 5/19 reveals a chronically occluded RCA  Plavix,  high dose statin,  Echo reveals a decreased EF 45%  Cardiology consulted   Continuous telemetry monitoring to evaluate for arrhythmias    Ventricular tachycardia (HCC)  Assessment & Plan  6 minutes of v tach rate 210 on 5/21  IV magnesium and IV potassium were given  Continuous tele monitoring  Pacemaker interrogation shows a fib but no prolonged v tach  On amiodarone      DVT of upper extremity (deep vein thrombosis) (HCC)- (present on admission)  Assessment & Plan  New diagnosis  Start Eliquis  Stop plavix, start aspirin per cardiology    Hypokalemia- (present on admission)  Assessment & Plan  Ordered potassium replacement and magnesium level    Epigastric pain  Assessment & Plan  Mild, CTA/P at [place] unremarkable.  With slightly elevated bilirubin and no commen on bile  duct  Liver ultrasound is negative  Stopped IV Zosyn  This resolved.    AF (atrial fibrillation) (HCC)  Assessment & Plan  Continue amiodarone, BB  Eliquis    Acute respiratory failure with hypoxia (HCC)- (present on admission)  Assessment & Plan  He had been requiring 5 liters of oxgyen now on 3 liters  There was concern for aspiration thus Speech path consulted for swallow eval  Likely volume overload given his lowered EF  IV lasix and potassium ordered. Follow urine output   BMP ordered for the morning to follow Cr and potassium    Essential hypertension, benign- (present on admission)  Assessment & Plan  Cozaar and Toprol with holding parameters.          VTE prophylaxis: SCDs/TEDs Eliquis    I have performed a physical exam and reviewed and updated ROS and Plan today (5/24/2023). In review of yesterday's note (5/23/2023), there are no changes except as documented above.

## 2023-05-24 NOTE — CARE PLAN
"         The patient is Stable - Low risk of patient condition declining or worsening    Shift Goals  Clinical Goals: Monitor VS and safety from fall  Patient Goals: get well and go home  Family Goals: na    Progress made toward(s) clinical / shift goals:  Patient had 3 BM today, performed partial bed bath and complete linen change to promote comfort, bed alarm was kept on and call light within reach,/76   Pulse 94   Temp 36.1 °C (97 °F) (Temporal)   Resp 18   Ht 1.803 m (5' 11\")   Wt 95.6 kg (210 lb 12.2 oz)   SpO2 95%  will continue to monitor       Patient is not progressing towards the following goals:    Problem: Hemodynamics  Goal: Patient's hemodynamics, fluid balance and neurologic status will be stable or improve  "

## 2023-05-25 ENCOUNTER — APPOINTMENT (OUTPATIENT)
Dept: RADIOLOGY | Facility: MEDICAL CENTER | Age: 78
DRG: 280 | End: 2023-05-25
Payer: MEDICARE

## 2023-05-25 ENCOUNTER — APPOINTMENT (OUTPATIENT)
Dept: CARDIOLOGY | Facility: MEDICAL CENTER | Age: 78
DRG: 280 | End: 2023-05-25
Attending: NURSE PRACTITIONER
Payer: MEDICARE

## 2023-05-25 LAB
ANION GAP SERPL CALC-SCNC: 7 MMOL/L (ref 7–16)
BUN SERPL-MCNC: 17 MG/DL (ref 8–22)
CALCIUM SERPL-MCNC: 8.5 MG/DL (ref 8.5–10.5)
CHLORIDE SERPL-SCNC: 104 MMOL/L (ref 96–112)
CO2 SERPL-SCNC: 30 MMOL/L (ref 20–33)
CREAT SERPL-MCNC: 0.68 MG/DL (ref 0.5–1.4)
ERYTHROCYTE [DISTWIDTH] IN BLOOD BY AUTOMATED COUNT: 52.3 FL (ref 35.9–50)
GFR SERPLBLD CREATININE-BSD FMLA CKD-EPI: 94 ML/MIN/1.73 M 2
GLUCOSE SERPL-MCNC: 123 MG/DL (ref 65–99)
HCT VFR BLD AUTO: 40.5 % (ref 42–52)
HGB BLD-MCNC: 12.7 G/DL (ref 14–18)
LV EJECT FRACT MOD 4C 99902: 49.14
MCH RBC QN AUTO: 30.2 PG (ref 27–33)
MCHC RBC AUTO-ENTMCNC: 31.4 G/DL (ref 32.3–36.5)
MCV RBC AUTO: 96.2 FL (ref 81.4–97.8)
PLATELET # BLD AUTO: 718 K/UL (ref 164–446)
PMV BLD AUTO: 11.5 FL (ref 9–12.9)
POTASSIUM SERPL-SCNC: 4.4 MMOL/L (ref 3.6–5.5)
RBC # BLD AUTO: 4.21 M/UL (ref 4.7–6.1)
SODIUM SERPL-SCNC: 141 MMOL/L (ref 135–145)
WBC # BLD AUTO: 17.2 K/UL (ref 4.8–10.8)

## 2023-05-25 PROCEDURE — 770020 HCHG ROOM/CARE - TELE (206)

## 2023-05-25 PROCEDURE — 71045 X-RAY EXAM CHEST 1 VIEW: CPT

## 2023-05-25 PROCEDURE — 93308 TTE F-UP OR LMTD: CPT

## 2023-05-25 PROCEDURE — 85576 BLOOD PLATELET AGGREGATION: CPT

## 2023-05-25 PROCEDURE — 80053 COMPREHEN METABOLIC PANEL: CPT

## 2023-05-25 PROCEDURE — 97535 SELF CARE MNGMENT TRAINING: CPT

## 2023-05-25 PROCEDURE — 700102 HCHG RX REV CODE 250 W/ 637 OVERRIDE(OP): Performed by: INTERNAL MEDICINE

## 2023-05-25 PROCEDURE — 85384 FIBRINOGEN ACTIVITY: CPT

## 2023-05-25 PROCEDURE — 80048 BASIC METABOLIC PNL TOTAL CA: CPT

## 2023-05-25 PROCEDURE — 700117 HCHG RX CONTRAST REV CODE 255: Performed by: NURSE PRACTITIONER

## 2023-05-25 PROCEDURE — A9270 NON-COVERED ITEM OR SERVICE: HCPCS | Performed by: INTERNAL MEDICINE

## 2023-05-25 PROCEDURE — 85730 THROMBOPLASTIN TIME PARTIAL: CPT

## 2023-05-25 PROCEDURE — 85025 COMPLETE CBC W/AUTO DIFF WBC: CPT

## 2023-05-25 PROCEDURE — A9270 NON-COVERED ITEM OR SERVICE: HCPCS | Performed by: HOSPITALIST

## 2023-05-25 PROCEDURE — 85610 PROTHROMBIN TIME: CPT

## 2023-05-25 PROCEDURE — 36415 COLL VENOUS BLD VENIPUNCTURE: CPT

## 2023-05-25 PROCEDURE — 99232 SBSQ HOSP IP/OBS MODERATE 35: CPT | Performed by: STUDENT IN AN ORGANIZED HEALTH CARE EDUCATION/TRAINING PROGRAM

## 2023-05-25 PROCEDURE — 85007 BL SMEAR W/DIFF WBC COUNT: CPT

## 2023-05-25 PROCEDURE — A9270 NON-COVERED ITEM OR SERVICE: HCPCS | Performed by: NURSE PRACTITIONER

## 2023-05-25 PROCEDURE — 97116 GAIT TRAINING THERAPY: CPT

## 2023-05-25 PROCEDURE — 700102 HCHG RX REV CODE 250 W/ 637 OVERRIDE(OP): Performed by: HOSPITALIST

## 2023-05-25 PROCEDURE — 83880 ASSAY OF NATRIURETIC PEPTIDE: CPT

## 2023-05-25 PROCEDURE — 97530 THERAPEUTIC ACTIVITIES: CPT

## 2023-05-25 PROCEDURE — 83615 LACTATE (LD) (LDH) ENZYME: CPT

## 2023-05-25 PROCEDURE — 700102 HCHG RX REV CODE 250 W/ 637 OVERRIDE(OP): Performed by: NURSE PRACTITIONER

## 2023-05-25 PROCEDURE — 700111 HCHG RX REV CODE 636 W/ 250 OVERRIDE (IP): Performed by: HOSPITALIST

## 2023-05-25 PROCEDURE — 85027 COMPLETE CBC AUTOMATED: CPT

## 2023-05-25 PROCEDURE — 85347 COAGULATION TIME ACTIVATED: CPT

## 2023-05-25 PROCEDURE — 93308 TTE F-UP OR LMTD: CPT | Mod: 26 | Performed by: INTERNAL MEDICINE

## 2023-05-25 RX ADMIN — ATORVASTATIN CALCIUM 80 MG: 80 TABLET, FILM COATED ORAL at 17:31

## 2023-05-25 RX ADMIN — HUMAN ALBUMIN MICROSPHERES AND PERFLUTREN 3 ML: 10; .22 INJECTION, SOLUTION INTRAVENOUS at 12:15

## 2023-05-25 RX ADMIN — ASPIRIN 81 MG: 81 TABLET, COATED ORAL at 05:57

## 2023-05-25 RX ADMIN — APIXABAN 10 MG: 5 TABLET, FILM COATED ORAL at 17:30

## 2023-05-25 RX ADMIN — APIXABAN 10 MG: 5 TABLET, FILM COATED ORAL at 05:57

## 2023-05-25 RX ADMIN — METOPROLOL SUCCINATE 75 MG: 50 TABLET, EXTENDED RELEASE ORAL at 17:31

## 2023-05-25 RX ADMIN — Medication 400 MG: at 05:57

## 2023-05-25 RX ADMIN — FUROSEMIDE 40 MG: 10 INJECTION INTRAMUSCULAR; INTRAVENOUS at 06:03

## 2023-05-25 RX ADMIN — FINASTERIDE 5 MG: 5 TABLET, FILM COATED ORAL at 05:57

## 2023-05-25 RX ADMIN — AMIODARONE HYDROCHLORIDE 400 MG: 200 TABLET ORAL at 05:58

## 2023-05-25 RX ADMIN — LOSARTAN POTASSIUM 50 MG: 50 TABLET, FILM COATED ORAL at 05:57

## 2023-05-25 RX ADMIN — METOPROLOL SUCCINATE 75 MG: 50 TABLET, EXTENDED RELEASE ORAL at 05:57

## 2023-05-25 RX ADMIN — SENNOSIDES AND DOCUSATE SODIUM 2 TABLET: 50; 8.6 TABLET ORAL at 05:57

## 2023-05-25 ASSESSMENT — COGNITIVE AND FUNCTIONAL STATUS - GENERAL
SUGGESTED CMS G CODE MODIFIER DAILY ACTIVITY: CK
PERSONAL GROOMING: A LITTLE
HELP NEEDED FOR BATHING: A LOT
TOILETING: A LOT
DRESSING REGULAR UPPER BODY CLOTHING: A LITTLE
MOBILITY SCORE: 24
DAILY ACTIVITIY SCORE: 16
DRESSING REGULAR LOWER BODY CLOTHING: A LOT
SUGGESTED CMS G CODE MODIFIER MOBILITY: CH

## 2023-05-25 ASSESSMENT — ENCOUNTER SYMPTOMS
SPUTUM PRODUCTION: 0
ABDOMINAL PAIN: 0
COUGH: 1
SHORTNESS OF BREATH: 1

## 2023-05-25 ASSESSMENT — PAIN DESCRIPTION - PAIN TYPE: TYPE: ACUTE PAIN

## 2023-05-25 ASSESSMENT — GAIT ASSESSMENTS
ASSISTIVE DEVICE: FRONT WHEEL WALKER
DISTANCE (FEET): 75
GAIT LEVEL OF ASSIST: STANDBY ASSIST

## 2023-05-25 ASSESSMENT — FIBROSIS 4 INDEX: FIB4 SCORE: 0.51

## 2023-05-25 NOTE — THERAPY
Physical Therapy   Daily Treatment     Patient Name: Hi Montes De Oca  Age:  78 y.o., Sex:  male  Medical Record #: [id number]  Today's Date: 5/25/2023          Assessment    Pt expresses a desire to return to home vs post acute placement.  He lives alone.  Today, he was able to mobilize w/o need of physical assist, but he did use the fww, which he normally does not use, but uses a cane.  If he continues to decline post acute placement, he is mobilizing at household level, but all his mobility is labored, and he fatigues quickly.  I don't feel at this level he would be able to mobilize in the community well, ie grocery shopping.  PT would recommend post acute.    Plan    Treatment Plan Status: (P) Continue Current Treatment Plan  Type of Treatment: Bed Mobility, Gait Training, Neuro Re-Education / Balance, Stair Training, Therapeutic Activities, Therapeutic Exercise  Treatment Frequency: 4 Times per Week  Treatment Duration: Until Therapy Goals Met    DC Equipment Recommendations: (P) Unable to determine at this time  Discharge Recommendations: Recommend post-acute placement for additional physical therapy services prior to discharge home           Objective       05/25/23 1031   Balance   Sitting Balance (Static) Fair +   Sitting Balance (Dynamic) Fair +   Standing Balance (Static) Fair   Standing Balance (Dynamic) Fair   Weight Shift Sitting Good   Weight Shift Standing Good   Comments w/ fww   Bed Mobility    Supine to Sit Supervised   Sit to Supine Supervised   Gait Analysis   Gait Level Of Assist Standby Assist   Assistive Device Front Wheel Walker   Distance (Feet) 75   # of Stairs Climbed 3   Level of Assist with Stairs Contact Guard Assist   Comments labored   Functional Mobility   Sit to Stand Supervised   Bed, Chair, Wheelchair Transfer Refused   Comments refused bedside chair after ambulation 2/2 fatigue   Short Term Goals    Short Term Goal # 1 Pt will perform supine<->sit with supv within 6 visits in  order to return home   Goal Outcome # 1 Goal met   Short Term Goal # 2 Pt will transfer bed<->chair with FWW or SPC with supv within 6 visits in order to return home   Goal Outcome # 2 Goal met   Short Term Goal # 3 Pt will amb 300' with FWW or SPC with supv within 6 visits in order to return home   Goal Outcome # 3 Goal not met   Short Term Goal # 4 Pt will perform 6 steps with SPC and supv within 6 visits as required to enter/exit home   Physical Therapy Treatment Plan   Physical Therapy Treatment Plan Continue Current Treatment Plan   Anticipated Discharge Equipment and Recommendations   DC Equipment Recommendations Unable to determine at this time

## 2023-05-25 NOTE — PROGRESS NOTES
Monitor summary    Rhythm: afib with paced beats  Rate:   Ectopy: (F) PVC, (R) TRIP, BIGEM, COUP, 4 bt VTACH  Measurement: .0/.08/.0

## 2023-05-25 NOTE — DISCHARGE PLANNING
I am in the process of getting a cost quote with [employer] for this ride to patients home and also an idea of when ride can be taken.I will update care team in the AM on 5/26/2023.   SLC

## 2023-05-25 NOTE — DISCHARGE PLANNING
"(Addendum 1446: Discussed patient in rounds and physician discussed discharge plan with the patient.  Patient confirmed he wishes to return home with home health care services.  This RNCM sent referral to [facility] and they have accepted him.  They are aware he may be stable for discharge home tomorrow.  They'll follow up with his PCP and they will await a discharge summary to be faxed to them. )      Met with this patient at bedside for initial assessment.  He presented to the hospital from [place] for NSTEMI and DVT.  He states that he lives alone and uses a cane.  Discussed PT/OT recommendations for rehab once medically stable for discharge. (Last assessment 2 days ago).  He declines rehab.  He wishes to return home and use [facility] for PT/OT needs.  Will request PT/OT re-evaluate as well.     He will need transportation back home when medically stable.      He requests his daughter Vera Green be removed from his emergency contact list \"she's taking care of her mother\".  He is in agreement with his son Stevie Montes De Oca being made primary emergency contact.     Case Management Discharge Planning    Admission Date: 5/18/2023  GMLOS: 4.1  ALOS: 7    6-Clicks ADL Score: 17  6-Clicks Mobility Score: 18  PT and/or OT Eval ordered: Yes  Post-acute Referrals Ordered: Yes  Post-acute Choice Obtained: Yes  Has referral(s) been sent to post-acute provider:  Yes      Anticipated Discharge Dispo: Discharge Disposition: D/T to home under [facility] care in anticipation of covered skilled care (06)  Discharge Address: [facility] [address] [place] [address]  Discharge Contact Phone Number: [phone]    DME Needed: No    Action(s) Taken: DC Assessment Complete (See below), Choice obtained, and Referral(s) sent    Escalations Completed: None    Medically Clear: No    Next Steps: Referral sent to [facility] choice with an update that orders and face to face are still pending.      Barriers to Discharge: Medical clearance    Is the " patient up for discharge tomorrow: No    Care Transition Team Assessment    Information Source  Orientation Level: Oriented X4  Information Given By: Patient  Who is responsible for making decisions for patient? : Patient    Readmission Evaluation  Is this a readmission?: No    Elopement Risk  Legal Hold: No  Ambulatory or Self Mobile in Wheelchair: Yes  Disoriented: No  Psychiatric Symptoms: None  History of Wandering: No  Elopement this Admit: No  Vocalizing Wanting to Leave: No  Displays Behaviors, Body Language Wanting to Leave: No-Not at Risk for Elopement  Elopement Risk: Not at Risk for Elopement    Interdisciplinary Discharge Planning  Primary Care Physician: Mario Lindquist MD  Lives with - Patient's Self Care Capacity: Alone and Able to Care For Self  Patient or legal guardian wants to designate a caregiver: No  Support Systems: Children  Housing / Facility: [address] [facility]  Prior Services: None  Patient Prefers to be Discharged to:: Return home with [facility] for any PT/OT needs  Assistance Needed: No  Durable Medical Equipment: Unknown    Discharge Preparedness  What is your plan after discharge?: Home health care  What are your discharge supports?: Child  Prior Functional Level: Ambulatory, Drives Self, Independent with Activities of Daily Living, Independent with Medication Management, Uses Cane    Functional Assesment  Prior Functional Level: Ambulatory, Drives Self, Independent with Activities of Daily Living, Independent with Medication Management, Uses Cane    Finances  Financial Barriers to Discharge: No  Prescription Coverage: Yes    Vision / Hearing Impairment  Hearing Impairment: Both Ears, Hearing Device Not Available         Advance Directive  Advance Directive?: None  Advance Directive offered?: AD Booklet refused    Domestic Abuse  Have you ever been the victim of abuse or violence?: No  Physical Abuse or Sexual Abuse: No  Verbal Abuse or Emotional Abuse: No  Possible Abuse/Neglect Reported to:: Not  Applicable    Psychological Assessment  Newly Diagnosed Illness: Yes    Discharge Risks or Barriers  Discharge risks or barriers?: No  Patient risk factors: Vulnerable adult    Anticipated Discharge Information  Discharge Disposition: D/T to home under [facility] care in anticipation of covered skilled care (06)  Discharge Address: [facility] [place] [address]  Discharge Contact Phone Number: [phone]

## 2023-05-25 NOTE — PROGRESS NOTES
Report received from night nurse at 0700. Pt seen at bedside and pt care assumed. PT is A&Ox4, on 4L NC, and denies pain. PIV flushed and intact. Pt is Afib on telemetry. PT is up x1 assist w/ fww.     Plan of care reviewed with pt, call light, phone, and personal belongings within reach. Bed alarm is on, bed is in low locked position. All pt's needs met at this time.

## 2023-05-25 NOTE — CARE PLAN
Problem: Knowledge Deficit - Standard  Goal: Patient and family/care givers will demonstrate understanding of plan of care, disease process/condition, diagnostic tests and medications  Outcome: Progressing     Problem: Pain - Standard  Goal: Alleviation of pain or a reduction in pain to the patient’s comfort goal  Outcome: Progressing     Problem: Fall Risk  Goal: Patient will remain free from falls  Outcome: Progressing     Problem: Skin Integrity  Goal: Skin integrity is maintained or improved  Outcome: Progressing   The patient is Stable - Low risk of patient condition declining or worsening    Shift Goals  Clinical Goals: Improved vital signs and labwork  Patient Goals: Rest and comfort  Family Goals: HEATHER    Progress made toward(s) clinical / shift goals:      Pt educated on plan of care, reinforcement needed. Pt educated on pain scales, pain alleviating factors, pain medications and side effects, and need for reassessment, pain controlled at this time, reinforcement needed. Pt educated on the need to reposition frequently and remain dry to avoid skin breakdown, skin is maintaining integrity with no further skin breakdown, reinforcement needed. Fall risk education provided to pt, pt educated about the need to call for assistance while ambulating, reinforcement needed, pt remains free of falls.

## 2023-05-25 NOTE — THERAPY
"Occupational Therapy  Daily Treatment     Patient Name: Hi Montes De Oca  Age:  78 y.o., Sex:  male  Medical Record #: [id number]  Today's Date: 5/25/2023     Precautions: Fall Risk  Comments: R wrist access for catheterization    Assessment    Pt seen for OT tx, very motivated for activity as he emphatically wishes to DC home. Pt demonstrates ability to ambulate functional household distances with FWW but he prefers SPC, encouraged to use FWW for the time being for increased safety. Pt required maxA for BM hygiene clean up. Pt is currently below his independent baseline, would not be able to tolerate higher activity demand tasks like driving or grocery shopping. Pt makes his desire to DC home clear but was strongly encouraged to consider additional therapy at post-acute placement to increase his strength, independence and safety, and reduce his risk for readmission.     Plan    Treatment Plan Status: Continue Current Treatment Plan  Type of Treatment: Self Care / Activities of Daily Living, Neuro Re-Education / Balance, Therapeutic Exercises, Therapeutic Activity, Adaptive Equipment  Treatment Frequency: 3 Times per Week  Treatment Duration: Until Therapy Goals Met    DC Equipment Recommendations: Unable to determine at this time  Discharge Recommendations: Recommend post-acute placement for additional occupational therapy services prior to discharge home    Subjective    \"I don't shower, I stand out in the rain.\"     Objective       05/25/23 1015   Precautions   Precautions Fall Risk   Cognition    Cognition / Consciousness X   Speech/ Communication Hard of Hearing   Level of Consciousness Alert   Safety Awareness Impaired   Comments motivated to participate in order to DC home   Strength Upper Body   Upper Body Strength  WDL   Balance   Sitting Balance (Static) Fair +   Sitting Balance (Dynamic) Fair +   Standing Balance (Static) Fair   Standing Balance (Dynamic) Fair   Weight Shift Sitting Good   Weight Shift " Standing Good   Skilled Intervention Sequencing;Tactile Cuing;Verbal Cuing   Comments w/FWW   Bed Mobility    Supine to Sit Supervised   Sit to Supine Supervised   Skilled Intervention Verbal Cuing;Sequencing   Activities of Daily Living   Eating Supervision   Grooming Minimal Assist;Seated   Upper Body Dressing Minimal Assist   Lower Body Dressing Maximal Assist   Toileting Maximal Assist   Skilled Intervention Verbal Cuing;Tactile Cuing;Sequencing;Facilitation   Comments pt requires assist to complete adequate post-BM hygiene   How much help from another person does the patient currently need...   Putting on and taking off regular lower body clothing? 2   Bathing (including washing, rinsing, and drying)? 2   Toileting, which includes using a toilet, bedpan, or urinal? 2   Putting on and taking off regular upper body clothing? 3   Taking care of personal grooming such as brushing teeth? 3   Eating meals? 4   6 Clicks Daily Activity Score 16   Functional Mobility   Sit to Stand Contact Guard Assist   Bed, Chair, Wheelchair Transfer Contact Guard Assist   Toilet Transfers Contact Guard Assist   Transfer Method Stand Step   Mobility FWW   Comments pt requested to use his SPC but was far more stable with FWW, decreased safety awareness   Activity Tolerance   Sitting in Chair 8min (toilet)   Sitting Edge of Bed 5min   Standing 7min   Comments pt requested BTB following activity 2/2 fatigue   Patient / Family Goals   Patient / Family Goal #1 To go home   Goal #1 Outcome Progressing as expected   Short Term Goals   Short Term Goal # 1 Pt will demo LB dressing w/ SPV   Goal Outcome # 1 Goal not met   Short Term Goal # 2 Pt will demo standing grooming w/ SPV   Goal Outcome # 2 Progressing as expected   Short Term Goal # 3 Pt will demo toilet txf w/ SPV   Goal Outcome # 3 Progressing as expected   Short Term Goal # 4 Pt will demo toilet hygiene w/ SPV   Goal Outcome # 4 Goal not met   Education Group   Education Provided  Role of Occupational Therapist;Activities of Daily Living   Role of Occupational Therapist Patient Response Patient;Acceptance;Explanation;Verbal Demonstration   ADL Patient Response Patient;Acceptance;Explanation;Verbal Demonstration;Action Demonstration   Occupational Therapy Treatment Plan    O.T. Treatment Plan Continue Current Treatment Plan   Anticipated Discharge Equipment and Recommendations   DC Equipment Recommendations Unable to determine at this time   Discharge Recommendations Recommend post-acute placement for additional occupational therapy services prior to discharge home

## 2023-05-25 NOTE — FACE TO FACE
Face to Face Supporting Documentation - Home Health    The encounter with this patient was in whole or in part the primary reason for home health admission.    Date of encounter:   Patient:                    MRN:                       YOB: 2025  Hi Montes De Oca  [id number]  1945     Home health to see patient for:  Skilled Nursing care for assessment, interventions & education, Physical Therapy evaluation and treatment, and Occupational therapy evaluation and treatment    Skilled need for:  New Onset Medical Diagnosis deep vein thrombosis, NSTEMI    Skilled nursing interventions to include:  Comment: physical and occupational therapy    Homebound status evidenced by:  Needs the assistance of another person in order to leave the home. Leaving home requires a considerable and taxing effort. There is a normal inability to leave the home.    Community Physician to provide follow up care: Mario Lindquist M.D.     Optional Interventions? No      I certify the face to face encounter for this home health care referral meets the CMS requirements and the encounter/clinical assessment with the patient was, in whole, or in part, for the medical condition(s) listed above, which is the primary reason for home health care. Based on my clinical findings: the service(s) are medically necessary, support the need for home health care, and the homebound criteria are met.  I certify that this patient has had a face to face encounter by myself.  Gutierrez Cerna M.D. - NPI: [id number]

## 2023-05-25 NOTE — CARE PLAN
The patient is Stable - Low risk of patient condition declining or worsening    Shift Goals  Clinical Goals: Monitor vital signs & labs  Patient Goals: Rest, comfort, reposition  Family Goals: na      Problem: Hemodynamics  Goal: Patient's hemodynamics, fluid balance and neurologic status will be stable or improve  Outcome: Progressing     Problem: Respiratory  Goal: Patient will achieve/maintain optimum respiratory ventilation and gas exchange  Outcome: Progressing     Problem: Fall Risk  Goal: Patient will remain free from falls  5/25/2023 0103 by Freya Bailey R.N.  Outcome: Progressing  Note: Fall precautions in place.  5/25/2023 0103 by Freya Bailey R.N.  Note: Fall precautions in place.     Problem: Skin Integrity  Goal: Skin integrity is maintained or improved  Outcome: Progressing  Note: Patient on waffle mattress and TAPS system. Pillows in use to offload pressure points.       Progress made toward(s) clinical / shift goals:  Progressing

## 2023-05-25 NOTE — PROGRESS NOTES
Assumed care of patient. Bedside report received from John VILLANUEVA. Updated POC, call light within reach, fall precautions in place. Bed locked, and in lowest position, bed alarm on and working. Assessment completed, patient is A&Ox4, states 0/10 pain. Patient instructed to call for assistance. All questions answered, no further needs at this time.

## 2023-05-26 LAB
ACANTHOCYTES BLD QL SMEAR: NORMAL
ALBUMIN SERPL BCP-MCNC: 2.2 G/DL (ref 3.2–4.9)
ALBUMIN/GLOB SERPL: 0.7 G/DL
ALP SERPL-CCNC: 84 U/L (ref 30–99)
ALT SERPL-CCNC: 52 U/L (ref 2–50)
ANION GAP SERPL CALC-SCNC: 10 MMOL/L (ref 7–16)
APTT PPP: 43.9 SEC (ref 24.7–36)
AST SERPL-CCNC: 39 U/L (ref 12–45)
BASOPHILS # BLD AUTO: 1 % (ref 0–1.8)
BASOPHILS # BLD: 0.2 K/UL (ref 0–0.12)
BILIRUB SERPL-MCNC: 0.7 MG/DL (ref 0.1–1.5)
BUN SERPL-MCNC: 19 MG/DL (ref 8–22)
BURR CELLS BLD QL SMEAR: NORMAL
CALCIUM ALBUM COR SERPL-MCNC: 9.9 MG/DL (ref 8.5–10.5)
CALCIUM SERPL-MCNC: 8.5 MG/DL (ref 8.5–10.5)
CFT BLD TEG: 8 MIN (ref 4.6–9.1)
CFT P HPASE BLD TEG: 9.9 MIN (ref 4.3–8.3)
CHLORIDE SERPL-SCNC: 101 MMOL/L (ref 96–112)
CLOT ANGLE BLD TEG: 80 DEGREES (ref 63–78)
CLOT LYSIS 30M P MA LENFR BLD TEG: 4.6 % (ref 0–2.6)
CO2 SERPL-SCNC: 29 MMOL/L (ref 20–33)
CREAT SERPL-MCNC: 0.66 MG/DL (ref 0.5–1.4)
CT.EXTRINSIC BLD ROTEM: 0.8 MIN (ref 0.8–2.1)
EOSINOPHIL # BLD AUTO: 0 K/UL (ref 0–0.51)
EOSINOPHIL NFR BLD: 0 % (ref 0–6.9)
ERYTHROCYTE [DISTWIDTH] IN BLOOD BY AUTOMATED COUNT: 51.1 FL (ref 35.9–50)
FIBRINOGEN PPP-MCNC: 630 MG/DL (ref 215–460)
GFR SERPLBLD CREATININE-BSD FMLA CKD-EPI: 95 ML/MIN/1.73 M 2
GLOBULIN SER CALC-MCNC: 3.3 G/DL (ref 1.9–3.5)
GLUCOSE SERPL-MCNC: 142 MG/DL (ref 65–99)
HCT VFR BLD AUTO: 39.1 % (ref 42–52)
HGB BLD-MCNC: 12.4 G/DL (ref 14–18)
INR PPP: 2.51 (ref 0.87–1.13)
LDH SERPL L TO P-CCNC: 344 U/L (ref 107–266)
LYMPHOCYTES # BLD AUTO: 0.4 K/UL (ref 1–4.8)
LYMPHOCYTES NFR BLD: 2 % (ref 22–41)
MANUAL DIFF BLD: NORMAL
MCF BLD TEG: 72.4 MM (ref 52–69)
MCF.PLATELET INHIB BLD ROTEM: >52 MM (ref 15–32)
MCH RBC QN AUTO: 30.1 PG (ref 27–33)
MCHC RBC AUTO-ENTMCNC: 31.7 G/DL (ref 32.3–36.5)
MCV RBC AUTO: 94.9 FL (ref 81.4–97.8)
MONOCYTES # BLD AUTO: 1.21 K/UL (ref 0–0.85)
MONOCYTES NFR BLD AUTO: 6 % (ref 0–13.4)
MORPHOLOGY BLD-IMP: NORMAL
NEUTROPHILS # BLD AUTO: 18.29 K/UL (ref 1.82–7.42)
NEUTROPHILS NFR BLD: 91 % (ref 44–72)
NRBC # BLD AUTO: 0 K/UL
NRBC BLD-RTO: 0 /100 WBC (ref 0–0.2)
NT-PROBNP SERPL IA-MCNC: 8291 PG/ML (ref 0–125)
PA AA BLD-ACNC: ABNORMAL % (ref 0–11)
PA ADP BLD-ACNC: ABNORMAL % (ref 0–17)
PLATELET # BLD AUTO: 786 K/UL (ref 164–446)
PLATELET BLD QL SMEAR: NORMAL
PMV BLD AUTO: 11.8 FL (ref 9–12.9)
POIKILOCYTOSIS BLD QL SMEAR: NORMAL
POTASSIUM SERPL-SCNC: 4 MMOL/L (ref 3.6–5.5)
PROT SERPL-MCNC: 5.5 G/DL (ref 6–8.2)
PROTHROMBIN TIME: 26.3 SEC (ref 12–14.6)
RBC # BLD AUTO: 4.12 M/UL (ref 4.7–6.1)
RBC BLD AUTO: PRESENT
SODIUM SERPL-SCNC: 140 MMOL/L (ref 135–145)
TEG ALGORITHM TGALG: ABNORMAL
WBC # BLD AUTO: 20.1 K/UL (ref 4.8–10.8)

## 2023-05-26 PROCEDURE — 700102 HCHG RX REV CODE 250 W/ 637 OVERRIDE(OP): Performed by: HOSPITALIST

## 2023-05-26 PROCEDURE — 700111 HCHG RX REV CODE 636 W/ 250 OVERRIDE (IP): Performed by: HOSPITALIST

## 2023-05-26 PROCEDURE — A9270 NON-COVERED ITEM OR SERVICE: HCPCS | Performed by: INTERNAL MEDICINE

## 2023-05-26 PROCEDURE — 99232 SBSQ HOSP IP/OBS MODERATE 35: CPT | Performed by: INTERNAL MEDICINE

## 2023-05-26 PROCEDURE — 770020 HCHG ROOM/CARE - TELE (206)

## 2023-05-26 PROCEDURE — 700102 HCHG RX REV CODE 250 W/ 637 OVERRIDE(OP): Performed by: INTERNAL MEDICINE

## 2023-05-26 PROCEDURE — A9270 NON-COVERED ITEM OR SERVICE: HCPCS | Performed by: HOSPITALIST

## 2023-05-26 PROCEDURE — A9270 NON-COVERED ITEM OR SERVICE: HCPCS | Performed by: NURSE PRACTITIONER

## 2023-05-26 PROCEDURE — 99232 SBSQ HOSP IP/OBS MODERATE 35: CPT | Performed by: STUDENT IN AN ORGANIZED HEALTH CARE EDUCATION/TRAINING PROGRAM

## 2023-05-26 PROCEDURE — 700102 HCHG RX REV CODE 250 W/ 637 OVERRIDE(OP): Performed by: NURSE PRACTITIONER

## 2023-05-26 RX ADMIN — Medication 400 MG: at 06:18

## 2023-05-26 RX ADMIN — APIXABAN 10 MG: 5 TABLET, FILM COATED ORAL at 18:06

## 2023-05-26 RX ADMIN — FUROSEMIDE 40 MG: 10 INJECTION INTRAMUSCULAR; INTRAVENOUS at 06:18

## 2023-05-26 RX ADMIN — ATORVASTATIN CALCIUM 80 MG: 80 TABLET, FILM COATED ORAL at 18:06

## 2023-05-26 RX ADMIN — METOPROLOL SUCCINATE 75 MG: 50 TABLET, EXTENDED RELEASE ORAL at 06:18

## 2023-05-26 RX ADMIN — METOPROLOL SUCCINATE 75 MG: 50 TABLET, EXTENDED RELEASE ORAL at 18:07

## 2023-05-26 RX ADMIN — SENNOSIDES AND DOCUSATE SODIUM 2 TABLET: 50; 8.6 TABLET ORAL at 06:18

## 2023-05-26 RX ADMIN — APIXABAN 10 MG: 5 TABLET, FILM COATED ORAL at 11:01

## 2023-05-26 RX ADMIN — SENNOSIDES AND DOCUSATE SODIUM 2 TABLET: 50; 8.6 TABLET ORAL at 18:07

## 2023-05-26 RX ADMIN — FINASTERIDE 5 MG: 5 TABLET, FILM COATED ORAL at 06:19

## 2023-05-26 RX ADMIN — LOSARTAN POTASSIUM 50 MG: 50 TABLET, FILM COATED ORAL at 06:19

## 2023-05-26 RX ADMIN — AMIODARONE HYDROCHLORIDE 400 MG: 200 TABLET ORAL at 06:18

## 2023-05-26 ASSESSMENT — PAIN DESCRIPTION - PAIN TYPE: TYPE: ACUTE PAIN

## 2023-05-26 ASSESSMENT — ENCOUNTER SYMPTOMS
COUGH: 1
SPUTUM PRODUCTION: 0
ABDOMINAL PAIN: 0
SHORTNESS OF BREATH: 1

## 2023-05-26 NOTE — PROGRESS NOTES
"[facility] Medicine Daily Progress Note    Date of Service  5/25/2023    Chief Complaint  Hi Montes De Oca is a 78 y.o. male admitted 5/18/2023 with abdominal pain.    Hospital Course  Mr. Montes De Oca is a 78 y.o. male who presented 5/18/2023 as a transfer from [place] for epigastric pain and abdominal pain.   He has a history of MI on ASA, Plavix and statin, par Afib s/p pacer on BB and amiodarone  but not on anticoagulation based on records, hypertension. He is a poor historian and has cognitive deficits. Per EDP he was transferred here for NSTEMI on hepa gtt. Patient does not know his medications much but did say he was having epigastric pain for the past few days, had one episode of vomiting that is nonbloody and like the food he ate and one episode of diarrhea, which he says is melanotic. He denied smoking or alcohol. I personally reviewed the records. At [place] he was afebrile and hemodynamically stable. A CTA Chest showed no PE but made comment of possible \"heart failure\". A CTA Ab/P reveled cholecystectomy but made no comment of the bile duct, pancreas and kidneys normal other than some renal cysts and no mention of colitis. His labs were significant for a leukocytosis of 20K, Hb was actually normal at 13, nornal PLTs. His kidney function was normal but he had a mildly elevated AST and a slightly elevated bilirubin. Lipase was normal. His troponin was elevated at 5000 and lactic slightly elevated. He was given a dose of Zosyn and put on hepa gtt and transferred here.  At the ED, he is afebrile, normotensive, HR 80-90s.  EDP is consulting Cardiology.  When I saw him at the ED, he is not having chest pain. Cognitive deficits. He was mildly tender epigastric region.  Dr. Landry, Cardiology came at bedside and tentatively planned cardiac catheterization tomorrow    Interval Problem Update  No acute events overnight.  On 3L oxygen, continue to wean.  On eliquis for DVT's.  Limited repeat echocardiogram shows " LV EF 50%.  Cardiology following, appreciate recommendations.  Continue IV lasix diuresis.  Appreciate cardiology recommendations and clearance for patient.  PT/OT recommend SNF however patient opting for home with home health. Home health ordered.  Anticipate discharge to home tomorrow pending cardiology clearance.      I have discussed this patient's plan of care and discharge plan at IDT rounds today with Case Management, Nursing, Nursing leadership, and other members of the IDT team.    Consultants/Specialty  Cardiology    Code Status  Full Code    Disposition  The patient is not medically cleared for discharge to home or a post-acute facility.  Anticipate discharge to: home with organized home healthcare and close outpatient follow-up    I have placed the appropriate orders for post-discharge needs.    Review of Systems  Review of Systems   Constitutional:         Generally weak   Respiratory:  Positive for cough and shortness of breath. Negative for sputum production.    Cardiovascular:  Negative for chest pain.   Gastrointestinal:  Negative for abdominal pain.   Musculoskeletal:         Left arm swelling   All other systems reviewed and are negative.       Physical Exam  Temp:  [36.1 °C (97 °F)-36.4 °C (97.5 °F)] 36.4 °C (97.5 °F)  Pulse:  [] 108  Resp:  [18] 18  BP: (116-152)/(69-81) 116/69  SpO2:  [89 %-95 %] 93 %    Physical Exam  Vitals and nursing note reviewed.   Constitutional:       General: He is not in acute distress.     Appearance: He is not ill-appearing or toxic-appearing.   HENT:      Head: Normocephalic and atraumatic.      Right Ear: External ear normal.      Left Ear: External ear normal.      Mouth/Throat:      Pharynx: No oropharyngeal exudate or posterior oropharyngeal erythema.   Eyes:      Extraocular Movements: Extraocular movements intact.      Pupils: Pupils are equal, round, and reactive to light.   Cardiovascular:      Rate and Rhythm: Normal rate. Rhythm irregular.       Pulses: Normal pulses.      Heart sounds: Normal heart sounds.      Comments: Left chest pacemaker  Pulmonary:      Effort: Pulmonary effort is normal. No respiratory distress.      Breath sounds: Normal breath sounds. No wheezing, rhonchi or rales.   Abdominal:      General: Bowel sounds are normal. There is no distension.      Palpations: Abdomen is soft.      Tenderness: There is no abdominal tenderness.   Musculoskeletal:         General: Swelling present. No tenderness.      Cervical back: Normal range of motion and neck supple.      Right lower leg: Edema present.      Left lower leg: No edema.      Comments: + bruising and hematoma from the cath site  Marked left arm swelling.    Skin:     General: Skin is warm and dry.   Neurological:      General: No focal deficit present.      Mental Status: He is alert and oriented to person, place, and time.      Cranial Nerves: No cranial nerve deficit.      Sensory: No sensory deficit.      Motor: No weakness.   Psychiatric:         Mood and Affect: Mood normal.         Behavior: Behavior normal.         Fluids    Intake/Output Summary (Last 24 hours) at 5/25/2023 1704  Last data filed at 5/24/2023 2223  Gross per 24 hour   Intake --   Output 300 ml   Net -300 ml       Laboratory  Recent Labs     05/23/23  0316 05/24/23  0404 05/25/23  1204   WBC 15.6* 13.3* 17.2*   RBC 4.05* 4.09* 4.21*   HEMOGLOBIN 12.3* 12.1* 12.7*   HEMATOCRIT 38.2* 38.3* 40.5*   MCV 94.3 93.6 96.2   MCH 30.4 29.6 30.2   MCHC 32.2* 31.6* 31.4*   RDW 50.7* 51.1* 52.3*   PLATELETCT 493* 582* 718*   MPV 12.4 12.1 11.5     Recent Labs     05/23/23  0316 05/24/23  0404 05/25/23  0314   SODIUM 134* 138 141   POTASSIUM 4.4 4.6 4.4   CHLORIDE 100 102 104   CO2 23 30 30   GLUCOSE 147* 115* 123*   BUN 24* 19 17   CREATININE 0.75 0.69 0.68   CALCIUM 8.7 8.5 8.5                       Imaging  EC-ECHOCARDIOGRAM LTD W/ CONT   Final Result      US-EXTREMITY VENOUS LOWER UNILAT RIGHT   Final Result       DX-CHEST-PORTABLE (1 VIEW)   Final Result         1.  Pulmonary edema and/or infiltrate, greater on the right, decreased since prior study   2.  Cardiomegaly      US-EXTREMITY VENOUS UPPER UNILAT LEFT   Final Result      US-EXTREMITY VENOUS LOWER UNILAT LEFT   Final Result      EC-ECHOCARDIOGRAM COMPLETE W/ CONT   Final Result      OUTSIDE IMAGES-CT CHEST   Final Result      OUTSIDE IMAGES-DX CHEST   Final Result      OUTSIDE IMAGES-CT ABDOMEN /PELVIS   Final Result      DX-CHEST-LIMITED (1 VIEW)   Final Result         Stable chest x-ray findings suggesting bilateral dependent edema versus atelectasis with trace effusions.      US-RUQ   Final Result      1.  No acute abnormality.   2.  Status post cholecystectomy.   3.  The pancreas is obscured by overlying bowel gas and not evaluated.      DX-CHEST-LIMITED (1 VIEW)   Final Result         Low lung volume with hypoventilatory change and bibasilar atelectasis.      CL-LEFT HEART CATHETERIZATION WITH POSSIBLE INTERVENTION    (Results Pending)          Assessment/Plan  * NSTEMI (non-ST elevated myocardial infarction), h/o Afib not on anticoag, leukocytosis (HCC)  Assessment & Plan  Admitted  to inpatient, telemetry  Heart cath on 5/19 reveals a chronically occluded RCA  Plavix,  high dose statin,  Echo reveals a decreased EF 45%  Cardiology consulted   Continuous telemetry monitoring to evaluate for arrhythmias    Ventricular tachycardia (HCC)  Assessment & Plan  6 minutes of v tach rate 210 on 5/21  IV magnesium and IV potassium were given  Continuous tele monitoring  Pacemaker interrogation shows a fib but no prolonged v tach  On amiodarone      DVT of upper extremity (deep vein thrombosis) (HCC)- (present on admission)  Assessment & Plan  New diagnosis  Start Eliquis  Stop plavix, start aspirin per cardiology    Hypokalemia- (present on admission)  Assessment & Plan  Ordered potassium replacement and magnesium level    Epigastric pain  Assessment & Plan  Mild,  CTA/P at [place] unremarkable.  With slightly elevated bilirubin and no commen on bile duct  Liver ultrasound is negative  Stopped IV Zosyn  This resolved.    AF (atrial fibrillation) (HCC)  Assessment & Plan  Continue amiodarone, BB  Eliquis    Acute respiratory failure with hypoxia (HCC)- (present on admission)  Assessment & Plan  He had been requiring 5 liters of oxgyen now on 3 liters  There was concern for aspiration thus Speech path consulted for swallow eval  Likely volume overload given his lowered EF  IV lasix and potassium ordered. Follow urine output   BMP ordered for the morning to follow Cr and potassium    Essential hypertension, benign- (present on admission)  Assessment & Plan  Cozaar and Toprol with holding parameters.          VTE prophylaxis: SCDs/TEDs Eliquis    I have performed a physical exam and reviewed and updated ROS and Plan today (5/25/2023). In review of yesterday's note (5/24/2023), there are no changes except as documented above.

## 2023-05-26 NOTE — PROGRESS NOTES
Cardiology Update:  Notified by patient's nurse that the patient's right wrist hematoma appears to be larger during the p.m. shift.  Patient was evaluated at bedside.  There was no active bleeding present.  Manual pressure was held over the enlarging hematoma for several minutes.  Noted to have improvement in hematoma size after manual pressure.  Right radial pulse palpable.  Good pulse ox waveform. Patient without complaints of right hand pain, numbness, tingling or paresthesias.    Celso Shetty MD, FACC

## 2023-05-26 NOTE — PROGRESS NOTES
This RN messaged PAGE Sexton. Pt's cath site on the right wrist has a hematoma. The hematoma is not new. However, the hematoma is getting larger. This RN request APRN to bedside since pt's cath site looks worse regarding hematoma.     2238-This RN messaged BK Nicole. Pt denies any numbness or tingling to the extremity where the cath site was on his right wrist. Pt's hand is cool to touch. This RN explained the pt's hematoma is getting bigger. This RN is requesting PA to come to bedside to take a look.     2305- PAGE Malin came to bedside to assess pt. This RN also expressed concern since pt keeps oozing blood where his old IV was prior.    2312- This RN messaged Dr. Shetty. Explaining the situation and asking MD to come to the pt's bedside.     2330- Dr. Shetty at bedside. Dr. Shetty held manual pressure for a few minutes. The hematoma improved. The hematoma was still there however it was much smaller.

## 2023-05-26 NOTE — PROGRESS NOTES
"[facility] Medicine Daily Progress Note    Date of Service  5/26/2023    Chief Complaint  Hi Montes De Oca is a 78 y.o. male admitted 5/18/2023 with abdominal pain.    Hospital Course  Mr. Montes De Oca is a 78 y.o. male who presented 5/18/2023 as a transfer from [place] for epigastric pain and abdominal pain.   He has a history of MI on ASA, Plavix and statin, par Afib s/p pacer on BB and amiodarone  but not on anticoagulation based on records, hypertension. He is a poor historian and has cognitive deficits. Per EDP he was transferred here for NSTEMI on hepa gtt. Patient does not know his medications much but did say he was having epigastric pain for the past few days, had one episode of vomiting that is nonbloody and like the food he ate and one episode of diarrhea, which he says is melanotic. He denied smoking or alcohol. I personally reviewed the records. At [place] he was afebrile and hemodynamically stable. A CTA Chest showed no PE but made comment of possible \"heart failure\". A CTA Ab/P reveled cholecystectomy but made no comment of the bile duct, pancreas and kidneys normal other than some renal cysts and no mention of colitis. His labs were significant for a leukocytosis of 20K, Hb was actually normal at 13, nornal PLTs. His kidney function was normal but he had a mildly elevated AST and a slightly elevated bilirubin. Lipase was normal. His troponin was elevated at 5000 and lactic slightly elevated. He was given a dose of Zosyn and put on hepa gtt and transferred here.  At the ED, he is afebrile, normotensive, HR 80-90s.  EDP is consulting Cardiology.  When I saw him at the ED, he is not having chest pain. Cognitive deficits. He was mildly tender epigastric region.  Dr. Landry, Cardiology came at bedside and tentatively planned cardiac catheterization tomorrow    Interval Problem Update  Patient with enlarging right wrist hematoma overnight.  Discussed with cardiology, will stop aspirin.  Continue eliquis " for acute lower extremity and upper extremity DVT's.  Monitor for signs of bleeding and size of hematoma.  Stopping eliquis and placing IVC filter not ideal, especially as IVC filter will not stop upper extremity DVT embolism.  On 4L oxygen, continue to wean as able.  Continue IV lasix diuresis while inpatient.  Home oxygen evaluation for home oxygen.  PT/OT recommend SNF, however patient opting to go home with home health. HH ordered.  Anticipate possible discharge home tomorrow if bleeding issues/hematoma stabilize.      I have discussed this patient's plan of care and discharge plan at IDT rounds today with Case Management, Nursing, Nursing leadership, and other members of the IDT team.    Consultants/Specialty  Cardiology    Code Status  Full Code    Disposition  The patient is not medically cleared for discharge to home or a post-acute facility.  Anticipate discharge to: home with organized home healthcare and close outpatient follow-up    I have placed the appropriate orders for post-discharge needs.    Review of Systems  Review of Systems   Constitutional:         Generally weak   Respiratory:  Positive for cough and shortness of breath. Negative for sputum production.    Cardiovascular:  Negative for chest pain.   Gastrointestinal:  Negative for abdominal pain.   Musculoskeletal:         Left arm swelling   All other systems reviewed and are negative.       Physical Exam  Temp:  [36.1 °C (97 °F)-36.7 °C (98 °F)] 36.1 °C (97 °F)  Pulse:  [] 84  Resp:  [18-22] 18  BP: (113-144)/(66-86) 113/73  SpO2:  [91 %-96 %] 96 %    Physical Exam  Vitals and nursing note reviewed.   Constitutional:       General: He is not in acute distress.     Appearance: He is not ill-appearing or toxic-appearing.   HENT:      Head: Normocephalic and atraumatic.      Right Ear: External ear normal.      Left Ear: External ear normal.      Mouth/Throat:      Pharynx: No oropharyngeal exudate or posterior oropharyngeal erythema.    Eyes:      Extraocular Movements: Extraocular movements intact.      Pupils: Pupils are equal, round, and reactive to light.   Cardiovascular:      Rate and Rhythm: Normal rate. Rhythm irregular.      Pulses: Normal pulses.      Heart sounds: Normal heart sounds.      Comments: Left chest pacemaker  Pulmonary:      Effort: Pulmonary effort is normal. No respiratory distress.      Breath sounds: Normal breath sounds. No wheezing, rhonchi or rales.   Abdominal:      General: Bowel sounds are normal. There is no distension.      Palpations: Abdomen is soft.      Tenderness: There is no abdominal tenderness.   Musculoskeletal:         General: Swelling present. No tenderness.      Cervical back: Normal range of motion and neck supple.      Right lower leg: Edema present.      Left lower leg: No edema.      Comments: + bruising and hematoma from the cath site  Marked left arm swelling.    Skin:     General: Skin is warm and dry.   Neurological:      General: No focal deficit present.      Mental Status: He is alert and oriented to person, place, and time.      Cranial Nerves: No cranial nerve deficit.      Sensory: No sensory deficit.      Motor: No weakness.   Psychiatric:         Mood and Affect: Mood normal.         Behavior: Behavior normal.         Fluids    Intake/Output Summary (Last 24 hours) at 5/26/2023 1053  Last data filed at 5/26/2023 0900  Gross per 24 hour   Intake --   Output 1500 ml   Net -1500 ml       Laboratory  Recent Labs     05/24/23  0404 05/25/23  1204 05/25/23  2349   WBC 13.3* 17.2* 20.1*   RBC 4.09* 4.21* 4.12*   HEMOGLOBIN 12.1* 12.7* 12.4*   HEMATOCRIT 38.3* 40.5* 39.1*   MCV 93.6 96.2 94.9   MCH 29.6 30.2 30.1   MCHC 31.6* 31.4* 31.7*   RDW 51.1* 52.3* 51.1*   PLATELETCT 582* 718* 786*   MPV 12.1 11.5 11.8     Recent Labs     05/24/23  0404 05/25/23  0314 05/25/23  2349   SODIUM 138 141 140   POTASSIUM 4.6 4.4 4.0   CHLORIDE 102 104 101   CO2 30 30 29   GLUCOSE 115* 123* 142*   BUN 19 17  19   CREATININE 0.69 0.68 0.66   CALCIUM 8.5 8.5 8.5     Recent Labs     05/25/23  2349   APTT 43.9*                   Imaging  DX-CHEST-PORTABLE (1 VIEW)   Final Result         1.  Hazy bilateral lung base infiltrates, slightly decreased since prior study.   2.  Cardiomegaly      EC-ECHOCARDIOGRAM LTD W/ CONT   Final Result      US-EXTREMITY VENOUS LOWER UNILAT RIGHT   Final Result      DX-CHEST-PORTABLE (1 VIEW)   Final Result         1.  Pulmonary edema and/or infiltrate, greater on the right, decreased since prior study   2.  Cardiomegaly      US-EXTREMITY VENOUS UPPER UNILAT LEFT   Final Result      US-EXTREMITY VENOUS LOWER UNILAT LEFT   Final Result      EC-ECHOCARDIOGRAM COMPLETE W/ CONT   Final Result      OUTSIDE IMAGES-CT CHEST   Final Result      OUTSIDE IMAGES-DX CHEST   Final Result      OUTSIDE IMAGES-CT ABDOMEN /PELVIS   Final Result      DX-CHEST-LIMITED (1 VIEW)   Final Result         Stable chest x-ray findings suggesting bilateral dependent edema versus atelectasis with trace effusions.      US-RUQ   Final Result      1.  No acute abnormality.   2.  Status post cholecystectomy.   3.  The pancreas is obscured by overlying bowel gas and not evaluated.      DX-CHEST-LIMITED (1 VIEW)   Final Result         Low lung volume with hypoventilatory change and bibasilar atelectasis.      CL-LEFT HEART CATHETERIZATION WITH POSSIBLE INTERVENTION    (Results Pending)          Assessment/Plan  * NSTEMI (non-ST elevated myocardial infarction), h/o Afib not on anticoag, leukocytosis (HCC)  Assessment & Plan  Admitted  to inpatient, telemetry  Heart cath on 5/19 reveals a chronically occluded RCA  plavix changed to aspirin, aspirin stopped due to enlarging wrist hematoma with associated bleeding  Echo reveals a decreased EF 45%  Cardiology consulted   Continuous telemetry monitoring to evaluate for arrhythmias    Ventricular tachycardia (HCC)  Assessment & Plan  6 minutes of v tach rate 210 on 5/21  IV magnesium  and IV potassium were given  Continuous tele monitoring  Pacemaker interrogation shows a fib but no prolonged v tach  On amiodarone      DVT of upper extremity (deep vein thrombosis) (HCC)- (present on admission)  Assessment & Plan  New onset of DVT in right lower extremity as well as left upper extremity  Continue eliquis    Hypokalemia- (present on admission)  Assessment & Plan  Ordered potassium replacement and magnesium level    Epigastric pain  Assessment & Plan  Mild, CTA/P at [place] unremarkable.  With slightly elevated bilirubin and no commen on bile duct  Liver ultrasound is negative  Stopped IV Zosyn  This resolved.    AF (atrial fibrillation) (HCC)  Assessment & Plan  Continue amiodarone, BB  Eliquis    Acute respiratory failure with hypoxia (HCC)- (present on admission)  Assessment & Plan  He had been requiring 5 liters of oxgyen now on 3 liters  There was concern for aspiration thus Speech path consulted for swallow eval  Likely volume overload given his lowered EF  IV lasix and potassium ordered. Follow urine output   BMP ordered for the morning to follow Cr and potassium    Essential hypertension, benign- (present on admission)  Assessment & Plan  Cozaar and Toprol with holding parameters.          VTE prophylaxis: SCDs/TEDs Eliquis    I have performed a physical exam and reviewed and updated ROS and Plan today (5/26/2023). In review of yesterday's note (5/25/2023), there are no changes except as documented above.

## 2023-05-26 NOTE — DISCHARGE PLANNING
"Discussed patient in AM rounds.  Physician reports patient is medically stable for discharge home with [facility].  Patient has started on oxygen 4L/min overnight.  Awaiting walk test for home oxygen.     Met with patient at bedside who reports he does not have a choice for home oxygen provider.  Discussed potential for a portable oxygen concentrator which is his preference.  [facility] is the facility who can provide that.  Patient has been accepted by [facility] for [facility].     Discharge Plan: Awaiting walk test to order oxygen.  Awaiting transportation set up.    Addendum 1201:  Discussed patient in IDT.  Clinical team concerned about this patient going home alone with [facility] and safe plan for discharge would be SNF.  This RNCM met with patient after IDT and shared the concerns.  He agreed to referral to SNF in [facility] to determine if there is bed availability or ability to accept with the understanding he could cancel it if he desired. Choice obtained.  Referral sent.     He will not be discharged today as his hematoma to his R forearm is going to be monitored along with discontinuance of ASA.     Walk test has been performed for home oxygen.  Requested RX and F2F so referral can be sent.     Lastly, [facility] is aware of potential transport need tomorrow.  Awaiting transport approval from  leadership    Addendum 1551:  [facility] in [facility] does not have bed availability for this patient. Discussed with the patient to inquire if he would be receptive to sending out SNF referral in [place].  He is not willing to move forward with that.  He wishes to return home with [facility].      Patient reported this nurse could call his son Stevie to discuss discharge plan.  TC to Stevie with update. He confirmed he is not able to transport his father home second to the \"short-notice\", distance, and he would have to \"get into a  truck\".      Obtained approval for transport services from  director for this patient's return home.  " [facility] attempted will call, but it is not in a time constraint allowing cancellation wtihout charge of 2 hours (would have to be cancelled at 8AM tomorrow for 10AM ).  Discussed with CM director who requested cancellation of GMT will call for 10AM.  GMT will need to be set up once patient is confirmed to be medically stable for discharge.  Shared the same with Talat.

## 2023-05-26 NOTE — PROGRESS NOTES
Assumed care at 0700. Bedside report received from Ros. Patient's chart and MAR reviewed. Pt denies pain at this time. Pt is A & O 4. Pt continues to have enlarging hematoma at right wrist cath site from 5/19/23 left heart cath.  Pt also oozing from days old IV site, also on right wrist. Patient was updated on plan of care for the day. Questions answered and concerns addressed.  Pt denies any additional needs at this time. White board updated. Call light, phone and personal belongings within reach.

## 2023-05-26 NOTE — PROGRESS NOTES
"Interval History:  Still feels weak.  Swollen upper, lower extremity.    Physical Exam   Blood pressure 103/69, pulse 100, temperature 36.6 °C (97.8 °F), temperature source Temporal, resp. rate 18, height 1.803 m (5' 11\"), weight 94.6 kg (208 lb 8.9 oz), SpO2 90 %.    Constitutional:  Appears well-developed.   HENT: Normocephalic and atraumatic. No scleral icterus.   Neck: No JVD present.   Cardiovascular: Normal rate. Exam reveals no gallop and no friction rub. No murmur heard.   Pulmonary/Chest: Coarse breath sounds  Abdominal: S/NT/ND BS+   Musculoskeletal: Edema +  Skin: Skin is warm and dry.         Intake/Output Summary (Last 24 hours) at 5/26/2023 1324  Last data filed at 5/26/2023 0900  Gross per 24 hour   Intake --   Output 1500 ml   Net -1500 ml       Recent Labs     05/24/23  0404 05/25/23  1204 05/25/23  2349   WBC 13.3* 17.2* 20.1*   RBC 4.09* 4.21* 4.12*   HEMOGLOBIN 12.1* 12.7* 12.4*   HEMATOCRIT 38.3* 40.5* 39.1*   MCV 93.6 96.2 94.9   MCH 29.6 30.2 30.1   MCHC 31.6* 31.4* 31.7*   RDW 51.1* 52.3* 51.1*   PLATELETCT 582* 718* 786*   MPV 12.1 11.5 11.8     Recent Labs     05/24/23  0404 05/25/23  0314 05/25/23  2349   SODIUM 138 141 140   POTASSIUM 4.6 4.4 4.0   CHLORIDE 102 104 101   CO2 30 30 29   GLUCOSE 115* 123* 142*   BUN 19 17 19   CREATININE 0.69 0.68 0.66   CALCIUM 8.5 8.5 8.5     Recent Labs     05/25/23  2349   APTT 43.9*                   No current facility-administered medications on file prior to encounter.     Current Outpatient Medications on File Prior to Encounter   Medication Sig Dispense Refill    ascorbic acid (VITAMIN C) 500 MG tablet Take 500 mg by mouth every day.      Cholecalciferol (VITAMIN D3) 50 MCG (2000 UT) Tab Take 2,000 Units by mouth every day.      ferrous sulfate 325 (65 Fe) MG tablet Take 325 mg by mouth every day.      hydroCHLOROthiazide (HYDRODIURIL) 12.5 MG tablet Take 12.5 mg by mouth every day.      tamsulosin (FLOMAX) 0.4 MG capsule Take 0.4 mg by mouth " every day.      clotrimazole (MYCELEX) 10 MG Janny janny Take 10 mg by mouth every day.      oxyCODONE immediate-release (ROXICODONE) 5 MG Tab Take 5 mg by mouth every 12 hours as needed for Severe Pain.      clopidogrel (PLAVIX) 75 MG Tab Take 1 tablet by mouth every day. 90 tablet 2    atorvastatin (LIPITOR) 80 MG tablet Take 1 Tab by mouth every evening. 30 Tab 0    metoprolol SR (TOPROL XL) 50 MG TB24 Take 50 mg by mouth every day.      finasteride (PROSCAR) 5 MG TABS Take 5 mg by mouth every day.      nitroglycerin (NITROSTAT) 0.4 MG SUBL Place 0.4 mg under tongue every 5 minutes as needed.           Current Facility-Administered Medications   Medication Dose Frequency Provider Last Rate Last Admin    amiodarone (Cordarone) tablet 400 mg  400 mg DAILY Randi Forrester A.P.R.N.   400 mg at 05/26/23 0618    [START ON 6/2/2023] amiodarone (Cordarone) tablet 200 mg  200 mg DAILY Randi Forrester, A.P.R.N.        metoprolol SR (TOPROL XL) tablet 75 mg  75 mg BID Randi Forrester, A.P.R.N.   75 mg at 05/26/23 0618    guaiFENesin dextromethorphan (ROBITUSSIN DM) 100-10 MG/5ML syrup 5 mL  5 mL Q6HRS PRN Rosario Ventura, A.P.R.N.   5 mL at 05/24/23 1227    furosemide (LASIX) injection 40 mg  40 mg Q DAY Chino Alvarenga M.D.   40 mg at 05/26/23 0618    apixaban (ELIQUIS) tablet 10 mg  10 mg BID Chino Alvarenga M.D.   10 mg at 05/26/23 1101    Followed by    [START ON 5/28/2023] apixaban (ELIQUIS) tablet 5 mg  5 mg BID Chino Alvarenga M.D.        benzonatate (TESSALON) capsule 100 mg  100 mg TID PRN Chino Alvarenga M.D.   100 mg at 05/20/23 1732    finasteride (PROSCAR) tablet 5 mg  5 mg DAILY August Castrejon M.D.   5 mg at 05/26/23 0619    losartan (COZAAR) tablet 50 mg  50 mg DAILY August Castrejon M.D.   50 mg at 05/26/23 0619    magnesium oxide tablet 400 mg  400 mg DAILY August Castrejon M.D.   400 mg at 05/26/23 0618    nitroglycerin (NITROSTAT) tablet 0.4 mg  0.4 mg Q5 MIN PRN August Castrejon M.D.         senna-docusate (PERICOLACE or SENOKOT S) 8.6-50 MG per tablet 2 Tablet  2 Tablet BID August Castrejon M.D.   2 Tablet at 05/26/23 0618    And    polyethylene glycol/lytes (MIRALAX) PACKET 1 Packet  1 Packet QDAY PRN August Castrejon M.D.        And    magnesium hydroxide (MILK OF MAGNESIA) suspension 30 mL  30 mL QDAY PRN August Castrejon M.D.        And    bisacodyl (DULCOLAX) suppository 10 mg  10 mg QDAY PRN August Castrejon M.D.        acetaminophen (Tylenol) tablet 650 mg  650 mg Q6HRS PRN August Castrejon M.D.   650 mg at 05/20/23 1732    labetalol (NORMODYNE/TRANDATE) injection 10 mg  10 mg Q4HRS PRN August Castrejon M.D.        atorvastatin (LIPITOR) tablet 80 mg  80 mg Q EVENING August Castrejon M.D.   80 mg at 05/25/23 1731    Metoprolol Tartrate (LOPRESSOR) injection 5 mg  5 mg Q5 MIN PRN August Castrejon M.D.        lactated ringers infusion (BOLUS)  500 mL Once PRN August Castrejon M.D.        GI Cocktail (hyoscyamine-lidocaine-Maalox) oral susp cup 15 mL  15 mL Q6HRS PRN August Castrejon M.D.       Last reviewed on 5/18/2023  5:44 PM by Merlene Burnette Medications reviewed    Imaging reviewed    Echocardiogram 5/21/2023 reviewed, independently interpreted  CONCLUSIONS  Technically difficult study.  Mildly depressed left ventricular systolic function. The left   ventricular ejection fraction is visually estimated to be 45%.  Mildly dilated right ventricle. Reduced right ventricular systolic   function.  Mild tricuspid regurgitation. Right ventricular systolic pressure is   estimated to be 33 mmHg (normal).  Compared to prior study on 1/27/2021, LVEF is now mildly depressed.  Primary team is notified of findings.      Impressions:  78-year-old male patient with history of atrial fibrillation, hypertension, presented with acute inferior MI late presentation, occluded RCA with collaterals, intervention was not performed.  He has recurrent wide-complex tachycardia, pacemaker interrogation  showed atrial fibrillation.  Also has left upper extremity DVT, right lower extremity    Recommendations:  He is having bleeding from prior IV site.  Holding aspirin temporarily.  Continue amiodarone, Eliquis, Lipitor, furosemide, losartan.  Recommend discharge to skilled nursing facility but patient denies.  Repeat echocardiogram 5/25/2023 showed preserved left, right ventricular function.  We will follow from distance, please call us if any questions.  Transition Lasix to oral upon discharge 40 mg daily    Discussed with primary physician and bedside nursing.    Do not hesitate to call me with questions regarding the patient care.    Fredi Munoz  Interventional cardiologist  Cell: [phone]

## 2023-05-27 ENCOUNTER — APPOINTMENT (OUTPATIENT)
Dept: RADIOLOGY | Facility: MEDICAL CENTER | Age: 78
DRG: 280 | End: 2023-05-27
Attending: STUDENT IN AN ORGANIZED HEALTH CARE EDUCATION/TRAINING PROGRAM
Payer: MEDICARE

## 2023-05-27 PROBLEM — T14.8XXA HEMATOMA: Status: ACTIVE | Noted: 2023-05-27

## 2023-05-27 PROCEDURE — A9270 NON-COVERED ITEM OR SERVICE: HCPCS | Performed by: INTERNAL MEDICINE

## 2023-05-27 PROCEDURE — 92526 ORAL FUNCTION THERAPY: CPT

## 2023-05-27 PROCEDURE — 770020 HCHG ROOM/CARE - TELE (206)

## 2023-05-27 PROCEDURE — 99233 SBSQ HOSP IP/OBS HIGH 50: CPT | Performed by: STUDENT IN AN ORGANIZED HEALTH CARE EDUCATION/TRAINING PROGRAM

## 2023-05-27 PROCEDURE — 700111 HCHG RX REV CODE 636 W/ 250 OVERRIDE (IP): Performed by: HOSPITALIST

## 2023-05-27 PROCEDURE — 93931 UPPER EXTREMITY STUDY: CPT | Mod: RT

## 2023-05-27 PROCEDURE — 700102 HCHG RX REV CODE 250 W/ 637 OVERRIDE(OP): Performed by: NURSE PRACTITIONER

## 2023-05-27 PROCEDURE — 700102 HCHG RX REV CODE 250 W/ 637 OVERRIDE(OP): Performed by: HOSPITALIST

## 2023-05-27 PROCEDURE — A9270 NON-COVERED ITEM OR SERVICE: HCPCS | Performed by: NURSE PRACTITIONER

## 2023-05-27 PROCEDURE — A9270 NON-COVERED ITEM OR SERVICE: HCPCS | Performed by: HOSPITALIST

## 2023-05-27 PROCEDURE — 700102 HCHG RX REV CODE 250 W/ 637 OVERRIDE(OP): Performed by: INTERNAL MEDICINE

## 2023-05-27 PROCEDURE — 93931 UPPER EXTREMITY STUDY: CPT | Mod: 26,RT | Performed by: INTERNAL MEDICINE

## 2023-05-27 RX ORDER — CHOLECALCIFEROL (VITAMIN D3) 125 MCG
5 CAPSULE ORAL NIGHTLY
Status: DISCONTINUED | OUTPATIENT
Start: 2023-05-28 | End: 2023-05-29 | Stop reason: HOSPADM

## 2023-05-27 RX ADMIN — APIXABAN 10 MG: 5 TABLET, FILM COATED ORAL at 04:26

## 2023-05-27 RX ADMIN — Medication 400 MG: at 04:25

## 2023-05-27 RX ADMIN — AMIODARONE HYDROCHLORIDE 400 MG: 200 TABLET ORAL at 04:25

## 2023-05-27 RX ADMIN — ATORVASTATIN CALCIUM 80 MG: 80 TABLET, FILM COATED ORAL at 16:48

## 2023-05-27 RX ADMIN — FINASTERIDE 5 MG: 5 TABLET, FILM COATED ORAL at 04:26

## 2023-05-27 RX ADMIN — SENNOSIDES AND DOCUSATE SODIUM 2 TABLET: 50; 8.6 TABLET ORAL at 04:25

## 2023-05-27 RX ADMIN — METOPROLOL SUCCINATE 75 MG: 50 TABLET, EXTENDED RELEASE ORAL at 16:48

## 2023-05-27 RX ADMIN — METOPROLOL SUCCINATE 75 MG: 50 TABLET, EXTENDED RELEASE ORAL at 04:26

## 2023-05-27 RX ADMIN — FUROSEMIDE 40 MG: 10 INJECTION INTRAMUSCULAR; INTRAVENOUS at 04:31

## 2023-05-27 RX ADMIN — LOSARTAN POTASSIUM 50 MG: 50 TABLET, FILM COATED ORAL at 04:26

## 2023-05-27 RX ADMIN — APIXABAN 10 MG: 5 TABLET, FILM COATED ORAL at 16:48

## 2023-05-27 ASSESSMENT — ENCOUNTER SYMPTOMS
ABDOMINAL PAIN: 0
COUGH: 1
SPUTUM PRODUCTION: 0
SHORTNESS OF BREATH: 1

## 2023-05-27 ASSESSMENT — PAIN DESCRIPTION - PAIN TYPE: TYPE: ACUTE PAIN

## 2023-05-27 NOTE — ASSESSMENT & PLAN NOTE
Right wrist radial hematoma by site of left heart catheterization access point  Was on plavix and eliquis due to occluded RCA and acute DVT's  plavix was switched to aspirin, aspirin now held as well due to ongoing bleeding  Continue eliquis for now given acute DVT's  Arterial US ordered shows normal findings.  stable

## 2023-05-27 NOTE — DISCHARGE PLANNING
Received call from Charu at [facility] who reported they are able to take pt on Monday if he would like to go to [facility].

## 2023-05-27 NOTE — THERAPY
Speech Language Pathology   Daily Treatment     Patient Name: Hi Montes De Oca  AGE:  78 y.o., SEX:  male  Medical Record #: [id number]  Date of Service: 5/27/2023      Precautions:  Precautions: Fall Risk, Swallow Precautions         Subjective  Patient seen on this date for dysphagia management. Pt agreeable to participate as he is eager to upgrade diet.       Assessment  PO trials of thin liquids, soft/bite sized and regular solids assessed. Adequate oral bolus acceptance/containment. Complete AP transfer without notable oral bolus residue. Prolonged but functional mastication of soft solids, though pt demonstrated difficulty with dry solids. No cough/throat clear appreciated with PO. Vocal quality remained stable throughout PO intake. Single swallow completed per bolus. No signs of esophageal dysfunction. No overt s/sx of aspiration appreciated.       Clinical Impressions  Swallow appears appropriate to upgrade diet to soft/bite sized solids is indicated. SLP will continue to follow to ensure diet tolerance and monitor for changes.       Recommendations  Treatment Completed: Dysphagia Treatment       Dysphagia Treatment  Diet Consistency: Soft/bite sized solids and thin liquids  Instrumentation: None indicated at this time  Medication: As tolerated  Supervision: Assist with meal tray set up, Distant supervision - check on patient 2-3 times per meal  Positioning: Fully upright and midline during oral intake, Meals sitting upright in a chair, as tolerated  Risk Management : Small bites/sips, Slow rate of intake, Physical mobility, as tolerated                SLP Treatment Plan  Treatment Plan: Dysphagia Treatment  SLP Frequency: 2x Per Week  Estimated Duration: Until Therapy Goals Met      Anticipated Discharge Needs  Discharge Recommendations: Other (Dependent on status at time of d/c)  Therapy Recommendations Upon DC: Dysphagia Training, Patient / Family / Caregiver Education      Patient / Family  "Goals  Patient / Family Goal #1: \"Don't throw that away\"  Goal #1 Outcome: Progressing as expected  Short Term Goals  Short Term Goal # 1: Pt will consume MM5/TN0 diet with direct supervision from nursing staff and adherence to posted swallow precautions with no overt s/sx concerning for aspiration.  Goal Outcome # 1: Goal met, new goal added  Short Term Goal # 1 B : Patient will consume a diet of soft/bite sized solids and thin liquids with no overt s/sx of aspiration      Yuli Butts MS,CCC-SLP    "

## 2023-05-27 NOTE — DISCHARGE PLANNING
Received Choice form at 2179  Agency/Facility Name: [facility]   Referral sent per Choice form @ 2100

## 2023-05-27 NOTE — PROGRESS NOTES
Assumed care at 0700. Bedside report received from Yan. Patient's chart and MAR reviewed. Pt sleeping at this time. Pt continues to have sanguinous drainage from prior IV site on right forearm and persistent hematoma at prior right wrist catheter site.  White board updated. Call light, phone and personal belongings within reach.

## 2023-05-27 NOTE — CARE PLAN
The patient is Watcher - Medium risk of patient condition declining or worsening    Shift Goals  Clinical Goals: improve mobility, wean oxygen, monitor right wrist hematoma  Patient Goals: safe DC  Family Goals: HEATHER    Progress made toward(s) clinical / shift goals:  Progressing      Problem: Knowledge Deficit - Standard  Goal: Patient and family/care givers will demonstrate understanding of plan of care, disease process/condition, diagnostic tests and medications  Outcome: Progressing  Note: Pt verbalizes understanding of plan of care     Problem: Pain - Standard  Goal: Alleviation of pain or a reduction in pain to the patient’s comfort goal  Outcome: Progressing  Note: Pt currently reports no pain at this time          Patient is not progressing towards the following goals:      Problem: Hemodynamics  Goal: Patient's hemodynamics, fluid balance and neurologic status will be stable or improve  Outcome: Not Progressing  Note: Pt continues to have persistent hematoma as well as serosanguinous drainage to right arm IV site

## 2023-05-27 NOTE — DISCHARGE PLANNING
RNCM spoke with patient bedside. Patient is agreeable to [facility] rehab for  discharge. RNCM has sent blanket referral to all local SNF's in the [facility] area.

## 2023-05-27 NOTE — PROGRESS NOTES
"[facility] Medicine Daily Progress Note    Date of Service  5/27/2023    Chief Complaint  Hi Montes De Oca is a 78 y.o. male admitted 5/18/2023 with abdominal pain.    Hospital Course  Mr. Montes De Oca is a 78 y.o. male who presented 5/18/2023 as a transfer from [place] for epigastric pain and abdominal pain.   He has a history of MI on ASA, Plavix and statin, par Afib s/p pacer on BB and amiodarone  but not on anticoagulation based on records, hypertension. He is a poor historian and has cognitive deficits. Per EDP he was transferred here for NSTEMI on hepa gtt. Patient does not know his medications much but did say he was having epigastric pain for the past few days, had one episode of vomiting that is nonbloody and like the food he ate and one episode of diarrhea, which he says is melanotic. He denied smoking or alcohol. I personally reviewed the records. At [place] he was afebrile and hemodynamically stable. A CTA Chest showed no PE but made comment of possible \"heart failure\". A CTA Ab/P reveled cholecystectomy but made no comment of the bile duct, pancreas and kidneys normal other than some renal cysts and no mention of colitis. His labs were significant for a leukocytosis of 20K, Hb was actually normal at 13, nornal PLTs. His kidney function was normal but he had a mildly elevated AST and a slightly elevated bilirubin. Lipase was normal. His troponin was elevated at 5000 and lactic slightly elevated. He was given a dose of Zosyn and put on hepa gtt and transferred here.  At the ED, he is afebrile, normotensive, HR 80-90s.  EDP is consulting Cardiology.  When I saw him at the ED, he is not having chest pain. Cognitive deficits. He was mildly tender epigastric region.  Dr. Landry, Cardiology came at bedside and tentatively planned cardiac catheterization tomorrow    Interval Problem Update  No acute events overnight.  On 2L oxygen, continue to wean as able.  Continue IV lasix diuresis while inpatient, " transition to PO lasix on discharge. Monitor fluid status.  Patient with continued wrist hematoma that is worsening in size.  US arterial study ordered. Will consider vascular surgery consult pending results.  Aspirin stopped due to hematoma bleeding. Continuing eliquis for acute DVT's for now.  Hgb relatively stable on labs.  Discussed safe discharge planning with patient, he is agreeable to SNF. CM notified, will send referrals to SNFs.  Patient is not yet medically cleared, pending hematoma evaluation.      I have discussed this patient's plan of care and discharge plan at IDT rounds today with Case Management, Nursing, Nursing leadership, and other members of the IDT team.    Consultants/Specialty  Cardiology    Code Status  Full Code    Disposition  The patient is not medically cleared for discharge to home or a post-acute facility.  Anticipate discharge to: skilled nursing facility    I have placed the appropriate orders for post-discharge needs.    Review of Systems  Review of Systems   Constitutional:         Generally weak   Respiratory:  Positive for cough and shortness of breath. Negative for sputum production.    Cardiovascular:  Negative for chest pain.   Gastrointestinal:  Negative for abdominal pain.   Musculoskeletal:         Left arm swelling   All other systems reviewed and are negative.       Physical Exam  Temp:  [36.1 °C (96.9 °F)-36.4 °C (97.5 °F)] 36.2 °C (97.2 °F)  Pulse:  [72-96] 90  Resp:  [13-18] 18  BP: (105-118)/(57-68) 105/57  SpO2:  [91 %-94 %] 94 %    Physical Exam  Vitals and nursing note reviewed.   Constitutional:       General: He is not in acute distress.     Appearance: He is not ill-appearing or toxic-appearing.   HENT:      Head: Normocephalic and atraumatic.      Right Ear: External ear normal.      Left Ear: External ear normal.      Mouth/Throat:      Pharynx: No oropharyngeal exudate or posterior oropharyngeal erythema.   Eyes:      Extraocular Movements: Extraocular  movements intact.      Pupils: Pupils are equal, round, and reactive to light.   Cardiovascular:      Rate and Rhythm: Normal rate. Rhythm irregular.      Pulses: Normal pulses.      Heart sounds: Normal heart sounds.      Comments: Left chest pacemaker  Pulmonary:      Effort: Pulmonary effort is normal. No respiratory distress.      Breath sounds: Normal breath sounds. No wheezing, rhonchi or rales.   Abdominal:      General: Bowel sounds are normal. There is no distension.      Palpations: Abdomen is soft.      Tenderness: There is no abdominal tenderness.   Musculoskeletal:         General: Swelling present. No tenderness.      Cervical back: Normal range of motion and neck supple.      Right lower leg: Edema present.      Left lower leg: No edema.      Comments: + bruising and hematoma from the cath site  Marked left arm swelling.    Skin:     General: Skin is warm and dry.   Neurological:      General: No focal deficit present.      Mental Status: He is alert and oriented to person, place, and time.      Cranial Nerves: No cranial nerve deficit.      Sensory: No sensory deficit.      Motor: No weakness.   Psychiatric:         Mood and Affect: Mood normal.         Behavior: Behavior normal.         Fluids    Intake/Output Summary (Last 24 hours) at 5/27/2023 1436  Last data filed at 5/27/2023 0900  Gross per 24 hour   Intake 360 ml   Output 750 ml   Net -390 ml       Laboratory  Recent Labs     05/25/23  1204 05/25/23 2349   WBC 17.2* 20.1*   RBC 4.21* 4.12*   HEMOGLOBIN 12.7* 12.4*   HEMATOCRIT 40.5* 39.1*   MCV 96.2 94.9   MCH 30.2 30.1   MCHC 31.4* 31.7*   RDW 52.3* 51.1*   PLATELETCT 718* 786*   MPV 11.5 11.8     Recent Labs     05/25/23  0314 05/25/23  2349   SODIUM 141 140   POTASSIUM 4.4 4.0   CHLORIDE 104 101   CO2 30 29   GLUCOSE 123* 142*   BUN 17 19   CREATININE 0.68 0.66   CALCIUM 8.5 8.5     Recent Labs     05/25/23 2349   APTT 43.9*   INR 2.51*                   Imaging  US-EXTREMITY ARTERY  UPPER UNILAT RIGHT         DX-CHEST-PORTABLE (1 VIEW)   Final Result         1.  Hazy bilateral lung base infiltrates, slightly decreased since prior study.   2.  Cardiomegaly      EC-ECHOCARDIOGRAM LTD W/ CONT   Final Result      US-EXTREMITY VENOUS LOWER UNILAT RIGHT   Final Result      DX-CHEST-PORTABLE (1 VIEW)   Final Result         1.  Pulmonary edema and/or infiltrate, greater on the right, decreased since prior study   2.  Cardiomegaly      US-EXTREMITY VENOUS UPPER UNILAT LEFT   Final Result      US-EXTREMITY VENOUS LOWER UNILAT LEFT   Final Result      EC-ECHOCARDIOGRAM COMPLETE W/ CONT   Final Result      OUTSIDE IMAGES-CT CHEST   Final Result      OUTSIDE IMAGES-DX CHEST   Final Result      OUTSIDE IMAGES-CT ABDOMEN /PELVIS   Final Result      DX-CHEST-LIMITED (1 VIEW)   Final Result         Stable chest x-ray findings suggesting bilateral dependent edema versus atelectasis with trace effusions.      US-RUQ   Final Result      1.  No acute abnormality.   2.  Status post cholecystectomy.   3.  The pancreas is obscured by overlying bowel gas and not evaluated.      DX-CHEST-LIMITED (1 VIEW)   Final Result         Low lung volume with hypoventilatory change and bibasilar atelectasis.      CL-LEFT HEART CATHETERIZATION WITH POSSIBLE INTERVENTION    (Results Pending)          Assessment/Plan  * NSTEMI (non-ST elevated myocardial infarction), h/o Afib not on anticoag, leukocytosis (HCC)  Assessment & Plan  Admitted  to inpatient, telemetry  Heart cath on 5/19 reveals a chronically occluded RCA  plavix changed to aspirin, aspirin stopped due to enlarging wrist hematoma with associated bleeding  Echo reveals a decreased EF 45%  Cardiology consulted   Continuous telemetry monitoring to evaluate for arrhythmias    Hematoma  Assessment & Plan  Right wrist radial hematoma by site of left heart catheterization access point  Was on plavix and eliquis due to occluded RCA and acute DVT's  plavix was switched to aspirin,  aspirin now held as well due to ongoing bleeding  Continue eliquis for now given acute DVT's  Arterial US ordered, consider vascular surgery consult if shows ongoing bleeding    Ventricular tachycardia (HCC)  Assessment & Plan  6 minutes of v tach rate 210 on 5/21  IV magnesium and IV potassium were given  Continuous tele monitoring  Pacemaker interrogation shows a fib but no prolonged v tach  On amiodarone      DVT of upper extremity (deep vein thrombosis) (HCC)- (present on admission)  Assessment & Plan  New onset of DVT in right lower extremity as well as left upper extremity  Continue eliquis    Hypokalemia- (present on admission)  Assessment & Plan  Ordered potassium replacement and magnesium level    Epigastric pain  Assessment & Plan  Mild, CTA/P at [place] unremarkable.  With slightly elevated bilirubin and no commen on bile duct  Liver ultrasound is negative  Stopped IV Zosyn  This resolved.    AF (atrial fibrillation) (HCC)  Assessment & Plan  Continue amiodarone, BB  Eliquis    Acute respiratory failure with hypoxia (HCC)- (present on admission)  Assessment & Plan  He had been requiring 5 liters of oxgyen  There was concern for aspiration thus Speech path consulted for swallow eval  Likely volume overload given his lowered EF  IV lasix and potassium ordered. Follow urine output   Improving, on 2L oxygen    Essential hypertension, benign- (present on admission)  Assessment & Plan  Cozaar and Toprol with holding parameters.          VTE prophylaxis: SCDs/TEDs Eliquis    I have performed a physical exam and reviewed and updated ROS and Plan today (5/27/2023). In review of yesterday's note (5/26/2023), there are no changes except as documented above.

## 2023-05-28 LAB
ERYTHROCYTE [DISTWIDTH] IN BLOOD BY AUTOMATED COUNT: 48.3 FL (ref 35.9–50)
HCT VFR BLD AUTO: 35.2 % (ref 42–52)
HGB BLD-MCNC: 11.5 G/DL (ref 14–18)
MCH RBC QN AUTO: 29.8 PG (ref 27–33)
MCHC RBC AUTO-ENTMCNC: 32.7 G/DL (ref 32.3–36.5)
MCV RBC AUTO: 91.2 FL (ref 81.4–97.8)
PLATELET # BLD AUTO: 707 K/UL (ref 164–446)
PMV BLD AUTO: 11.4 FL (ref 9–12.9)
RBC # BLD AUTO: 3.86 M/UL (ref 4.7–6.1)
WBC # BLD AUTO: 16.9 K/UL (ref 4.8–10.8)

## 2023-05-28 PROCEDURE — 700102 HCHG RX REV CODE 250 W/ 637 OVERRIDE(OP)

## 2023-05-28 PROCEDURE — A9270 NON-COVERED ITEM OR SERVICE: HCPCS | Performed by: HOSPITALIST

## 2023-05-28 PROCEDURE — 99232 SBSQ HOSP IP/OBS MODERATE 35: CPT | Performed by: STUDENT IN AN ORGANIZED HEALTH CARE EDUCATION/TRAINING PROGRAM

## 2023-05-28 PROCEDURE — A9270 NON-COVERED ITEM OR SERVICE: HCPCS | Performed by: NURSE PRACTITIONER

## 2023-05-28 PROCEDURE — 700111 HCHG RX REV CODE 636 W/ 250 OVERRIDE (IP): Performed by: HOSPITALIST

## 2023-05-28 PROCEDURE — 85027 COMPLETE CBC AUTOMATED: CPT

## 2023-05-28 PROCEDURE — 770020 HCHG ROOM/CARE - TELE (206)

## 2023-05-28 PROCEDURE — 36415 COLL VENOUS BLD VENIPUNCTURE: CPT

## 2023-05-28 PROCEDURE — 700102 HCHG RX REV CODE 250 W/ 637 OVERRIDE(OP): Performed by: HOSPITALIST

## 2023-05-28 PROCEDURE — 700102 HCHG RX REV CODE 250 W/ 637 OVERRIDE(OP): Performed by: INTERNAL MEDICINE

## 2023-05-28 PROCEDURE — A9270 NON-COVERED ITEM OR SERVICE: HCPCS | Performed by: INTERNAL MEDICINE

## 2023-05-28 PROCEDURE — 700102 HCHG RX REV CODE 250 W/ 637 OVERRIDE(OP): Performed by: NURSE PRACTITIONER

## 2023-05-28 PROCEDURE — A9270 NON-COVERED ITEM OR SERVICE: HCPCS

## 2023-05-28 RX ORDER — FUROSEMIDE 40 MG/1
40 TABLET ORAL
Status: DISCONTINUED | OUTPATIENT
Start: 2023-05-29 | End: 2023-05-29 | Stop reason: HOSPADM

## 2023-05-28 RX ADMIN — METOPROLOL SUCCINATE 75 MG: 50 TABLET, EXTENDED RELEASE ORAL at 17:36

## 2023-05-28 RX ADMIN — AMIODARONE HYDROCHLORIDE 400 MG: 200 TABLET ORAL at 05:02

## 2023-05-28 RX ADMIN — FINASTERIDE 5 MG: 5 TABLET, FILM COATED ORAL at 05:03

## 2023-05-28 RX ADMIN — LOSARTAN POTASSIUM 50 MG: 50 TABLET, FILM COATED ORAL at 05:03

## 2023-05-28 RX ADMIN — Medication 400 MG: at 05:04

## 2023-05-28 RX ADMIN — SENNOSIDES AND DOCUSATE SODIUM 2 TABLET: 50; 8.6 TABLET ORAL at 05:04

## 2023-05-28 RX ADMIN — ATORVASTATIN CALCIUM 80 MG: 80 TABLET, FILM COATED ORAL at 17:36

## 2023-05-28 RX ADMIN — FUROSEMIDE 40 MG: 10 INJECTION INTRAMUSCULAR; INTRAVENOUS at 05:05

## 2023-05-28 RX ADMIN — METOPROLOL SUCCINATE 75 MG: 50 TABLET, EXTENDED RELEASE ORAL at 05:04

## 2023-05-28 RX ADMIN — Medication 5 MG: at 21:29

## 2023-05-28 RX ADMIN — APIXABAN 5 MG: 5 TABLET, FILM COATED ORAL at 17:36

## 2023-05-28 RX ADMIN — BENZONATATE 100 MG: 100 CAPSULE ORAL at 05:01

## 2023-05-28 RX ADMIN — APIXABAN 10 MG: 5 TABLET, FILM COATED ORAL at 05:03

## 2023-05-28 ASSESSMENT — ENCOUNTER SYMPTOMS
ABDOMINAL PAIN: 0
SHORTNESS OF BREATH: 1
SPUTUM PRODUCTION: 0
COUGH: 1

## 2023-05-28 ASSESSMENT — FIBROSIS 4 INDEX: FIB4 SCORE: 0.61

## 2023-05-28 ASSESSMENT — PAIN DESCRIPTION - PAIN TYPE: TYPE: ACUTE PAIN

## 2023-05-28 NOTE — PROGRESS NOTES
Monitor Summary  Afib: 79-91  Ectopies: PVC, couplet, bigeminy, frequent trigeminy  .0/.08/.0

## 2023-05-28 NOTE — PROGRESS NOTES
"[facility] Medicine Daily Progress Note    Date of Service  5/28/2023    Chief Complaint  Hi Montes De Oca is a 78 y.o. male admitted 5/18/2023 with abdominal pain.    Hospital Course  Mr. Montes De Oca is a 78 y.o. male who presented 5/18/2023 as a transfer from [place] for epigastric pain and abdominal pain.   He has a history of MI on ASA, Plavix and statin, par Afib s/p pacer on BB and amiodarone  but not on anticoagulation based on records, hypertension. He is a poor historian and has cognitive deficits. Per EDP he was transferred here for NSTEMI on hepa gtt. Patient does not know his medications much but did say he was having epigastric pain for the past few days, had one episode of vomiting that is nonbloody and like the food he ate and one episode of diarrhea, which he says is melanotic. He denied smoking or alcohol. I personally reviewed the records. At [place] he was afebrile and hemodynamically stable. A CTA Chest showed no PE but made comment of possible \"heart failure\". A CTA Ab/P reveled cholecystectomy but made no comment of the bile duct, pancreas and kidneys normal other than some renal cysts and no mention of colitis. His labs were significant for a leukocytosis of 20K, Hb was actually normal at 13, nornal PLTs. His kidney function was normal but he had a mildly elevated AST and a slightly elevated bilirubin. Lipase was normal. His troponin was elevated at 5000 and lactic slightly elevated. He was given a dose of Zosyn and put on hepa gtt and transferred here.  At the ED, he is afebrile, normotensive, HR 80-90s.  EDP is consulting Cardiology.  When I saw him at the ED, he is not having chest pain. Cognitive deficits. He was mildly tender epigastric region.  Dr. Landry, Cardiology came at bedside and tentatively planned cardiac catheterization tomorrow    Interval Problem Update  No acute events overnight.  On 2L oxygen, continue to wean as able.  Stop IV lasix diuresis, start PO lasix.  Wrist " hematoma stable. R upper extremity arterial US with normal findings.  Continue eliquis for acute DVT's.  Patient is medically cleared to discharge to SNF.      I have discussed this patient's plan of care and discharge plan at IDT rounds today with Case Management, Nursing, Nursing leadership, and other members of the IDT team.    Consultants/Specialty  Cardiology    Code Status  Full Code    Disposition  The patient is medically cleared for discharge to home or a post-acute facility.  Anticipate discharge to: skilled nursing facility    I have placed the appropriate orders for post-discharge needs.    Review of Systems  Review of Systems   Constitutional:         Generally weak   Respiratory:  Positive for cough and shortness of breath. Negative for sputum production.    Cardiovascular:  Negative for chest pain.   Gastrointestinal:  Negative for abdominal pain.   Musculoskeletal:         Left arm swelling   All other systems reviewed and are negative.       Physical Exam  Temp:  [35.7 °C (96.3 °F)-36.2 °C (97.2 °F)] 36.2 °C (97.1 °F)  Pulse:  [74-92] 82  Resp:  [16] 16  BP: ()/(53-71) 106/59  SpO2:  [88 %-95 %] 95 %    Physical Exam  Vitals and nursing note reviewed.   Constitutional:       General: He is not in acute distress.     Appearance: He is not ill-appearing or toxic-appearing.   HENT:      Head: Normocephalic and atraumatic.      Right Ear: External ear normal.      Left Ear: External ear normal.      Mouth/Throat:      Pharynx: No oropharyngeal exudate or posterior oropharyngeal erythema.   Eyes:      Extraocular Movements: Extraocular movements intact.      Pupils: Pupils are equal, round, and reactive to light.   Cardiovascular:      Rate and Rhythm: Normal rate. Rhythm irregular.      Pulses: Normal pulses.      Heart sounds: Normal heart sounds.      Comments: Left chest pacemaker  Pulmonary:      Effort: Pulmonary effort is normal. No respiratory distress.      Breath sounds: Normal breath  sounds. No wheezing, rhonchi or rales.   Abdominal:      General: Bowel sounds are normal. There is no distension.      Palpations: Abdomen is soft.      Tenderness: There is no abdominal tenderness.   Musculoskeletal:         General: Swelling present. No tenderness.      Cervical back: Normal range of motion and neck supple.      Right lower leg: Edema present.      Left lower leg: No edema.      Comments: + bruising and hematoma from the cath site  Marked left arm swelling.    Skin:     General: Skin is warm and dry.   Neurological:      General: No focal deficit present.      Mental Status: He is alert and oriented to person, place, and time.      Cranial Nerves: No cranial nerve deficit.      Sensory: No sensory deficit.      Motor: No weakness.   Psychiatric:         Mood and Affect: Mood normal.         Behavior: Behavior normal.         Fluids    Intake/Output Summary (Last 24 hours) at 5/28/2023 1227  Last data filed at 5/28/2023 0730  Gross per 24 hour   Intake --   Output 925 ml   Net -925 ml       Laboratory  Recent Labs     05/25/23  2349 05/28/23  0347   WBC 20.1* 16.9*   RBC 4.12* 3.86*   HEMOGLOBIN 12.4* 11.5*   HEMATOCRIT 39.1* 35.2*   MCV 94.9 91.2   MCH 30.1 29.8   MCHC 31.7* 32.7   RDW 51.1* 48.3   PLATELETCT 786* 707*   MPV 11.8 11.4     Recent Labs     05/25/23  2349   SODIUM 140   POTASSIUM 4.0   CHLORIDE 101   CO2 29   GLUCOSE 142*   BUN 19   CREATININE 0.66   CALCIUM 8.5     Recent Labs     05/25/23  2349   APTT 43.9*   INR 2.51*                   Imaging  US-EXTREMITY ARTERY UPPER UNILAT RIGHT   Final Result      DX-CHEST-PORTABLE (1 VIEW)   Final Result         1.  Hazy bilateral lung base infiltrates, slightly decreased since prior study.   2.  Cardiomegaly      EC-ECHOCARDIOGRAM LTD W/ CONT   Final Result      US-EXTREMITY VENOUS LOWER UNILAT RIGHT   Final Result      DX-CHEST-PORTABLE (1 VIEW)   Final Result         1.  Pulmonary edema and/or infiltrate, greater on the right, decreased  since prior study   2.  Cardiomegaly      US-EXTREMITY VENOUS UPPER UNILAT LEFT   Final Result      US-EXTREMITY VENOUS LOWER UNILAT LEFT   Final Result      EC-ECHOCARDIOGRAM COMPLETE W/ CONT   Final Result      OUTSIDE IMAGES-CT CHEST   Final Result      OUTSIDE IMAGES-DX CHEST   Final Result      OUTSIDE IMAGES-CT ABDOMEN /PELVIS   Final Result      DX-CHEST-LIMITED (1 VIEW)   Final Result         Stable chest x-ray findings suggesting bilateral dependent edema versus atelectasis with trace effusions.      US-RUQ   Final Result      1.  No acute abnormality.   2.  Status post cholecystectomy.   3.  The pancreas is obscured by overlying bowel gas and not evaluated.      DX-CHEST-LIMITED (1 VIEW)   Final Result         Low lung volume with hypoventilatory change and bibasilar atelectasis.      CL-LEFT HEART CATHETERIZATION WITH POSSIBLE INTERVENTION    (Results Pending)          Assessment/Plan  * NSTEMI (non-ST elevated myocardial infarction), h/o Afib not on anticoag, leukocytosis (HCC)  Assessment & Plan  Admitted  to inpatient, telemetry  Heart cath on 5/19 reveals a chronically occluded RCA  plavix changed to aspirin, aspirin stopped due to enlarging wrist hematoma with associated bleeding  Echo reveals a decreased EF 45%    Hematoma  Assessment & Plan  Right wrist radial hematoma by site of left heart catheterization access point  Was on plavix and eliquis due to occluded RCA and acute DVT's  plavix was switched to aspirin, aspirin now held as well due to ongoing bleeding  Continue eliquis for now given acute DVT's  Arterial US ordered shows normal findings.  stable    Ventricular tachycardia (HCC)  Assessment & Plan  6 minutes of v tach rate 210 on 5/21  IV magnesium and IV potassium were given  Continuous tele monitoring  Pacemaker interrogation shows a fib but no prolonged v tach  On amiodarone      DVT of upper extremity (deep vein thrombosis) (HCC)- (present on admission)  Assessment & Plan  New onset of  DVT in right lower extremity as well as left upper extremity  Continue eliquis    Hypokalemia- (present on admission)  Assessment & Plan  Ordered potassium replacement and magnesium level    Epigastric pain  Assessment & Plan  Mild, CTA/P at [place] unremarkable.  With slightly elevated bilirubin and no commen on bile duct  Liver ultrasound is negative  Stopped IV Zosyn  This resolved.    AF (atrial fibrillation) (HCC)  Assessment & Plan  Continue amiodarone, BB  Eliquis    Acute respiratory failure with hypoxia (HCC)- (present on admission)  Assessment & Plan  He had been requiring 5 liters of oxgyen  There was concern for aspiration thus Speech path consulted for swallow eval  Likely volume overload given his lowered EF  IV lasix and potassium ordered. Follow urine output   Improving, on 2L oxygen    Essential hypertension, benign- (present on admission)  Assessment & Plan  Cozaar and Toprol with holding parameters.          VTE prophylaxis: SCDs/TEDs Eliquis    I have performed a physical exam and reviewed and updated ROS and Plan today (5/28/2023). In review of yesterday's note (5/27/2023), there are no changes except as documented above.

## 2023-05-28 NOTE — DISCHARGE PLANNING
Case Management Discharge Planning    Admission Date: 5/18/2023  GMLOS: 4.1  ALOS: 10    6-Clicks ADL Score: 16  6-Clicks Mobility Score: 24  PT and/or OT Eval ordered: Yes  Post-acute Referrals Ordered: Yes  Post-acute Choice Obtained: Yes  Has referral(s) been sent to post-acute provider:  Yes      Anticipated Discharge Dispo: Discharge Disposition: D/T to SNF with Medicare cert in anticipation of skilled care (03)  Discharge Address: [facility] [address]   [place] [address]  Discharge Contact Phone Number: [phone]    DME Needed: No    Action(s) Taken: Per MD Cerna pt is medically clear for SNF. Pt accepted at [facility], [facility], and [facility] so far. Sent message to [facility] to request they f/u for bed availability.    Addendum @0256  LSW notified by NEIL [facility] will have a bed available tomorrow for pt. Unable to get a hold of admissions for [facility] or [facility]. LSW met with pt at bedside to discuss bed at [facility] vs [facility] tomorrow. Pt reported his first choice is for [facility]. LSW left  for [facility] admissions to secure bed for Monday 5/29.    Escalations Completed: None    Medically Clear: Yes    Next Steps: Care coordination will f/u with SNF referrals for bed availability    Barriers to Discharge: Pending Placement    Is the patient up for discharge tomorrow: Yes    Is transport arranged for discharge disposition: No

## 2023-05-28 NOTE — PROGRESS NOTES
Assumed care at 0700. Bedside report received from [place]. Patient's chart and MAR reviewed. Pt complains of chronic low back pain at this time. Pt is A & O 4. Patient was updated on plan of care for the day. Questions answered and concerns addressed.  Pt denies any additional needs at this time. White board updated. Call light, phone and personal belongings within reach.

## 2023-05-29 VITALS
SYSTOLIC BLOOD PRESSURE: 124 MMHG | BODY MASS INDEX: 28.27 KG/M2 | OXYGEN SATURATION: 93 % | RESPIRATION RATE: 18 BRPM | WEIGHT: 201.94 LBS | HEIGHT: 71 IN | DIASTOLIC BLOOD PRESSURE: 66 MMHG | HEART RATE: 73 BPM | TEMPERATURE: 96.6 F

## 2023-05-29 PROCEDURE — A9270 NON-COVERED ITEM OR SERVICE: HCPCS | Performed by: NURSE PRACTITIONER

## 2023-05-29 PROCEDURE — 700102 HCHG RX REV CODE 250 W/ 637 OVERRIDE(OP): Performed by: NURSE PRACTITIONER

## 2023-05-29 PROCEDURE — 700102 HCHG RX REV CODE 250 W/ 637 OVERRIDE(OP): Performed by: HOSPITALIST

## 2023-05-29 PROCEDURE — A9270 NON-COVERED ITEM OR SERVICE: HCPCS | Performed by: INTERNAL MEDICINE

## 2023-05-29 PROCEDURE — A9270 NON-COVERED ITEM OR SERVICE: HCPCS | Performed by: STUDENT IN AN ORGANIZED HEALTH CARE EDUCATION/TRAINING PROGRAM

## 2023-05-29 PROCEDURE — 700102 HCHG RX REV CODE 250 W/ 637 OVERRIDE(OP): Performed by: STUDENT IN AN ORGANIZED HEALTH CARE EDUCATION/TRAINING PROGRAM

## 2023-05-29 PROCEDURE — A9270 NON-COVERED ITEM OR SERVICE: HCPCS | Performed by: HOSPITALIST

## 2023-05-29 PROCEDURE — 700102 HCHG RX REV CODE 250 W/ 637 OVERRIDE(OP): Performed by: INTERNAL MEDICINE

## 2023-05-29 PROCEDURE — 99239 HOSP IP/OBS DSCHRG MGMT >30: CPT | Performed by: STUDENT IN AN ORGANIZED HEALTH CARE EDUCATION/TRAINING PROGRAM

## 2023-05-29 RX ORDER — METOPROLOL SUCCINATE 25 MG/1
75 TABLET, EXTENDED RELEASE ORAL 2 TIMES DAILY
Qty: 30 TABLET | Refills: 0
Start: 2023-05-29

## 2023-05-29 RX ORDER — FUROSEMIDE 40 MG/1
40 TABLET ORAL DAILY
Qty: 30 TABLET | Refills: 0 | Status: ON HOLD
Start: 2023-05-30 | End: 2023-09-29 | Stop reason: SDUPTHER

## 2023-05-29 RX ORDER — AMIODARONE HYDROCHLORIDE 400 MG/1
TABLET ORAL
Qty: 48 TABLET | Refills: 0 | Status: ON HOLD
Start: 2023-05-30 | End: 2023-07-03

## 2023-05-29 RX ORDER — LOSARTAN POTASSIUM 50 MG/1
50 TABLET ORAL DAILY
Qty: 30 TABLET | Refills: 0
Start: 2023-05-30

## 2023-05-29 RX ADMIN — FUROSEMIDE 40 MG: 40 TABLET ORAL at 05:16

## 2023-05-29 RX ADMIN — AMIODARONE HYDROCHLORIDE 400 MG: 200 TABLET ORAL at 05:16

## 2023-05-29 RX ADMIN — Medication 400 MG: at 05:17

## 2023-05-29 RX ADMIN — FINASTERIDE 5 MG: 5 TABLET, FILM COATED ORAL at 05:17

## 2023-05-29 RX ADMIN — APIXABAN 5 MG: 5 TABLET, FILM COATED ORAL at 05:17

## 2023-05-29 RX ADMIN — METOPROLOL SUCCINATE 75 MG: 50 TABLET, EXTENDED RELEASE ORAL at 05:17

## 2023-05-29 RX ADMIN — LOSARTAN POTASSIUM 50 MG: 50 TABLET, FILM COATED ORAL at 05:17

## 2023-05-29 RX ADMIN — SENNOSIDES AND DOCUSATE SODIUM 2 TABLET: 50; 8.6 TABLET ORAL at 05:16

## 2023-05-29 ASSESSMENT — PAIN DESCRIPTION - PAIN TYPE: TYPE: ACUTE PAIN

## 2023-05-29 NOTE — CARE PLAN
The patient is Stable - Low risk of patient condition declining or worsening    Shift Goals  Clinical Goals: Mobility, Maintain oxygenation  Patient Goals: sleep  Family Goals: na    Progress made toward(s) clinical / shift goals:    Problem: Knowledge Deficit - Standard  Goal: Patient and family/care givers will demonstrate understanding of plan of care, disease process/condition, diagnostic tests and medications  Outcome: Progressing     Problem: Hemodynamics  Goal: Patient's hemodynamics, fluid balance and neurologic status will be stable or improve  Outcome: Progressing     Problem: Fluid Volume  Goal: Fluid volume balance will be maintained  Outcome: Progressing     Problem: Urinary - Renal Perfusion  Goal: Ability to achieve and maintain adequate renal perfusion and functioning will improve  Outcome: Progressing     Problem: Respiratory  Goal: Patient will achieve/maintain optimum respiratory ventilation and gas exchange  Outcome: Progressing       Patient is not progressing towards the following goals:

## 2023-05-29 NOTE — PROGRESS NOTES
Report received from outgoing nurse, care transferred at 1900. Patient currently in afib 80-90 on the monitor and A&Ox 4. Patient reports feeling significantly better than previous days. No pain at this time. Patient reports he is unsure of everything that has happened, educated of course of stay and reassured of POC. Whiteboard updated with plan for the day and names of staff. No other questions or concerns at this time from the patient. Call light within reach, fall precautions in place.

## 2023-05-29 NOTE — PROGRESS NOTES
0700 - Report received from Pierce VILLANUEVA at patient's bedside. Patient resting in bed quietly with no complaints at this time. Telemetry monitor intact et functioning. Call light and belongings within reach, safety measures intact, white board updated. Hematoma to right wrist is solid, hard and cool. Does not appear to have changed in size.     0900 - Patient resting in bed, denies needs at this time.     1047 - Left message for patient's son for callback so RN can update about discharge time.     1230 - IV removed with immediate hemostasis. Telemetry removed and returned to desk. All belongings gathered to be sent with patient at [place]. Asked patient if he had spoken to his son for transfer update and patient said that he has been unable to get a hold of this. RN attempted phone call again and left message.     1300 - Patient picked up by [facility] transport. Report called to [facility] [place]. Ivonne COVARRUBIAS.

## 2023-05-29 NOTE — DISCHARGE SUMMARY
Discharge Summary    CHIEF COMPLAINT ON ADMISSION  Chief Complaint   Patient presents with    Chest Pain       Reason for Admission  ems    Admission Date  5/18/2023     CODE STATUS  Full Code    HPI & HOSPITAL COURSE  Mr. Montes De Oca is a 78 y.o. male who presented 5/18/2023 as a transfer from [place] for epigastric pain and abdominal pain.   He has a history of MI on ASA, Plavix and statin, paroxysmal Afib s/p pacer on BB and amiodarone  but not on anticoagulation based on records, hypertension. He was transferred here for NSTEMI on heparin gtt. Patient does not know his medications much but did say he was having epigastric pain for the past few days. A CTA Chest showed no PE but made comment of possible heart failure. A CTA Ab/P reveled cholecystectomy but made no comment of the bile duct, pancreas and kidneys normal other than some renal cysts and no mention of colitis. His troponin was elevated at 5000 and lactic slightly elevated. He was given a dose of Zosyn and put on hepa gtt and transferred here. Patient was taken for left heart catheterization, and found to have chronically occluded RCA. No stenting or angioplasty performed. Patient was put on plavix for NSTEMI. He was found to have acute DVT's in left upper extremity as well as right lower extremity. He was also started on eliquis for acute unprovoked DVT. He was found to have heart failure with reduced ejection fraction 45%, he was treated with IV lasix for fluid overload. Patient found to have atrial fibrillation with rapid ventricular response, he was started on amiodarone by cardiology team, and is to continue amiodarone as well as metoprolol. Patient also noted to have right wrist hematoma by radial catheter site, he needed to be taken off of plavix. Aspirin was attempted but he continued having growing hematoma and so aspirin was also discontinued. Patient stable on eliquis for acute DVT's with improving hematoma size. Physical and occupational  therapy recommend SNF. Patient initially wanted to go home but changed his mind and is now agreeable to SNF rehabilitation. He is doing well, and is to follow up with his PCP as well as cardiology.    Therefore, he is discharged in fair and stable condition to skilled nursing facility.    The patient met 2-midnight criteria for an inpatient stay at the time of discharge.      FOLLOW UP ITEMS POST DISCHARGE  Take medications as prescribed.  Follow up with PCP and cardiology.    DISCHARGE DIAGNOSES  Principal Problem:    NSTEMI (non-ST elevated myocardial infarction), h/o Afib not on anticoag, leukocytosis (HCC) (POA: Unknown)  Active Problems:    Essential hypertension, benign (POA: Yes)    Acute respiratory failure with hypoxia (HCC) (POA: Yes)    AF (atrial fibrillation) (HCC) (POA: Unknown)    Epigastric pain (POA: Unknown)    Hypokalemia (POA: Yes)    DVT of upper extremity (deep vein thrombosis) (HCC) (POA: Yes)    Ventricular tachycardia (HCC) (POA: Unknown)    Hematoma (POA: Unknown)  Resolved Problems:    Advanced care planning/counseling discussion (POA: Unknown)      FOLLOW UP  No future appointments.  [facility]  [address]  [address]  [place] [address] [phone]  Call  Your doctor has referred you for Cardiac Rehab which is important in your recovery. Please call to make an appointment or speak with your local provider for programs in your area.    [facility]  [address] [place] [address]  [phone]          MEDICATIONS ON DISCHARGE     Medication List        START taking these medications        Instructions   amiodarone 400 MG tablet  Start taking on: May 30, 2023  Commonly known as: PACERONE   Take 1 Tablet by mouth every day for 3 days, THEN 0.5 Tablets every day for 90 days.     apixaban 5mg Tabs  Commonly known as: ELIQUIS   Take 1 Tablet by mouth 2 times a day. Indications: DVT/PE  Dose: 5 mg     furosemide 40 MG Tabs  Start taking  on: May 30, 2023  Commonly known as: LASIX   Take 1 Tablet by mouth every day.  Dose: 40 mg            CHANGE how you take these medications        Instructions   metoprolol SR 25 MG Tb24  What changed:   medication strength  how much to take  when to take this  Commonly known as: TOPROL XL   Take 3 Tablets by mouth 2 times a day.  Dose: 75 mg            CONTINUE taking these medications        Instructions   ascorbic acid 500 MG tablet  Commonly known as: Vitamin C   Take 500 mg by mouth every day.  Dose: 500 mg     atorvastatin 80 MG tablet  Commonly known as: LIPITOR   Take 1 Tab by mouth every evening.  Dose: 80 mg     clotrimazole 10 MG Troc janny  Commonly known as: Mycelex   Take 10 mg by mouth every day.  Dose: 10 mg     ferrous sulfate 325 (65 Fe) MG tablet   Take 325 mg by mouth every day.  Dose: 325 mg     finasteride 5 MG Tabs  Commonly known as: PROSCAR   Take 5 mg by mouth every day.  Dose: 5 mg     hydroCHLOROthiazide 12.5 MG tablet  Commonly known as: HYDRODIURIL   Take 12.5 mg by mouth every day.  Dose: 12.5 mg     losartan 50 MG Tabs  Start taking on: May 30, 2023  Commonly known as: COZAAR   Take 1 Tablet by mouth every day.  Dose: 50 mg     nitroglycerin 0.4 MG Subl  Commonly known as: NITROSTAT   Place 0.4 mg under tongue every 5 minutes as needed.  Dose: 0.4 mg     tamsulosin 0.4 MG capsule  Commonly known as: FLOMAX   Take 0.4 mg by mouth every day.  Dose: 0.4 mg     Vitamin D3 50 MCG (2000 UT) Tabs   Take 2,000 Units by mouth every day.  Dose: 2,000 Units            STOP taking these medications      clopidogrel 75 MG Tabs  Commonly known as: PLAVIX     oxyCODONE immediate-release 5 MG Tabs  Commonly known as: ROXICODONE              Allergies  No Known Allergies    DIET  Orders Placed This Encounter   Procedures    Diet Order Diet: Level 6 - Soft and Bite Sized (No straws); Liquid level: Level 0 - Thin; Second Modifier: (optional): Cardiac     Standing Status:   Standing     Number of  Occurrences:   1     Order Specific Question:   Diet:     Answer:   Level 6 - Soft and Bite Sized [23]     Comments:   No straws     Order Specific Question:   Liquid level     Answer:   Level 0 - Thin     Order Specific Question:   Second Modifier: (optional)     Answer:   Cardiac [6]       ACTIVITY  As tolerated and directed by skilled nursing.  Weight bearing as tolerated    LINES, DRAINS, AND WOUNDS  This is an automated list. Peripheral IVs will be removed prior to discharge.  Peripheral IV 05/25/23 20 G Anterior;Proximal;Right Forearm (Active)   Site Assessment Clean;Dry;Intact 05/28/23 2300   Dressing Type Transparent Film 05/28/23 2300   Line Status Saline locked 05/28/23 2300   Dressing Status Clean;Dry;Intact 05/28/23 2300   Dressing Intervention N/A 05/28/23 2300   Infiltration Grading ([facility]) 0 05/28/23 2300   Phlebitis Scale ([facility] Only) 0 05/28/23 2300          Peripheral IV 05/25/23 20 G Anterior;Proximal;Right Forearm (Active)   Site Assessment Clean;Dry;Intact 05/28/23 2300   Dressing Type Transparent Film 05/28/23 2300   Line Status Saline locked 05/28/23 2300   Dressing Status Clean;Dry;Intact 05/28/23 2300   Dressing Intervention N/A 05/28/23 2300   Infiltration Grading ([facility]) 0 05/28/23 2300   Phlebitis Scale ([facility]) 0 05/28/23 2300               MENTAL STATUS ON TRANSFER      Alert and oriented x4       CONSULTATIONS  cardiology    PROCEDURES  5/19 Left heart catheterization, coronary angiography    LABORATORY  Lab Results   Component Value Date    SODIUM 140 05/25/2023    POTASSIUM 4.0 05/25/2023    CHLORIDE 101 05/25/2023    CO2 29 05/25/2023    GLUCOSE 142 (H) 05/25/2023    BUN 19 05/25/2023    CREATININE 0.66 05/25/2023        Lab Results   Component Value Date    WBC 16.9 (H) 05/28/2023    HEMOGLOBIN 11.5 (L) 05/28/2023    HEMATOCRIT 35.2 (L) 05/28/2023    PLATELETCT 707 (H) 05/28/2023        Total time of the discharge process exceeds 38 minutes.

## 2023-05-29 NOTE — CARE PLAN
The patient is Stable - Low risk of patient condition declining or worsening    Shift Goals  Clinical Goals: Mobility, Maintain oxygenation  Patient Goals: sleep  Family Goals: na    Progress made toward(s) clinical / shift goals:  Patient to DC to [facility] today at 1300      Problem: Knowledge Deficit - Standard  Goal: Patient and family/care givers will demonstrate understanding of plan of care, disease process/condition, diagnostic tests and medications  Outcome: Met     Problem: Hemodynamics  Goal: Patient's hemodynamics, fluid balance and neurologic status will be stable or improve  Outcome: Met     Problem: Fluid Volume  Goal: Fluid volume balance will be maintained  Outcome: Met     Problem: Urinary - Renal Perfusion  Goal: Ability to achieve and maintain adequate renal perfusion and functioning will improve  Outcome: Met     Problem: Respiratory  Goal: Patient will achieve/maintain optimum respiratory ventilation and gas exchange  Outcome: Met     Problem: Mechanical Ventilation  Goal: Safe management of artificial airway and ventilation  Outcome: Met  Goal: Successful weaning off mechanical ventilator, spontaneously maintains adequate gas exchange  Outcome: Met  Goal: Patient will be able to express needs and understand communication  Outcome: Met     Problem: Physical Regulation  Goal: Diagnostic test results will improve  Outcome: Met  Goal: Signs and symptoms of infection will decrease  Outcome: Met     Problem: Pain - Standard  Goal: Alleviation of pain or a reduction in pain to the patient’s comfort goal  Outcome: Met     Problem: Fall Risk  Goal: Patient will remain free from falls  Outcome: Met     Problem: Skin Integrity  Goal: Skin integrity is maintained or improved  Outcome: Met       Patient is not progressing towards the following goals:

## 2023-05-29 NOTE — DISCHARGE INSTRUCTIONS
Diet    Heart Healthy Diet    A Heart-Healthy diet includes increasing healthy fats and limiting unhealthy fats.  Focus on eating a balance of foods, including fruits and vegetables, low-fat or nonfat dairy, lean protein, nuts and legumes, whole grains, and heart-healthy oils and fats.  Monitor your salt (sodium) intake, especially if you have high blood pressure.  Lose weight if you are overweight. Losing just 5-10% of your body weight can help your overall health and prevent diseases such as diabetes and heart disease.    Activity    Resume Your Normal Activity    You may resume your normal activity as tolerated.  Rest as needed.      Radial Catherization Discharge Instructions   performed 05/19/2023      Do not subject hand/arm to any forceful movements for 24 hours    i.e. supporting weight when rising from the chair or bed.   Do not drive a car for 24 hours  You may remove the dressing tomorrow  You may shower on the day following your procedure.  Do not take a tub bath or submerge the puncture site in water for 3 days following the procedure.  No Lifting more than 3-5 pounds with affected wrist for 5 days  Follow up with    2-4 weeks.  Increase fluids for 2 days post procedure.  Continue all previous medications unless otherwise instructed.    If bleeding should occur following discharge:  Sit down and apply firm pressure to site with your fingers for 10 minutes  If the bleeding stops, continue to sit quietly, keeping your wrist straight for 2 hours.  Notify physician as soon as possible ( [phone])  If bleeding does not stop after 10 minutes, or if there is a large amount of bleeding or spurting, call 911 immediately.  Do not drive yourself to the hospital.

## 2023-05-29 NOTE — DISCHARGE PLANNING
Agency/Facility Name: [facility]  Outcome: NEIL caled regarding bed availability and had to leave a voice message for admissions.  Awaiting a call back.    Agency/Facility Name: [facility]  Spoke To: Suhail  Outcome: [facility] has a bed available today and the [facility] van will transport the pt at 1300.

## 2023-06-14 ENCOUNTER — TELEPHONE (OUTPATIENT)
Dept: CARDIOLOGY | Facility: MEDICAL CENTER | Age: 78
End: 2023-06-14
Payer: MEDICARE

## 2023-07-01 ENCOUNTER — ANESTHESIA (OUTPATIENT)
Dept: SURGERY | Facility: MEDICAL CENTER | Age: 78
DRG: 901 | End: 2023-07-01
Payer: MEDICARE

## 2023-07-01 ENCOUNTER — HOSPITAL ENCOUNTER (INPATIENT)
Facility: MEDICAL CENTER | Age: 78
LOS: 2 days | DRG: 901 | End: 2023-07-03
Attending: EMERGENCY MEDICINE | Admitting: INTERNAL MEDICINE
Payer: MEDICARE

## 2023-07-01 ENCOUNTER — ANESTHESIA EVENT (OUTPATIENT)
Dept: SURGERY | Facility: MEDICAL CENTER | Age: 78
DRG: 901 | End: 2023-07-01
Payer: MEDICARE

## 2023-07-01 DIAGNOSIS — L03.113 CELLULITIS OF RIGHT UPPER EXTREMITY: ICD-10-CM

## 2023-07-01 DIAGNOSIS — I82.629 ACUTE DEEP VEIN THROMBOSIS (DVT) OF UPPER EXTREMITY, UNSPECIFIED LATERALITY, UNSPECIFIED VEIN (HCC): ICD-10-CM

## 2023-07-01 DIAGNOSIS — S55.101A INJURY OF RIGHT RADIAL ARTERY, INITIAL ENCOUNTER: ICD-10-CM

## 2023-07-01 DIAGNOSIS — I21.4 NSTEMI (NON-ST ELEVATED MYOCARDIAL INFARCTION) (HCC): ICD-10-CM

## 2023-07-01 DIAGNOSIS — E87.6 HYPOKALEMIA: ICD-10-CM

## 2023-07-01 PROBLEM — S60.211D: Status: ACTIVE | Noted: 2023-07-01

## 2023-07-01 PROBLEM — I50.22 CHRONIC SYSTOLIC CONGESTIVE HEART FAILURE (HCC): Status: ACTIVE | Noted: 2023-07-01

## 2023-07-01 PROBLEM — S60.211A TRAUMATIC HEMATOMA OF RIGHT WRIST: Status: RESOLVED | Noted: 2023-07-01 | Resolved: 2023-07-01

## 2023-07-01 PROBLEM — Z95.0 PACEMAKER: Status: ACTIVE | Noted: 2023-07-01

## 2023-07-01 PROBLEM — S60.211A TRAUMATIC HEMATOMA OF RIGHT WRIST: Status: ACTIVE | Noted: 2023-07-01

## 2023-07-01 LAB
ABO + RH BLD: ABNORMAL
ABO GROUP BLD: NORMAL
ACANTHOCYTES BLD QL SMEAR: NORMAL
ALBUMIN SERPL BCP-MCNC: 2.8 G/DL (ref 3.2–4.9)
ALBUMIN/GLOB SERPL: 1 G/DL
ALP SERPL-CCNC: 83 U/L (ref 30–99)
ALT SERPL-CCNC: 10 U/L (ref 2–50)
ANION GAP SERPL CALC-SCNC: 10 MMOL/L (ref 7–16)
ANISOCYTOSIS BLD QL SMEAR: ABNORMAL
APTT PPP: 32.3 SEC (ref 24.7–36)
AST SERPL-CCNC: 9 U/L (ref 12–45)
BASOPHILS # BLD AUTO: 1.8 % (ref 0–1.8)
BASOPHILS # BLD: 0.15 K/UL (ref 0–0.12)
BILIRUB SERPL-MCNC: 0.3 MG/DL (ref 0.1–1.5)
BLD GP AB SCN SERPL QL: NORMAL
BUN SERPL-MCNC: 13 MG/DL (ref 8–22)
CALCIUM ALBUM COR SERPL-MCNC: 9.3 MG/DL (ref 8.5–10.5)
CALCIUM SERPL-MCNC: 8.3 MG/DL (ref 8.5–10.5)
CHLORIDE SERPL-SCNC: 105 MMOL/L (ref 96–112)
CO2 SERPL-SCNC: 28 MMOL/L (ref 20–33)
CREAT SERPL-MCNC: 0.94 MG/DL (ref 0.5–1.4)
EOSINOPHIL # BLD AUTO: 0.6 K/UL (ref 0–0.51)
EOSINOPHIL NFR BLD: 7 % (ref 0–6.9)
ERYTHROCYTE [DISTWIDTH] IN BLOOD BY AUTOMATED COUNT: 57.8 FL (ref 35.9–50)
GFR SERPLBLD CREATININE-BSD FMLA CKD-EPI: 82 ML/MIN/1.73 M 2
GLOBULIN SER CALC-MCNC: 2.7 G/DL (ref 1.9–3.5)
GLUCOSE SERPL-MCNC: 112 MG/DL (ref 65–99)
HCT VFR BLD AUTO: 29.9 % (ref 42–52)
HGB BLD-MCNC: 9.6 G/DL (ref 14–18)
INR PPP: 1.23 (ref 0.87–1.13)
LYMPHOCYTES # BLD AUTO: 0.3 K/UL (ref 1–4.8)
LYMPHOCYTES NFR BLD: 3.5 % (ref 22–41)
MANUAL DIFF BLD: NORMAL
MCH RBC QN AUTO: 30 PG (ref 27–33)
MCHC RBC AUTO-ENTMCNC: 32.1 G/DL (ref 32.3–36.5)
MCV RBC AUTO: 93.4 FL (ref 81.4–97.8)
MICROCYTES BLD QL SMEAR: ABNORMAL
MONOCYTES # BLD AUTO: 0.6 K/UL (ref 0–0.85)
MONOCYTES NFR BLD AUTO: 7 % (ref 0–13.4)
MORPHOLOGY BLD-IMP: NORMAL
NEUTROPHILS # BLD AUTO: 6.94 K/UL (ref 1.82–7.42)
NEUTROPHILS NFR BLD: 78.1 % (ref 44–72)
NEUTS BAND NFR BLD MANUAL: 2.6 % (ref 0–10)
NRBC # BLD AUTO: 0 K/UL
NRBC BLD-RTO: 0 /100 WBC (ref 0–0.2)
PLATELET # BLD AUTO: 415 K/UL (ref 164–446)
PLATELET BLD QL SMEAR: NORMAL
PMV BLD AUTO: 11.1 FL (ref 9–12.9)
POIKILOCYTOSIS BLD QL SMEAR: NORMAL
POTASSIUM SERPL-SCNC: 3.1 MMOL/L (ref 3.6–5.5)
PROT SERPL-MCNC: 5.5 G/DL (ref 6–8.2)
PROTHROMBIN TIME: 15.3 SEC (ref 12–14.6)
RBC # BLD AUTO: 3.2 M/UL (ref 4.7–6.1)
RBC BLD AUTO: PRESENT
RH BLD: NORMAL
SCHISTOCYTES BLD QL SMEAR: NORMAL
SODIUM SERPL-SCNC: 143 MMOL/L (ref 135–145)
WBC # BLD AUTO: 8.6 K/UL (ref 4.8–10.8)

## 2023-07-01 PROCEDURE — 36415 COLL VENOUS BLD VENIPUNCTURE: CPT

## 2023-07-01 PROCEDURE — 85610 PROTHROMBIN TIME: CPT

## 2023-07-01 PROCEDURE — 86850 RBC ANTIBODY SCREEN: CPT

## 2023-07-01 PROCEDURE — 11042 DBRDMT SUBQ TIS 1ST 20SQCM/<: CPT | Performed by: SURGERY

## 2023-07-01 PROCEDURE — 160009 HCHG ANES TIME/MIN: Performed by: SURGERY

## 2023-07-01 PROCEDURE — 80053 COMPREHEN METABOLIC PANEL: CPT

## 2023-07-01 PROCEDURE — 160038 HCHG SURGERY MINUTES - EA ADDL 1 MIN LEVEL 2: Performed by: SURGERY

## 2023-07-01 PROCEDURE — 99223 1ST HOSP IP/OBS HIGH 75: CPT | Mod: AI | Performed by: INTERNAL MEDICINE

## 2023-07-01 PROCEDURE — 86900 BLOOD TYPING SEROLOGIC ABO: CPT

## 2023-07-01 PROCEDURE — 99291 CRITICAL CARE FIRST HOUR: CPT

## 2023-07-01 PROCEDURE — 85007 BL SMEAR W/DIFF WBC COUNT: CPT

## 2023-07-01 PROCEDURE — 86901 BLOOD TYPING SEROLOGIC RH(D): CPT

## 2023-07-01 PROCEDURE — 85025 COMPLETE CBC W/AUTO DIFF WBC: CPT

## 2023-07-01 PROCEDURE — A9270 NON-COVERED ITEM OR SERVICE: HCPCS | Performed by: INTERNAL MEDICINE

## 2023-07-01 PROCEDURE — 770001 HCHG ROOM/CARE - MED/SURG/GYN PRIV*

## 2023-07-01 PROCEDURE — 99222 1ST HOSP IP/OBS MODERATE 55: CPT | Mod: 57 | Performed by: SURGERY

## 2023-07-01 PROCEDURE — 0JBG0ZZ EXCISION OF RIGHT LOWER ARM SUBCUTANEOUS TISSUE AND FASCIA, OPEN APPROACH: ICD-10-PCS | Performed by: SURGERY

## 2023-07-01 PROCEDURE — 35206 REPAIR BLOOD VESSEL DIR UXTR: CPT | Mod: RT | Performed by: SURGERY

## 2023-07-01 PROCEDURE — 01840 ANES ART F/ARM WRST&HND NOS: CPT | Performed by: ANESTHESIOLOGY

## 2023-07-01 PROCEDURE — 700111 HCHG RX REV CODE 636 W/ 250 OVERRIDE (IP): Performed by: SURGERY

## 2023-07-01 PROCEDURE — 160035 HCHG PACU - 1ST 60 MINS PHASE I: Performed by: SURGERY

## 2023-07-01 PROCEDURE — 700111 HCHG RX REV CODE 636 W/ 250 OVERRIDE (IP): Mod: JZ | Performed by: EMERGENCY MEDICINE

## 2023-07-01 PROCEDURE — 700105 HCHG RX REV CODE 258: Performed by: SURGERY

## 2023-07-01 PROCEDURE — 64415 NJX AA&/STRD BRCH PLXS IMG: CPT | Mod: 59,RT | Performed by: ANESTHESIOLOGY

## 2023-07-01 PROCEDURE — 700105 HCHG RX REV CODE 258: Performed by: ANESTHESIOLOGY

## 2023-07-01 PROCEDURE — 160048 HCHG OR STATISTICAL LEVEL 1-5: Performed by: SURGERY

## 2023-07-01 PROCEDURE — 700102 HCHG RX REV CODE 250 W/ 637 OVERRIDE(OP): Performed by: INTERNAL MEDICINE

## 2023-07-01 PROCEDURE — 96374 THER/PROPH/DIAG INJ IV PUSH: CPT

## 2023-07-01 PROCEDURE — 03QB0ZZ REPAIR RIGHT RADIAL ARTERY, OPEN APPROACH: ICD-10-PCS | Performed by: SURGERY

## 2023-07-01 PROCEDURE — 85730 THROMBOPLASTIN TIME PARTIAL: CPT

## 2023-07-01 PROCEDURE — 96375 TX/PRO/DX INJ NEW DRUG ADDON: CPT

## 2023-07-01 PROCEDURE — 160027 HCHG SURGERY MINUTES - 1ST 30 MINS LEVEL 2: Performed by: SURGERY

## 2023-07-01 PROCEDURE — 700111 HCHG RX REV CODE 636 W/ 250 OVERRIDE (IP): Mod: JZ | Performed by: ANESTHESIOLOGY

## 2023-07-01 PROCEDURE — 99100 ANES PT EXTEME AGE<1 YR&>70: CPT | Performed by: ANESTHESIOLOGY

## 2023-07-01 PROCEDURE — 700101 HCHG RX REV CODE 250: Performed by: ANESTHESIOLOGY

## 2023-07-01 PROCEDURE — 160002 HCHG RECOVERY MINUTES (STAT): Performed by: SURGERY

## 2023-07-01 RX ORDER — ONDANSETRON 2 MG/ML
4 INJECTION INTRAMUSCULAR; INTRAVENOUS
Status: DISCONTINUED | OUTPATIENT
Start: 2023-07-01 | End: 2023-07-01 | Stop reason: HOSPADM

## 2023-07-01 RX ORDER — FINASTERIDE 5 MG/1
5 TABLET, FILM COATED ORAL DAILY
Status: DISCONTINUED | OUTPATIENT
Start: 2023-07-02 | End: 2023-07-03 | Stop reason: HOSPADM

## 2023-07-01 RX ORDER — AMOXICILLIN 250 MG
2 CAPSULE ORAL 2 TIMES DAILY
Status: DISCONTINUED | OUTPATIENT
Start: 2023-07-01 | End: 2023-07-03 | Stop reason: HOSPADM

## 2023-07-01 RX ORDER — ACETAMINOPHEN 325 MG/1
650 TABLET ORAL EVERY 6 HOURS PRN
Status: DISCONTINUED | OUTPATIENT
Start: 2023-07-01 | End: 2023-07-03 | Stop reason: HOSPADM

## 2023-07-01 RX ORDER — HALOPERIDOL 5 MG/ML
1 INJECTION INTRAMUSCULAR
Status: DISCONTINUED | OUTPATIENT
Start: 2023-07-01 | End: 2023-07-01 | Stop reason: HOSPADM

## 2023-07-01 RX ORDER — HYDRALAZINE HYDROCHLORIDE 20 MG/ML
5 INJECTION INTRAMUSCULAR; INTRAVENOUS
Status: DISCONTINUED | OUTPATIENT
Start: 2023-07-01 | End: 2023-07-01 | Stop reason: HOSPADM

## 2023-07-01 RX ORDER — ONDANSETRON 2 MG/ML
4 INJECTION INTRAMUSCULAR; INTRAVENOUS EVERY 4 HOURS PRN
Status: DISCONTINUED | OUTPATIENT
Start: 2023-07-01 | End: 2023-07-03 | Stop reason: HOSPADM

## 2023-07-01 RX ORDER — LOSARTAN POTASSIUM 50 MG/1
50 TABLET ORAL DAILY
Status: DISCONTINUED | OUTPATIENT
Start: 2023-07-02 | End: 2023-07-03 | Stop reason: HOSPADM

## 2023-07-01 RX ORDER — FUROSEMIDE 20 MG/1
40 TABLET ORAL DAILY
Status: DISCONTINUED | OUTPATIENT
Start: 2023-07-02 | End: 2023-07-03 | Stop reason: HOSPADM

## 2023-07-01 RX ORDER — SODIUM CHLORIDE, SODIUM LACTATE, POTASSIUM CHLORIDE, CALCIUM CHLORIDE 600; 310; 30; 20 MG/100ML; MG/100ML; MG/100ML; MG/100ML
INJECTION, SOLUTION INTRAVENOUS
Status: DISCONTINUED | OUTPATIENT
Start: 2023-07-01 | End: 2023-07-01 | Stop reason: SURG

## 2023-07-01 RX ORDER — ONDANSETRON 2 MG/ML
4 INJECTION INTRAMUSCULAR; INTRAVENOUS ONCE
Status: COMPLETED | OUTPATIENT
Start: 2023-07-01 | End: 2023-07-01

## 2023-07-01 RX ORDER — SODIUM CHLORIDE, SODIUM LACTATE, POTASSIUM CHLORIDE, CALCIUM CHLORIDE 600; 310; 30; 20 MG/100ML; MG/100ML; MG/100ML; MG/100ML
INJECTION, SOLUTION INTRAVENOUS CONTINUOUS
Status: DISCONTINUED | OUTPATIENT
Start: 2023-07-01 | End: 2023-07-01 | Stop reason: HOSPADM

## 2023-07-01 RX ORDER — BUPIVACAINE HYDROCHLORIDE 2.5 MG/ML
INJECTION, SOLUTION EPIDURAL; INFILTRATION; INTRACAUDAL
Status: DISCONTINUED | OUTPATIENT
Start: 2023-07-01 | End: 2023-07-01 | Stop reason: HOSPADM

## 2023-07-01 RX ORDER — LIDOCAINE HCL/EPINEPHRINE/PF 2%-1:200K
VIAL (ML) INJECTION PRN
Status: DISCONTINUED | OUTPATIENT
Start: 2023-07-01 | End: 2023-07-01 | Stop reason: SURG

## 2023-07-01 RX ORDER — ACETAMINOPHEN 325 MG/1
650 TABLET ORAL EVERY 6 HOURS PRN
Status: DISCONTINUED | OUTPATIENT
Start: 2023-07-01 | End: 2023-07-01

## 2023-07-01 RX ORDER — OXYCODONE HCL 5 MG/5 ML
5 SOLUTION, ORAL ORAL
Status: DISCONTINUED | OUTPATIENT
Start: 2023-07-01 | End: 2023-07-01 | Stop reason: HOSPADM

## 2023-07-01 RX ORDER — POLYETHYLENE GLYCOL 3350 17 G/17G
1 POWDER, FOR SOLUTION ORAL
Status: DISCONTINUED | OUTPATIENT
Start: 2023-07-01 | End: 2023-07-03 | Stop reason: HOSPADM

## 2023-07-01 RX ORDER — HYDROCHLOROTHIAZIDE 12.5 MG/1
12.5 TABLET ORAL DAILY
Status: DISCONTINUED | OUTPATIENT
Start: 2023-07-02 | End: 2023-07-03 | Stop reason: HOSPADM

## 2023-07-01 RX ORDER — ATORVASTATIN CALCIUM 40 MG/1
80 TABLET, FILM COATED ORAL EVERY EVENING
Status: DISCONTINUED | OUTPATIENT
Start: 2023-07-01 | End: 2023-07-03 | Stop reason: HOSPADM

## 2023-07-01 RX ORDER — MORPHINE SULFATE 4 MG/ML
1-2 INJECTION INTRAVENOUS
Status: DISCONTINUED | OUTPATIENT
Start: 2023-07-01 | End: 2023-07-03 | Stop reason: HOSPADM

## 2023-07-01 RX ORDER — FERROUS SULFATE 325(65) MG
325 TABLET ORAL DAILY
Status: DISCONTINUED | OUTPATIENT
Start: 2023-07-02 | End: 2023-07-03 | Stop reason: HOSPADM

## 2023-07-01 RX ORDER — MORPHINE SULFATE 4 MG/ML
4 INJECTION INTRAVENOUS ONCE
Status: COMPLETED | OUTPATIENT
Start: 2023-07-01 | End: 2023-07-01

## 2023-07-01 RX ORDER — BISACODYL 10 MG
10 SUPPOSITORY, RECTAL RECTAL
Status: DISCONTINUED | OUTPATIENT
Start: 2023-07-01 | End: 2023-07-03 | Stop reason: HOSPADM

## 2023-07-01 RX ORDER — HYDROCODONE BITARTRATE AND ACETAMINOPHEN 5; 325 MG/1; MG/1
1-2 TABLET ORAL EVERY 6 HOURS PRN
Status: DISCONTINUED | OUTPATIENT
Start: 2023-07-01 | End: 2023-07-03 | Stop reason: HOSPADM

## 2023-07-01 RX ORDER — EPHEDRINE SULFATE 50 MG/ML
5 INJECTION, SOLUTION INTRAVENOUS
Status: DISCONTINUED | OUTPATIENT
Start: 2023-07-01 | End: 2023-07-01 | Stop reason: HOSPADM

## 2023-07-01 RX ORDER — OXYCODONE HCL 5 MG/5 ML
10 SOLUTION, ORAL ORAL
Status: DISCONTINUED | OUTPATIENT
Start: 2023-07-01 | End: 2023-07-01 | Stop reason: HOSPADM

## 2023-07-01 RX ORDER — LIDOCAINE HYDROCHLORIDE 20 MG/ML
INJECTION, SOLUTION EPIDURAL; INFILTRATION; INTRACAUDAL; PERINEURAL PRN
Status: DISCONTINUED | OUTPATIENT
Start: 2023-07-01 | End: 2023-07-01

## 2023-07-01 RX ORDER — ONDANSETRON 2 MG/ML
INJECTION INTRAMUSCULAR; INTRAVENOUS PRN
Status: DISCONTINUED | OUTPATIENT
Start: 2023-07-01 | End: 2023-07-01 | Stop reason: HOSPADM

## 2023-07-01 RX ORDER — AMIODARONE HYDROCHLORIDE 200 MG/1
200 TABLET ORAL
Status: DISCONTINUED | OUTPATIENT
Start: 2023-07-02 | End: 2023-07-03 | Stop reason: HOSPADM

## 2023-07-01 RX ORDER — TAMSULOSIN HYDROCHLORIDE 0.4 MG/1
0.4 CAPSULE ORAL DAILY
Status: DISCONTINUED | OUTPATIENT
Start: 2023-07-02 | End: 2023-07-03 | Stop reason: HOSPADM

## 2023-07-01 RX ORDER — CEFAZOLIN SODIUM 1 G/3ML
INJECTION, POWDER, FOR SOLUTION INTRAMUSCULAR; INTRAVENOUS PRN
Status: DISCONTINUED | OUTPATIENT
Start: 2023-07-01 | End: 2023-07-01 | Stop reason: SURG

## 2023-07-01 RX ORDER — POTASSIUM CHLORIDE 20 MEQ/1
40 TABLET, EXTENDED RELEASE ORAL ONCE
Status: COMPLETED | OUTPATIENT
Start: 2023-07-01 | End: 2023-07-01

## 2023-07-01 RX ORDER — ONDANSETRON 4 MG/1
4 TABLET, ORALLY DISINTEGRATING ORAL EVERY 4 HOURS PRN
Status: DISCONTINUED | OUTPATIENT
Start: 2023-07-01 | End: 2023-07-03 | Stop reason: HOSPADM

## 2023-07-01 RX ADMIN — POTASSIUM CHLORIDE 40 MEQ: 1500 TABLET, EXTENDED RELEASE ORAL at 19:44

## 2023-07-01 RX ADMIN — CEFAZOLIN 2 G: 2 INJECTION, POWDER, FOR SOLUTION INTRAMUSCULAR; INTRAVENOUS at 21:48

## 2023-07-01 RX ADMIN — CEFAZOLIN 2 G: 1 INJECTION, POWDER, FOR SOLUTION INTRAMUSCULAR; INTRAVENOUS at 17:24

## 2023-07-01 RX ADMIN — ATORVASTATIN CALCIUM 80 MG: 40 TABLET, FILM COATED ORAL at 19:44

## 2023-07-01 RX ADMIN — MORPHINE SULFATE 4 MG: 4 INJECTION INTRAVENOUS at 15:31

## 2023-07-01 RX ADMIN — PROPOFOL 30 MCG/KG/MIN: 10 INJECTION, EMULSION INTRAVENOUS at 17:24

## 2023-07-01 RX ADMIN — SENNOSIDES AND DOCUSATE SODIUM 2 TABLET: 50; 8.6 TABLET ORAL at 19:44

## 2023-07-01 RX ADMIN — ONDANSETRON 4 MG: 2 INJECTION INTRAMUSCULAR; INTRAVENOUS at 17:24

## 2023-07-01 RX ADMIN — SODIUM CHLORIDE, POTASSIUM CHLORIDE, SODIUM LACTATE AND CALCIUM CHLORIDE: 600; 310; 30; 20 INJECTION, SOLUTION INTRAVENOUS at 17:12

## 2023-07-01 RX ADMIN — ONDANSETRON 4 MG: 2 INJECTION INTRAMUSCULAR; INTRAVENOUS at 15:32

## 2023-07-01 RX ADMIN — LIDOCAINE HYDROCHLORIDE,EPINEPHRINE BITARTRATE 15 ML: 20; .005 INJECTION, SOLUTION EPIDURAL; INFILTRATION; INTRACAUDAL; PERINEURAL at 17:20

## 2023-07-01 ASSESSMENT — PAIN SCALES - GENERAL: PAIN_LEVEL: 0

## 2023-07-01 ASSESSMENT — ENCOUNTER SYMPTOMS
MYALGIAS: 1
NERVOUS/ANXIOUS: 0
DIZZINESS: 0
NAUSEA: 0
SHORTNESS OF BREATH: 0

## 2023-07-01 ASSESSMENT — FIBROSIS 4 INDEX: FIB4 SCORE: 0.61

## 2023-07-01 ASSESSMENT — PAIN DESCRIPTION - PAIN TYPE
TYPE: ACUTE PAIN
TYPE: ACUTE PAIN

## 2023-07-01 NOTE — ASSESSMENT & PLAN NOTE
With worsening anemia and active bleeding  Eliquis stopped and reversed with Kcentra, TXA was given  Plan for OR today with Dr. Kyra caballero hemoglobin and transfuse if less than 8 given his recent NSTEMI

## 2023-07-01 NOTE — H&P
Hospital Medicine History & Physical Note    Date of Service  7/1/2023    Primary Care Physician  Mario Lindquist M.D.    Consultants  Dr. Rae    Code Status  Full Code    Chief Complaint  Chief Complaint   Patient presents with    Bleeding/Bruising     Post cath hematoma to right wrist       History of Presenting Illness  Hi Montes De Oca is a 78 y.o. male who presented 7/1/2023 with right wrist bleeding.  He was hospitalized 5/18 for NSTEMI, he had a cardiac cath with chronic RCA occlusion. He was also found with left upper arm and right lower leg DVTs, as well as new heart failure EF 45% and A-fib.  He did have postprocedure right wrist hematoma and Plavix was stopped, he was continued on Eliquis and his hematoma was stable.  He was discharged home.  Patient said he was trying to start his weedwacker yesterday.  Then  he started having bleeding from his right wrist.  He went to [facility] and they found active bleeding from his right wrist.  He was given Kcentra and TXA and transferred to [facility].  He currently has a compression dressing in place, ERP spoke with vascular surgeon and he will be going to the OR.  Patient says he has a little bit of pain but not too bad.  He denies any chest pain or shortness of breath.  I updated his son over the phone    I discussed the plan of care with patient.    Review of Systems  Review of Systems   Constitutional:  Negative for malaise/fatigue.   Respiratory:  Negative for shortness of breath.    Cardiovascular:  Negative for chest pain.   Gastrointestinal:  Negative for nausea.   Musculoskeletal:  Positive for myalgias.   Neurological:  Negative for dizziness.   Psychiatric/Behavioral:  The patient is not nervous/anxious.        Past Medical History   has a past medical history of Arthritis, CATARACT (2006), Coronary artery disease due to lipid rich plaque, Heart murmur (1974), Hypertension, Myocardial infarct (HCC) (7/11/2012), Pain, Renal disorder, Unspecified  urinary incontinence, and Urinary bladder disorder.    Surgical History   has a past surgical history that includes pr lap,cholecystectomy (2001); arthroscopy, knee; tonsillectomy; appendectomy (1958); vasectomy (1984); other orthopedic surgery (3/1/2011); knee arthroplasty total (3/1/2011); and recovery (12/21/2012).     Family History  family history includes Heart Attack in his father; Heart Failure in his mother.     Social History   reports that he has never smoked. His smokeless tobacco use includes chew. He reports that he does not drink alcohol and does not use drugs.    Allergies  No Known Allergies    Medications  Prior to Admission Medications   Prescriptions Last Dose Informant Patient Reported? Taking?   Cholecalciferol (VITAMIN D3) 50 MCG (2000 UT) Tab  Other Facility Yes No   Sig: Take 2,000 Units by mouth every day.   amiodarone (PACERONE) 400 MG tablet   No No   Sig: Take 1 Tablet by mouth every day for 3 days, THEN 0.5 Tablets every day for 90 days.   apixaban (ELIQUIS) 5mg Tab   No No   Sig: Take 1 Tablet by mouth 2 times a day. Indications: DVT/PE   ascorbic acid (VITAMIN C) 500 MG tablet  Other Facility Yes No   Sig: Take 500 mg by mouth every day.   atorvastatin (LIPITOR) 80 MG tablet  Patient's Home Pharmacy No No   Sig: Take 1 Tab by mouth every evening.   clotrimazole (MYCELEX) 10 MG Tye tye  Patient's Home Pharmacy Yes No   Sig: Take 10 mg by mouth every day.   ferrous sulfate 325 (65 Fe) MG tablet  Other Facility Yes No   Sig: Take 325 mg by mouth every day.   finasteride (PROSCAR) 5 MG TABS  Patient's Home Pharmacy Yes No   Sig: Take 5 mg by mouth every day.   furosemide (LASIX) 40 MG Tab   No No   Sig: Take 1 Tablet by mouth every day.   hydroCHLOROthiazide (HYDRODIURIL) 12.5 MG tablet  Patient's Home Pharmacy Yes No   Sig: Take 12.5 mg by mouth every day.   losartan (COZAAR) 50 MG Tab   No No   Sig: Take 1 Tablet by mouth every day.   metoprolol SR (TOPROL XL) 25 MG TABLET SR 24  HR   No No   Sig: Take 3 Tablets by mouth 2 times a day.   nitroglycerin (NITROSTAT) 0.4 MG SUBL  Historical Yes No   Sig: Place 0.4 mg under tongue every 5 minutes as needed.     tamsulosin (FLOMAX) 0.4 MG capsule  Patient's Home Pharmacy Yes No   Sig: Take 0.4 mg by mouth every day.      Facility-Administered Medications: None       Physical Exam  Temp:  [36.4 °C (97.6 °F)] 36.4 °C (97.6 °F)  Pulse:  [70-78] 70  Resp:  [16-18] 18  BP: (110-140)/(53-71) 116/53  SpO2:  [78 %-97 %] 97 %  Blood Pressure : (!) 140/71   Temperature: 36.4 °C (97.6 °F)   Pulse: 76   Respiration: 16   Pulse Oximetry: 93 %       Physical Exam  Vitals and nursing note reviewed.   Constitutional:       General: He is not in acute distress.     Appearance: He is not toxic-appearing.      Comments: Hard of hearing   HENT:      Head: Normocephalic.      Mouth/Throat:      Mouth: Mucous membranes are moist.   Eyes:      General:         Right eye: No discharge.         Left eye: No discharge.   Cardiovascular:      Rate and Rhythm: Normal rate and regular rhythm.      Comments: Pacemaker present  Pulmonary:      Effort: Pulmonary effort is normal. No respiratory distress.      Breath sounds: No wheezing or rales.   Abdominal:      Palpations: Abdomen is soft.      Tenderness: There is no abdominal tenderness.   Musculoskeletal:         General: Swelling (Left arm) present.      Cervical back: Neck supple.      Right lower leg: Edema present.      Comments: Right wrist with pression dressing in place   Skin:     General: Skin is warm and dry.   Neurological:      Mental Status: He is alert and oriented to person, place, and time.         Laboratory:  Recent Labs     07/01/23  1522   WBC 8.6   RBC 3.20*   HEMOGLOBIN 9.6*   HEMATOCRIT 29.9*   MCV 93.4   MCH 30.0   MCHC 32.1*   RDW 57.8*   PLATELETCT 415   MPV 11.1     Recent Labs     07/01/23  1522   SODIUM 143   POTASSIUM 3.1*   CHLORIDE 105   CO2 28   GLUCOSE 112*   BUN 13   CREATININE 0.94    CALCIUM 8.3*     Recent Labs     07/01/23  1522   ALTSGPT 10   ASTSGOT 9*   ALKPHOSPHAT 83   TBILIRUBIN 0.3   GLUCOSE 112*     Recent Labs     07/01/23  1522   APTT 32.3   INR 1.23*     No results for input(s): NTPROBNP in the last 72 hours.      No results for input(s): TROPONINT in the last 72 hours.    Imaging:  No orders to display         Assessment/Plan:  Justification for Admission Status  I anticipate this patient will require at least two midnights for appropriate medical management, necessitating inpatient admission because wrist hematoma needing surgery and medical management of recent NSTEMI    Patient will need a Telemetry bed on EMERGENCY service .  The need is secondary to wrist hematoma needing surgery and medical management of recent NSTEMI.    * Traumatic hematoma of right wrist, subsequent encounter- (present on admission)  Assessment & Plan  With worsening anemia and active bleeding  Eliquis stopped and reversed with Kcentra, TXA was given  Plan for OR today with Dr. Kyra caballero hemoglobin and transfuse if less than 8 given his recent NSTEMI    Pacemaker  Assessment & Plan  Noted    Chronic systolic congestive heart failure (HCC)  Assessment & Plan  EF 40%  Cont metop, losartan, lasix, statin    DVT of upper extremity (deep vein thrombosis) (HCC)- (present on admission)  Assessment & Plan  Large clot to right leg and left arm   holding Eliquis due to active bleeding  Resume Eliquis once okay by surgery    Hypokalemia- (present on admission)  Assessment & Plan  Replete and recheck level    AF (atrial fibrillation) (HCC)- (present on admission)  Assessment & Plan  Cont metop and amiodarone  Stopped Eliquis    Essential hypertension, benign- (present on admission)  Assessment & Plan  Blood pressure is low range  Can resume metoprolol, hydrochlorothiazide, losartan, Lasix tomorrow with hold parameters    Coronary artery disease due to lipid rich plaque- (present on admission)  Assessment &  Plan  NSTEMI 1 month ago  Plavix was stopped from hematoma, also hold Eliquis as hematoma recurred  Resume Eliquis once cleared by surgery, consider resuming Plavix as well  Cont statin, metop        VTE prophylaxis: SCDs/TEDs

## 2023-07-01 NOTE — ASSESSMENT & PLAN NOTE
Large clot to right leg and left arm   holding Eliquis due to active bleeding  Vascular surgery recommending resume Eliquis 1 week from now

## 2023-07-01 NOTE — ED NOTES
Medicated for pain, Dr. Bajwa ordered to removed pressure dressing and place direct pressure to site for 15minutes

## 2023-07-01 NOTE — ASSESSMENT & PLAN NOTE
NSTEMI 1 month ago  Cont statin, metop  Aspirin, Eliquis 1 week from now   4 = No assist / stand by assistance

## 2023-07-01 NOTE — ED TRIAGE NOTES
Chief Complaint   Patient presents with    Bleeding/Bruising     Post cath hematoma to right wrist      BIB [facility] from [place]. Stent placed in May 2023 with recent hematoma formation. Pt on eliquis and reported bleeding x1 day.     Kcentra txa 1g. Aox4 GCS15

## 2023-07-01 NOTE — ED NOTES
Med rec updated and complete. Allergies reviewed  ( unable to  clarify with pt).  Pt unable to participate in an interview at this time. Placed call to family in demographics. Family stated  that he is unsure of the pts medications.   Home pharmacy ( [facility] ) is currently closed until 07/05/23.   Med rec updated per historical encounter and chart review.    Home pharmacy   [facility]  [phone]

## 2023-07-01 NOTE — ANESTHESIA PREPROCEDURE EVALUATION
Case: [id number] Date/Time: 07/01/23 9161    Procedure: LIGATION, VEIN (Right: Wrist)    Location: [facility] OR 09 / SURGERY [facility]    Surgeons: Marissa Rae M.D.          Relevant Problems   NEURO   (positive) Medication overuse headache      CARDIAC   (positive) AF (atrial fibrillation) ([facility])   (positive) Chronic systolic congestive heart failure (HCC)   (positive) Coronary artery disease due to lipid rich plaque   (positive) DVT of upper extremity (deep vein thrombosis) ([facility])   (positive) Essential hypertension, benign   (positive) NSTEMI (non-ST elevated myocardial infarction), h/o Afib not on anticoag, leukocytosis (HCC)   (positive) Pacemaker   (positive) Ventricular tachycardia (HCC)       Physical Exam    Airway   Mallampati: III  TM distance: >3 FB  Neck ROM: limited       Cardiovascular   Rhythm: irregular  Rate: normal  (+) weak pulses, peripheral edema     Dental - normal exam  (+) upper dentures, lower dentures           Pulmonary   (+) decreased breath sounds, rales     Abdominal   (+) obese     Neurological - normal exam                 Anesthesia Plan    ASA 4- EMERGENT   ASA physical status 4 criteria: MI - recent (< 3 months) and ongoing cardiac ischemia or unstable anginaASA physical status emergent criteria: acute hemorrhage    Plan - peripheral nerve block and MAC     Peripheral nerve block will be primary anesthetic          Induction: intravenous    Postoperative Plan: Postoperative administration of opioids is intended.    Pertinent diagnostic labs and testing reviewed    Informed Consent:  Emergent - Consent given by clinician  Anesthetic plan and risks discussed with patient.    Use of blood products discussed with: patient whom consented to blood products.       After a long discussion we decided that the least risky option given his heart history would be doing the surgery with a surgical block and minimal sedation.

## 2023-07-01 NOTE — ED PROVIDER NOTES
ER Provider Note    Scribed for Chaya Bajwa M.d. by Milton Dangelo. 7/1/2023  2:55 PM    Primary Care Provider: Mario Lindquist M.D.    CHIEF COMPLAINT  Chief Complaint   Patient presents with    Bleeding/Bruising     Post cath hematoma to right wrist     LIMITATION TO HISTORY   None    HPI/ROS  OUTSIDE HISTORIAN(S):  EMS Gave additional history    EXTERNAL RECORDS REVIEWED  Inpatient Notes The patient was admitted in May 2023 following NSTEMI. He underwent cardiac catheterization at this time via the right radial artery where a hematoma was noted to the right wrist during his hospitalization. The patient has a history of chronically occluded RCA and was found to have a DVT in the left upper extremity for which he was placed on Eliquis. He has CHF with an EF of 45%, A-fib with RVR on Amiodarone and Metoprolol    Hi Montes De Oca is a 78 y.o. male who presents to the ED via EMS for evaluation of right wrist bleeding onset 1 day ago. EMS states that the patient underwent stent placement in late May to early June 2023 via cardiac cath through the right wrist. The patient is compliant with daily Eliquis. He reports doing well following the procedure, but over the past few days he has noted increased swelling to the catheterization site.  He says he noticed a blister about 3 days ago which seem to get bigger as the days progressed.  Beginning yesterday, the patient began to see small streams of blood from the cath site. He presented at [facility] earlier today where they found that patient had quite a lot of blood coming from his right wrist and administered Kcentra and TXA, ultimately being transferred to [facility] for vascular surgery consultation and rule out pseudoaneurysm.  Associated symptoms include right hand pain, right hand swelling, and mild right hand numbness.  Patient reports all of his fingers are numb.  The patient denies any dizziness. His right hand pain is exacerbated with pressure. No  alleviating factors noted.     PAST MEDICAL HISTORY  Past Medical History:   Diagnosis Date    Arthritis     all over    CATARACT 2006    removed    Coronary artery disease due to lipid rich plaque     Heart murmur 1974    Hypertension     Myocardial infarct (HCC) 7/11/2012    Pain     Renal disorder     Unspecified urinary incontinence     prostate issues, self cathing currently    Urinary bladder disorder        SURGICAL HISTORY  Past Surgical History:   Procedure Laterality Date    RECOVERY  12/21/2012    Performed by Cath-Recovery Surgery at SURGERY SAME DAY [facility] ORS    OTHER ORTHOPEDIC SURGERY  3/1/2011    left knee total    KNEE ARTHROPLASTY TOTAL  3/1/2011    Performed by KHALIF TREJO at SURGERY [facility] ORS    ID LAP,CHOLECYSTECTOMY  2001    VASECTOMY  1984    APPENDECTOMY  1958    ARTHROSCOPY, KNEE      2 x left, 1 x right    TONSILLECTOMY         FAMILY HISTORY  Family History   Problem Relation Age of Onset    Heart Failure Mother     Heart Attack Father        SOCIAL HISTORY   reports that he has never smoked. His smokeless tobacco use includes chew. He reports that he does not drink alcohol and does not use drugs.    CURRENT MEDICATIONS  Current Discharge Medication List        CONTINUE these medications which have NOT CHANGED    Details   metoprolol SR (TOPROL XL) 25 MG TABLET SR 24 HR Take 3 Tablets by mouth 2 times a day.  Qty: 30 Tablet, Refills: 0    Associated Diagnoses: Paroxysmal atrial fibrillation ([facility])      losartan (COZAAR) 50 MG Tab Take 1 Tablet by mouth every day.  Qty: 30 Tablet, Refills: 0    Associated Diagnoses: NSTEMI (non-ST elevated myocardial infarction) ([facility])      apixaban (ELIQUIS) 5mg Tab Take 1 Tablet by mouth 2 times a day. Indications: DVT/PE  Qty: 60 Tablet, Refills: 0    Associated Diagnoses: Acute deep vein thrombosis (DVT) of upper extremity, unspecified laterality, unspecified vein ([facility])      furosemide (LASIX) 40 MG Tab Take 1 Tablet by mouth every  day.  Qty: 30 Tablet, Refills: 0    Associated Diagnoses: NSTEMI (non-ST elevated myocardial infarction) (HCC)      amiodarone (PACERONE) 400 MG tablet Take 1 Tablet by mouth every day for 3 days, THEN 0.5 Tablets every day for 90 days.  Qty: 48 Tablet, Refills: 0    Associated Diagnoses: Paroxysmal atrial fibrillation (HCC)      ascorbic acid (VITAMIN C) 500 MG tablet Take 500 mg by mouth every day.      Cholecalciferol (VITAMIN D3) 50 MCG (2000 UT) Tab Take 2,000 Units by mouth every day.      ferrous sulfate 325 (65 Fe) MG tablet Take 325 mg by mouth every day.      hydroCHLOROthiazide (HYDRODIURIL) 12.5 MG tablet Take 12.5 mg by mouth every day.      tamsulosin (FLOMAX) 0.4 MG capsule Take 0.4 mg by mouth every day.      clotrimazole (MYCELEX) 10 MG Tye tye Take 10 mg by mouth every day.      atorvastatin (LIPITOR) 80 MG tablet Take 1 Tab by mouth every evening.  Qty: 30 Tab, Refills: 0      finasteride (PROSCAR) 5 MG TABS Take 5 mg by mouth every day.      nitroglycerin (NITROSTAT) 0.4 MG SUBL Place 0.4 mg under tongue every 5 minutes as needed.               ALLERGIES  Patient has no known allergies.    PHYSICAL EXAM  BP (!) (P) 140/71   Pulse (P) 76   Temp (P) 36.4 °C (97.6 °F) (Temporal)   Resp (P) 16   Ht (P) 1.829 m (6')   Wt (P) 90.7 kg (200 lb)   SpO2 (P) 93%   BMI (P) 27.12 kg/m²     Constitutional: Well developed, well nourished; No acute distress; Non-toxic appearance.   HENT: Normocephalic, atraumatic; Bilateral external ears normal; slightly dry mucous membranes  Eyes: PERRL, EOMI, Conjunctiva normal. No discharge.   Neck:  Supple, nontender midline; No stridor; No nuchal rigidity.   Cardiovascular: Regular rate and rhythm without murmurs, rubs, or gallop.   Thorax & Lungs: No respiratory distress, breath sounds clear to auscultation bilaterally without wheezing, rales or rhonchi. Nontender chest wall. No crepitus or subcutaneous air  Abdomen: Soft, nontender, bowel sounds normal. No  obvious masses; No pulsatile masses; no rebound, guarding, or peritoneal signs.   Skin: Good color; warm and dry without rash or petechia.  Extremities: Right wrist has a pressure dressing overlying the radial aspect of the wrist.  Patient has a 2+ radial pulse in that right wrist.  He has full range of motion to his digits with good capillary refill.  He describes decree sensation to light touch to all of his fingers.  His right hand is minimally swollen.  He was able to remove his wedding ring without significant difficulty and MD presents.  Patient told me to put his wedding ring in the belongings bag which was on the [place] stand, which I did.  Musculoskeletal: Good range of motion in all major joints. No tenderness to palpation or major deformities noted.   Neurologic: Alert & oriented x 4, clear speech.    DIAGNOSTIC STUDIES & PROCEDURES    Labs:   Results for orders placed or performed during the hospital encounter of 07/01/23   CBC WITH DIFFERENTIAL   Result Value Ref Range    WBC 8.6 4.8 - 10.8 K/uL    RBC 3.20 (L) 4.70 - 6.10 M/uL    Hemoglobin 9.6 (L) 14.0 - 18.0 g/dL    Hematocrit 29.9 (L) 42.0 - 52.0 %    MCV 93.4 81.4 - 97.8 fL    MCH 30.0 27.0 - 33.0 pg    MCHC 32.1 (L) 32.3 - 36.5 g/dL    RDW 57.8 (H) 35.9 - 50.0 fL    Platelet Count 415 164 - 446 K/uL    MPV 11.1 9.0 - 12.9 fL    Neutrophils-Polys 78.10 (H) 44.00 - 72.00 %    Lymphocytes 3.50 (L) 22.00 - 41.00 %    Monocytes 7.00 0.00 - 13.40 %    Eosinophils 7.00 (H) 0.00 - 6.90 %    Basophils 1.80 0.00 - 1.80 %    Nucleated RBC 0.00 0.00 - 0.20 /100 WBC    Neutrophils (Absolute) 6.94 1.82 - 7.42 K/uL    Lymphs (Absolute) 0.30 (L) 1.00 - 4.80 K/uL    Monos (Absolute) 0.60 0.00 - 0.85 K/uL    Eos (Absolute) 0.60 (H) 0.00 - 0.51 K/uL    Baso (Absolute) 0.15 (H) 0.00 - 0.12 K/uL    NRBC (Absolute) 0.00 K/uL    Anisocytosis 1+     Microcytosis 1+    COMP METABOLIC PANEL   Result Value Ref Range    Sodium 143 135 - 145 mmol/L    Potassium 3.1 (L) 3.6 -  5.5 mmol/L    Chloride 105 96 - 112 mmol/L    Co2 28 20 - 33 mmol/L    Anion Gap 10.0 7.0 - 16.0    Glucose 112 (H) 65 - 99 mg/dL    Bun 13 8 - 22 mg/dL    Creatinine 0.94 0.50 - 1.40 mg/dL    Calcium 8.3 (L) 8.5 - 10.5 mg/dL    AST(SGOT) 9 (L) 12 - 45 U/L    ALT(SGPT) 10 2 - 50 U/L    Alkaline Phosphatase 83 30 - 99 U/L    Total Bilirubin 0.3 0.1 - 1.5 mg/dL    Albumin 2.8 (L) 3.2 - 4.9 g/dL    Total Protein 5.5 (L) 6.0 - 8.2 g/dL    Globulin 2.7 1.9 - 3.5 g/dL    A-G Ratio 1.0 g/dL   PROTHROMBIN TIME (INR)   Result Value Ref Range    PT 15.3 (H) 12.0 - 14.6 sec    INR 1.23 (H) 0.87 - 1.13   APTT   Result Value Ref Range    APTT 32.3 24.7 - 36.0 sec   ESTIMATED GFR   Result Value Ref Range    GFR (CKD-EPI) 82 >60 mL/min/1.73 m 2   CORRECTED CALCIUM   Result Value Ref Range    Correct Calcium 9.3 8.5 - 10.5 mg/dL   DIFFERENTIAL MANUAL   Result Value Ref Range    Bands-Stabs 2.60 0.00 - 10.00 %    Manual Diff Status PERFORMED    PERIPHERAL SMEAR REVIEW   Result Value Ref Range    Peripheral Smear Review see below    PLATELET ESTIMATE   Result Value Ref Range    Plt Estimation Normal    MORPHOLOGY   Result Value Ref Range    RBC Morphology Present     Poikilocytosis 2+     Schistocytes 1+     Acanthocytes 2+    COD - Adult (Type and Screen)   Result Value Ref Range    ABO Grouping Only A     Rh Grouping Only POS     Antibody Screen-Cod NEG    ABO Rh Confirm   Result Value Ref Range    ABO Rh Confirm A POS (A)      All labs reviewed by me.    COURSE & MEDICAL DECISION MAKING    ED Observation Status? Yes; I am placing the patient in to an observation status due to a diagnostic uncertainty as well as therapeutic intensity. Patient placed in observation status at 2:55 PM, 7/1/2023.     Observation plan is as follows: Pending labs, vascular consult, and reassessment    Upon Reevaluation, the patient's condition has: not improved; and will be escalated to hospitalization.    Patient discharged from ED Observation status at  4:05 PM on 7/1/2023.    INITIAL ASSESSMENT AND PLAN  Care Narrative: Patient presents to the ER as a transfer from [place] after he had quite a lot of bleeding from the radial artery cardiac catheterization site.  The patient had a cardiac catheterization through the radial artery here at [facility] last month.  Patient says that he was doing pretty well up until 3 days ago when he started noticing a blistering of the skin in the area of the puncture site.  He started oozing some blood and when he presented to [place] today he was pouring blood out of his right wrist.  Patient is on Eliquis.  He was given [facility] facility as well as TXA.  He arrives with a pressure dressing.  I gently remove the dressing and saw quite a lot of blood pouring out of the large skin defect in the radial wrist.  I immediately contacted Dr. Rae, vascular surgery on-call.  Dr. Rae told me to put direct pressure on the wound for 15 minutes and then call him if it did not stop bleeding.  Patient asked that we give him some pain medicine before we hold direct pressure, which we did.  Dr. Rae came to see the patient.  He is going to be taking patient to the operating room for radial artery ligation and debridement of the wound.  He would like me to admit the patient to the medicine service.  I spoke with the hospitalist on-call and she will kindly evaluate the patient hospitalization.      2:55 PM - Patient seen and evaluated at bedside. Hi Montes De Oca is a 78 y.o. male with a history of CHF and A-fib with RVR, who presents with right wrist b.} Patient will be treated with Zofran 4 mg and Morphine 4 mg for his symptoms. Ordered labs to evaluate. He understands and agrees to the plan of care.    3:04 PM - Paged Vascular    3:10 PM - I discussed the patient's case and the above findings with Dr. Rae (Vascular) who relayed to hold direct pressure over the catheterization site for 15 minutes. They  would like to be called back if the bleeding persists. Dr. Rae did not want to have any ultrasound imaging ordered considering the active bleeding.     3:53 PM Dr. Rae would like patient admitted to medicine service.  He is going to take patient to OR to ligate the radial artery and debride the wound.    4:05 PM - I discussed the patient's case and the above findings with Dr. Sparks (Hospitalist) who will assess the patient for hospitalization.     ADDITIONAL PROBLEM LIST AND DISPOSITION  Problem #1: Right radial artery bleeding               DISPOSITION AND DISCUSSIONS  I have discussed management of the patient with the following physicians and NESS's: Dr. Rae (Vascular) and Dr. Sparks (Hospitalist)    Discussion of management with other [facility] or appropriate source(s): None     Escalation of care considered, and ultimately not performed: diagnostic imaging.  I spoke with Dr. Rae, vascular surgeon.  He did not want me to order any imaging at the time I spoke with him.    Barriers to care at this time, including but not limited to:  Patient is from [place] .     Decision tools and prescription drugs considered including, but not limited to:  Patient was given Kcentra and TXA at outlying facility prior to arrival. .    CRITICAL CARE  The very real possibilty of a deterioration of this patient's condition required the highest level of my preparedness for sudden, emergent intervention.  I provided critical care services, which included medication orders, frequent reevaluations of the patient's condition and response to treatment, ordering and reviewing test results, and discussing the case with various consultants.  The critical care time associated with the care of the patient was 35 minutes. Review chart for interventions. This time is exclusive of any other billable procedures.      DISPOSITION:  Patient will be hospitalized by Dr. Sparks in guarded condition.    FINAL IMPRESSION   1. Injury of right radial  artery, initial encounter Acute   The critical care time associated with the care of the patient was 35 minutes.    This dictation has been created using voice recognition software. The accuracy of the dictation is limited by the abilities of the software. I expect there may be some errors of grammar and possibly content. I made every attempt to manually correct the errors within my dictation. However, errors related to voice recognition software may still exist and should be interpreted within the appropriate context.      IMilton (Scribe), am scribing for, and in the presence of, Chaya Bajwa M.D..    Electronically signed by: Milton Dangelo (Jeffersonibkisha), 7/1/2023    Chaya BELLO M.D. personally performed the services described in this documentation, as scribed by Milton Dangelo in my presence, and it is both accurate and complete.    The note accurately reflects work and decisions made by me.  Chaya Bajwa M.D.  7/1/2023  11:17 PM

## 2023-07-01 NOTE — ASSESSMENT & PLAN NOTE
Blood pressure is low range  Can resume metoprolol, hydrochlorothiazide, losartan, Lasix tomorrow with hold parameters

## 2023-07-01 NOTE — CONSULTS
VASCULAR SURGERY SERVICE  CONSULT NOTE      Date: 7/1/2023    Referring Provider: Chaya Bajwa MD    Consulting Physician: Marissa Rae MD - [facility]     -------------------------------------------------------------------------------------------------    Reason for consultation:  Right wrist bleeding.    HPI:  This is a 78 y.o. male who underwent cardiac catheterization on May 18, 2023 via right radial approach for non-STEMI.  He was also found to have left upper arm and right lower extremity deep venous thrombosis and new heart failure and A-fib.  He developed hematoma at the wrist over the puncture site following the procedure.  He has been on Eliquis.  He presented to [facility] earlier today with bleeding.  He was given Kcentra and TXA and transferred to [facility].    Past Medical History:   Diagnosis Date    Arthritis     all over    CATARACT 2006    removed    Coronary artery disease due to lipid rich plaque     Heart murmur 1974    Hypertension     Myocardial infarct (HCC) 7/11/2012    Pain     Renal disorder     Unspecified urinary incontinence     prostate issues, self cathing currently    Urinary bladder disorder        Past Surgical History:   Procedure Laterality Date    RECOVERY  12/21/2012    Performed by Cath-Recovery Surgery at SURGERY SAME DAY [facility]    OTHER ORTHOPEDIC SURGERY  3/1/2011    left knee total    KNEE ARTHROPLASTY TOTAL  3/1/2011    Performed by KHALIF TREJO at SURGERY [facility]    OR LAP,CHOLECYSTECTOMY  2001    VASECTOMY  1984    APPENDECTOMY  1958    ARTHROSCOPY, KNEE      2 x left, 1 x right    TONSILLECTOMY         No current facility-administered medications for this encounter.     Current Outpatient Medications   Medication Sig Dispense Refill    metoprolol SR (TOPROL XL) 25 MG TABLET SR 24 HR Take 3 Tablets by mouth 2 times a day. 30 Tablet 0    losartan (COZAAR) 50 MG Tab Take 1 Tablet by mouth every day. 30 Tablet 0    apixaban (ELIQUIS) 5mg Tab Take 1  Tablet by mouth 2 times a day. Indications: DVT/PE 60 Tablet 0    furosemide (LASIX) 40 MG Tab Take 1 Tablet by mouth every day. 30 Tablet 0    amiodarone (PACERONE) 400 MG tablet Take 1 Tablet by mouth every day for 3 days, THEN 0.5 Tablets every day for 90 days. 48 Tablet 0    ascorbic acid (VITAMIN C) 500 MG tablet Take 500 mg by mouth every day.      Cholecalciferol (VITAMIN D3) 50 MCG (2000 UT) Tab Take 2,000 Units by mouth every day.      ferrous sulfate 325 (65 Fe) MG tablet Take 325 mg by mouth every day.      hydroCHLOROthiazide (HYDRODIURIL) 12.5 MG tablet Take 12.5 mg by mouth every day.      tamsulosin (FLOMAX) 0.4 MG capsule Take 0.4 mg by mouth every day.      clotrimazole (MYCELEX) 10 MG Tye tye Take 10 mg by mouth every day.      atorvastatin (LIPITOR) 80 MG tablet Take 1 Tab by mouth every evening. 30 Tab 0    finasteride (PROSCAR) 5 MG TABS Take 5 mg by mouth every day.      nitroglycerin (NITROSTAT) 0.4 MG SUBL Place 0.4 mg under tongue every 5 minutes as needed.           Social History     Socioeconomic History    Marital status:      Spouse name: Not on file    Number of children: Not on file    Years of education: Not on file    Highest education level: Not on file   Occupational History    Not on file   Tobacco Use    Smoking status: Never    Smokeless tobacco: Current     Types: Chew    Tobacco comments:     chews snuff / tobacco   Substance and Sexual Activity    Alcohol use: No    Drug use: No    Sexual activity: Not on file   Other Topics Concern    Not on file   Social History Narrative    Not on file     Social Determinants of Health     Financial Resource Strain: Not on file   Food Insecurity: Not on file   Transportation Needs: Not on file   Physical Activity: Not on file   Stress: Not on file   Social Connections: Not on file   Intimate Partner Violence: Not on file   Housing Stability: Not on file       Family History   Problem Relation Age of Onset    Heart Failure  Mother     Heart Attack Father        Allergies:  Patient has no known allergies.    Review of Systems:    Constitutional: Negative for fever, chills, weight loss,   HENT:   Negative for hearing loss or tinnitus    Eyes:    Negative for blurred vision, double vision, or loss of vision  Respiratory:  Negative for cough, hemoptysis, or wheezing    Cardiac:  Negative for chest pain or palpitations or orthopnea  Vascular:  Negative for claudication or rest pain   Gastrointestinal: Negative for nausea, vomiting, or abdominal pain     Negative for hematochezia or melena   Genitourinary: Negative for dysuria, frequency, or hematuria   Musculoskeletal: Positive for right wrist pain   Skin:   Negative for itching or rash  Neurological:  Negative for dizziness, headaches, or tremors     Negative for speech disturbance     Negative for extremity weakness or paresthesias  Endo/Heme:  Negative for easy bruising or bleeding  Psychiatric:  Negative for depression, suicidal ideas, or hallucinations    Physical Exam:  BP (!) 140/71   Pulse 76   Temp 36.4 °C (97.6 °F) (Temporal)   Resp 16   Ht 1.829 m (6')   Wt 90.7 kg (200 lb)   SpO2 93%     Constitutional: Alert, oriented, no acute distress  HEENT:  Normocephalic and atraumatic, EOMI  Neck:   Supple, no JVD  Cardiovascular: Irregular.  Pulmonary:  Good air entry bilaterally  Abdominal:  Soft, non-tender, non-distended     Aortic impulse not widened  Musculoskeletal: No edema, no tenderness  Neurological:  CN II-XII grossly intact, no focal deficits  Skin:   Skin is warm and dry. No rash noted.  Psychiatric:  Normal mood and affect.  Right upper extremity: There is 1.5 cm opening on the skin extended down to the subcutaneous tissues with active bright red bleeding.  Perfusion of the right hand appears to be intact with compression of the radial artery.    Labs:  Recent Labs     07/01/23  1522   WBC 8.6   RBC 3.20*   HEMOGLOBIN 9.6*   HEMATOCRIT 29.9*   MCV 93.4   MCH 30.0    MCHC 32.1*   RDW 57.8*   PLATELETCT 415   MPV 11.1     Recent Labs     07/01/23  1522   SODIUM 143   POTASSIUM 3.1*   CHLORIDE 105   CO2 28   GLUCOSE 112*   BUN 13   CREATININE 0.94   CALCIUM 8.3*         Recent Labs     07/01/23  1522   ASTSGOT 9*   ALTSGPT 10   TBILIRUBIN 0.3   ALKPHOSPHAT 83   GLOBULIN 2.7         Assessment:  -Right wrist ulceration and radial arterial injury with active bleeding.  -History of recent non-STEMI.  -Atrial fibrillation.  -Dyslipidemia.  -Hypertension.    Plan:  I had a discussion with patient.  It appears that he developed a hematoma following the cardiac cath and the hematoma eroded through the skin and subcutaneous tissues.  I advised patient to be taken to the operating room to undergo ligation of the radial artery and debridement of the wound.  I told him that more than likely, I would have to leave the wound open following the debridement and then let it heal from inside out.  Patient indicated understanding would like to proceed.  OR was contacted.      Marissa Rae MD  [facility]   [place] preferred or call my office [phone]  __________________________________________________________________  Patient:Hi Montes De Oca   MRN:[id number]   CSN:[id number]

## 2023-07-02 ENCOUNTER — APPOINTMENT (OUTPATIENT)
Dept: RADIOLOGY | Facility: MEDICAL CENTER | Age: 78
DRG: 901 | End: 2023-07-02
Attending: STUDENT IN AN ORGANIZED HEALTH CARE EDUCATION/TRAINING PROGRAM
Payer: MEDICARE

## 2023-07-02 LAB
ACANTHOCYTES BLD QL SMEAR: NORMAL
ANION GAP SERPL CALC-SCNC: 9 MMOL/L (ref 7–16)
ANISOCYTOSIS BLD QL SMEAR: ABNORMAL
BASOPHILS # BLD AUTO: 2.6 % (ref 0–1.8)
BASOPHILS # BLD: 0.18 K/UL (ref 0–0.12)
BUN SERPL-MCNC: 13 MG/DL (ref 8–22)
CALCIUM SERPL-MCNC: 8.1 MG/DL (ref 8.5–10.5)
CHLORIDE SERPL-SCNC: 103 MMOL/L (ref 96–112)
CO2 SERPL-SCNC: 30 MMOL/L (ref 20–33)
CREAT SERPL-MCNC: 0.83 MG/DL (ref 0.5–1.4)
EOSINOPHIL # BLD AUTO: 0.29 K/UL (ref 0–0.51)
EOSINOPHIL NFR BLD: 4.3 % (ref 0–6.9)
ERYTHROCYTE [DISTWIDTH] IN BLOOD BY AUTOMATED COUNT: 59.8 FL (ref 35.9–50)
GFR SERPLBLD CREATININE-BSD FMLA CKD-EPI: 88 ML/MIN/1.73 M 2
GLUCOSE SERPL-MCNC: 124 MG/DL (ref 65–99)
HCT VFR BLD AUTO: 28.9 % (ref 42–52)
HGB BLD-MCNC: 9 G/DL (ref 14–18)
HYPOCHROMIA BLD QL SMEAR: ABNORMAL
LYMPHOCYTES # BLD AUTO: 0.77 K/UL (ref 1–4.8)
LYMPHOCYTES NFR BLD: 11.3 % (ref 22–41)
MANUAL DIFF BLD: NORMAL
MCH RBC QN AUTO: 29.7 PG (ref 27–33)
MCHC RBC AUTO-ENTMCNC: 31.1 G/DL (ref 32.3–36.5)
MCV RBC AUTO: 95.4 FL (ref 81.4–97.8)
MICROCYTES BLD QL SMEAR: ABNORMAL
MONOCYTES # BLD AUTO: 0.24 K/UL (ref 0–0.85)
MONOCYTES NFR BLD AUTO: 3.5 % (ref 0–13.4)
MORPHOLOGY BLD-IMP: NORMAL
NEUTROPHILS # BLD AUTO: 5.32 K/UL (ref 1.82–7.42)
NEUTROPHILS NFR BLD: 78.3 % (ref 44–72)
NRBC # BLD AUTO: 0 K/UL
NRBC BLD-RTO: 0 /100 WBC (ref 0–0.2)
PLATELET # BLD AUTO: 371 K/UL (ref 164–446)
PLATELET BLD QL SMEAR: NORMAL
PMV BLD AUTO: 11.6 FL (ref 9–12.9)
POIKILOCYTOSIS BLD QL SMEAR: NORMAL
POTASSIUM SERPL-SCNC: 3.4 MMOL/L (ref 3.6–5.5)
RBC # BLD AUTO: 3.03 M/UL (ref 4.7–6.1)
RBC BLD AUTO: PRESENT
SCHISTOCYTES BLD QL SMEAR: NORMAL
SODIUM SERPL-SCNC: 142 MMOL/L (ref 135–145)
WBC # BLD AUTO: 6.8 K/UL (ref 4.8–10.8)

## 2023-07-02 PROCEDURE — 700111 HCHG RX REV CODE 636 W/ 250 OVERRIDE (IP): Performed by: SURGERY

## 2023-07-02 PROCEDURE — 85007 BL SMEAR W/DIFF WBC COUNT: CPT

## 2023-07-02 PROCEDURE — 99233 SBSQ HOSP IP/OBS HIGH 50: CPT | Performed by: STUDENT IN AN ORGANIZED HEALTH CARE EDUCATION/TRAINING PROGRAM

## 2023-07-02 PROCEDURE — 71045 X-RAY EXAM CHEST 1 VIEW: CPT

## 2023-07-02 PROCEDURE — 85025 COMPLETE CBC W/AUTO DIFF WBC: CPT

## 2023-07-02 PROCEDURE — A9270 NON-COVERED ITEM OR SERVICE: HCPCS | Performed by: STUDENT IN AN ORGANIZED HEALTH CARE EDUCATION/TRAINING PROGRAM

## 2023-07-02 PROCEDURE — 700102 HCHG RX REV CODE 250 W/ 637 OVERRIDE(OP): Performed by: SURGERY

## 2023-07-02 PROCEDURE — 700102 HCHG RX REV CODE 250 W/ 637 OVERRIDE(OP): Performed by: STUDENT IN AN ORGANIZED HEALTH CARE EDUCATION/TRAINING PROGRAM

## 2023-07-02 PROCEDURE — 770001 HCHG ROOM/CARE - MED/SURG/GYN PRIV*

## 2023-07-02 PROCEDURE — 80048 BASIC METABOLIC PNL TOTAL CA: CPT

## 2023-07-02 PROCEDURE — 700105 HCHG RX REV CODE 258: Performed by: SURGERY

## 2023-07-02 PROCEDURE — A9270 NON-COVERED ITEM OR SERVICE: HCPCS | Performed by: INTERNAL MEDICINE

## 2023-07-02 PROCEDURE — A9270 NON-COVERED ITEM OR SERVICE: HCPCS | Performed by: SURGERY

## 2023-07-02 PROCEDURE — 700102 HCHG RX REV CODE 250 W/ 637 OVERRIDE(OP): Performed by: INTERNAL MEDICINE

## 2023-07-02 RX ORDER — ASPIRIN 81 MG/1
81 TABLET, CHEWABLE ORAL DAILY
Status: DISCONTINUED | OUTPATIENT
Start: 2023-07-02 | End: 2023-07-03 | Stop reason: HOSPADM

## 2023-07-02 RX ORDER — AMOXICILLIN AND CLAVULANATE POTASSIUM 875; 125 MG/1; MG/1
1 TABLET, FILM COATED ORAL EVERY 12 HOURS
Status: DISCONTINUED | OUTPATIENT
Start: 2023-07-03 | End: 2023-07-03 | Stop reason: HOSPADM

## 2023-07-02 RX ADMIN — SENNOSIDES AND DOCUSATE SODIUM 2 TABLET: 50; 8.6 TABLET ORAL at 17:32

## 2023-07-02 RX ADMIN — FINASTERIDE 5 MG: 5 TABLET, FILM COATED ORAL at 05:23

## 2023-07-02 RX ADMIN — METOPROLOL SUCCINATE 75 MG: 50 TABLET, EXTENDED RELEASE ORAL at 17:32

## 2023-07-02 RX ADMIN — ATORVASTATIN CALCIUM 80 MG: 40 TABLET, FILM COATED ORAL at 17:32

## 2023-07-02 RX ADMIN — ASPIRIN 81 MG 81 MG: 81 TABLET ORAL at 11:04

## 2023-07-02 RX ADMIN — SENNOSIDES AND DOCUSATE SODIUM 2 TABLET: 50; 8.6 TABLET ORAL at 05:23

## 2023-07-02 RX ADMIN — FUROSEMIDE 40 MG: 20 TABLET ORAL at 05:23

## 2023-07-02 RX ADMIN — HYDROCHLOROTHIAZIDE 12.5 MG: 12.5 TABLET ORAL at 05:23

## 2023-07-02 RX ADMIN — ACETAMINOPHEN 650 MG: 325 TABLET, FILM COATED ORAL at 17:39

## 2023-07-02 RX ADMIN — TAMSULOSIN HYDROCHLORIDE 0.4 MG: 0.4 CAPSULE ORAL at 05:23

## 2023-07-02 RX ADMIN — LOSARTAN POTASSIUM 50 MG: 50 TABLET, FILM COATED ORAL at 05:24

## 2023-07-02 RX ADMIN — CEFAZOLIN 2 G: 2 INJECTION, POWDER, FOR SOLUTION INTRAMUSCULAR; INTRAVENOUS at 13:09

## 2023-07-02 RX ADMIN — CEFAZOLIN 2 G: 2 INJECTION, POWDER, FOR SOLUTION INTRAMUSCULAR; INTRAVENOUS at 05:21

## 2023-07-02 RX ADMIN — METOPROLOL SUCCINATE 75 MG: 50 TABLET, EXTENDED RELEASE ORAL at 05:23

## 2023-07-02 RX ADMIN — FERROUS SULFATE TAB 325 MG (65 MG ELEMENTAL FE) 325 MG: 325 (65 FE) TAB at 05:23

## 2023-07-02 RX ADMIN — AMIODARONE HYDROCHLORIDE 200 MG: 200 TABLET ORAL at 05:22

## 2023-07-02 RX ADMIN — ACETAMINOPHEN 650 MG: 325 TABLET, FILM COATED ORAL at 09:40

## 2023-07-02 ASSESSMENT — COGNITIVE AND FUNCTIONAL STATUS - GENERAL
SUGGESTED CMS G CODE MODIFIER DAILY ACTIVITY: CJ
DRESSING REGULAR LOWER BODY CLOTHING: A LITTLE
MOBILITY SCORE: 18
SUGGESTED CMS G CODE MODIFIER MOBILITY: CK
STANDING UP FROM CHAIR USING ARMS: A LITTLE
DAILY ACTIVITIY SCORE: 22
MOVING TO AND FROM BED TO CHAIR: A LITTLE
MOVING FROM LYING ON BACK TO SITTING ON SIDE OF FLAT BED: A LITTLE
WALKING IN HOSPITAL ROOM: A LITTLE
CLIMB 3 TO 5 STEPS WITH RAILING: A LITTLE
DRESSING REGULAR UPPER BODY CLOTHING: A LITTLE
TURNING FROM BACK TO SIDE WHILE IN FLAT BAD: A LITTLE

## 2023-07-02 ASSESSMENT — PAIN DESCRIPTION - PAIN TYPE
TYPE: ACUTE PAIN

## 2023-07-02 ASSESSMENT — LIFESTYLE VARIABLES
EVER HAD A DRINK FIRST THING IN THE MORNING TO STEADY YOUR NERVES TO GET RID OF A HANGOVER: NO
ON A TYPICAL DAY WHEN YOU DRINK ALCOHOL HOW MANY DRINKS DO YOU HAVE: 0
HAVE PEOPLE ANNOYED YOU BY CRITICIZING YOUR DRINKING: NO
AVERAGE NUMBER OF DAYS PER WEEK YOU HAVE A DRINK CONTAINING ALCOHOL: 0
ALCOHOL_USE: NO
HAVE YOU EVER FELT YOU SHOULD CUT DOWN ON YOUR DRINKING: NO
TOTAL SCORE: 0
DOES PATIENT WANT TO STOP DRINKING: NO
EVER FELT BAD OR GUILTY ABOUT YOUR DRINKING: NO
CONSUMPTION TOTAL: NEGATIVE
TOTAL SCORE: 0
TOTAL SCORE: 0
HOW MANY TIMES IN THE PAST YEAR HAVE YOU HAD 5 OR MORE DRINKS IN A DAY: 0

## 2023-07-02 NOTE — PROGRESS NOTES
Hospitalist Dr Sparks contacted about Kcentra order started at [facility] and orders received to DC infusion. Medication not in MAR, infusion DC's at 1835.

## 2023-07-02 NOTE — PROGRESS NOTES
Patient to floor with transport in stable condition. VSS. Surgical dressings clean dry intact. Aox4 and on 3 l O2. Belongings returned. Family updated. No further needs.

## 2023-07-02 NOTE — OR SURGEON
VASCULAR SURGERY IMMEDIATE POST OP NOTE       PreOp Diagnosis: Right wrist radial arterial injury and ulceration.      PostOp Diagnosis: Same.      Procedure(s):  Right wrist exploration, oversew of posterior radial arterial bleeder.  Excisional debridement of right wrist ulcer (skin and subcutaneous tissues) and primary closure - Wound Class: Contaminated    Surgeon(s):  Marissa Rae M.D.    Anesthesiologist/Type of Anesthesia:  Anesthesiologist: Gutierrez Morris M.D./MAC    Surgical Staff:  Circulator: Hi Gordillo R.N.  Scrub Person: Phill Jacobs    Specimens removed if any:  * No specimens in log *    Estimated Blood Loss: 10 mL.    Findings: Radial artery distally was obliterated with active bleeding from posterior branch.    Complications: None.    Dictated, #[id number].        7/1/2023 5:58 PM Marissa Rae M.D.

## 2023-07-02 NOTE — PROGRESS NOTES
Hospital Medicine Daily Progress Note    Date of Service  7/2/2023    Chief Complaint  Hi Montes De Oca is a 78 y.o. male admitted 7/1/2023 with right wrist bleeding.    Hospital Course    Hi Montes De Oca is a 78 y.o. male who presented 7/1/2023 with right wrist bleeding.  He was hospitalized 5/18 for NSTEMI, he had a cardiac cath with chronic RCA occlusion. He was also found with left upper arm and right lower leg DVTs, as well as new heart failure EF 45% and A-fib.  He did have postprocedure right wrist hematoma and Plavix was stopped, he was continued on Eliquis and his hematoma was stable.  He was discharged home.    Vascular surgery evaluated status post Right wrist exploration and oversew of bleeding posterior radial arterial bleed.  Excisional debridement of right wrist ulcer  07/01/2023       Interval Problem Update  7/2/2023  Requiring 2 L oxygen to maintain saturation over 90.  Patient is AOx3.  Agitated  Denies pain around his wrist    Labs reviewed, noted hemoglobin 9.6, potassium 3.1    S/p ight wrist exploration, excisional debridement of right wrist ulcer, on Prevena    Discussed with  vascular surgery Dr Bearden .  Vascular surgeon okay to resume aspirin now, resume Eliquis and 1 week.  Recommending 10 days course of oral antibiotic Augmentin.  Follow-up in clinic 7/6 for Prevena removal      Currently on IV antibiotic cefazolin  Continue to monitor Prevena output  Vascular surgery following  Need close H&H monitoring.  Labs ordered  Repeat BMP to monitor electrolytes.  Get chest x-ray,   incentive spirometry      I have discussed this patient's plan of care and discharge plan at IDT rounds today with Case Management, Nursing, Nursing leadership, and other members of the IDT team.    Consultants/Specialty  vascular surgery    Code Status  Full Code    Disposition  The patient is not medically cleared for discharge to home or a post-acute facility.  Anticipate discharge to: home with close  outpatient follow-up    I have placed the appropriate orders for post-discharge needs.    Review of Systems  Review of Systems   Unable to perform ROS: Other      Remain agitated    Physical Exam  Temp:  [35.9 °C (96.6 °F)-36.7 °C (98.1 °F)] 36.7 °C (98.1 °F)  Pulse:  [67-78] 71  Resp:  [16-22] 16  BP: (101-152)/(52-87) 134/62  SpO2:  [78 %-99 %] 91 %    Physical Exam  Constitutional:       General: He is not in acute distress.  Pulmonary:      Effort: Pulmonary effort is normal.      Breath sounds: Normal breath sounds.   Musculoskeletal:      Comments: Surgical dressing noted around right wrist, on Prevena, no tenderness on exam   Skin:     General: Skin is dry.         Fluids    Intake/Output Summary (Last 24 hours) at 7/2/2023 1012  Last data filed at 7/2/2023 0511  Gross per 24 hour   Intake 230.61 ml   Output 420 ml   Net -189.39 ml       Laboratory  Recent Labs     07/01/23  1522   WBC 8.6   RBC 3.20*   HEMOGLOBIN 9.6*   HEMATOCRIT 29.9*   MCV 93.4   MCH 30.0   MCHC 32.1*   RDW 57.8*   PLATELETCT 415   MPV 11.1     Recent Labs     07/01/23  1522   SODIUM 143   POTASSIUM 3.1*   CHLORIDE 105   CO2 28   GLUCOSE 112*   BUN 13   CREATININE 0.94   CALCIUM 8.3*     Recent Labs     07/01/23  1522   APTT 32.3   INR 1.23*               Imaging  No orders to display        Assessment/Plan  * Traumatic hematoma of right wrist, subsequent encounter- (present on admission)  Assessment & Plan  With worsening anemia and active bleeding  Eliquis stopped and reversed with Kcentra, TXA was given  Plan for OR today with Dr. Kyra caballero hemoglobin and transfuse if less than 8 given his recent NSTEMI    Pacemaker  Assessment & Plan  Noted    Chronic systolic congestive heart failure (HCC)  Assessment & Plan  EF 40%  Cont metop, losartan, lasix, statin    DVT of upper extremity (deep vein thrombosis) (HCC)- (present on admission)  Assessment & Plan  Large clot to right leg and left arm   holding Eliquis due to active  bleeding  Vascular surgery recommending resume Eliquis 1 week from now    Hypokalemia- (present on admission)  Assessment & Plan  Replete and recheck level    AF (atrial fibrillation) (HCC)- (present on admission)  Assessment & Plan  Cont metop and amiodarone  Stopped Eliquis    Acute respiratory failure with hypoxia (HCC)- (present on admission)  Assessment & Plan  Likely of underlying atelectasis  Get chest x-ray  Incentive spirometry    Essential hypertension, benign- (present on admission)  Assessment & Plan  Blood pressure is low range  Can resume metoprolol, hydrochlorothiazide, losartan, Lasix tomorrow with hold parameters    Coronary artery disease due to lipid rich plaque- (present on admission)  Assessment & Plan  NSTEMI 1 month ago  Cont statin, metop  Aspirin, Eliquis 1 week from now         VTE prophylaxis: SCDs/TEDs    Anticoagulation once cleared by vascular surgery    I have performed a physical exam and reviewed and updated ROS and Plan today (7/2/2023). In review of yesterday's note (7/1/2023), there are no changes except as documented above.         Greater than 51 minutes spent prepping to see patient (e.g. review of tests) obtaining and/or reviewing separately obtained history. Performing a medically appropriate examination and/ evaluation.  Counseling and educating the patient/family/caregiver.  Ordering medications, tests, or procedures.  Referring and communicating with other health care professionals.  Documenting clinical information in EPIC.  Independently interpreting results and communicating results to patient/family/caregiver.  Care coordination.

## 2023-07-02 NOTE — ANESTHESIA TIME REPORT
Anesthesia Start and Stop Event Times     Date Time Event    7/1/2023 1700 Ready for Procedure     1712 Anesthesia Start     1803 Anesthesia Stop        Responsible Staff  07/01/23    Name Role Begin End    Gutierrez Morris M.D. Anesth 1710 1802        Overtime Reason:  no overtime (within assigned shift)    Comments:

## 2023-07-02 NOTE — ANESTHESIA POSTPROCEDURE EVALUATION
Patient: Hi Montes De Oca    Procedure Summary     Date: 07/01/23 Room / Location: [facility] 09 / SURGERY [facility]    Anesthesia Start: 1712 Anesthesia Stop: 1803    Procedure: LIGATION, VEIN (Right: Wrist) Diagnosis: (Right wrist ulceration and radial arterial injury )    Surgeons: Marissa Rae M.D. Responsible Provider: Gutierrez Morris M.D.    Anesthesia Type: general ASA Status: 4 - Emergent          Final Anesthesia Type: general  Last vitals  BP   Blood Pressure : 122/58    Temp   36.2 °C (97.1 °F)    Pulse   74   Resp   18    SpO2   97 %      Anesthesia Post Evaluation    Patient location during evaluation: PACU  Patient participation: complete - patient participated  Level of consciousness: awake  Pain score: 0    Airway patency: patent  Anesthetic complications: no  Cardiovascular status: adequate  Respiratory status: acceptable and face mask  Hydration status: hypervolemic    PONV: controlled    patient able to participate, but full recovery from regional anesthesia has not occurred and is not expected within the stipulated timeframe for the completion of the evaluation      No notable events documented.     Nurse Pain Score: 3 (NPRS)

## 2023-07-02 NOTE — CARE PLAN
Problem: Knowledge Deficit - Standard  Goal: Patient and family/care givers will demonstrate understanding of plan of care, disease process/condition, diagnostic tests and medications  Outcome: Progressing     Problem: Pain - Standard  Goal: Alleviation of pain or a reduction in pain to the patient’s comfort goal  Outcome: Progressing   The patient is Stable - Low risk of patient condition declining or worsening    Shift Goals  Clinical Goals: pain control, void  Patient Goals: rest    Progress made toward(s) clinical / shift goals:  pts pain managed with prn pain medication.  POC discussed with pt, pt verbalized understanding of plan.

## 2023-07-02 NOTE — ANESTHESIA PROCEDURE NOTES
Peripheral Block    Date/Time: 7/1/2023 5:20 PM    Performed by: Gutierrez Morris M.D.  Authorized by: Gutierrez Morris M.D.    Patient Location:  OR  Start Time:  7/1/2023 5:20 PM  Reason for Block: at surgeon's request    patient identified, IV checked, site marked, risks and benefits discussed, surgical consent, monitors and equipment checked, pre-op evaluation and timeout performed    Patient Position:  Supine  Prep: ChloraPrep    Monitoring:  Heart rate, continuous pulse ox and cardiac monitor  Block Region:  Upper Extremity  Upper Extremity - Block Type:  BRACHIAL PLEXUS block, axillary approach    Laterality:  Right  Procedures: ultrasound guided  Image captured, interpreted and electronically stored.  Local Infiltration:  Lidocaine  Strength:  1 %  Dose:  3 ml  Block Type:  Single-shot  Needle Length:  100mm  Needle Gauge:  21 G  Needle Localization:  Ultrasound guidance  Injection Assessment:  Negative aspiration for heme, no paresthesia on injection, incremental injection and local visualized surrounding nerve on ultrasound  Evidence of intravascular injection: No     US Guided Axillary Block   US probe placed in axilla at intersection of pec major and biceps muscles.  Axillary Artery (AA) identified with Median (superficial), Radial (deep) and Ulnar (caudad) nerves surrounding artery.  Needle inserted cephalad to probe in an in plane approach to a perivascular/perineural position.  After negative aspiration LA injected with ease and visualized surrounding the artery and nerves.  Probe moved cephalad to identify corcobrachialis muscle and Musculocutaneous Nerve (MCN) within muscle belly. Needle inserted cephalad to probe in an in plane approach to a perineural position.  After negative aspiration LA injected with ease and visualized surrounding MCN.

## 2023-07-02 NOTE — CARE PLAN
The patient is Stable - Low risk of patient condition declining or worsening    Shift Goals  Clinical Goals: pain control, ambulation, wean off oxygen  Patient Goals: rest    Progress made toward(s) clinical / shift goals:   PRN medication administered for complaints of pain. Turned patient to side, as patient has been refusing ambulation. Oxygen weaned off.     Patient is not progressing towards the following goals: N/A        Problem: Knowledge Deficit - Standard  Goal: Patient and family/care givers will demonstrate understanding of plan of care, disease process/condition, diagnostic tests and medications  Outcome: Progressing     Problem: Pain - Standard  Goal: Alleviation of pain or a reduction in pain to the patient’s comfort goal  Outcome: Progressing

## 2023-07-02 NOTE — PROGRESS NOTES
Pt arrived on unit from PACU. 2L NC in place. Bed alarm in place. Call light within patient reach.

## 2023-07-02 NOTE — PROGRESS NOTES
"  VASCULAR SURGERY PROGRESS NOTE       Awake, grumpy.  AF, VSS.    General: Elderly male in none apparent distress.  Right upper extremity: Prevena in place with no bloody output.  Palpable right distal ulnar pulses with multiphasic flow.  Normal capillary refill of right hand and fingers.  Sensorimotor function intact.    Labs: Reviewed.    Assessment: Status post right wrist exploration, oversewing of bleeding posterior radial arterial branch, and excisional debridement of right wrist ulcer.    Plan:  Stable from vascular standpoint.  May resume Aspirin but hold Eliquis for 1 week.    Patient may be discharged home from vascular standpoint on 10 days of Augmentin since the ulcer was dirty.    Keep right arm elevated as much as possible.  No lifting more than 1/2 gallon of milk using right hand.    Follow-up with Dr. Pate (I will be out of town) in vascular clinic on July 6, 2023 for Prevena removal and wound check.    When I turned the TV volume down to attempt to talk to patient, patient became angry, threw a pillow at me, and called me an \"ass hole\".    Discussed with Dr. Lim.    Vascular service will sign off.  Please call for questions or concerns.  "

## 2023-07-02 NOTE — PROGRESS NOTES
4 Eyes Skin Assessment Completed by RICH Ortiz and RICH Garcia.    Head WDL  Ears WDL  Nose WDL  Mouth WDL  Neck WDL  Breast/Chest WDL  Shoulder Blades WDL  Spine WDL  (R) Arm/Elbow/Hand Bruising and Swelling, prevena wound vac to wrist   (L) Arm/Elbow/Hand Bruising, Swelling, and Edema  Abdomen WDL  Groin WDL  Scrotum/Coccyx/Buttocks WDL  (R) Leg Bruising, Swelling, and Edema  (L) Leg Bruising, Swelling, and Edema  (R) Heel/Foot/Toe WDL  (L) Heel/Foot/Toe WDL          Devices In Places Blood Pressure Cuff, Pulse Ox, SCD's, and Nasal Cannula      Interventions In Place N/A    Possible Skin Injury No    Pictures Uploaded Into Epic N/A  Wound Consult Placed N/A  RN Wound Prevention Protocol Ordered No

## 2023-07-02 NOTE — OP REPORT
VASCULAR SURGERY OPERATIVE REPORT       DATE OF SERVICE:  07/01/2023     SURGEON:  Marissa Rae MD     ANESTHESIOLOGIST:  Gutierrez Morris MD     TYPE OF ANESTHESIA:  Axillary block and monitored anesthesia care.     PREOPERATIVE DIAGNOSIS:  Right wrist radial arterial injury and ulceration.     POSTOPERATIVE DIAGNOSIS:  Right wrist radial arterial injury and ulceration.     PROCEDURES:  1.  Right wrist exploration and oversew of bleeding posterior radial arterial   bleed.  2.  Excisional debridement of right wrist ulcer (skin and subcutaneous tissue)   and primary closure.     INDICATIONS FOR PROCEDURE:  This is a pleasant 78-year-old male who previously   underwent cardiac catheterization via right radial artery.  The patient   developed a hematoma and presented to the emergency room with active bleeding   as well as ulceration of the overlying tissue and skin, about 1.5 cm defect.    Discussion was made with the patient.  He would like to undergo above   procedureS, fully understanding all risks.     DESCRIPTION OF PROCEDURE:  Informed consent was obtained.  The patient was   taken to the operating room and was placed in the supine position.  He was   given Ancef intravenously.  A timeout procedure was done.  The axillary block   was performed by the anesthesiologist.  Monitored anesthesia care was also   carried out.  A tourniquet was placed in the distal upper arm.  The tourniquet   was inflated to 130 mmHg.  Occlusive dressing at the wrist was removed.  The   arm was sterilely prepped and draped in the normal fashion.  An incision was   made, extended from the skin opening distally and proximally.  There was   significant hematoma seen, which was evacuated.  The hematoma cavity was   approximately 5 x 3 cm in size.  The radial artery was found to be   obliterated; however, there was bleeding seen from the posterior branch.  This   was oversewn with a 6-0 Prolene suture.  The tourniquet was deflated.  The    devitalized subcutaneous tissue was sharply excised using Metzenbaum scissor.    The wound was copiously irrigated with sterile saline solution.  Good   hemostasis was seen.  The devitalized skin was resected.  The wound was closed   with 4-0 nylon vertical mattresses.  The wound was cleaned and a small   Prevena dressing was applied over the wound.     ESTIMATED BLOOD LOSS:  10 mL.     COUNTS:  Sponge and instrument count was correct x2.     The patient was then awakened, extubated, taken to recovery area in stable   condition with palpable right distal ulnar pulses with multiphasic flow.  The   perfusion of his right hand remained intact.        ______________________________  Marissa Rae MD    TQN/MARISSA    DD:  07/01/2023 18:05  DT:  07/01/2023 18:51    Job#:  [id number]

## 2023-07-02 NOTE — PROGRESS NOTES
Bedside report received.  Assessment complete.  A&O x 4. Patient calls appropriately.  Patient ambulates with standby assist. Patient has 2/10 pain. Pain managed with prescribed medications.  Denies N&V. Tolerating cardiac diet.  Prevena wound vac on right wrist, elevated at all times.     + void, + flatus, Last  BM was PTA.    Patient denies SOB.  SCD's  on.  Review plan with of care with patient. Call light and personal belongings within reach. Hourly rounding in place. All needs met at this time.

## 2023-07-02 NOTE — PROGRESS NOTES
Report received from Eleanor VILLANUEVA, assumed care at 1900  A0x4  Pt declines any SOB on 2L NC, chest pain, new onset of numbness/tingling  Pt rates pain at 0/10, on a scale of 1-10  + voiding   Pt has + flatus, + bowel sounds, BM PTA  Pt ambulates with a standby assist  Pt is tolerating a cardiac diet, pt denies any nausea/vomiting  Prevena wound vac to right wrist  Plan of care discussed, all questions answered.Call light is within reach, treaded slipper socks on, bed in lowest/locked position, hourly rounding in place, all needs met at this time.

## 2023-07-03 ENCOUNTER — PHARMACY VISIT (OUTPATIENT)
Dept: PHARMACY | Facility: MEDICAL CENTER | Age: 78
End: 2023-07-03
Payer: COMMERCIAL

## 2023-07-03 VITALS
HEIGHT: 72 IN | RESPIRATION RATE: 18 BRPM | WEIGHT: 200 LBS | DIASTOLIC BLOOD PRESSURE: 58 MMHG | OXYGEN SATURATION: 92 % | TEMPERATURE: 97.7 F | SYSTOLIC BLOOD PRESSURE: 104 MMHG | HEART RATE: 73 BPM | BODY MASS INDEX: 27.09 KG/M2

## 2023-07-03 PROCEDURE — A9270 NON-COVERED ITEM OR SERVICE: HCPCS | Performed by: STUDENT IN AN ORGANIZED HEALTH CARE EDUCATION/TRAINING PROGRAM

## 2023-07-03 PROCEDURE — 99239 HOSP IP/OBS DSCHRG MGMT >30: CPT | Performed by: STUDENT IN AN ORGANIZED HEALTH CARE EDUCATION/TRAINING PROGRAM

## 2023-07-03 PROCEDURE — 700102 HCHG RX REV CODE 250 W/ 637 OVERRIDE(OP): Performed by: STUDENT IN AN ORGANIZED HEALTH CARE EDUCATION/TRAINING PROGRAM

## 2023-07-03 PROCEDURE — 700102 HCHG RX REV CODE 250 W/ 637 OVERRIDE(OP): Performed by: INTERNAL MEDICINE

## 2023-07-03 PROCEDURE — RXMED WILLOW AMBULATORY MEDICATION CHARGE: Performed by: STUDENT IN AN ORGANIZED HEALTH CARE EDUCATION/TRAINING PROGRAM

## 2023-07-03 PROCEDURE — A9270 NON-COVERED ITEM OR SERVICE: HCPCS | Performed by: INTERNAL MEDICINE

## 2023-07-03 RX ORDER — AMOXICILLIN AND CLAVULANATE POTASSIUM 875; 125 MG/1; MG/1
1 TABLET, FILM COATED ORAL EVERY 12 HOURS
Qty: 20 TABLET | Refills: 0 | Status: ACTIVE | OUTPATIENT
Start: 2023-07-03 | End: 2023-07-13

## 2023-07-03 RX ORDER — POTASSIUM CHLORIDE 20 MEQ/1
40 TABLET, EXTENDED RELEASE ORAL DAILY
Qty: 10 TABLET | Refills: 0 | Status: SHIPPED | OUTPATIENT
Start: 2023-07-03 | End: 2023-07-03 | Stop reason: SDUPTHER

## 2023-07-03 RX ORDER — POTASSIUM CHLORIDE 20 MEQ/1
40 TABLET, EXTENDED RELEASE ORAL DAILY
Qty: 6 TABLET | Refills: 0 | Status: SHIPPED | OUTPATIENT
Start: 2023-07-03 | End: 2023-07-06

## 2023-07-03 RX ORDER — ASPIRIN 81 MG/1
81 TABLET, CHEWABLE ORAL DAILY
Qty: 30 TABLET | Refills: 0 | Status: SHIPPED | OUTPATIENT
Start: 2023-07-04 | End: 2023-08-03

## 2023-07-03 RX ORDER — AMIODARONE HYDROCHLORIDE 200 MG/1
200 TABLET ORAL DAILY
Qty: 30 TABLET | Refills: 0 | Status: SHIPPED | OUTPATIENT
Start: 2023-07-04 | End: 2023-08-03

## 2023-07-03 RX ADMIN — AMIODARONE HYDROCHLORIDE 200 MG: 200 TABLET ORAL at 05:48

## 2023-07-03 RX ADMIN — ASPIRIN 81 MG 81 MG: 81 TABLET ORAL at 05:47

## 2023-07-03 RX ADMIN — FINASTERIDE 5 MG: 5 TABLET, FILM COATED ORAL at 05:47

## 2023-07-03 RX ADMIN — SENNOSIDES AND DOCUSATE SODIUM 2 TABLET: 50; 8.6 TABLET ORAL at 05:48

## 2023-07-03 RX ADMIN — FERROUS SULFATE TAB 325 MG (65 MG ELEMENTAL FE) 325 MG: 325 (65 FE) TAB at 05:48

## 2023-07-03 RX ADMIN — METOPROLOL SUCCINATE 75 MG: 50 TABLET, EXTENDED RELEASE ORAL at 05:46

## 2023-07-03 RX ADMIN — AMOXICILLIN AND CLAVULANATE POTASSIUM 1 TABLET: 875; 125 TABLET, FILM COATED ORAL at 05:48

## 2023-07-03 RX ADMIN — HYDROCHLOROTHIAZIDE 12.5 MG: 12.5 TABLET ORAL at 05:48

## 2023-07-03 RX ADMIN — LOSARTAN POTASSIUM 50 MG: 50 TABLET, FILM COATED ORAL at 05:47

## 2023-07-03 RX ADMIN — TAMSULOSIN HYDROCHLORIDE 0.4 MG: 0.4 CAPSULE ORAL at 05:48

## 2023-07-03 RX ADMIN — FUROSEMIDE 40 MG: 20 TABLET ORAL at 05:46

## 2023-07-03 ASSESSMENT — PAIN DESCRIPTION - PAIN TYPE
TYPE: ACUTE PAIN

## 2023-07-03 NOTE — DISCHARGE SUMMARY
Discharge Summary    CHIEF COMPLAINT ON ADMISSION  Chief Complaint   Patient presents with    Bleeding/Bruising     Post cath hematoma to right wrist       Reason for Admission  EMS     Admission Date  7/1/2023    CODE STATUS  Full Code    HPI & HOSPITAL COURSE  Hi Montes De Oca is a 78 y.o. male who presented 7/1/2023 with right wrist bleeding.  He was hospitalized 5/18 for NSTEMI, he had a cardiac cath with chronic RCA occlusion. He was also found with left upper arm and right lower leg DVTs, as well as new heart failure EF 45% and A-fib.  He did have postprocedure right wrist hematoma and Plavix was stopped, he was continued on Eliquis and his hematoma was stable.  He was discharged home.     Vascular surgery evaluated status post Right wrist exploration and oversew of bleeding posterior radial arterial bleed.  Excisional debridement of right wrist ulcer  07/01/2023     Discussed with  vascular surgery Dr Bearden .  Vascular surgeon okay to resume aspirin now, resume Eliquis and 1 week.  Recommending 10 days course of oral antibiotic Augmentin.  Follow-up in clinic 7/6 for Prevena removal      At the time of discharge patient remain  hemodynamically stable ,asymptomatic ,labs remain unremarkable .  Patient will be discharged with close follow up with PCP and vascular .  Advised for medicine compliance.Discharge plan was discussed with patient in details .  Patient agreed with discharge plan  and  all questions answered.    Discharge plan updated to patient's on Stevie.   to assist with discharge plan      Therefore, he is discharged in good and stable condition to home with close outpatient follow-up.    The patient met 2-midnight criteria for an inpatient stay at the time of discharge.    Discharge Date  7/3/2023       DISCHARGE DIAGNOSES  Principal Problem:    Traumatic hematoma of right wrist, subsequent encounter (POA: Yes)  Active Problems:    Coronary artery disease due to lipid rich plaque  (Chronic) (POA: Yes)    Essential hypertension, benign (POA: Yes)    Acute respiratory failure with hypoxia (HCC) (POA: Yes)    AF (atrial fibrillation) (HCC) (POA: Yes)    Hypokalemia (POA: Yes)    DVT of upper extremity (deep vein thrombosis) (HCC) (POA: Yes)    Chronic systolic congestive heart failure (HCC) (POA: Unknown)    Pacemaker (POA: Unknown)  Resolved Problems:    Traumatic hematoma of right wrist (POA: Unknown)      FOLLOW UP  No future appointments.  Luis Pate M.D.  [address] [facility] [address]  [facility] [address]  [phone]    Follow up on 7/6/2023        MEDICATIONS ON DISCHARGE     Medication List        START taking these medications        Instructions   amoxicillin-clavulanate 875-125 MG Tabs  Commonly known as: Augmentin   Take 1 Tablet by mouth every 12 hours for 10 days.  Dose: 1 Tablet     aspirin 81 MG Chew chewable tablet  Start taking on: July 4, 2023  Commonly known as: Asa   Chew 1 Tablet every day for 30 days.  Dose: 81 mg            CHANGE how you take these medications        Instructions   amiodarone 200 MG Tabs  Start taking on: July 4, 2023  What changed:   medication strength  See the new instructions.  Commonly known as: Cordarone   Take 1 Tablet by mouth every day for 30 days.  Dose: 200 mg     apixaban 5mg Tabs  Start taking on: July 9, 2023  What changed: These instructions start on July 9, 2023. If you are unsure what to do until then, ask your doctor or other care provider.  Commonly known as: Eliquis   Take 1 Tablet by mouth 2 times a day. Indications: DVT/PE  Dose: 5 mg            CONTINUE taking these medications        Instructions   ascorbic acid 500 MG tablet  Commonly known as: Vitamin C   Take 500 mg by mouth every day.  Dose: 500 mg     atorvastatin 80 MG tablet  Commonly known as: Lipitor   Take 1 Tab by mouth every evening.  Dose: 80 mg     clotrimazole 10 MG Troc janny  Commonly known as: Mycelex   Take 10 mg by mouth every day.  Dose: 10 mg     ferrous  sulfate 325 (65 Fe) MG tablet   Take 325 mg by mouth every day.  Dose: 325 mg     finasteride 5 MG Tabs  Commonly known as: Proscar   Take 5 mg by mouth every day.  Dose: 5 mg     furosemide 40 MG Tabs  Commonly known as: Lasix   Take 1 Tablet by mouth every day.  Dose: 40 mg     hydroCHLOROthiazide 12.5 MG tablet  Commonly known as: Hydrodiuril   Take 12.5 mg by mouth every day.  Dose: 12.5 mg     losartan 50 MG Tabs  Commonly known as: Cozaar   Take 1 Tablet by mouth every day.  Dose: 50 mg     metoprolol SR 25 MG Tb24  Commonly known as: Toprol XL   Take 3 Tablets by mouth 2 times a day.  Dose: 75 mg     nitroglycerin 0.4 MG Subl  Commonly known as: Nitrostat   Place 0.4 mg under tongue every 5 minutes as needed.  Dose: 0.4 mg     tamsulosin 0.4 MG capsule  Commonly known as: Flomax   Take 0.4 mg by mouth every day.  Dose: 0.4 mg     Vitamin D3 50 MCG (2000 UT) Tabs   Take 2,000 Units by mouth every day.  Dose: 2,000 Units              Allergies  No Known Allergies    DIET  Orders Placed This Encounter   Procedures    Diet Order Diet: Cardiac     Standing Status:   Standing     Number of Occurrences:   1     Order Specific Question:   Diet:     Answer:   Cardiac [6]       ACTIVITY  As tolerated.  Weight bearing as tolerated    CONSULTATIONS  Vascular    PROCEDURES  DX-CHEST-LIMITED (1 VIEW)   Final Result         1.  Development of opacification of volume loss in the left lower lung above the left hemidiaphragm is identified which could indicate pneumonia or consolidative atelectasis.      2.  Possible small left pleural effusion.            LABORATORY  Lab Results   Component Value Date    SODIUM 142 07/02/2023    POTASSIUM 3.4 (L) 07/02/2023    CHLORIDE 103 07/02/2023    CO2 30 07/02/2023    GLUCOSE 124 (H) 07/02/2023    BUN 13 07/02/2023    CREATININE 0.83 07/02/2023        Lab Results   Component Value Date    WBC 6.8 07/02/2023    HEMOGLOBIN 9.0 (L) 07/02/2023    HEMATOCRIT 28.9 (L) 07/02/2023    PLATELETCT  371 07/02/2023        Total time of the discharge process exceeds 37 minutes.

## 2023-07-03 NOTE — DISCHARGE PLANNING
Care Transition Team Assessment    RN CM met with pt at bedside, pt states he wants to be discharged and go home. Discussed plan for follow up on 7/6 with the vascular team in clinic. Pt is very upset about this, he does not have a ride to take him from [place] to [place] and back. He would like to Prevena removed today. Explained that he would need to speak with the physician about this. Pt is still upset with this.    Inquired about his ride home, again pt states that he cannot get a ride, no one is going to drive from [place] to pick him up. RICH MENDIETA called jonathon Hubbard, updated on plan to follow up on Thursday July 6th for Prevena removal and wound check. He states that he will provide a ride, however this is very disruptive for him. Asked if the [facility] can do the dressing change, RN CM explained that the surgical team will need to assess the wound.     Called Vascular Clinic to confirm Appt time, left msg to return call.    Information Source  Orientation Level: Oriented X4  Information Given By: Patient  Who is responsible for making decisions for patient? : Patient    Elopement Risk  Legal Hold: No  Ambulatory or Self Mobile in Wheelchair: Yes  Disoriented: No  Psychiatric Symptoms: None  History of Wandering: No  Elopement this Admit: No  Vocalizing Wanting to Leave: No  Displays Behaviors, Body Language Wanting to Leave: No-Not at Risk for Elopement  Elopement Risk: Not at Risk for Elopement    Interdisciplinary Discharge Planning  Lives with - Patient's Self Care Capacity: Alone and Able to Care For Self  Patient or legal guardian wants to designate a caregiver: No  Support Systems: Family Member(s)  Housing / Facility: Unable To Determine At This Time  Durable Medical Equipment: Not Applicable    Discharge Preparedness  What is your plan after discharge?: Home with help  What are your discharge supports?: Child  Prior Functional Level: Ambulatory, Uses Cane, Uses Walker, Independent with  Activities of Daily Living, Independent with Medication Management  Difficulity with ADLs: Walking  Difficulity with IADLs: Driving    Functional Assesment  Prior Functional Level: Ambulatory, Uses Cane, Uses Walker, Independent with Activities of Daily Living, Independent with Medication Management    Finances  Financial Barriers to Discharge: No  Prescription Coverage: Yes    Vision / Hearing Impairment  Vision Impairment : No  Hearing Impairment : Yes  Hearing Impairment: Both Ears, Hearing Device Not Available    Advance Directive  Advance Directive?: None  Advance Directive offered?: AD Booklet refused    Domestic Abuse  Have you ever been the victim of abuse or violence?: No  Physical Abuse or Sexual Abuse: No  Verbal Abuse or Emotional Abuse: No  Possible Abuse/Neglect Reported to:: Not Applicable    Psychological Assessment  History of Substance Abuse: None  History of Psychiatric Problems: No  Non-compliant with Treatment: No  Newly Diagnosed Illness: No    Discharge Risks or Barriers  Discharge risks or barriers?: Complex medical needs, Lives alone, no community support  Patient risk factors: Complex medical needs, Lives alone and no community support, Vulnerable adult    Anticipated Discharge Information  Discharge Disposition: Discharged to home/self care (01)  Discharge Address: [address] [place] [address] Jimmy SHARIF [id number]

## 2023-07-03 NOTE — CARE PLAN
Problem: Knowledge Deficit - Standard  Goal: Patient and family/care givers will demonstrate understanding of plan of care, disease process/condition, diagnostic tests and medications  Outcome: Progressing     Problem: Pain - Standard  Goal: Alleviation of pain or a reduction in pain to the patient’s comfort goal  Outcome: Progressing   The patient is Stable - Low risk of patient condition declining or worsening    Shift Goals  Clinical Goals: pain control, maintain RUE restrictions  Patient Goals: rest    Progress made toward(s) clinical / shift goals:  pts pain managed with prn pain medication.  POC discussed with pt, pt verbalized understanding of plan.

## 2023-07-03 NOTE — PROGRESS NOTES
[facility] at [facility]. Discharge RN to provide discharge education, follow-up information. Patient discharging home with Prevena vac and educated on his ff-up appointment.  Patient verbalized understanding on going home with the Prevena vac. PIV line removed. Patient transporting via wheelchair. Belongings with patient. Meds to beds to be given by Discharge RN. CM coordinated Uber ride as transportation to [facility].

## 2023-07-03 NOTE — CARE PLAN
The patient is Stable - Low risk of patient condition declining or worsening    Shift Goals  Clinical Goals: ambulation, pain control  Patient Goals: rest    Progress made toward(s) clinical / shift goals:  PRN mediations provided relief for pain control.      Problem: Pain - Standard  Goal: Alleviation of pain or a reduction in pain to the patient’s comfort goal  Outcome: Progressing       Patient is not progressing towards the following goals:  Patient noncompliant with keeping RUE elevated and straightened and refuses ambulation. Education provided.

## 2023-07-03 NOTE — PROGRESS NOTES
Bedside report received.  Assessment complete.  A&O x 4. Patient calls appropriately.  Patient ambulates with standby assist. Patient declines pain at this time. Pain managed with prescribed medications.  Denies N&V. Tolerating cardiac diet.  Prevena wound vac on right wrist.  + void, + flatus, Last  BM was PTA.    Patient denies SOB.  Patient noncompliant in keeping RUE elevated and straightened, educated provided.      Review plan with of care with patient. Call light and personal belongings within reach. Hourly rounding in place. All needs met at this time.

## 2023-07-03 NOTE — DISCHARGE INSTRUCTIONS
Vascular surgeon okay to resume aspirin now, resume Eliquis and 1 week on 7/9/2021   10 days course of oral antibiotic Augmentin.  Follow-up in clinic 7/6 for Prevena removalIncision Care, Adult  An incision is a surgical cut that is made through your skin. Most incisions are closed after a surgical procedure. Your incision may be closed with stitches (sutures), staples, skin glue, or adhesive strips. You may need to return to your health care provider to have sutures or staples removed. This may occur several days or several weeks after your surgery. Until then, the incision needs to be cared for properly to prevent infection. Follow instructions from your health care provider about how to care for your incision.  Supplies needed:  Soap, water, and a clean hand towel.  Wound cleanser.  A clean bandage (dressing), if needed.  Cream or ointment, if told by your health care provider.  Clean gauze.  How to care for your incision  Cleaning the incision  Ask your health care provider how to clean the incision. This may include:  Wearing medical gloves.  Using mild soap and water, or wound cleanser.  Using a clean gauze to pat the incision dry after cleaning it.  Dressing changes  Wash your hands with soap and water for at least 20 seconds before and after you change the dressing. If soap and water are not available, use hand .  Do not use disinfectants or antiseptics, such as rubbing alcohol, to clean open wounds unless told by your health care provider.  Change your dressing as told by your health care provider.  Leave sutures, staples, skin glue, or adhesive strips in place. These skin closures may need to stay in place for 2 weeks or longer. If adhesive strip edges start to loosen and curl up, you may trim the loose edges. Do not remove adhesive strips completely unless your health care provider tells you to do that.  Apply cream or ointment. Do this only as told by your health care provider.  Cover the  incision with a clean dressing. Ask your health care provider when you can start to leave the incision uncovered.  Checking for infection  Check your incision area every day for signs of infection. Check for:  More redness, swelling, or pain.  More fluid or blood.  New warmth, hardness, or a rash that develops along the incision.  Pus or a bad smell.    Follow these instructions at home  Medicines  Take over-the-counter and prescription medicines only as told by your health care provider.  If you were prescribed an antibiotic medicine, cream, or ointment, take or apply it as told by your health care provider. Do not stop using the antibiotic even if your condition improves.  Eating and drinking  Eat a diet that includes protein, vitamin A, vitamin C, and other nutrient-rich foods to help the wound heal.  Foods rich in protein include meat, fish, eggs, dairy, beans, nuts, and protein supplement drinks.  Foods rich in vitamin A include carrots and dark green, leafy vegetables.  Foods rich in vitamin C include citrus fruits, tomatoes, broccoli, and peppers.  Drink enough fluid to keep your urine pale yellow.  General instructions    Do not take baths, swim, use a hot tub, or do anything that would put the incision underwater until your health care provider approves. Ask your health care provider if you may take showers. You may only be allowed to take sponge baths.  Limit movement around your incision to promote healing.  Avoid straining, lifting, or exercising for the first 2 weeks after your procedure, or for as long as told by your health care provider.  Return to your normal activities as told by your health care provider. Ask your health care provider what activities are safe for you.  Do not scratch, pick, or scrub the incision. Keep it covered as told by your health care provider.  Protect your incision from the sun when you are outside for the first 6 months, or for as long as told by your health care provider.  Cover up the scar area or apply sunscreen that has an SPF of at least 30.  Do not use any products that contain nicotine or tobacco, such as cigarettes, e-cigarettes, and chewing tobacco. These can delay incision healing. If you need help quitting, ask your health care provider.  Keep all follow-up visits. This is important.  Contact a health care provider if:  You have any of these signs of infection:  More redness, swelling, or pain around your incision.  More fluid or blood coming from your incision.  New warmth or hardness around your incision.  Pus or a bad smell coming from your incision.  A rash that develops along the incision.  You feel nauseous or you vomit.  You are dizzy.  Your sutures, staples, skin glue, or adhesive strips come undone.  Your wound gets bigger.  You have a fever.  Get help right away if:  Your incision bleeds through the dressing and the bleeding does not stop with gentle pressure.  The edges of your incision open up and separate.  These symptoms may represent a serious problem that is an emergency. Do not wait to see if the symptoms will go away. Get medical help right away. Call your local emergency services (911 in the [place] Do not drive yourself to the hospital.  Summary  Follow instructions from your health care provider about how to care for your incision.  Wash your hands with soap and water for at least 20 seconds before and after you change the dressing. If soap and water are not available, use hand .  Check your incision area every day for signs of infection.  Keep all follow-up visits. This is important.  This information is not intended to replace advice given to you by your health care provider. Make sure you discuss any questions you have with your health care provider.  Document Revised: 03/21/2022 Document Reviewed: 03/21/2022  Elsevier Patient Education © 2023 [employer]

## 2023-07-03 NOTE — PROGRESS NOTES
Report received from Myrna VILLANUEVA, assumed care at 1900  A0x4  Pt declines any SOB on 2L NC, chest pain, new onset of numbness/tingling  Pt rates pain at 0/10, on a scale of 1-10  + voiding   Pt has + flatus, + bowel sounds, BM PTA  Pt ambulates with a standby assist and cane  Pt is tolerating a cardiac diet, pt denies any nausea/vomiting  Prevena wound vac to right wrist.  Pt noncompliant with elevation of RUE and keeping straight, education provided to pt.   Plan of care discussed, all questions answered.Call light is within reach, treaded slipper socks on, bed in lowest/locked position, hourly rounding in place, all needs met at this time.

## 2023-07-03 NOTE — PROGRESS NOTES
DC instructions reviewed w/ pt, verbalized understanding. No piv on arrival to [facility]. Armband removed. Meds to bed delivered. Home via uber w/ prevena

## 2023-09-25 ENCOUNTER — HOSPITAL ENCOUNTER (OUTPATIENT)
Dept: RADIOLOGY | Facility: MEDICAL CENTER | Age: 78
End: 2023-09-25

## 2023-09-25 ENCOUNTER — HOSPITAL ENCOUNTER (INPATIENT)
Facility: MEDICAL CENTER | Age: 78
LOS: 14 days | DRG: 299 | End: 2023-10-09
Attending: INTERNAL MEDICINE | Admitting: INTERNAL MEDICINE
Payer: MEDICARE

## 2023-09-25 DIAGNOSIS — I82.622 ARM DVT (DEEP VENOUS THROMBOEMBOLISM), ACUTE, LEFT (HCC): ICD-10-CM

## 2023-09-25 DIAGNOSIS — I82.629 ACUTE DEEP VEIN THROMBOSIS (DVT) OF UPPER EXTREMITY, UNSPECIFIED LATERALITY, UNSPECIFIED VEIN (HCC): ICD-10-CM

## 2023-09-25 DIAGNOSIS — R53.81 DEBILITY: ICD-10-CM

## 2023-09-25 DIAGNOSIS — J18.9 PNEUMONIA OF LOWER LOBE DUE TO INFECTIOUS ORGANISM, UNSPECIFIED LATERALITY: ICD-10-CM

## 2023-09-25 DIAGNOSIS — J18.9 PNEUMONIA DUE TO INFECTIOUS ORGANISM, UNSPECIFIED LATERALITY, UNSPECIFIED PART OF LUNG: ICD-10-CM

## 2023-09-25 DIAGNOSIS — I21.4 NSTEMI (NON-ST ELEVATED MYOCARDIAL INFARCTION) (HCC): ICD-10-CM

## 2023-09-25 PROCEDURE — 99223 1ST HOSP IP/OBS HIGH 75: CPT | Mod: AI | Performed by: STUDENT IN AN ORGANIZED HEALTH CARE EDUCATION/TRAINING PROGRAM

## 2023-09-25 PROCEDURE — 770006 HCHG ROOM/CARE - MED/SURG/GYN SEMI*

## 2023-09-25 RX ORDER — FINASTERIDE 5 MG/1
5 TABLET, FILM COATED ORAL DAILY
Status: DISCONTINUED | OUTPATIENT
Start: 2023-09-26 | End: 2023-10-09 | Stop reason: HOSPADM

## 2023-09-25 RX ORDER — AMOXICILLIN 250 MG
2 CAPSULE ORAL 2 TIMES DAILY
Status: DISCONTINUED | OUTPATIENT
Start: 2023-09-26 | End: 2023-10-09 | Stop reason: HOSPADM

## 2023-09-25 RX ORDER — TAMSULOSIN HYDROCHLORIDE 0.4 MG/1
0.4 CAPSULE ORAL DAILY
Status: DISCONTINUED | OUTPATIENT
Start: 2023-09-26 | End: 2023-10-09 | Stop reason: HOSPADM

## 2023-09-25 RX ORDER — LOSARTAN POTASSIUM 50 MG/1
50 TABLET ORAL DAILY
Status: DISCONTINUED | OUTPATIENT
Start: 2023-09-26 | End: 2023-10-09 | Stop reason: HOSPADM

## 2023-09-25 RX ORDER — ATORVASTATIN CALCIUM 40 MG/1
80 TABLET, FILM COATED ORAL EVERY EVENING
Status: DISCONTINUED | OUTPATIENT
Start: 2023-09-26 | End: 2023-09-26

## 2023-09-25 RX ORDER — POLYETHYLENE GLYCOL 3350 17 G/17G
1 POWDER, FOR SOLUTION ORAL
Status: DISCONTINUED | OUTPATIENT
Start: 2023-09-25 | End: 2023-10-09 | Stop reason: HOSPADM

## 2023-09-25 RX ORDER — HYDROCHLOROTHIAZIDE 25 MG/1
12.5 TABLET ORAL DAILY
Status: DISCONTINUED | OUTPATIENT
Start: 2023-09-26 | End: 2023-09-26

## 2023-09-25 RX ORDER — FUROSEMIDE 40 MG/1
40 TABLET ORAL DAILY
Status: DISCONTINUED | OUTPATIENT
Start: 2023-09-26 | End: 2023-10-09 | Stop reason: HOSPADM

## 2023-09-25 RX ORDER — BISACODYL 10 MG
10 SUPPOSITORY, RECTAL RECTAL
Status: DISCONTINUED | OUTPATIENT
Start: 2023-09-25 | End: 2023-10-09 | Stop reason: HOSPADM

## 2023-09-25 ASSESSMENT — FIBROSIS 4 INDEX
FIB4 SCORE: 0.61
FIB4 SCORE: 0.61

## 2023-09-26 PROBLEM — T14.8XXA HEMATOMA: Status: RESOLVED | Noted: 2023-05-27 | Resolved: 2023-09-26

## 2023-09-26 LAB
ALBUMIN SERPL BCP-MCNC: 3.3 G/DL (ref 3.2–4.9)
ALBUMIN/GLOB SERPL: 1.3 G/DL
ALP SERPL-CCNC: 111 U/L (ref 30–99)
ALT SERPL-CCNC: 28 U/L (ref 2–50)
ANION GAP SERPL CALC-SCNC: 11 MMOL/L (ref 7–16)
APTT PPP: 118.8 SEC (ref 24.7–36)
AST SERPL-CCNC: 19 U/L (ref 12–45)
BILIRUB SERPL-MCNC: 0.6 MG/DL (ref 0.1–1.5)
BUN SERPL-MCNC: 18 MG/DL (ref 8–22)
CALCIUM ALBUM COR SERPL-MCNC: 9.1 MG/DL (ref 8.5–10.5)
CALCIUM SERPL-MCNC: 8.5 MG/DL (ref 8.5–10.5)
CHLORIDE SERPL-SCNC: 106 MMOL/L (ref 96–112)
CHOLEST SERPL-MCNC: 105 MG/DL (ref 100–199)
CO2 SERPL-SCNC: 24 MMOL/L (ref 20–33)
CREAT SERPL-MCNC: 1.11 MG/DL (ref 0.5–1.4)
ERYTHROCYTE [DISTWIDTH] IN BLOOD BY AUTOMATED COUNT: 55.5 FL (ref 35.9–50)
GFR SERPLBLD CREATININE-BSD FMLA CKD-EPI: 67 ML/MIN/1.73 M 2
GLOBULIN SER CALC-MCNC: 2.6 G/DL (ref 1.9–3.5)
GLUCOSE SERPL-MCNC: 102 MG/DL (ref 65–99)
HCT VFR BLD AUTO: 33.9 % (ref 42–52)
HDLC SERPL-MCNC: 46 MG/DL
HGB BLD-MCNC: 10.6 G/DL (ref 14–18)
INR PPP: 1.35 (ref 0.87–1.13)
LDLC SERPL CALC-MCNC: 52 MG/DL
MAGNESIUM SERPL-MCNC: 2 MG/DL (ref 1.5–2.5)
MCH RBC QN AUTO: 29.9 PG (ref 27–33)
MCHC RBC AUTO-ENTMCNC: 31.3 G/DL (ref 32.3–36.5)
MCV RBC AUTO: 95.8 FL (ref 81.4–97.8)
PHOSPHATE SERPL-MCNC: 3.3 MG/DL (ref 2.5–4.5)
PLATELET # BLD AUTO: 666 K/UL (ref 164–446)
PMV BLD AUTO: 12.2 FL (ref 9–12.9)
POTASSIUM SERPL-SCNC: 4 MMOL/L (ref 3.6–5.5)
PROT SERPL-MCNC: 5.9 G/DL (ref 6–8.2)
PROTHROMBIN TIME: 16.9 SEC (ref 12–14.6)
RBC # BLD AUTO: 3.54 M/UL (ref 4.7–6.1)
SODIUM SERPL-SCNC: 141 MMOL/L (ref 135–145)
TRIGL SERPL-MCNC: 37 MG/DL (ref 0–149)
UFH PPP CHRO-ACNC: >1.1 IU/ML
WBC # BLD AUTO: 7.5 K/UL (ref 4.8–10.8)

## 2023-09-26 PROCEDURE — 85027 COMPLETE CBC AUTOMATED: CPT

## 2023-09-26 PROCEDURE — 80061 LIPID PANEL: CPT

## 2023-09-26 PROCEDURE — 90662 IIV NO PRSV INCREASED AG IM: CPT | Performed by: INTERNAL MEDICINE

## 2023-09-26 PROCEDURE — 80053 COMPREHEN METABOLIC PANEL: CPT

## 2023-09-26 PROCEDURE — A9270 NON-COVERED ITEM OR SERVICE: HCPCS | Performed by: INTERNAL MEDICINE

## 2023-09-26 PROCEDURE — 36415 COLL VENOUS BLD VENIPUNCTURE: CPT

## 2023-09-26 PROCEDURE — 84100 ASSAY OF PHOSPHORUS: CPT

## 2023-09-26 PROCEDURE — 90471 IMMUNIZATION ADMIN: CPT

## 2023-09-26 PROCEDURE — 700111 HCHG RX REV CODE 636 W/ 250 OVERRIDE (IP): Performed by: INTERNAL MEDICINE

## 2023-09-26 PROCEDURE — 700102 HCHG RX REV CODE 250 W/ 637 OVERRIDE(OP): Performed by: STUDENT IN AN ORGANIZED HEALTH CARE EDUCATION/TRAINING PROGRAM

## 2023-09-26 PROCEDURE — 85520 HEPARIN ASSAY: CPT

## 2023-09-26 PROCEDURE — 700102 HCHG RX REV CODE 250 W/ 637 OVERRIDE(OP): Performed by: INTERNAL MEDICINE

## 2023-09-26 PROCEDURE — 85610 PROTHROMBIN TIME: CPT

## 2023-09-26 PROCEDURE — 85730 THROMBOPLASTIN TIME PARTIAL: CPT

## 2023-09-26 PROCEDURE — 99233 SBSQ HOSP IP/OBS HIGH 50: CPT | Performed by: INTERNAL MEDICINE

## 2023-09-26 PROCEDURE — A9270 NON-COVERED ITEM OR SERVICE: HCPCS | Performed by: STUDENT IN AN ORGANIZED HEALTH CARE EDUCATION/TRAINING PROGRAM

## 2023-09-26 PROCEDURE — 770006 HCHG ROOM/CARE - MED/SURG/GYN SEMI*

## 2023-09-26 PROCEDURE — 3E0234Z INTRODUCTION OF SERUM, TOXOID AND VACCINE INTO MUSCLE, PERCUTANEOUS APPROACH: ICD-10-PCS | Performed by: INTERNAL MEDICINE

## 2023-09-26 PROCEDURE — 83735 ASSAY OF MAGNESIUM: CPT

## 2023-09-26 PROCEDURE — 700111 HCHG RX REV CODE 636 W/ 250 OVERRIDE (IP): Performed by: STUDENT IN AN ORGANIZED HEALTH CARE EDUCATION/TRAINING PROGRAM

## 2023-09-26 RX ORDER — ATORVASTATIN CALCIUM 40 MG/1
40 TABLET, FILM COATED ORAL EVERY EVENING
Status: DISCONTINUED | OUTPATIENT
Start: 2023-09-26 | End: 2023-09-26

## 2023-09-26 RX ORDER — HEPARIN SODIUM 1000 [USP'U]/ML
40 INJECTION, SOLUTION INTRAVENOUS; SUBCUTANEOUS PRN
Status: DISCONTINUED | OUTPATIENT
Start: 2023-09-26 | End: 2023-09-26

## 2023-09-26 RX ORDER — HEPARIN SODIUM 1000 [USP'U]/ML
40 INJECTION, SOLUTION INTRAVENOUS; SUBCUTANEOUS PRN
Status: DISCONTINUED | OUTPATIENT
Start: 2023-09-26 | End: 2023-09-26 | Stop reason: ALTCHOICE

## 2023-09-26 RX ORDER — HEPARIN SODIUM 5000 [USP'U]/100ML
0-30 INJECTION, SOLUTION INTRAVENOUS CONTINUOUS
Status: DISCONTINUED | OUTPATIENT
Start: 2023-09-26 | End: 2023-09-26 | Stop reason: ALTCHOICE

## 2023-09-26 RX ORDER — POTASSIUM CHLORIDE 1.5 G/1.58G
40 POWDER, FOR SOLUTION ORAL 2 TIMES DAILY
COMMUNITY

## 2023-09-26 RX ORDER — ASPIRIN 81 MG/1
81 TABLET ORAL DAILY
Status: DISCONTINUED | OUTPATIENT
Start: 2023-09-26 | End: 2023-10-09 | Stop reason: HOSPADM

## 2023-09-26 RX ORDER — WARFARIN SODIUM 5 MG/1
5 TABLET ORAL DAILY
Status: DISCONTINUED | OUTPATIENT
Start: 2023-09-26 | End: 2023-09-29

## 2023-09-26 RX ORDER — HEPARIN SODIUM 1000 [USP'U]/ML
80 INJECTION, SOLUTION INTRAVENOUS; SUBCUTANEOUS ONCE
Status: DISCONTINUED | OUTPATIENT
Start: 2023-09-26 | End: 2023-09-26 | Stop reason: ALTCHOICE

## 2023-09-26 RX ORDER — HEPARIN SODIUM 5000 [USP'U]/100ML
0-30 INJECTION, SOLUTION INTRAVENOUS CONTINUOUS
Status: DISCONTINUED | OUTPATIENT
Start: 2023-09-26 | End: 2023-09-26

## 2023-09-26 RX ORDER — ENOXAPARIN SODIUM 100 MG/ML
1 INJECTION SUBCUTANEOUS EVERY 12 HOURS
Status: DISCONTINUED | OUTPATIENT
Start: 2023-09-26 | End: 2023-10-01

## 2023-09-26 RX ORDER — ATORVASTATIN CALCIUM 40 MG/1
80 TABLET, FILM COATED ORAL EVERY EVENING
Status: DISCONTINUED | OUTPATIENT
Start: 2023-09-26 | End: 2023-10-09 | Stop reason: HOSPADM

## 2023-09-26 RX ORDER — AMIODARONE HYDROCHLORIDE 200 MG/1
200 TABLET ORAL DAILY
COMMUNITY

## 2023-09-26 RX ORDER — ASPIRIN 81 MG/1
81 TABLET ORAL DAILY
COMMUNITY
End: 2023-11-21

## 2023-09-26 RX ADMIN — INFLUENZA A VIRUS A/VICTORIA/4897/2022 IVR-238 (H1N1) ANTIGEN (FORMALDEHYDE INACTIVATED), INFLUENZA A VIRUS A/DARWIN/9/2021 SAN-010 (H3N2) ANTIGEN (FORMALDEHYDE INACTIVATED), INFLUENZA B VIRUS B/PHUKET/3073/2013 ANTIGEN (FORMALDEHYDE INACTIVATED), AND INFLUENZA B VIRUS B/MICHIGAN/01/2021 ANTIGEN (FORMALDEHYDE INACTIVATED) 0.7 ML: 60; 60; 60; 60 INJECTION, SUSPENSION INTRAMUSCULAR at 05:30

## 2023-09-26 RX ADMIN — HEPARIN SODIUM 12 UNITS/KG/HR: 5000 INJECTION, SOLUTION INTRAVENOUS at 13:11

## 2023-09-26 RX ADMIN — HEPARIN SODIUM 18 UNITS/KG/HR: 5000 INJECTION, SOLUTION INTRAVENOUS at 01:14

## 2023-09-26 RX ADMIN — METOPROLOL SUCCINATE 75 MG: 25 TABLET, EXTENDED RELEASE ORAL at 05:26

## 2023-09-26 RX ADMIN — FUROSEMIDE 40 MG: 40 TABLET ORAL at 05:27

## 2023-09-26 RX ADMIN — ASPIRIN 81 MG: 81 TABLET, COATED ORAL at 05:27

## 2023-09-26 RX ADMIN — ENOXAPARIN SODIUM 100 MG: 100 INJECTION SUBCUTANEOUS at 17:17

## 2023-09-26 RX ADMIN — FINASTERIDE 5 MG: 5 TABLET, FILM COATED ORAL at 05:27

## 2023-09-26 RX ADMIN — DOCUSATE SODIUM 50 MG AND SENNOSIDES 8.6 MG 2 TABLET: 8.6; 5 TABLET, FILM COATED ORAL at 05:32

## 2023-09-26 RX ADMIN — LOSARTAN POTASSIUM 50 MG: 50 TABLET, FILM COATED ORAL at 05:27

## 2023-09-26 RX ADMIN — WARFARIN SODIUM 5 MG: 5 TABLET ORAL at 17:17

## 2023-09-26 RX ADMIN — TAMSULOSIN HYDROCHLORIDE 0.4 MG: 0.4 CAPSULE ORAL at 05:27

## 2023-09-26 RX ADMIN — METOPROLOL SUCCINATE 75 MG: 25 TABLET, EXTENDED RELEASE ORAL at 17:16

## 2023-09-26 RX ADMIN — ATORVASTATIN CALCIUM 80 MG: 40 TABLET, FILM COATED ORAL at 17:16

## 2023-09-26 RX ADMIN — DOCUSATE SODIUM 50 MG AND SENNOSIDES 8.6 MG 2 TABLET: 8.6; 5 TABLET, FILM COATED ORAL at 17:17

## 2023-09-26 ASSESSMENT — COGNITIVE AND FUNCTIONAL STATUS - GENERAL
DAILY ACTIVITIY SCORE: 24
SUGGESTED CMS G CODE MODIFIER DAILY ACTIVITY: CH
MOBILITY SCORE: 24
SUGGESTED CMS G CODE MODIFIER MOBILITY: CH

## 2023-09-26 ASSESSMENT — PAIN DESCRIPTION - PAIN TYPE
TYPE: ACUTE PAIN

## 2023-09-26 ASSESSMENT — LIFESTYLE VARIABLES
TOTAL SCORE: 0
HAVE PEOPLE ANNOYED YOU BY CRITICIZING YOUR DRINKING: NO
ALCOHOL_USE: NO
EVER FELT BAD OR GUILTY ABOUT YOUR DRINKING: NO
TOTAL SCORE: 0
DOES PATIENT WANT TO STOP DRINKING: NO
HAVE YOU EVER FELT YOU SHOULD CUT DOWN ON YOUR DRINKING: NO
SUBSTANCE_ABUSE: 0
EVER HAD A DRINK FIRST THING IN THE MORNING TO STEADY YOUR NERVES TO GET RID OF A HANGOVER: NO
ON A TYPICAL DAY WHEN YOU DRINK ALCOHOL HOW MANY DRINKS DO YOU HAVE: 0
CONSUMPTION TOTAL: NEGATIVE
AVERAGE NUMBER OF DAYS PER WEEK YOU HAVE A DRINK CONTAINING ALCOHOL: 0
HOW MANY TIMES IN THE PAST YEAR HAVE YOU HAD 5 OR MORE DRINKS IN A DAY: 0
TOTAL SCORE: 0

## 2023-09-26 ASSESSMENT — PATIENT HEALTH QUESTIONNAIRE - PHQ9
6. FEELING BAD ABOUT YOURSELF - OR THAT YOU ARE A FAILURE OR HAVE LET YOURSELF OR YOUR FAMILY DOWN: SEVERAL DAYS
7. TROUBLE CONCENTRATING ON THINGS, SUCH AS READING THE NEWSPAPER OR WATCHING TELEVISION: SEVERAL DAYS
4. FEELING TIRED OR HAVING LITTLE ENERGY: SEVERAL DAYS
9. THOUGHTS THAT YOU WOULD BE BETTER OFF DEAD, OR OF HURTING YOURSELF: SEVERAL DAYS
5. POOR APPETITE OR OVEREATING: SEVERAL DAYS
2. FEELING DOWN, DEPRESSED, IRRITABLE, OR HOPELESS: SEVERAL DAYS
8. MOVING OR SPEAKING SO SLOWLY THAT OTHER PEOPLE COULD HAVE NOTICED. OR THE OPPOSITE, BEING SO FIGETY OR RESTLESS THAT YOU HAVE BEEN MOVING AROUND A LOT MORE THAN USUAL: SEVERAL DAYS
SUM OF ALL RESPONSES TO PHQ9 QUESTIONS 1 AND 2: 2
SUM OF ALL RESPONSES TO PHQ QUESTIONS 1-9: 9
3. TROUBLE FALLING OR STAYING ASLEEP OR SLEEPING TOO MUCH: SEVERAL DAYS
1. LITTLE INTEREST OR PLEASURE IN DOING THINGS: SEVERAL DAYS

## 2023-09-26 ASSESSMENT — ENCOUNTER SYMPTOMS
SPUTUM PRODUCTION: 0
BACK PAIN: 0
MYALGIAS: 0
DEPRESSION: 0
NERVOUS/ANXIOUS: 0
STRIDOR: 0
HALLUCINATIONS: 0
NECK PAIN: 0
FEVER: 0
HEADACHES: 0
ABDOMINAL PAIN: 0
CHILLS: 0
VOMITING: 0
ORTHOPNEA: 0
PALPITATIONS: 0
NAUSEA: 0
SHORTNESS OF BREATH: 0
SINUS PAIN: 0
DIZZINESS: 0
HEMOPTYSIS: 0
DIARRHEA: 0

## 2023-09-26 NOTE — PROGRESS NOTES
4 Eyes Skin Assessment Completed by RICH Law and RICH Mustafa.    Head WDL  Ears scab under right ear near jaw line  Nose WDL  Mouth purple/discolored sores (intact)  Neck WDL  Breast/Chest WDL  Shoulder Blades WDL  Spine WDL  (R) Arm/Elbow/Hand Bruising and Abrasion  (L) Arm/Elbow/Hand Redness, Swelling, and Edema at elbow. Hematoma with raised bumps on anterior and posterior wrist.   Abdomen WDL  Groin WDL  Scrotum/Coccyx/Buttocks WDL  (R) Leg WDL  (L) Leg WDL  (R) Heel/Foot/Toe WDL  (L) Heel/Foot/Toe WDL          Devices In Places       Interventions In Place Pillows and Pressure Redistribution Mattress    Possible Skin Injury No    Pictures Uploaded Into Epic N/A  Wound Consult Placed N/A  RN Wound Prevention Protocol Ordered No

## 2023-09-26 NOTE — CARE PLAN
The patient is Stable - Low risk of patient condition declining or worsening    Shift Goals  Clinical Goals: heparin gtt, Xa draws, skin integrity, safety  Patient Goals: rest/comfort    Progress made toward(s) clinical / shift goals:  heparin gtt monitored, Xa draws performed, skin maintained, safety maintained    Patient is not progressing towards the following goals:

## 2023-09-26 NOTE — ASSESSMENT & PLAN NOTE
Mildly depressed left ventricular systolic function. The left ventricular ejection fraction is visually estimated to be 45%.Mildly dilated right ventricle. Reduced right ventricular systolic function. Mild tricuspid regurgitation. Right ventricular systolic pressure is  estimated to be 33 mmHg (normal).    Continue with metorprolol, lasix and losartan

## 2023-09-26 NOTE — PROGRESS NOTES
Received critical labs results call at this time.   PTT-118.8  Xa- >1.10  MD notified at this time.

## 2023-09-26 NOTE — PROGRESS NOTES
Hospital Medicine Daily Progress Note    Date of Service  9/26/2023    Chief Complaint  Hi Montes De Oca is a 78 y.o. male admitted 9/25/2023 with left arm swelling.    Hospital Course   78 y.o. male w/ PMH of CHF, DVT of the lower extremity, on AC, BPH, HTN, HLD, CAD with stent, PPP who presented 9/25/2023 with worsening left arm swelling. Patient sttes that he had been having worsening left arm swelling for over a month now. He had a caretaker come visit him today and told him to go to [facility] [place] by [facility]. CT imaging was done and demonstrated a clot of the left internal jugular, subclavian and axillary vein. Outside facility contacted our Vascular surgeon who stated that there is no surgical intervention to be done. They recommend continue his anticoagulation.  On chart review, patient had similar occlusions on ultrasound on May 2023.  He was started on apixaban at that time.  Patient states that he has been compliant with his medication.  However he has stated that over the last month, he has developed worsening swelling.  Denies any other symptoms at this time.    Interval Problem Update  Patient mildly tachypneic otherwise vital signs stable.  Hemoglobin stable.  INR 1.35 and heparin drip supratherapeutic so has been stopped for now.  Patient has failed outpatient apixaban as his edema and clot have worsened.  Patient reports he has never been on warfarin before.  Discussed what is involved in switching to warfarin including needing INR checks, the therapeutic window and dietary changes, patient is in agreement with this switching to warfarin.  Nutrition consulted to help with diet education.  I have placed a referral for the anticoagulation clinic as he will need outpatient follow-up.  On chart review patient has history of bleeding and at one point was taken off his Eliquis, due to his high risk of rebleeding will monitor for now while inpatient bridging. Will also change heparin ggt to Lovenox  therapeutic dosing in anticipation for home.      I have discussed this patient's plan of care and discharge plan at IDT rounds today with Case Management, Nursing, Nursing leadership, and other members of the IDT team.    Consultants/Specialty  N/A    Code Status  Full Code    Disposition  The patient is not medically cleared for discharge to home or a post-acute facility.  Anticipate discharge to: home with close outpatient follow-up    I have placed the appropriate orders for post-discharge needs.    Review of Systems  Review of Systems   Constitutional:  Negative for chills and fever.   Respiratory:  Negative for shortness of breath.    Cardiovascular:  Negative for chest pain.   Gastrointestinal:  Negative for abdominal pain, nausea and vomiting.   Genitourinary:  Negative for dysuria.   Musculoskeletal:  Negative for myalgias.        Left arm swelling    Skin:  Negative for rash.   Neurological:  Negative for dizziness and headaches.   Psychiatric/Behavioral:  Negative for depression.    All other systems reviewed and are negative.       Physical Exam  Temp:  [36.4 °C (97.5 °F)-36.6 °C (97.8 °F)] 36.5 °C (97.7 °F)  Pulse:  [63-72] 63  Resp:  [18-22] 18  BP: (116-128)/(60-75) 116/60  SpO2:  [92 %-95 %] 95 %    Physical Exam  Vitals and nursing note reviewed.   Constitutional:       General: He is not in acute distress.  HENT:      Head: Normocephalic and atraumatic.      Ears:      Comments: Hard of hearing   Eyes:      Conjunctiva/sclera: Conjunctivae normal.   Cardiovascular:      Rate and Rhythm: Normal rate and regular rhythm.      Pulses: Normal pulses.   Pulmonary:      Effort: Pulmonary effort is normal. No respiratory distress.      Breath sounds: Normal breath sounds. No wheezing.   Abdominal:      General: Abdomen is flat. There is no distension.      Palpations: Abdomen is soft.      Tenderness: There is no abdominal tenderness.   Musculoskeletal:         General: Swelling (right arm) present. No  tenderness. Normal range of motion.      Cervical back: Neck supple.      Right lower leg: No edema.      Left lower leg: No edema.   Skin:     General: Skin is warm and dry.      Findings: No rash.   Neurological:      General: No focal deficit present.      Mental Status: He is alert and oriented to person, place, and time.      Cranial Nerves: No cranial nerve deficit.   Psychiatric:         Mood and Affect: Mood normal.         Behavior: Behavior normal.         Fluids    Intake/Output Summary (Last 24 hours) at 9/26/2023 1638  Last data filed at 9/26/2023 1615  Gross per 24 hour   Intake 330 ml   Output --   Net 330 ml       Laboratory  Recent Labs     09/26/23  0024   WBC 7.5   RBC 3.54*   HEMOGLOBIN 10.6*   HEMATOCRIT 33.9*   MCV 95.8   MCH 29.9   MCHC 31.3*   RDW 55.5*   PLATELETCT 666*   MPV 12.2     Recent Labs     09/26/23  0024   SODIUM 141   POTASSIUM 4.0   CHLORIDE 106   CO2 24   GLUCOSE 102*   BUN 18   CREATININE 1.11   CALCIUM 8.5     Recent Labs     09/26/23  1106   APTT 118.8*   INR 1.35*         Recent Labs     09/26/23  0024   TRIGLYCERIDE 37   HDL 46   LDL 52       Imaging  No orders to display        Assessment/Plan  * Arm DVT (deep venous thromboembolism), acute, left (HCC)- (present on admission)  Assessment & Plan  CT imaging shows occlusion of the internal jugular vein, subclavian vein and axillary vein    Patient had an ultrasound done in the past.  May 2023, ultrasound showed: Evidence of acute to subacute occlusive deep venous thrombosis in the    jugular vein, subclavian vein, axillary vein and brachial veins from the  proximal to distal bicep.  Evidence of acute to subacute occlusive superficial venous thrombosis in  the basilic vein from the proximal to distal bicep.  Evidence of acute to subacute occlusive superficial venous thrombosis in    the cephalic vein from the proximal bicep to the elbow and    Vascular surgery aware - no surgical intervention  Patient currently on heparin  drip, change to lovenox therapeutic bridge dosing when clear by pharmacy   Patient has failed apixaban therapy, will change to warfarin  Daily INR  Dietary consulted for nutrition counseling  Referral placed for outpatient anticoagulation clinic  High risk of bleed, will monitor while bridging on warfarin, if patient's hemoglobin stays stable can consider discharging on Lovenox bridging    Chronic systolic congestive heart failure (HCC)- (present on admission)  Assessment & Plan  Mildly depressed left ventricular systolic function. The left ventricular ejection fraction is visually estimated to be 45%.Mildly dilated right ventricle. Reduced right ventricular systolic function. Mild tricuspid regurgitation. Right ventricular systolic pressure is  estimated to be 33 mmHg (normal).    Continue with metorprolol, lasix and losartan    AF (atrial fibrillation) (HCC)- (present on admission)  Assessment & Plan  Continue metoprolol  On heparin drip  Start warfarin     NSTEMI (non-ST elevated myocardial infarction), h/o Afib not on anticoag, leukocytosis (HCC)- (present on admission)  Assessment & Plan  5/2023: acute inferior MI late presentation, occluded RCA with collaterals  No interventions done  Continue aspirin and statin    Essential hypertension, benign- (present on admission)  Assessment & Plan  Continue with metoprolol, losartan, furosemide    Coronary artery disease due to lipid rich plaque- (present on admission)  Assessment & Plan  Continue with aspirin, atorvastatin  Lipid panel with low LDL below goal          VTE prophylaxis: warfarin, therapeutic lovenox     I have performed a physical exam and reviewed and updated ROS and Plan today (9/26/2023). In review of yesterday's note (9/25/2023), there are no changes except as documented above.

## 2023-09-26 NOTE — PROGRESS NOTES
[facility] DIRECT ADMISSION REPORT  Transferring facility: [facility]  Transferring physician: Anika    Chief complaint: Left leg swelling  Pertinent history & patient course: 78-year-old male with prior history of left arm DVT in the right leg DVT, CAD/NSTEMI, EF 45%, A-fib, on Eliquis,  Was brought to outside hospital ER after welfare check by .  Patient has swelling of the left arm for 2 months.  Ultrasound showed extensive DVT, involving left jugular vein, subclavian vein.  Patient may have been missing Eliquis.  Transferring physician spoke to Dr. Rondon/vascular surgery, who advised that there is no role for any vascular procedures or consult, recommended to resume anticoagulation.  Outside facility transferring physician started this patient on IV heparin and still wants to transfer patient for higher level of care, as he believes the patient is at risk for decompensation secondary to extensive DVT.    Pertinent imaging & lab results:   Consultants called prior to transfer and pertinent input from consultants:   Code Status:  per transferring provider, I personally verified with the transferring provider patient's code status and the transferring provider has confirmed this with the patient.  Reason for Transfer: Extensive DVT with risk of decompensation, for higher level of care  Further work up or recommendations requested prior to transfer:     Patient accepted for transfer: Yes  Accepting [facility] Facility: [facility] - Nursing to notify the Triage Coordinator in the RTOC via Voalte or Phone ext. [phone] when patient arrives to the unit. The Triage Coordinator will assign the admitting provider.    Consultants to be called upon arrival:   Admission status: Inpatient.   Floor requested: med  If ICU transfer, name of intensivist case discussed with and pertinent input from critical care: n/a    The admitting provider is the point of contact for questions or concerns  regarding patient's care.

## 2023-09-26 NOTE — CARE PLAN
The patient is Stable - Low risk of patient condition declining or worsening    Shift Goals  Clinical Goals: Start Heparin drip  Patient Goals: talk to MD    Progress made toward(s) clinical / shift goals:  Heparin infusion started per MAR.      Problem: Knowledge Deficit - Standard  Goal: Patient and family/care givers will demonstrate understanding of plan of care, disease process/condition, diagnostic tests and medications  Outcome: Progressing     Problem: Provide Safe Environment  Goal: Suicide environmental safety, protocols, policies, and practices will be implemented  Outcome: Progressing       Patient is not progressing towards the following goals:

## 2023-09-26 NOTE — PROGRESS NOTES
Harrison from Lab called with critical result of anti-Xa >1.10 at 0300. Critical lab result read back to Harrison.   Dr. Freya Whelan notified of critical lab result at 0316.  Critical lab result read back by Dr. Freya Whelan.    This result was from 2352 as orders were placed by admitting MD.

## 2023-09-26 NOTE — PROGRESS NOTES
Inpatient Anticoagulation Service Note for 9/26/2023    Reason for Anticoagulation: Deep Vein Thrombosis     Hemoglobin Value: (!) 10.6  Hematocrit Value: (!) 33.9  Lab Platelet Value: (!) 666  Target INR: 2.0 to 3.0    INR from last 7 days       Date/Time INR Value    09/26/23 1106 1.35          Dose from last 7 days       Date/Time Dose (mg)    09/26/23 1259 5          Average Dose (mg):  (New Start)  Significant Interactions: Antiplatelet Medications  Bridge Therapy: Yes  Date of Last VTE Event: March 2023  Bridge Therapy Start Date: 09/26/23  Days of Overlap Therapy: 0   INR Value Greater than 2 Prior to Discontinuation of Parenteral Anticoagulation: Not Applicable   Reversal Agent Administered: Not Applicable    Assessment and Plan: Patient with complex DVT history who was previously on apixaban PTA. Patient has a history of DVT in his L upper extremity that has been evidenced by imaging as far back as May 2023. He has been experiencing worsening redness and swelling secondary to this DVT. At an outside facility imaging showed expanding occusion of his L upper extremity and vascular at the outside facility deemed it non-operable. Discussion with Dr. Figueroa today about resuming his previous anticoagulation with apixaban vs starting warfarin and bridging from the heparin gtt. Will initiate warfarin at this time at 5 mg nightly. Will get daily INRs and adjust as appropriate for a goal INR of 2-3.     Education Material Provided?: No (Will need at discharge.)    Pharmacist suggested discharge dosing: TBD once patient is liberated from heparin gtt. Potential for warfarin 5 mg daily and INR within 48-72 hours of discharge.      Mari Galloway, PharmD

## 2023-09-26 NOTE — ASSESSMENT & PLAN NOTE
5/2023: acute inferior MI late presentation, occluded RCA with collaterals  No interventions done  Continue aspirin and statin

## 2023-09-26 NOTE — H&P
Hospital Medicine History & Physical Note    Date of Service  9/26/2023    Primary Care Physician  Mario Lindquist M.D.        Code Status  Full Code    Chief Complaint  No chief complaint on file.      History of Presenting Illness  Hi Montes De Oca is a 78 y.o. male w/ PMH of CHF, DVT of the lower extremity, on AC, BPH, HTN, HLD, CAD with stent, PPP who presented 9/25/2023 with worsening left arm swelling. Patient sttes that he had been having worsening left arm swelling for over a month now. He had a caretaker come visit him today and told him to go to [facility]. CT imaging was done and demonstrated a clot of the left internal jugular, subclavian and axillary vein. Outside facility contacted our Vascular surgeons who stated that there is no surgical intervention to be done. They recommend continue his current anticoagulation.  On chart review, patient had similar occlusions on ultrasound on May 2023.  He was started on apixaban at that time.  Patient states that he has been compliant with his medication.  However he has stated that over the last month, he has developed worsening swelling.  Denies any other symptoms at this time.    I discussed the plan of care with patient.    Review of Systems  Review of Systems   Constitutional:  Negative for chills and fever.   HENT:  Negative for congestion, ear discharge, ear pain, nosebleeds and sinus pain.    Respiratory:  Negative for hemoptysis, sputum production, shortness of breath and stridor.    Cardiovascular:  Negative for chest pain, palpitations and orthopnea.   Gastrointestinal:  Negative for abdominal pain, diarrhea, nausea and vomiting.   Musculoskeletal:  Negative for back pain and neck pain.   Neurological:  Negative for dizziness and headaches.   Psychiatric/Behavioral:  Negative for hallucinations, substance abuse and suicidal ideas. The patient is not nervous/anxious.        Past Medical History   has a past medical history of  Arthritis, CATARACT (2006), Coronary artery disease due to lipid rich plaque, Heart murmur (1974), Hypertension, Myocardial infarct (HCC) (7/11/2012), Pain, Renal disorder, Unspecified urinary incontinence, and Urinary bladder disorder.    Surgical History   has a past surgical history that includes pr lap,cholecystectomy (2001); arthroscopy, knee; tonsillectomy; appendectomy (1958); vasectomy (1984); other orthopedic surgery (3/1/2011); knee arthroplasty total (3/1/2011); recovery (12/21/2012); and vein ligation (Right, 7/1/2023).     Family History  family history includes Heart Attack in his father; Heart Failure in his mother.   Family history reviewed with patient. There is no family history that is pertinent to the chief complaint.     Social History   reports that he has never smoked. His smokeless tobacco use includes chew. He reports that he does not drink alcohol and does not use drugs.    Allergies  No Known Allergies    Medications  Prior to Admission Medications   Prescriptions Last Dose Informant Patient Reported? Taking?   Cholecalciferol (VITAMIN D3) 50 MCG (2000 UT) Tab  Historical Yes No   Sig: Take 2,000 Units by mouth every day.   apixaban (ELIQUIS) 5mg Tab   No No   Sig: Take 1 Tablet by mouth 2 times a day. Indications: DVT/PE   ascorbic acid (VITAMIN C) 500 MG tablet  Historical Yes No   Sig: Take 500 mg by mouth every day.   atorvastatin (LIPITOR) 80 MG tablet  Historical No No   Sig: Take 1 Tab by mouth every evening.   clotrimazole (MYCELEX) 10 MG Tye tye  Historical Yes No   Sig: Take 10 mg by mouth every day.   ferrous sulfate 325 (65 Fe) MG tablet  Historical Yes No   Sig: Take 325 mg by mouth every day.   finasteride (PROSCAR) 5 MG TABS  Historical Yes No   Sig: Take 5 mg by mouth every day.   furosemide (LASIX) 40 MG Tab  Historical No No   Sig: Take 1 Tablet by mouth every day.   hydroCHLOROthiazide (HYDRODIURIL) 12.5 MG tablet  Historical Yes No   Sig: Take 12.5 mg by mouth  every day.   losartan (COZAAR) 50 MG Tab  Historical No No   Sig: Take 1 Tablet by mouth every day.   metoprolol SR (TOPROL XL) 25 MG TABLET SR 24 HR  Historical No No   Sig: Take 3 Tablets by mouth 2 times a day.   nitroglycerin (NITROSTAT) 0.4 MG SUBL  Historical Yes No   Sig: Place 0.4 mg under tongue every 5 minutes as needed.     tamsulosin (FLOMAX) 0.4 MG capsule  Historical Yes No   Sig: Take 0.4 mg by mouth every day.      Facility-Administered Medications: None       Physical Exam                             Physical Exam  Constitutional:       General: He is not in acute distress.     Appearance: Normal appearance. He is normal weight. He is not ill-appearing, toxic-appearing or diaphoretic.   HENT:      Head: Normocephalic and atraumatic.      Mouth/Throat:      Mouth: Mucous membranes are moist.   Eyes:      Pupils: Pupils are equal, round, and reactive to light.   Cardiovascular:      Rate and Rhythm: Normal rate and regular rhythm.      Pulses: Normal pulses.      Heart sounds: Normal heart sounds. No murmur heard.     No friction rub. No gallop.   Pulmonary:      Effort: Pulmonary effort is normal. No respiratory distress.      Breath sounds: Normal breath sounds. No stridor. No wheezing, rhonchi or rales.   Chest:      Chest wall: No tenderness.   Abdominal:      General: There is no distension.      Palpations: There is no mass.      Tenderness: There is no abdominal tenderness. There is no right CVA tenderness, left CVA tenderness, guarding or rebound.      Hernia: No hernia is present.   Musculoskeletal:         General: Swelling present. No tenderness, deformity or signs of injury.      Right lower leg: No edema.      Left lower leg: No edema.      Comments: Left arm swelling    Skin:     General: Skin is warm and dry.      Capillary Refill: Capillary refill takes less than 2 seconds.      Coloration: Skin is not jaundiced or pale.      Findings: No bruising, erythema, lesion or rash.  "  Neurological:      General: No focal deficit present.      Mental Status: He is alert and oriented to person, place, and time. Mental status is at baseline.      Cranial Nerves: No cranial nerve deficit.      Sensory: No sensory deficit.      Motor: No weakness.      Coordination: Coordination normal.      Gait: Gait normal.      Deep Tendon Reflexes: Reflexes normal.   Psychiatric:         Mood and Affect: Mood normal.         Laboratory:          No results for input(s): \"ALTSGPT\", \"ASTSGOT\", \"ALKPHOSPHAT\", \"TBILIRUBIN\", \"DBILIRUBIN\", \"GAMMAGT\", \"AMYLASE\", \"LIPASE\", \"ALB\", \"PREALBUMIN\", \"GLUCOSE\" in the last 72 hours.      No results for input(s): \"NTPROBNP\" in the last 72 hours.      No results for input(s): \"TROPONINT\" in the last 72 hours.    Imaging:  No orders to display       X-Ray:  I have personally reviewed the images and compared with prior images.  EKG:  I have personally reviewed the images and compared with prior images.    Assessment/Plan:  Justification for Admission Status  I anticipate this patient is appropriate for observation status at this time because management of axillary, subclavian vein occlusion    Patient will need a Med/Surg bed on MEDICAL service .  The need is secondary to management of internal jugular, axillary and subclavian occlusion.    * Arm DVT (deep venous thromboembolism), acute, left (HCC)- (present on admission)  Assessment & Plan  CT imaging shows occlusion of the internal jugular vein, subclavian vein and axillary vein    Patient had an ultrasound done in the past.  May 2023, ultrasound showed: Evidence of acute to subacute occlusive deep venous thrombosis in the    jugular vein, subclavian vein, axillary vein and brachial veins from the  proximal to distal bicep.  Evidence of acute to subacute occlusive superficial venous thrombosis in  the basilic vein from the proximal to distal bicep.  Evidence of acute to subacute occlusive superficial venous thrombosis in    the " cephalic vein from the proximal bicep to the elbow and    Vascular surgery aware - no surgical intervention  Patient currently on heparin drip  Patient was previously on apixaban.  He states that he has been taking it appropriately.   Resume DOAC when appropriate     Chronic systolic congestive heart failure (HCC)- (present on admission)  Assessment & Plan  Mildly depressed left ventricular systolic function. The left ventricular ejection fraction is visually estimated to be 45%.Mildly dilated right ventricle. Reduced right ventricular systolic function. Mild tricuspid regurgitation. Right ventricular systolic pressure is  estimated to be 33 mmHg (normal).    Continue with metorprolol, lasix and losartan    AF (atrial fibrillation) ([facility])- (present on admission)  Assessment & Plan  Continue metoprolol  Holding apixiban  On heparin drip    NSTEMI (non-ST elevated myocardial infarction), h/o Afib not on anticoag, leukocytosis ([facility])- (present on admission)  Assessment & Plan  5/2023: acute inferior MI late presentation, occluded RCA with collaterals  No interventions done  Continue aspirin and statin    Essential hypertension, benign- (present on admission)  Assessment & Plan  Continue with metoprolol, losartan, furosemide    Coronary artery disease due to lipid rich plaque- (present on admission)  Assessment & Plan  Continue with aspirin and statin  Follow-up lipid panel        VTE prophylaxis: therapeutic anticoagulation with heparin drip

## 2023-09-26 NOTE — ASSESSMENT & PLAN NOTE
CT imaging shows occlusion of the internal jugular vein, subclavian vein and axillary vein    Patient had an ultrasound done in the past.  May 2023, ultrasound showed: Evidence of acute to subacute occlusive deep venous thrombosis in the    jugular vein, subclavian vein, axillary vein and brachial veins from the  proximal to distal bicep.  Evidence of acute to subacute occlusive superficial venous thrombosis in  the basilic vein from the proximal to distal bicep.  Evidence of acute to subacute occlusive superficial venous thrombosis in    the cephalic vein from the proximal bicep to the elbow and    Vascular surgery aware - no surgical intervention  Continue lovenox   Patient has failed apixaban therapy, will change to warfarin  -monitor very closely for bleeding, hx of gross hematuria previous on blood thinners   Daily INR, H&H  Dietary consulted for nutrition counseling  Referral placed for outpatient anticoagulation clinic  High risk of bleed, will monitor while bridging on warfarin    10/2 Patient medically clear for discharge on warfarin 5 mg with Lovenox bridge, needs INR check appointment prior to discharge    10/3 generalized weakness, patient lives alone  Patient's son 2 hours away  Per staff, unable to ambulate  PT OT evaluation pending, will need placement assistance  Patient aware  Concerned about finances,  to assist  Outpatient Coumadin follow-up    10/4 skilled placement pending, patient wants discharge to [place], has minimal support,  to follow-up with patient's son  PT recommending skilled placement, quant gold pending  Palliative care consult, for goals of care    10/5 skilled placement pending,  to follow-up with son regarding discharge plan  May need to discharge to skilled facility in [place]  Quant gold negative  Discussed with palliative care, at this point, patient appears to understand his medical condition, and aware that he cannot return home  alone  Patient agitated, but however would like conservative management  Palliative care to follow-up, remains full code, to discuss CODE STATUS given comorbidities  Should patient's condition decline, patient will have poor outcome if he requires CPR given multiple core morbidities including anticoagulation use  Consideration of futility of care and transition to DNR, we will follow-up with palliative care assistance    10/6 continue Coumadin, patient now agreeable for medical management  Agreeable for skilled placement, will follow-up with local skilled facilities  Not medically cleared  Palliative care following  Now DNR  Encourage activity, per staff, now able to ambulate with standby assist, continue to monitor    10/8 stable, plan for transfer to snf in am, pt with improved activity tolerance  Wants to go home, to reeval if pt independent, updates with pt's son

## 2023-09-26 NOTE — PROGRESS NOTES
Rec'd pt via gurney, escorted by EMT staff. Assumed pt care. Ambulated pt from hallway to bed, SBA, steady gait with use of cane. Weighed and measured pt height on scale. Admit profile completed. Oriented pt to S533-1 including call light use, smoking policy, visiting hours. Pt verbalized understanding. BLE dependent edema noted. LUE pink/red near elbow, edematous. Bed in lowest position, bed locked, treaded socks in place, RN and CNA numbers provided, call light within reach.

## 2023-09-27 LAB
ANION GAP SERPL CALC-SCNC: 9 MMOL/L (ref 7–16)
BUN SERPL-MCNC: 17 MG/DL (ref 8–22)
CALCIUM SERPL-MCNC: 7.9 MG/DL (ref 8.5–10.5)
CHLORIDE SERPL-SCNC: 106 MMOL/L (ref 96–112)
CO2 SERPL-SCNC: 24 MMOL/L (ref 20–33)
CREAT SERPL-MCNC: 1 MG/DL (ref 0.5–1.4)
ERYTHROCYTE [DISTWIDTH] IN BLOOD BY AUTOMATED COUNT: 55.4 FL (ref 35.9–50)
GFR SERPLBLD CREATININE-BSD FMLA CKD-EPI: 76 ML/MIN/1.73 M 2
GLUCOSE SERPL-MCNC: 107 MG/DL (ref 65–99)
HCT VFR BLD AUTO: 31.2 % (ref 42–52)
HGB BLD-MCNC: 9.7 G/DL (ref 14–18)
INR PPP: 1.33 (ref 0.87–1.13)
MCH RBC QN AUTO: 29.8 PG (ref 27–33)
MCHC RBC AUTO-ENTMCNC: 31.1 G/DL (ref 32.3–36.5)
MCV RBC AUTO: 95.7 FL (ref 81.4–97.8)
PLATELET # BLD AUTO: 536 K/UL (ref 164–446)
PMV BLD AUTO: 12.3 FL (ref 9–12.9)
POTASSIUM SERPL-SCNC: 3.6 MMOL/L (ref 3.6–5.5)
PROTHROMBIN TIME: 16.7 SEC (ref 12–14.6)
RBC # BLD AUTO: 3.26 M/UL (ref 4.7–6.1)
SODIUM SERPL-SCNC: 139 MMOL/L (ref 135–145)
WBC # BLD AUTO: 7.5 K/UL (ref 4.8–10.8)

## 2023-09-27 PROCEDURE — A9270 NON-COVERED ITEM OR SERVICE: HCPCS | Performed by: STUDENT IN AN ORGANIZED HEALTH CARE EDUCATION/TRAINING PROGRAM

## 2023-09-27 PROCEDURE — 700102 HCHG RX REV CODE 250 W/ 637 OVERRIDE(OP): Performed by: INTERNAL MEDICINE

## 2023-09-27 PROCEDURE — 85027 COMPLETE CBC AUTOMATED: CPT

## 2023-09-27 PROCEDURE — 36415 COLL VENOUS BLD VENIPUNCTURE: CPT

## 2023-09-27 PROCEDURE — 99233 SBSQ HOSP IP/OBS HIGH 50: CPT | Performed by: INTERNAL MEDICINE

## 2023-09-27 PROCEDURE — 700111 HCHG RX REV CODE 636 W/ 250 OVERRIDE (IP): Performed by: INTERNAL MEDICINE

## 2023-09-27 PROCEDURE — 700102 HCHG RX REV CODE 250 W/ 637 OVERRIDE(OP): Performed by: STUDENT IN AN ORGANIZED HEALTH CARE EDUCATION/TRAINING PROGRAM

## 2023-09-27 PROCEDURE — 80048 BASIC METABOLIC PNL TOTAL CA: CPT

## 2023-09-27 PROCEDURE — A9270 NON-COVERED ITEM OR SERVICE: HCPCS | Performed by: INTERNAL MEDICINE

## 2023-09-27 PROCEDURE — 770006 HCHG ROOM/CARE - MED/SURG/GYN SEMI*

## 2023-09-27 PROCEDURE — 85610 PROTHROMBIN TIME: CPT

## 2023-09-27 RX ADMIN — DOCUSATE SODIUM 50 MG AND SENNOSIDES 8.6 MG 2 TABLET: 8.6; 5 TABLET, FILM COATED ORAL at 18:03

## 2023-09-27 RX ADMIN — WARFARIN SODIUM 5 MG: 5 TABLET ORAL at 18:03

## 2023-09-27 RX ADMIN — METOPROLOL SUCCINATE 75 MG: 25 TABLET, EXTENDED RELEASE ORAL at 18:02

## 2023-09-27 RX ADMIN — FUROSEMIDE 40 MG: 40 TABLET ORAL at 05:45

## 2023-09-27 RX ADMIN — ENOXAPARIN SODIUM 100 MG: 100 INJECTION SUBCUTANEOUS at 05:46

## 2023-09-27 RX ADMIN — FINASTERIDE 5 MG: 5 TABLET, FILM COATED ORAL at 05:44

## 2023-09-27 RX ADMIN — METOPROLOL SUCCINATE 75 MG: 25 TABLET, EXTENDED RELEASE ORAL at 05:45

## 2023-09-27 RX ADMIN — LOSARTAN POTASSIUM 50 MG: 50 TABLET, FILM COATED ORAL at 05:46

## 2023-09-27 RX ADMIN — ENOXAPARIN SODIUM 100 MG: 100 INJECTION SUBCUTANEOUS at 18:02

## 2023-09-27 RX ADMIN — DOCUSATE SODIUM 50 MG AND SENNOSIDES 8.6 MG 2 TABLET: 8.6; 5 TABLET, FILM COATED ORAL at 05:46

## 2023-09-27 RX ADMIN — ATORVASTATIN CALCIUM 80 MG: 40 TABLET, FILM COATED ORAL at 18:03

## 2023-09-27 RX ADMIN — TAMSULOSIN HYDROCHLORIDE 0.4 MG: 0.4 CAPSULE ORAL at 05:46

## 2023-09-27 RX ADMIN — ASPIRIN 81 MG: 81 TABLET, COATED ORAL at 05:46

## 2023-09-27 ASSESSMENT — PAIN DESCRIPTION - PAIN TYPE
TYPE: ACUTE PAIN

## 2023-09-27 ASSESSMENT — ENCOUNTER SYMPTOMS
ABDOMINAL PAIN: 0
HEADACHES: 0
DIZZINESS: 0
NAUSEA: 0
SHORTNESS OF BREATH: 0
DEPRESSION: 0
CHILLS: 0
MYALGIAS: 0
FEVER: 0
VOMITING: 0

## 2023-09-27 NOTE — PROGRESS NOTES
Inpatient Anticoagulation Service Note for 9/27/2023    Reason for Anticoagulation: Deep Vein Thrombosis     Hemoglobin Value: (!) 9.7  Hematocrit Value: (!) 31.2  Lab Platelet Value: (!) 536  Target INR: 2.0 to 3.0    INR from last 7 days       Date/Time INR Value    09/27/23 0432 1.33    09/26/23 1106 1.35          Dose from last 7 days       Date/Time Dose (mg)    09/27/23 1146 5    09/26/23 1259 5          Average Dose (mg):  (New Start)  Significant Interactions: Antiplatelet Medications  Bridge Therapy: Yes  Date of Last VTE Event:  (May 2023)  Bridge Therapy Start Date: 09/26/23  Days of Overlap Therapy: 1  INR Value Greater than 2 Prior to Discontinuation of Parenteral Anticoagulation: Not Applicable   Reversal Agent Administered: Not Applicable    Assessment/Plan: Patient with complex DVT history who was previously on apixaban PTA. Patient has a history of DVT in his L upper extremity that has been evidenced by imaging as far back as May 2023. He has been experiencing worsening redness and swelling secondary to this DVT. At an outside facility imaging showed expanding occusion of his L upper extremity and vascular at the outside facility deemed it non-operable. Given worsening occlusion while on apixaban, patient has been started on warfarin with a lovenox bridge. Will provide warfarin 5 mg tonight and continue to check daily INRs to adjust therapy as appropriate.    Education Material Provided?: No (Will need on discharge.)    Pharmacist suggested discharge dosing: TBD once patient is within therapeutic INR range. Potential for warfarin 5 mg daily and INR within 48-72 hours of discharge.      Mari Galloway, ManuelD

## 2023-09-27 NOTE — CARE PLAN
The patient is Stable - Low risk of patient condition declining or worsening    Shift Goals  Clinical Goals: monitor for bleeding, safety  Patient Goals: sleep    Progress made toward(s) clinical / shift goals:  hb 9.5, no signs of active bleeding noted. Pt assisted with self care(hygiene care in the morning, pt stable while ambulating to restroom, requested for materials to use for  shaving but educated on his high risk of bleeding due to his blood thinner and verbalized understanding    Patient is not progressing towards the following goals:

## 2023-09-27 NOTE — DIETARY
Nutrition Services: Diet Education Consult   Day 2 of admit.  Hi Montes De Oca is a 78 y.o. male with admitting DX of Arm DVT (deep venous thromboembolism), acute, left.    Consult for diet education for pt starting Warfarin. RD able to visit pt at bedside to provide diet education for Vitamin K and Warfarin. RD discussed importance of consistency in intake of foods high in Vitamin K, provided handout reinforcing topics discussed. Pt demonstrated good readiness and good evidence of learning. RD able to answer all questions to patient's satisfaction.     No other education needs identified at this time. Consider referral to outpatient nutrition services for continuation of education as indicated or per pt preferences.     Please re-consult RD as indicated.

## 2023-09-27 NOTE — PROGRESS NOTES
Hospital Medicine Daily Progress Note    Date of Service  9/27/2023    Chief Complaint  Hi Montes De Oca is a 78 y.o. male admitted 9/25/2023 with left arm swelling.    Hospital Course   78 y.o. male w/ PMH of CHF, DVT of the lower extremity, on AC, BPH, HTN, HLD, CAD with stent, PPP who presented 9/25/2023 with worsening left arm swelling. Patient sttes that he had been having worsening left arm swelling for over a month now. He had a caretaker come visit him today and told him to go to [facility]. CT imaging was done and demonstrated a clot of the left internal jugular, subclavian and axillary vein. Outside facility contacted our Vascular surgeon who stated that there is no surgical intervention to be done. They recommend continue his anticoagulation.  On chart review, patient had similar occlusions on ultrasound on May 2023.  He was started on apixaban at that time.  Patient states that he has been compliant with his medication.  However he has stated that over the last month, he has developed worsening swelling.  Denies any other symptoms at this time.    Interval Problem Update  9/26 Patient mildly tachypneic otherwise vital signs stable.  Hemoglobin stable.  INR 1.35 and heparin drip supratherapeutic so has been stopped for now.  Patient has failed outpatient apixaban as his edema and clot have worsened.  Patient reports he has never been on warfarin before.  Discussed what is involved in switching to warfarin including needing INR checks, the therapeutic window and dietary changes, patient is in agreement with this switching to warfarin.  Nutrition consulted to help with diet education.  I have placed a referral for the anticoagulation clinic as he will need outpatient follow-up.  On chart review patient has history of bleeding and at one point was taken off his Eliquis, due to his high risk of rebleeding will monitor for now while inpatient bridging. Will also change heparin ggt to  Lovenox therapeutic dosing in anticipation for home.      9/26 Vitals and hemoglobin stable. INR 1.3. Patient with mild bruising on right arm, per nursing is stable. Patient denies hematuria or melena. Right arm with worsening swelling, denies pain. Will ACE wrap or place compression stocking on left arm to help with edema. Patient asking if clot can be removed, discussed that no indication for thrombectomy still with good pulses, no pain and no skin changes. Nutrition did education with patient today. Discussed with social work, tentative plan for dc on Friday if H&H stable needs AC clinic appointment set up for Monday.     I have discussed this patient's plan of care and discharge plan at IDT rounds today with Case Management, Nursing, Nursing leadership, and other members of the IDT team.    Consultants/Specialty  N/A    Code Status  Full Code    Disposition  The patient is not medically cleared for discharge to home or a post-acute facility.  Anticipate discharge to: home with close outpatient follow-up    I have placed the appropriate orders for post-discharge needs.    Review of Systems  Review of Systems   Constitutional:  Negative for chills and fever.   Respiratory:  Negative for shortness of breath.    Cardiovascular:  Negative for chest pain.   Gastrointestinal:  Negative for abdominal pain, nausea and vomiting.   Genitourinary:  Negative for dysuria.   Musculoskeletal:  Negative for myalgias.        Left arm swelling    Skin:  Negative for rash.   Neurological:  Negative for dizziness and headaches.   Psychiatric/Behavioral:  Negative for depression.    All other systems reviewed and are negative.       Physical Exam  Temp:  [36.1 °C (97 °F)-36.4 °C (97.6 °F)] 36.1 °C (97 °F)  Pulse:  [64-72] 66  Resp:  [16-20] 20  BP: ()/(48-66) 114/66  SpO2:  [90 %-92 %] 92 %    Physical Exam  Vitals and nursing note reviewed.   Constitutional:       General: He is not in acute distress.  HENT:      Head:  Normocephalic and atraumatic.      Ears:      Comments: Hard of hearing   Eyes:      Conjunctiva/sclera: Conjunctivae normal.   Cardiovascular:      Rate and Rhythm: Normal rate and regular rhythm.      Pulses: Normal pulses.   Pulmonary:      Effort: Pulmonary effort is normal. No respiratory distress.      Breath sounds: Normal breath sounds. No wheezing.   Abdominal:      General: Abdomen is flat. There is no distension.      Palpations: Abdomen is soft.      Tenderness: There is no abdominal tenderness.   Musculoskeletal:         General: Swelling (left arm) present. No tenderness. Normal range of motion.      Cervical back: Neck supple.      Right lower leg: No edema.      Left lower leg: No edema.   Skin:     General: Skin is warm and dry.      Findings: Bruising (mild right arm) present. No rash.   Neurological:      General: No focal deficit present.      Mental Status: He is alert and oriented to person, place, and time.      Cranial Nerves: No cranial nerve deficit.   Psychiatric:         Mood and Affect: Mood normal.         Behavior: Behavior normal.         Fluids    Intake/Output Summary (Last 24 hours) at 9/27/2023 1625  Last data filed at 9/27/2023 1516  Gross per 24 hour   Intake 660 ml   Output --   Net 660 ml       Laboratory  Recent Labs     09/26/23  0024 09/27/23 0432   WBC 7.5 7.5   RBC 3.54* 3.26*   HEMOGLOBIN 10.6* 9.7*   HEMATOCRIT 33.9* 31.2*   MCV 95.8 95.7   MCH 29.9 29.8   MCHC 31.3* 31.1*   RDW 55.5* 55.4*   PLATELETCT 666* 536*   MPV 12.2 12.3     Recent Labs     09/26/23  0024 09/27/23 0432   SODIUM 141 139   POTASSIUM 4.0 3.6   CHLORIDE 106 106   CO2 24 24   GLUCOSE 102* 107*   BUN 18 17   CREATININE 1.11 1.00   CALCIUM 8.5 7.9*     Recent Labs     09/26/23  1106 09/27/23 0432   APTT 118.8*  --    INR 1.35* 1.33*         Recent Labs     09/26/23  0024   TRIGLYCERIDE 37   HDL 46   LDL 52       Imaging  No orders to display        Assessment/Plan  * Arm DVT (deep venous  thromboembolism), acute, left (HCC)- (present on admission)  Assessment & Plan  CT imaging shows occlusion of the internal jugular vein, subclavian vein and axillary vein    Patient had an ultrasound done in the past.  May 2023, ultrasound showed: Evidence of acute to subacute occlusive deep venous thrombosis in the    jugular vein, subclavian vein, axillary vein and brachial veins from the  proximal to distal bicep.  Evidence of acute to subacute occlusive superficial venous thrombosis in  the basilic vein from the proximal to distal bicep.  Evidence of acute to subacute occlusive superficial venous thrombosis in    the cephalic vein from the proximal bicep to the elbow and    Vascular surgery aware - no surgical intervention  Continue lovenox   Patient has failed apixaban therapy, will change to warfarin  -monitor very closely for bleeding, hx of gross hematuria previous on blood thinners   Daily INR, H&H  Dietary consulted for nutrition counseling  Referral placed for outpatient anticoagulation clinic  High risk of bleed, will monitor while bridging on warfarin, if patient's hemoglobin stays stable can consider discharging on Lovenox bridging    Chronic systolic congestive heart failure (HCC)- (present on admission)  Assessment & Plan  Mildly depressed left ventricular systolic function. The left ventricular ejection fraction is visually estimated to be 45%.Mildly dilated right ventricle. Reduced right ventricular systolic function. Mild tricuspid regurgitation. Right ventricular systolic pressure is  estimated to be 33 mmHg (normal).    Continue with metorprolol, lasix and losartan    AF (atrial fibrillation) (HCC)- (present on admission)  Assessment & Plan  Continue metoprolol  Continue full dose lovenox for bridge   Continue warfarin     NSTEMI (non-ST elevated myocardial infarction), h/o Afib not on anticoag, leukocytosis (HCC)- (present on admission)  Assessment & Plan  5/2023: acute inferior MI late  presentation, occluded RCA with collaterals  No interventions done  Continue aspirin and statin    Essential hypertension, benign- (present on admission)  Assessment & Plan  Continue with metoprolol, losartan, furosemide    Coronary artery disease due to lipid rich plaque- (present on admission)  Assessment & Plan  Continue with aspirin, atorvastatin  Lipid panel with low LDL below goal        Total time spent in chart review, at bedside with the patient, discussing with nursing and case management: 51 minutes    VTE prophylaxis: warfarin, therapeutic lovenox     I have performed a physical exam and reviewed and updated ROS and Plan today (9/27/2023). In review of yesterday's note (9/26/2023), there are no changes except as documented above.

## 2023-09-28 ENCOUNTER — APPOINTMENT (OUTPATIENT)
Dept: RADIOLOGY | Facility: MEDICAL CENTER | Age: 78
DRG: 299 | End: 2023-09-28
Payer: MEDICARE

## 2023-09-28 LAB
ALBUMIN SERPL BCP-MCNC: 3.3 G/DL (ref 3.2–4.9)
BUN SERPL-MCNC: 14 MG/DL (ref 8–22)
CALCIUM ALBUM COR SERPL-MCNC: 9 MG/DL (ref 8.5–10.5)
CALCIUM SERPL-MCNC: 8.4 MG/DL (ref 8.5–10.5)
CHLORIDE SERPL-SCNC: 103 MMOL/L (ref 96–112)
CO2 SERPL-SCNC: 24 MMOL/L (ref 20–33)
CREAT SERPL-MCNC: 0.91 MG/DL (ref 0.5–1.4)
ERYTHROCYTE [DISTWIDTH] IN BLOOD BY AUTOMATED COUNT: 54.9 FL (ref 35.9–50)
GFR SERPLBLD CREATININE-BSD FMLA CKD-EPI: 85 ML/MIN/1.73 M 2
GLUCOSE SERPL-MCNC: 109 MG/DL (ref 65–99)
HCT VFR BLD AUTO: 34.2 % (ref 42–52)
HGB BLD-MCNC: 10.5 G/DL (ref 14–18)
INR PPP: 1.32 (ref 0.87–1.13)
MCH RBC QN AUTO: 29.2 PG (ref 27–33)
MCHC RBC AUTO-ENTMCNC: 30.7 G/DL (ref 32.3–36.5)
MCV RBC AUTO: 95 FL (ref 81.4–97.8)
PHOSPHATE SERPL-MCNC: 3.6 MG/DL (ref 2.5–4.5)
PLATELET # BLD AUTO: 607 K/UL (ref 164–446)
PMV BLD AUTO: 12 FL (ref 9–12.9)
POTASSIUM SERPL-SCNC: 3.7 MMOL/L (ref 3.6–5.5)
PROTHROMBIN TIME: 16.5 SEC (ref 12–14.6)
RBC # BLD AUTO: 3.6 M/UL (ref 4.7–6.1)
SODIUM SERPL-SCNC: 140 MMOL/L (ref 135–145)
WBC # BLD AUTO: 7.7 K/UL (ref 4.8–10.8)

## 2023-09-28 PROCEDURE — A9270 NON-COVERED ITEM OR SERVICE: HCPCS | Performed by: INTERNAL MEDICINE

## 2023-09-28 PROCEDURE — 99232 SBSQ HOSP IP/OBS MODERATE 35: CPT | Performed by: INTERNAL MEDICINE

## 2023-09-28 PROCEDURE — 700102 HCHG RX REV CODE 250 W/ 637 OVERRIDE(OP): Performed by: INTERNAL MEDICINE

## 2023-09-28 PROCEDURE — 85610 PROTHROMBIN TIME: CPT

## 2023-09-28 PROCEDURE — 700102 HCHG RX REV CODE 250 W/ 637 OVERRIDE(OP): Performed by: STUDENT IN AN ORGANIZED HEALTH CARE EDUCATION/TRAINING PROGRAM

## 2023-09-28 PROCEDURE — 700111 HCHG RX REV CODE 636 W/ 250 OVERRIDE (IP): Performed by: INTERNAL MEDICINE

## 2023-09-28 PROCEDURE — 97165 OT EVAL LOW COMPLEX 30 MIN: CPT

## 2023-09-28 PROCEDURE — 36415 COLL VENOUS BLD VENIPUNCTURE: CPT

## 2023-09-28 PROCEDURE — 85027 COMPLETE CBC AUTOMATED: CPT

## 2023-09-28 PROCEDURE — 80069 RENAL FUNCTION PANEL: CPT

## 2023-09-28 PROCEDURE — 770006 HCHG ROOM/CARE - MED/SURG/GYN SEMI*

## 2023-09-28 PROCEDURE — 97162 PT EVAL MOD COMPLEX 30 MIN: CPT

## 2023-09-28 PROCEDURE — 02HV33Z INSERTION OF INFUSION DEVICE INTO SUPERIOR VENA CAVA, PERCUTANEOUS APPROACH: ICD-10-PCS | Performed by: INTERNAL MEDICINE

## 2023-09-28 PROCEDURE — A9270 NON-COVERED ITEM OR SERVICE: HCPCS | Performed by: STUDENT IN AN ORGANIZED HEALTH CARE EDUCATION/TRAINING PROGRAM

## 2023-09-28 RX ADMIN — FINASTERIDE 5 MG: 5 TABLET, FILM COATED ORAL at 06:17

## 2023-09-28 RX ADMIN — ASPIRIN 81 MG: 81 TABLET, COATED ORAL at 06:17

## 2023-09-28 RX ADMIN — METOPROLOL SUCCINATE 75 MG: 25 TABLET, EXTENDED RELEASE ORAL at 17:37

## 2023-09-28 RX ADMIN — DOCUSATE SODIUM 50 MG AND SENNOSIDES 8.6 MG 2 TABLET: 8.6; 5 TABLET, FILM COATED ORAL at 17:37

## 2023-09-28 RX ADMIN — TAMSULOSIN HYDROCHLORIDE 0.4 MG: 0.4 CAPSULE ORAL at 06:18

## 2023-09-28 RX ADMIN — ENOXAPARIN SODIUM 100 MG: 100 INJECTION SUBCUTANEOUS at 17:37

## 2023-09-28 RX ADMIN — WARFARIN SODIUM 5 MG: 5 TABLET ORAL at 17:37

## 2023-09-28 RX ADMIN — LOSARTAN POTASSIUM 50 MG: 50 TABLET, FILM COATED ORAL at 06:17

## 2023-09-28 RX ADMIN — METOPROLOL SUCCINATE 75 MG: 25 TABLET, EXTENDED RELEASE ORAL at 08:29

## 2023-09-28 RX ADMIN — ATORVASTATIN CALCIUM 80 MG: 40 TABLET, FILM COATED ORAL at 17:37

## 2023-09-28 RX ADMIN — FUROSEMIDE 40 MG: 40 TABLET ORAL at 06:17

## 2023-09-28 RX ADMIN — ENOXAPARIN SODIUM 100 MG: 100 INJECTION SUBCUTANEOUS at 06:17

## 2023-09-28 ASSESSMENT — ENCOUNTER SYMPTOMS
HEADACHES: 0
ABDOMINAL PAIN: 0
FEVER: 0
MYALGIAS: 0
SHORTNESS OF BREATH: 0
VOMITING: 0
DIZZINESS: 0
NAUSEA: 0
CHILLS: 0
DEPRESSION: 0

## 2023-09-28 ASSESSMENT — COGNITIVE AND FUNCTIONAL STATUS - GENERAL
STANDING UP FROM CHAIR USING ARMS: A LITTLE
CLIMB 3 TO 5 STEPS WITH RAILING: TOTAL
MOVING TO AND FROM BED TO CHAIR: A LITTLE
SUGGESTED CMS G CODE MODIFIER MOBILITY: CK
TURNING FROM BACK TO SIDE WHILE IN FLAT BAD: A LITTLE
MOBILITY SCORE: 16
SUGGESTED CMS G CODE MODIFIER DAILY ACTIVITY: CH
DAILY ACTIVITIY SCORE: 24
WALKING IN HOSPITAL ROOM: A LITTLE
MOVING FROM LYING ON BACK TO SITTING ON SIDE OF FLAT BED: A LITTLE

## 2023-09-28 ASSESSMENT — ACTIVITIES OF DAILY LIVING (ADL): TOILETING: INDEPENDENT

## 2023-09-28 ASSESSMENT — GAIT ASSESSMENTS
GAIT LEVEL OF ASSIST: SUPERVISED
ASSISTIVE DEVICE: SINGLE POINT CANE
DISTANCE (FEET): 125
DEVIATION: BRADYKINETIC;SHUFFLED GAIT;DECREASED HEEL STRIKE;DECREASED TOE OFF

## 2023-09-28 ASSESSMENT — PAIN DESCRIPTION - PAIN TYPE
TYPE: ACUTE PAIN

## 2023-09-28 NOTE — DISCHARGE PLANNING
"Case Management Discharge Planning    Admission Date: 9/25/2023  GMLOS: 3  ALOS: 3    6-Clicks ADL Score: 24  6-Clicks Mobility Score: 24      Anticipated Discharge Dispo: Discharge Disposition: Discharged to home/self care (01)  Discharge Address: [address] [place] [address]    DME Needed: No    Action(s) Taken: Updated Provider/Nurse on Discharge Plan    RNCM  met with pt at bedside to complete assessment. Pt A&Ox4 and able to verify the information on the face sheet.  Pt lives alone in a mobile home. There are 7 stairs to enter.  Prior to this hospitalization pt was independent at home with ADLs. He states that he gets winded easily, but is \"able to manage.\" Pt has a walker, cane, and crutches at home. RNCM spoke with pt about  need to establish with coumadin clinic and  services to check for INR. Choice was obtained for 1) [facility] 2) [facility]. Choice form given to DPA. Referral sent. RNCM left a vm to coumadin clinic supervisor at [facility].     Escalations Completed: None    Medically Clear: No    Next Steps: RNCM to continue to follow up with pt and medical team to address dc needs and barriers      Barriers to Discharge: None    Care Transition Team Assessment    Information Source  Orientation Level: Oriented X4  Who is responsible for making decisions for patient? : Patient         Elopement Risk  Legal Hold: No  Ambulatory or Self Mobile in Wheelchair: Yes  Disoriented: No  Psychiatric Symptoms: None  History of Wandering: No  Elopement this Admit: No  Vocalizing Wanting to Leave: No  Displays Behaviors, Body Language Wanting to Leave: No-Not at Risk for Elopement  Elopement Risk: Not at Risk for Elopement    Interdisciplinary Discharge Planning  Lives with - Patient's Self Care Capacity: Alone and Able to Care For Self  Patient or legal guardian wants to designate a caregiver: No  Support Systems: Friends / Neighbors  Housing / Facility: Mobile Home  Prior " Services:  (intermittent assistance from agency for medication management.)  Durable Medical Equipment: Not Applicable    Discharge Preparedness  Difficulity with ADLs: None  Difficulity with IADLs: None     Vision / Hearing Impairment  Vision Impairment : No  Hearing Impairment : Yes  Hearing Impairment: Both Ears, Hearing Device Not Available  Does Pt Need Special Equipment for the Hearing Impaired?: Yes-Needs for Facility to Arrange Equipment for the Hearing Impaired     Advance Directive  Advance Directive?: None    Domestic Abuse  Have you ever been the victim of abuse or violence?: No  Physical Abuse or Sexual Abuse: No  Verbal Abuse or Emotional Abuse: No  Possible Abuse/Neglect Reported to::     Psychological Assessment  History of Substance Abuse: None  History of Psychiatric Problems: No    Discharge Risks or Barriers  Patient risk factors: Complex medical needs    Anticipated Discharge Information  Discharge Disposition: Discharged to home/self care (01)  Discharge Address: [address] [address]

## 2023-09-28 NOTE — CARE PLAN
The patient is Stable - Low risk of patient condition declining or worsening    Shift Goals  Clinical Goals: monitor for bleeding, warfarin transition, rest/comfort, OOB  Patient Goals: rest/comfort/discharge    Progress made toward(s) clinical / shift goals:  no signs of bleeding, warfarin given, patient OOB today, pt comfortable in bed without meds    Patient is not progressing towards the following goals:

## 2023-09-28 NOTE — THERAPY
Occupational Therapy   Initial Evaluation     Patient Name: Hi Montes De Oca  Age:  78 y.o., Sex:  male  Medical Record #: [id number]  Today's Date: 9/28/2023          Assessment  Patient is 78 y.o. male with a diagnosis of L UE swelling, clot L internal jugular.  Pt is at or near his/her functional baseline. Pt with no further skilled OT needs in the acute care setting at this time.    Plan    Occupational Therapy Initial Treatment Plan   Duration: (P) Discharge Needs Only       Discharge Recommendations: (P) Anticipate that the patient will have no further occupational therapy needs after discharge from the hospital        09/28/23 0852   Prior Living Situation   Housing / Facility Mobile Home   Steps Into Home 4   Equipment Owned Single Point Cane   Lives with - Patient's Self Care Capacity Alone and Able to Care For Self   Prior Level of ADL Function   Self Feeding Independent   Grooming / Hygiene Independent   Bathing Independent   Dressing Independent   Toileting Independent   ADL Assessment   Grooming Supervision   Upper Body Dressing Supervision   Lower Body Dressing Standby Assist   Toileting Standby Assist   Functional Mobility   Sit to Stand Standby Assist   Bed, Chair, Wheelchair Transfer Standby Assist   Occupational Therapy Initial Treatment Plan    Duration Discharge Needs Only   Anticipated Discharge Equipment and Recommendations   Discharge Recommendations Anticipate that the patient will have no further occupational therapy needs after discharge from the hospital

## 2023-09-28 NOTE — PROGRESS NOTES
Bedside report recieved from night shift RICH Arnold. patient resting comfortably in bed, call bell in reach. no acute distress noted. patient is a/o x 4, patient offers no complaints at this time.

## 2023-09-28 NOTE — PROGRESS NOTES
Hospital Medicine Daily Progress Note    Date of Service  9/28/2023    Chief Complaint  Hi Montes De Oca is a 78 y.o. male admitted 9/25/2023 with left arm swelling.    Hospital Course   78 y.o. male w/ PMH of CHF, DVT of the lower extremity, on AC, BPH, HTN, HLD, CAD with stent, PPP who presented 9/25/2023 with worsening left arm swelling. Patient sttes that he had been having worsening left arm swelling for over a month now. He had a caretaker come visit him today and told him to go to [facility]. CT imaging was done and demonstrated a clot of the left internal jugular, subclavian and axillary vein. Outside facility contacted our Vascular surgeon who stated that there is no surgical intervention to be done. They recommend continue his anticoagulation.  On chart review, patient had similar occlusions on ultrasound on May 2023.  He was started on apixaban at that time.  Patient states that he has been compliant with his medication.  However he has stated that over the last month, he has developed worsening swelling.  Denies any other symptoms at this time.    Interval Problem Update  9/26 Patient mildly tachypneic otherwise vital signs stable.  Hemoglobin stable.  INR 1.35 and heparin drip supratherapeutic so has been stopped for now.  Patient has failed outpatient apixaban as his edema and clot have worsened.  Patient reports he has never been on warfarin before.  Discussed what is involved in switching to warfarin including needing INR checks, the therapeutic window and dietary changes, patient is in agreement with this switching to warfarin.  Nutrition consulted to help with diet education.  I have placed a referral for the anticoagulation clinic as he will need outpatient follow-up.  On chart review patient has history of bleeding and at one point was taken off his Eliquis, due to his high risk of rebleeding will monitor for now while inpatient bridging. Will also change heparin ggt to  Lovenox therapeutic dosing in anticipation for home.      9/26 Vitals and hemoglobin stable. INR 1.3. Patient with mild bruising on right arm, per nursing is stable. Patient denies hematuria or melena. Right arm with worsening swelling, denies pain. Will ACE wrap or place compression stocking on left arm to help with edema. Patient asking if clot can be removed, discussed that no indication for thrombectomy still with good pulses, no pain and no skin changes. Nutrition did education with patient today. Discussed with social work, tentative plan for dc on Friday if H&H stable needs AC clinic appointment set up for Monday.     9/27 INR 1.32, Hb 10. No signs of bleeding. Discussed with pharmacy if still no bleeding tomorrow we will plan to discharge patient on a Lovenox bridge with an anticoagulation clinic on Monday.  Dosing to be determined tomorrow after INR check.    I have discussed this patient's plan of care and discharge plan at IDT rounds today with Case Management, Nursing, Nursing leadership, and other members of the IDT team.    Consultants/Specialty  N/A    Code Status  Full Code    Disposition  The patient is not medically cleared for discharge to home or a post-acute facility.  Anticipate discharge to: home with close outpatient follow-up    I have placed the appropriate orders for post-discharge needs.    Review of Systems  Review of Systems   Constitutional:  Negative for chills and fever.   Respiratory:  Negative for shortness of breath.    Cardiovascular:  Negative for chest pain.   Gastrointestinal:  Negative for abdominal pain, nausea and vomiting.   Genitourinary:  Negative for dysuria.   Musculoskeletal:  Negative for myalgias.        Left arm swelling    Skin:  Negative for rash.   Neurological:  Negative for dizziness and headaches.   Psychiatric/Behavioral:  Negative for depression.    All other systems reviewed and are negative.       Physical Exam  Temp:  [36.2 °C (97.1 °F)-36.4 °C (97.6  °F)] 36.2 °C (97.1 °F)  Pulse:  [66-73] 70  Resp:  [15-21] 21  BP: (106-133)/(54-78) 133/78  SpO2:  [90 %-93 %] 92 %    Physical Exam  Vitals and nursing note reviewed.   Constitutional:       General: He is not in acute distress.  HENT:      Head: Normocephalic and atraumatic.      Ears:      Comments: Hard of hearing   Eyes:      Conjunctiva/sclera: Conjunctivae normal.   Cardiovascular:      Rate and Rhythm: Normal rate and regular rhythm.      Pulses: Normal pulses.   Pulmonary:      Effort: Pulmonary effort is normal. No respiratory distress.      Breath sounds: Normal breath sounds. No wheezing.   Abdominal:      General: Abdomen is flat. There is no distension.      Palpations: Abdomen is soft.      Tenderness: There is no abdominal tenderness.   Musculoskeletal:         General: Swelling (left arm) present. No tenderness. Normal range of motion.      Cervical back: Neck supple.      Right lower leg: No edema.      Left lower leg: No edema.   Skin:     General: Skin is warm and dry.      Findings: Bruising (mild right arm) present. No rash.   Neurological:      General: No focal deficit present.      Mental Status: He is alert and oriented to person, place, and time.      Cranial Nerves: No cranial nerve deficit.   Psychiatric:         Mood and Affect: Mood normal.         Behavior: Behavior normal.         Fluids    Intake/Output Summary (Last 24 hours) at 9/28/2023 1356  Last data filed at 9/28/2023 1100  Gross per 24 hour   Intake 660 ml   Output --   Net 660 ml       Laboratory  Recent Labs     09/26/23  0024 09/27/23  0432 09/28/23  0549   WBC 7.5 7.5 7.7   RBC 3.54* 3.26* 3.60*   HEMOGLOBIN 10.6* 9.7* 10.5*   HEMATOCRIT 33.9* 31.2* 34.2*   MCV 95.8 95.7 95.0   MCH 29.9 29.8 29.2   MCHC 31.3* 31.1* 30.7*   RDW 55.5* 55.4* 54.9*   PLATELETCT 666* 536* 607*   MPV 12.2 12.3 12.0     Recent Labs     09/26/23  0024 09/27/23  0432 09/28/23  0549   SODIUM 141 139 140   POTASSIUM 4.0 3.6 3.7   CHLORIDE 106 106  103   CO2 24 24 24   GLUCOSE 102* 107* 109*   BUN 18 17 14   CREATININE 1.11 1.00 0.91   CALCIUM 8.5 7.9* 8.4*     Recent Labs     09/26/23  1106 09/27/23  0432 09/28/23  0549   APTT 118.8*  --   --    INR 1.35* 1.33* 1.32*         Recent Labs     09/26/23  0024   TRIGLYCERIDE 37   HDL 46   LDL 52       Imaging  IR-US GUIDED PIV   Final Result    Ultrasound-guided PERIPHERAL IV INSERTION performed by    qualified nursing staff as above.           Assessment/Plan  * Arm DVT (deep venous thromboembolism), acute, left (HCC)- (present on admission)  Assessment & Plan  CT imaging shows occlusion of the internal jugular vein, subclavian vein and axillary vein    Patient had an ultrasound done in the past.  May 2023, ultrasound showed: Evidence of acute to subacute occlusive deep venous thrombosis in the    jugular vein, subclavian vein, axillary vein and brachial veins from the  proximal to distal bicep.  Evidence of acute to subacute occlusive superficial venous thrombosis in  the basilic vein from the proximal to distal bicep.  Evidence of acute to subacute occlusive superficial venous thrombosis in    the cephalic vein from the proximal bicep to the elbow and    Vascular surgery aware - no surgical intervention  Continue lovenox   Patient has failed apixaban therapy, will change to warfarin  -monitor very closely for bleeding, hx of gross hematuria previous on blood thinners   Daily INR, H&H  Dietary consulted for nutrition counseling  Referral placed for outpatient anticoagulation clinic  High risk of bleed, will monitor while bridging on warfarin, if patient's hemoglobin stays stable can consider discharging on Lovenox bridging    Chronic systolic congestive heart failure (HCC)- (present on admission)  Assessment & Plan  Mildly depressed left ventricular systolic function. The left ventricular ejection fraction is visually estimated to be 45%.Mildly dilated right ventricle. Reduced right ventricular systolic  function. Mild tricuspid regurgitation. Right ventricular systolic pressure is  estimated to be 33 mmHg (normal).    Continue with metorprolol, lasix and losartan    AF (atrial fibrillation) (HCC)- (present on admission)  Assessment & Plan  Continue metoprolol  Continue full dose lovenox for bridge   Continue warfarin     NSTEMI (non-ST elevated myocardial infarction), h/o Afib not on anticoag, leukocytosis (HCC)- (present on admission)  Assessment & Plan  5/2023: acute inferior MI late presentation, occluded RCA with collaterals  No interventions done  Continue aspirin and statin    Essential hypertension, benign- (present on admission)  Assessment & Plan  Continue with metoprolol, losartan, furosemide    Coronary artery disease due to lipid rich plaque- (present on admission)  Assessment & Plan  Continue with aspirin, atorvastatin  Lipid panel with low LDL below goal         VTE prophylaxis: warfarin, therapeutic lovenox     I have performed a physical exam and reviewed and updated ROS and Plan today (9/28/2023). In review of yesterday's note (9/27/2023), there are no changes except as documented above.

## 2023-09-28 NOTE — PROGRESS NOTES
Inpatient Anticoagulation Service Note for 9/28/2023    Reason for Anticoagulation: Deep Vein Thrombosis     Hemoglobin Value: (!) 10.5  Hematocrit Value: (!) 34.2  Lab Platelet Value: (!) 607  Target INR: 2.0 to 3.0    INR from last 7 days       Date/Time INR Value    09/28/23 0549 1.32    09/27/23 0432 1.33    09/26/23 1106 1.35          Dose from last 7 days       Date/Time Dose (mg)    09/28/23 1120 5    09/27/23 1146 5    09/26/23 1259 5          Average Dose (mg):  (New start)    Significant Interactions: Antiplatelet Medications    Bridge Therapy: Yes  Date of Last VTE Event:  (May 2023)  Bridge Therapy Start Date: 09/26/23  Days of Overlap Therapy: 2   INR Value Greater than 2 Prior to Discontinuation of Parenteral Anticoagulation: Not Applicable     Reversal Agent Administered: Not Applicable    Comments: INR remains stable as anticipated following initial doses of warfarin. Patient's warfarin-drug interactions unchanged since last evaluation and he continues to tolerate oral diet. Patient continues on enoxaparin bridge therapy and there are no documented s/s of bleeding present. Will continue current warfarin dose for no and consider dose increase tomorrow if no evidence of movement in INR.    Plan:  Warfarin 5 mg PO today. INR in AM.    Education Material Provided?: No (Will need prior to discharge)    Pharmacist suggested discharge dosing: TBD pending further trends in INR; could consider warfarin 5 mg PO daily with follow-up INR within 48-72 hours of discharge.     Pharmacy will continue to follow.     Pallavi Herrera, PharmD, BCCCP

## 2023-09-28 NOTE — DISCHARGE PLANNING
"CHW Amanda met with pt bedside to offer Community Care Management services.   Housing: No needs.  Transportation: No needs.   Food: No needs.  Finances: No needs.  PCP Follow up Appointment: Pt states PCP is located in [place] and has an appointment  \"coming up\"  CHW Amanda will not continue to follow at this time, due to pt stating no needs.     Community Health Worker Intake    Identified barriers to none.  Resources provided to N/A.  Contact information provided to Hi Montes De Oca.  Has PCP appointment scheduled for \"coming up\" per pt.  Inpatient assessment completed.  Did the patient receive medications post discharge:     Plan:  CHW Amanda will not continue to follow at this time due to pt stating no needs.     "

## 2023-09-28 NOTE — THERAPY
Physical Therapy   Initial Evaluation     Patient Name: Hi Montes De Oca  Age:  78 y.o., Sex:  male  Medical Record #: [id number]  Today's Date: 9/28/2023     Precautions  Precautions: Fall Risk    Assessment  Patient is 78 y.o. male with Pm Hx of CHF, LE DVT's, BPH, HTN, HLD, CAD with stent, PPP who presented 9/25/2023 with worsening left arm swelling. DX with clots left internal jugular, subclavian and axillary vein.  Pt seen for PT assessment, presents with generalized weakness and decreased activity tolerance. Pt is able to ambulate short in home distances with SPC but becomes SOB quickly.   Pt would benefit from  for education, medication management and PT services due to taxing effort to leave home and new onset of LUE edema, SOB and  inability to ambulate community distances.   Plan    Physical Therapy Initial Treatment Plan   Treatment Plan : (P) Bed Mobility, Equipment, Gait Training, Neuro Re-Education / Balance, Self Care / Home Evaluation, Stair Training, Therapeutic Activities, Therapeutic Exercise  Treatment Frequency: (P) 3 Times per Week  Duration: (P) Until Therapy Goals Met    DC Equipment Recommendations: (P) None  Discharge Recommendations: (P) Recommend home health for continued physical therapy services         Initial Contact Note    Initial Contact Note Order Received and Verified, Physical Therapy Evaluation in Progress with Full Report to Follow.   Precautions   Precautions Fall Risk   Pain 0 - 10 Group   Therapist Pain Assessment Post Activity Pain Same as Prior to Activity;Nurse Notified;4   Prior Living Situation   Prior Services   (intermittent assistance from agency for medication management.)   Housing / Facility Mobile Home   Steps Into Home 4   Equipment Owned Single Point Cane;Front-Wheel Walker   Lives with - Patient's Self Care Capacity Alone and Able to Care For Self   Prior Level of Functional Mobility   Bed Mobility Independent   Transfer Status Independent   Ambulation  Independent   Ambulation Distance short community distances   Assistive Devices Used Single Point Cane   Stairs Independent   Active ROM Upper Body   Comments limited LUE ROM due to edema.   Active ROM Lower Body    Active ROM Lower Body  WDL   Strength Lower Body   Lower Body Strength  X   Gross Strength Generalized Weakness, Equal Bilaterally   Sensation Lower Body   Comments NT   Lower Body Muscle Tone   Lower Body Muscle Tone  WDL   Balance Assessment   Sitting Balance (Static) Good   Sitting Balance (Dynamic) Fair +   Standing Balance (Static) Fair +   Standing Balance (Dynamic) Fair   Weight Shift Sitting Good   Weight Shift Standing Fair   Bed Mobility    Supine to Sit Standby Assist   Sit to Supine Standby Assist   Scooting Standby Assist   Rolling Standby Assist   Gait Analysis   Gait Level Of Assist Supervised   Assistive Device Single Point Cane   Distance (Feet) 125   # of Times Distance was Traveled 2   Deviation Bradykinetic;Shuffled Gait;Decreased Heel Strike;Decreased Toe Off   # of Stairs Climbed 0   Comments SOB with mobility   Functional Mobility   Sit to Stand Standby Assist   Bed, Chair, Wheelchair Transfer Standby Assist   How much difficulty does the patient currently have...   Turning over in bed (including adjusting bedclothes, sheets and blankets)? 3   Sitting down on and standing up from a chair with arms (e.g., wheelchair, bedside commode, etc.) 3   Moving from lying on back to sitting on the side of the bed? 3   How much help from another person does the patient currently need...   Moving to and from a bed to a chair (including a wheelchair)? 3   Need to walk in a hospital room? 3   Climbing 3-5 steps with a railing? 1   6 clicks Mobility Score 16   Activity Tolerance   Sitting Edge of Bed 5 min   Standing 4 min   Short Term Goals    Short Term Goal # 1 Pt will ambulate with LRAD for 200 ft while maintaining Modified [place] at 5/10 level   Short Term Goal # 2 Pt will ascend and descend  steps x for with matheus to enter his home   Education Group   Additional Comments education on daily weights, use of AD, pacing, RPE scale   Physical Therapy Initial Treatment Plan    Treatment Plan  Bed Mobility;Equipment;Gait Training;Neuro Re-Education / Balance;Self Care / Home Evaluation;Stair Training;Therapeutic Activities;Therapeutic Exercise   Treatment Frequency 3 Times per Week   Duration Until Therapy Goals Met   Problem List    Problems Pain;Impaired Ambulation;Decreased Activity Tolerance;Impaired Balance;Functional Strength Deficit   Anticipated Discharge Equipment and Recommendations   DC Equipment Recommendations None   Discharge Recommendations Recommend home health for continued physical therapy services   Interdisciplinary Plan of Care Collaboration   IDT Collaboration with  Nursing   Patient Position at End of Therapy In Bed;Phone within Reach;Tray Table within Reach;Call Light within Reach   Collaboration Comments staff updated.   Session Information   Date / Session Number  9/28-1 ( 1/3,10/4)

## 2023-09-29 ENCOUNTER — APPOINTMENT (OUTPATIENT)
Dept: RADIOLOGY | Facility: MEDICAL CENTER | Age: 78
DRG: 299 | End: 2023-09-29
Attending: INTERNAL MEDICINE
Payer: MEDICARE

## 2023-09-29 ENCOUNTER — PATIENT OUTREACH (OUTPATIENT)
Dept: SCHEDULING | Facility: IMAGING CENTER | Age: 78
End: 2023-09-29
Payer: MEDICARE

## 2023-09-29 PROBLEM — D72.829 LEUKOCYTOSIS: Status: ACTIVE | Noted: 2023-09-29

## 2023-09-29 PROBLEM — J18.9 PNEUMONIA: Status: ACTIVE | Noted: 2023-09-29

## 2023-09-29 LAB
APPEARANCE UR: CLEAR
BILIRUB UR QL STRIP.AUTO: NEGATIVE
COLOR UR: YELLOW
ERYTHROCYTE [DISTWIDTH] IN BLOOD BY AUTOMATED COUNT: 55.1 FL (ref 35.9–50)
GLUCOSE UR STRIP.AUTO-MCNC: NEGATIVE MG/DL
HCT VFR BLD AUTO: 33.5 % (ref 42–52)
HGB BLD-MCNC: 10.7 G/DL (ref 14–18)
INR PPP: 1.43 (ref 0.87–1.13)
KETONES UR STRIP.AUTO-MCNC: NEGATIVE MG/DL
LEUKOCYTE ESTERASE UR QL STRIP.AUTO: NEGATIVE
MCH RBC QN AUTO: 29.9 PG (ref 27–33)
MCHC RBC AUTO-ENTMCNC: 31.9 G/DL (ref 32.3–36.5)
MCV RBC AUTO: 93.6 FL (ref 81.4–97.8)
MICRO URNS: NORMAL
NITRITE UR QL STRIP.AUTO: NEGATIVE
PH UR STRIP.AUTO: 5.5 [PH] (ref 5–8)
PLATELET # BLD AUTO: 671 K/UL (ref 164–446)
PMV BLD AUTO: 12.1 FL (ref 9–12.9)
PROCALCITONIN SERPL-MCNC: 0.06 NG/ML
PROT UR QL STRIP: NEGATIVE MG/DL
PROTHROMBIN TIME: 17.6 SEC (ref 12–14.6)
RBC # BLD AUTO: 3.58 M/UL (ref 4.7–6.1)
RBC UR QL AUTO: NEGATIVE
SP GR UR STRIP.AUTO: 1.01
UROBILINOGEN UR STRIP.AUTO-MCNC: 0.2 MG/DL
WBC # BLD AUTO: 13.3 K/UL (ref 4.8–10.8)

## 2023-09-29 PROCEDURE — A9270 NON-COVERED ITEM OR SERVICE: HCPCS

## 2023-09-29 PROCEDURE — A9270 NON-COVERED ITEM OR SERVICE: HCPCS | Performed by: STUDENT IN AN ORGANIZED HEALTH CARE EDUCATION/TRAINING PROGRAM

## 2023-09-29 PROCEDURE — 700102 HCHG RX REV CODE 250 W/ 637 OVERRIDE(OP): Performed by: INTERNAL MEDICINE

## 2023-09-29 PROCEDURE — 84145 PROCALCITONIN (PCT): CPT

## 2023-09-29 PROCEDURE — 85027 COMPLETE CBC AUTOMATED: CPT

## 2023-09-29 PROCEDURE — 700102 HCHG RX REV CODE 250 W/ 637 OVERRIDE(OP)

## 2023-09-29 PROCEDURE — 99239 HOSP IP/OBS DSCHRG MGMT >30: CPT | Performed by: INTERNAL MEDICINE

## 2023-09-29 PROCEDURE — 36415 COLL VENOUS BLD VENIPUNCTURE: CPT

## 2023-09-29 PROCEDURE — 81003 URINALYSIS AUTO W/O SCOPE: CPT

## 2023-09-29 PROCEDURE — 700111 HCHG RX REV CODE 636 W/ 250 OVERRIDE (IP): Performed by: INTERNAL MEDICINE

## 2023-09-29 PROCEDURE — 71045 X-RAY EXAM CHEST 1 VIEW: CPT

## 2023-09-29 PROCEDURE — A9270 NON-COVERED ITEM OR SERVICE: HCPCS | Performed by: INTERNAL MEDICINE

## 2023-09-29 PROCEDURE — 700102 HCHG RX REV CODE 250 W/ 637 OVERRIDE(OP): Performed by: STUDENT IN AN ORGANIZED HEALTH CARE EDUCATION/TRAINING PROGRAM

## 2023-09-29 PROCEDURE — 770006 HCHG ROOM/CARE - MED/SURG/GYN SEMI*

## 2023-09-29 PROCEDURE — 85610 PROTHROMBIN TIME: CPT

## 2023-09-29 RX ORDER — AMOXICILLIN AND CLAVULANATE POTASSIUM 875; 125 MG/1; MG/1
1 TABLET, FILM COATED ORAL EVERY 12 HOURS
Status: COMPLETED | OUTPATIENT
Start: 2023-09-29 | End: 2023-10-04

## 2023-09-29 RX ORDER — WARFARIN SODIUM 7.5 MG/1
7.5 TABLET ORAL
Status: COMPLETED | OUTPATIENT
Start: 2023-09-29 | End: 2023-09-29

## 2023-09-29 RX ORDER — MENTHOL AND METHYL SALICYLATE 7.6; 29 G/100G; G/100G
OINTMENT TOPICAL
Status: DISCONTINUED | OUTPATIENT
Start: 2023-09-29 | End: 2023-10-09 | Stop reason: HOSPADM

## 2023-09-29 RX ORDER — AMOXICILLIN AND CLAVULANATE POTASSIUM 875; 125 MG/1; MG/1
1 TABLET, FILM COATED ORAL 2 TIMES DAILY
Qty: 10 TABLET | Refills: 0 | Status: ACTIVE | OUTPATIENT
Start: 2023-09-29 | End: 2023-10-02

## 2023-09-29 RX ORDER — WARFARIN SODIUM 5 MG/1
5 TABLET ORAL DAILY
Qty: 30 TABLET | Refills: 0 | Status: ACTIVE | OUTPATIENT
Start: 2023-09-29 | End: 2023-10-08

## 2023-09-29 RX ORDER — FUROSEMIDE 40 MG/1
60 TABLET ORAL DAILY
Qty: 30 TABLET | Refills: 0
Start: 2023-09-29

## 2023-09-29 RX ORDER — ENOXAPARIN SODIUM 100 MG/ML
1 INJECTION SUBCUTANEOUS EVERY 12 HOURS
Qty: 14 EACH | Refills: 0 | Status: ACTIVE | OUTPATIENT
Start: 2023-09-29 | End: 2023-10-08

## 2023-09-29 RX ADMIN — METOPROLOL SUCCINATE 75 MG: 25 TABLET, EXTENDED RELEASE ORAL at 04:59

## 2023-09-29 RX ADMIN — ASPIRIN 81 MG: 81 TABLET, COATED ORAL at 04:59

## 2023-09-29 RX ADMIN — TAMSULOSIN HYDROCHLORIDE 0.4 MG: 0.4 CAPSULE ORAL at 04:59

## 2023-09-29 RX ADMIN — MENTHOL AND METHYL SALICYLATE: 7.6; 29 OINTMENT TOPICAL at 21:15

## 2023-09-29 RX ADMIN — LOSARTAN POTASSIUM 50 MG: 50 TABLET, FILM COATED ORAL at 05:00

## 2023-09-29 RX ADMIN — AMOXICILLIN AND CLAVULANATE POTASSIUM 1 TABLET: 875; 125 TABLET, FILM COATED ORAL at 17:19

## 2023-09-29 RX ADMIN — MENTHOL AND METHYL SALICYLATE: 7.6; 29 OINTMENT TOPICAL at 05:06

## 2023-09-29 RX ADMIN — ENOXAPARIN SODIUM 100 MG: 100 INJECTION SUBCUTANEOUS at 17:19

## 2023-09-29 RX ADMIN — FINASTERIDE 5 MG: 5 TABLET, FILM COATED ORAL at 04:59

## 2023-09-29 RX ADMIN — DOCUSATE SODIUM 50 MG AND SENNOSIDES 8.6 MG 2 TABLET: 8.6; 5 TABLET, FILM COATED ORAL at 05:00

## 2023-09-29 RX ADMIN — MENTHOL AND METHYL SALICYLATE: 7.6; 29 OINTMENT TOPICAL at 02:16

## 2023-09-29 RX ADMIN — METOPROLOL SUCCINATE 75 MG: 25 TABLET, EXTENDED RELEASE ORAL at 17:19

## 2023-09-29 RX ADMIN — ATORVASTATIN CALCIUM 80 MG: 40 TABLET, FILM COATED ORAL at 17:20

## 2023-09-29 RX ADMIN — DOCUSATE SODIUM 50 MG AND SENNOSIDES 8.6 MG 2 TABLET: 8.6; 5 TABLET, FILM COATED ORAL at 17:19

## 2023-09-29 RX ADMIN — ENOXAPARIN SODIUM 100 MG: 100 INJECTION SUBCUTANEOUS at 04:59

## 2023-09-29 RX ADMIN — FUROSEMIDE 40 MG: 40 TABLET ORAL at 04:59

## 2023-09-29 RX ADMIN — WARFARIN SODIUM 7.5 MG: 7.5 TABLET ORAL at 17:20

## 2023-09-29 ASSESSMENT — ENCOUNTER SYMPTOMS
BRUISES/BLEEDS EASILY: 1
ABDOMINAL PAIN: 0
VOMITING: 0
DEPRESSION: 0
FEVER: 0
NAUSEA: 0
SHORTNESS OF BREATH: 0
DIZZINESS: 0
MYALGIAS: 0
CHILLS: 0
HEADACHES: 0

## 2023-09-29 ASSESSMENT — PAIN DESCRIPTION - PAIN TYPE
TYPE: ACUTE PAIN

## 2023-09-29 NOTE — PROGRESS NOTES
Pt was complaining of R shoulder pain and requested icy hot. BK Nicole was messaged for order and order was placed. PA also notified of pt's WBCs elevated to 13.3 from 7.7 the day before.

## 2023-09-29 NOTE — PROGRESS NOTES
Inpatient Anticoagulation Service Note for 9/29/2023    Reason for Anticoagulation: Deep Vein Thrombosis      Hemoglobin Value: (!) 10.7  Hematocrit Value: (!) 33.5  Lab Platelet Value: (!) 671  Target INR: 2.0 to 3.0    INR from last 7 days       Date/Time INR Value    09/29/23 0033 1.43    09/28/23 0549 1.32    09/27/23 0432 1.33    09/26/23 1106 1.35          Dose from last 7 days       Date/Time Dose (mg)    09/29/23 1405 7.5    09/28/23 1120 5    09/27/23 1146 5    09/26/23 1259 5          Average Dose (mg):  (New Start.)  Significant Interactions: Antiplatelet Medications  Bridge Therapy: Yes  Date of Last VTE Event:  (May 2023)  Bridge Therapy Start Date: 09/26/23  Days of Overlap Therapy: 3   INR Value Greater than 2 Prior to Discontinuation of Parenteral Anticoagulation: Not Applicable   Reversal Agent Administered: Not Applicable    Assessment and Plan: INR remains stable as anticipated following initial doses of warfarin. Patient's warfarin-drug interactions unchanged since last evaluation and he continues to tolerate oral diet. Patient continues on enoxaparin bridge therapy and there are no documented s/s of bleeding present. Discharge pending soon. Will provide bolus dose today of 7.5 mg. If patient remains in hospital, will continue to monitor daily INRs and adjust therapy as appropriate.     Education Material Provided?: No (Will need prior to discharge.)    Pharmacist suggested discharge dosing: Warfarin 5 mg daily along with lovenox bridge 100 mg q12h. Patient to follow-up with clinic to review bridge and INR at soonest availability.      Mari Galloway, ManuelD

## 2023-09-29 NOTE — DISCHARGE PLANNING
Case Management Discharge Planning    Admission Date: 9/25/2023  GMLOS: 3  ALOS: 4    6-Clicks ADL Score: 24  6-Clicks Mobility Score: 16  PT and/or OT Eval ordered: Yes  Post-acute Referrals Ordered: Yes  Post-acute Choice Obtained: Yes  Has referral(s) been sent to post-acute provider:  Yes      Anticipated Discharge Dispo: Discharge Disposition: Discharged to home/self care (01)  Discharge Address: [facility] [address] [facility] [place] [address]    DME Needed: No    Action(s) Taken:     0930 RN CM left a vm for [facility] in [place] to schedule pt for INR lab draws. [phone] ext [phone], RN ANAM had left a message yesterday with no call back .    1045RNCM spoke to Wang at [phone] ext [phone] at [facility].  He said he could schedule pt, but than referred me to Blanche [phone] ext [phone] from [facility] to schedule pt for INR checks.  INR Order and pharmacy notes faxed to [phone]; Wang to send referral to Blanche.     1400 RNCM reached out to [facility] to make an appointment with pt's PCP to see if he can see pt on Mond or Tues for INR checks.  RNCM left vm, no call back.     1530  RNCM called [facility] in [place].  They offer point of care INR checks, but process takes 3 weeks since they have to order INR machine.  RNCM attempted to get a hold of Blanche or Shelbi again.  No answer, unable to leave vm.     1600 Pt notified that he needs to stay in the hospital until this is arranged due to high risk of bleeding. MD Figueroa notified      Escalations Completed: None    Medically Clear: Yes    Next Steps: RNCM to continue to follow up with pt and medical team to address dc needs and barriers      Barriers to Discharge: Coordination of INR lab draws in [place]

## 2023-09-29 NOTE — PROGRESS NOTES
Assumed care of patient at 1900. Received report from day RN. Patient A&Ox4, on RA, Reporting a pain level of 8/10 but declines interventions, will continue to reassess and monitor pain throughout shift. Call light within reach, belongings within reach, Fall precautions in place, bed in lowest position. Patient does not have any other needs at this time.     POC was discussed with patient. All questions were answered. Patient verbalized understanding.

## 2023-09-29 NOTE — PROGRESS NOTES
Pt is a moderate fall risk and refusing a bed alarm at this time. Pt maintains a steady gait when ambulating with his cane to the bathroom. Pt is independent and has been up self. Pt has demonstrated appropriate use of the call light.

## 2023-09-29 NOTE — DISCHARGE PLANNING
Spoke To: Tori  Agency/Facility Name: [facility]  Plan or Request: referral not received thro epic, DPA resent referral manually.    1018- NEIL confirmed referral received. Per Jojo, referral will go into review soon.     1230- [facility] declined / pt is not home bound

## 2023-09-29 NOTE — CARE PLAN
The patient is Stable - Low risk of patient condition declining or worsening    Shift Goals  Clinical Goals: Monitor labs  Patient Goals: Comfort    Progress made toward(s) clinical / shift goals:  Pt Hgb increased from 10.5 to 10.7. Pt shows no signs of bleeding. Pt stated 8/10 pain but declined interventions. When reassessed, pt stated 7/10 pain and requested icy hot to be applied to shoulder for aching pain.     Problem: Knowledge Deficit - Standard  Goal: Patient and family/care givers will demonstrate understanding of plan of care, disease process/condition, diagnostic tests and medications  Outcome: Progressing     Problem: Fall Risk  Goal: Patient will remain free from falls  Outcome: Progressing     Problem: Risk for Bleeding  Goal: Patient will take measures to prevent bleeding and recognizes signs of bleeding that need to be reported immediately to a health care professional  Outcome: Progressing  Goal: Patient will not experience bleeding as evidenced by normal blood pressure, stable hematocrit and hemoglobin levels and desired ranges for coagulation profiles  Outcome: Progressing     Problem: Pain - Standard  Goal: Alleviation of pain or a reduction in pain to the patient’s comfort goal  Outcome: Progressing       Patient is not progressing towards the following goals:

## 2023-09-29 NOTE — DISCHARGE SUMMARY
Discharge Summary    CHIEF COMPLAINT ON ADMISSION  No chief complaint on file.      Reason for Admission  DVT LUE     Admission Date  9/25/2023    CODE STATUS  Full Code    HPI & HOSPITAL COURSE  This is a 78 y.o. male here with left arm swelling.     78 y.o. male w/ PMH of CHF, DVT of the lower extremity, on AC, BPH, HTN, HLD, CAD with stent, PPP who presented 9/25/2023 with worsening left arm swelling. Patient sttes that he had been having worsening left arm swelling for over a month now. He had a caretaker come visit him today and told him to go to [facility]. CT imaging was done and demonstrated a clot of the left internal jugular, subclavian and axillary vein. Outside facility contacted our Vascular surgeon who stated that there is no surgical intervention to be done. They recommend continue his anticoagulation.  On chart review, patient had similar occlusions on ultrasound on May 2023.  He was started on apixaban at that time.  Patient states that he has been compliant with his medication.  However he has stated that over the last month, he has developed worsening swelling.  Patient was initially started on heparin drip however due to patient's failure with apixaban therapy he was switched to warfarin with Lovenox bridging.  Patient was kept inpatient for close monitoring for bleeding as he has a history of hematuria requiring cystoscopy and his blood thinner had been held at that time.  Patient has been monitored for the last 4 days with no signs of bleeding.  His hemoglobin is stable.  Patient is requesting to be discharged home.  He will be made an appointment to have his INR checked either with home health or anticoagulation clinic prior to discharge.  Home health has been ordered for the patient for continued physical therapy.  I have discharged him with 7 days of Lovenox as well as warfarin 5 mg per our pharmacy recommendations.  Further dosing to be determined by anticoagulation clinic  after his INR check.  Patient did have elevated leukocytosis, vital signs stable.  Chest x-ray showed possible lower lobe pneumonia, he denies any symptoms, I have discharged him on 5 days of Augmentin.  He was counseled on diet education with the new warfarin.  Patient's vital signs are stable and he is requesting discharge home.  He is to follow-up with his primary care physician and return to the ER if his condition worsens.    Therefore, he is discharged in good and stable condition to home with organized home healthcare and close outpatient follow-up.    The patient met 2-midnight criteria for an inpatient stay at the time of discharge.    Discharge Date  9/29/2023    FOLLOW UP ITEMS POST DISCHARGE  Follow-up with primary care  INR check on Monday    DISCHARGE DIAGNOSES  Principal Problem:    Arm DVT (deep venous thromboembolism), acute, left (HCC) (POA: Yes)  Active Problems:    Coronary artery disease due to lipid rich plaque (Chronic) (POA: Yes)    Essential hypertension, benign (POA: Yes)    NSTEMI (non-ST elevated myocardial infarction), h/o Afib not on anticoag, leukocytosis (HCC) (POA: Yes)    AF (atrial fibrillation) (HCC) (POA: Yes)    Chronic systolic congestive heart failure (HCC) (POA: Yes)  Resolved Problems:    * No resolved hospital problems. *      FOLLOW UP  No future appointments.  Mario Lindquist M.D.  [address] [place] [id number]  [phone]    Follow up  Left message with Mario Lindquist M.D. office. The  will call you to schedule your follow up appointment. If you do not hear in 1 week please call the office to schedule.      MEDICATIONS ON DISCHARGE     Medication List        START taking these medications        Instructions   amoxicillin-clavulanate 875-125 MG Tabs  Commonly known as: Augmentin   Take 1 Tablet by mouth 2 times a day for 5 days.  Dose: 1 Tablet     enoxaparin 100 MG/ML Sosy inj  Commonly known as: Lovenox   Inject 1 syringe (100 mg) under the  skin every 12 hours for 7 days.  Dose: 1 mg/kg     warfarin 5 MG Tabs  Commonly known as: Coumadin   Take 1 Tablet by mouth every day at 6 PM.  Dose: 5 mg            CONTINUE taking these medications        Instructions   amiodarone 200 MG Tabs  Commonly known as: Cordarone   Take 200 mg by mouth every day.  Dose: 200 mg     ascorbic acid 500 MG tablet  Commonly known as: Vitamin C   Take 500 mg by mouth every day.  Dose: 500 mg     aspirin 81 MG EC tablet   Take 81 mg by mouth every day.  Dose: 81 mg     atorvastatin 80 MG tablet  Commonly known as: Lipitor   Take 1 Tab by mouth every evening.  Dose: 80 mg     clotrimazole 10 MG Troc janny  Commonly known as: Mycelex   Take 10 mg by mouth every day.  Dose: 10 mg     ferrous sulfate 325 (65 Fe) MG tablet   Take 325 mg by mouth every day.  Dose: 325 mg     finasteride 5 MG Tabs  Commonly known as: Proscar   Take 5 mg by mouth every day.  Dose: 5 mg     furosemide 40 MG Tabs  Commonly known as: Lasix   Take 1.5 Tablets by mouth every day.  Dose: 60 mg     hydroCHLOROthiazide 12.5 MG tablet  Commonly known as: Hydrodiuril   Take 12.5 mg by mouth every day.  Dose: 12.5 mg     losartan 50 MG Tabs  Commonly known as: Cozaar   Take 1 Tablet by mouth every day.  Dose: 50 mg     metoprolol SR 25 MG Tb24  Commonly known as: Toprol XL   Take 3 Tablets by mouth 2 times a day.  Dose: 75 mg     nitroglycerin 0.4 MG Subl  Commonly known as: Nitrostat   Place 0.4 mg under tongue every 5 minutes as needed.  Dose: 0.4 mg     potassium chloride 20 MEQ Pack  Commonly known as: Klor-Con   Take 40 mEq by mouth 2 times a day.  Dose: 40 mEq     tamsulosin 0.4 MG capsule  Commonly known as: Flomax   Take 0.4 mg by mouth every day.  Dose: 0.4 mg     Vitamin D3 50 MCG (2000 UT) Tabs   Take 2,000 Units by mouth every day.  Dose: 2,000 Units            STOP taking these medications      Eliquis 5mg Tabs  Generic drug: apixaban              Allergies  No Known Allergies    DIET  Orders Placed  This Encounter   Procedures    Diet Order Diet: Cardiac     Standing Status:   Standing     Number of Occurrences:   1     Order Specific Question:   Diet:     Answer:   Cardiac [6]       ACTIVITY  As tolerated.  Weight bearing as tolerated    CONSULTATIONS  N/A    PROCEDURES  N/A    LABORATORY  Lab Results   Component Value Date    SODIUM 140 09/28/2023    POTASSIUM 3.7 09/28/2023    CHLORIDE 103 09/28/2023    CO2 24 09/28/2023    GLUCOSE 109 (H) 09/28/2023    BUN 14 09/28/2023    CREATININE 0.91 09/28/2023        Lab Results   Component Value Date    WBC 13.3 (H) 09/29/2023    HEMOGLOBIN 10.7 (L) 09/29/2023    HEMATOCRIT 33.5 (L) 09/29/2023    PLATELETCT 671 (H) 09/29/2023        Total time of the discharge process exceeds 37 minutes.

## 2023-09-29 NOTE — PROGRESS NOTES
Hospital Medicine Daily Progress Note    Date of Service  9/29/2023    Chief Complaint  Hi Montes De Oca is a 78 y.o. male admitted 9/25/2023 with left arm swelling.    Hospital Course   78 y.o. male w/ PMH of CHF, DVT of the lower extremity, on AC, BPH, HTN, HLD, CAD with stent, PPP who presented 9/25/2023 with worsening left arm swelling. Patient sttes that he had been having worsening left arm swelling for over a month now. He had a caretaker come visit him today and told him to go to [facility]. CT imaging was done and demonstrated a clot of the left internal jugular, subclavian and axillary vein. Outside facility contacted our Vascular surgeon who stated that there is no surgical intervention to be done. They recommend continue his anticoagulation.  On chart review, patient had similar occlusions on ultrasound on May 2023.  He was started on apixaban at that time.  Patient states that he has been compliant with his medication.  However he has stated that over the last month, he has developed worsening swelling.  Denies any other symptoms at this time.    Interval Problem Update  9/26 Patient mildly tachypneic otherwise vital signs stable.  Hemoglobin stable.  INR 1.35 and heparin drip supratherapeutic so has been stopped for now.  Patient has failed outpatient apixaban as his edema and clot have worsened.  Patient reports he has never been on warfarin before.  Discussed what is involved in switching to warfarin including needing INR checks, the therapeutic window and dietary changes, patient is in agreement with this switching to warfarin.  Nutrition consulted to help with diet education.  I have placed a referral for the anticoagulation clinic as he will need outpatient follow-up.  On chart review patient has history of bleeding and at one point was taken off his Eliquis, due to his high risk of rebleeding will monitor for now while inpatient bridging. Will also change heparin ggt to  Lovenox therapeutic dosing in anticipation for home.      9/26 Vitals and hemoglobin stable. INR 1.3. Patient with mild bruising on right arm, per nursing is stable. Patient denies hematuria or melena. Right arm with worsening swelling, denies pain. Will ACE wrap or place compression stocking on left arm to help with edema. Patient asking if clot can be removed, discussed that no indication for thrombectomy still with good pulses, no pain and no skin changes. Nutrition did education with patient today. Discussed with social work, tentative plan for dc on Friday if H&H stable needs AC clinic appointment set up for Monday.     9/27 INR 1.32, Hb 10. No signs of bleeding. Discussed with pharmacy if still no bleeding tomorrow we will plan to discharge patient on a Lovenox bridge with an anticoagulation clinic on Monday.  Dosing to be determined tomorrow after INR check.    9/28 INR 1.43 today.  Discussed with pharmacy recommended discharge on warfarin 5 mg daily.  Patient with new WBC 13.3, he denies any new complaints.  Procalcitonin negative, UA negative.  Reviewed chest x-ray showed bibasilar underinflation, radiology read says superimposed pneumonia not excluded.  Start Augmentin for 5 days.  Patient is medically clear for discharge however discussed with case management and they have been unable to get patient appointment for an INR check thus far.  He will need his INR check set up prior to discharge for safe discharge planning.  He has no new complaints, he is anxious to go home.    I have discussed this patient's plan of care and discharge plan at IDT rounds today with Case Management, Nursing, Nursing leadership, and other members of the IDT team.    Consultants/Specialty  N/A    Code Status  Full Code    Disposition  The patient is medically cleared for discharge to home or a post-acute facility.  Anticipate discharge to: home with organized home healthcare and close outpatient follow-up    I have placed the  appropriate orders for post-discharge needs.    Review of Systems  Review of Systems   Constitutional:  Negative for chills and fever.   Respiratory:  Negative for shortness of breath.    Cardiovascular:  Negative for chest pain.   Gastrointestinal:  Negative for abdominal pain, nausea and vomiting.   Genitourinary:  Negative for dysuria.   Musculoskeletal:  Negative for myalgias.        Left arm swelling    Skin:  Negative for rash.   Neurological:  Negative for dizziness and headaches.   Endo/Heme/Allergies:  Bruises/bleeds easily.   Psychiatric/Behavioral:  Negative for depression.    All other systems reviewed and are negative.       Physical Exam  Temp:  [36.3 °C (97.4 °F)-37.2 °C (99 °F)] 36.3 °C (97.4 °F)  Pulse:  [78-84] 82  Resp:  [16-22] 16  BP: (130-163)/(64-94) 136/70  SpO2:  [91 %-92 %] 92 %    Physical Exam  Vitals and nursing note reviewed.   Constitutional:       General: He is not in acute distress.  HENT:      Head: Normocephalic and atraumatic.      Ears:      Comments: Hard of hearing   Eyes:      Conjunctiva/sclera: Conjunctivae normal.   Cardiovascular:      Rate and Rhythm: Normal rate and regular rhythm.      Pulses: Normal pulses.   Pulmonary:      Effort: Pulmonary effort is normal. No respiratory distress.      Breath sounds: Normal breath sounds. No wheezing.   Abdominal:      General: Abdomen is flat. There is no distension.      Palpations: Abdomen is soft.      Tenderness: There is no abdominal tenderness.   Musculoskeletal:         General: Swelling (left arm) present. No tenderness. Normal range of motion.      Cervical back: Neck supple.      Right lower leg: No edema.      Left lower leg: No edema.   Skin:     General: Skin is warm and dry.      Findings: Bruising (mild right arm) present. No rash.   Neurological:      General: No focal deficit present.      Mental Status: He is alert and oriented to person, place, and time.      Cranial Nerves: No cranial nerve deficit.    Psychiatric:         Mood and Affect: Mood normal.         Behavior: Behavior normal.         Fluids    Intake/Output Summary (Last 24 hours) at 9/29/2023 1619  Last data filed at 9/29/2023 0419  Gross per 24 hour   Intake --   Output 150 ml   Net -150 ml       Laboratory  Recent Labs     09/27/23  0432 09/28/23  0549 09/29/23  0033   WBC 7.5 7.7 13.3*   RBC 3.26* 3.60* 3.58*   HEMOGLOBIN 9.7* 10.5* 10.7*   HEMATOCRIT 31.2* 34.2* 33.5*   MCV 95.7 95.0 93.6   MCH 29.8 29.2 29.9   MCHC 31.1* 30.7* 31.9*   RDW 55.4* 54.9* 55.1*   PLATELETCT 536* 607* 671*   MPV 12.3 12.0 12.1     Recent Labs     09/27/23  0432 09/28/23  0549   SODIUM 139 140   POTASSIUM 3.6 3.7   CHLORIDE 106 103   CO2 24 24   GLUCOSE 107* 109*   BUN 17 14   CREATININE 1.00 0.91   CALCIUM 7.9* 8.4*     Recent Labs     09/27/23  0432 09/28/23  0549 09/29/23  0033   INR 1.33* 1.32* 1.43*                 Imaging  DX-CHEST-PORTABLE (1 VIEW)   Final Result      Decreased bibasilar underinflation atelectasis. Superimposed pneumonia not excluded.      IR-US GUIDED PIV   Final Result    Ultrasound-guided PERIPHERAL IV INSERTION performed by    qualified nursing staff as above.           Assessment/Plan  * Arm DVT (deep venous thromboembolism), acute, left (HCC)- (present on admission)  Assessment & Plan  CT imaging shows occlusion of the internal jugular vein, subclavian vein and axillary vein    Patient had an ultrasound done in the past.  May 2023, ultrasound showed: Evidence of acute to subacute occlusive deep venous thrombosis in the    jugular vein, subclavian vein, axillary vein and brachial veins from the  proximal to distal bicep.  Evidence of acute to subacute occlusive superficial venous thrombosis in  the basilic vein from the proximal to distal bicep.  Evidence of acute to subacute occlusive superficial venous thrombosis in    the cephalic vein from the proximal bicep to the elbow and    Vascular surgery aware - no surgical intervention  Continue  lovenox   Patient has failed apixaban therapy, will change to warfarin  -monitor very closely for bleeding, hx of gross hematuria previous on blood thinners   Daily INR, H&H  Dietary consulted for nutrition counseling  Referral placed for outpatient anticoagulation clinic  High risk of bleed, will monitor while bridging on warfarin    Patient medically clear for discharge on warfarin 5 mg with Lovenox bridge, needs INR check appointment prior to discharge    Leukocytosis  Assessment & Plan  WBC 13 today  No signs of sepsis  Work-up shows pneumonia, started augmentin    Pneumonia  Assessment & Plan  New leukocytosis, work-up shows possible basilar pneumonia on x-ray  Start Augmentin  No sepsis    Chronic systolic congestive heart failure (HCC)- (present on admission)  Assessment & Plan  Mildly depressed left ventricular systolic function. The left ventricular ejection fraction is visually estimated to be 45%.Mildly dilated right ventricle. Reduced right ventricular systolic function. Mild tricuspid regurgitation. Right ventricular systolic pressure is  estimated to be 33 mmHg (normal).    Continue with metorprolol, lasix and losartan    AF (atrial fibrillation) (HCC)- (present on admission)  Assessment & Plan  Continue metoprolol  Continue full dose lovenox for bridge   Continue warfarin     NSTEMI (non-ST elevated myocardial infarction), h/o Afib not on anticoag, leukocytosis (HCC)- (present on admission)  Assessment & Plan  5/2023: acute inferior MI late presentation, occluded RCA with collaterals  No interventions done  Continue aspirin and statin    Essential hypertension, benign- (present on admission)  Assessment & Plan  Continue with metoprolol, losartan, furosemide    Coronary artery disease due to lipid rich plaque- (present on admission)  Assessment & Plan  Continue with aspirin, atorvastatin  Lipid panel with low LDL below goal         VTE prophylaxis: warfarin, therapeutic lovenox     I have performed a  physical exam and reviewed and updated ROS and Plan today (9/29/2023). In review of yesterday's note (9/28/2023), there are no changes except as documented above.

## 2023-09-30 LAB
ERYTHROCYTE [DISTWIDTH] IN BLOOD BY AUTOMATED COUNT: 53.5 FL (ref 35.9–50)
HCT VFR BLD AUTO: 33.4 % (ref 42–52)
HGB BLD-MCNC: 10.7 G/DL (ref 14–18)
INR PPP: 1.84 (ref 0.87–1.13)
MCH RBC QN AUTO: 29.4 PG (ref 27–33)
MCHC RBC AUTO-ENTMCNC: 32 G/DL (ref 32.3–36.5)
MCV RBC AUTO: 91.8 FL (ref 81.4–97.8)
PLATELET # BLD AUTO: 707 K/UL (ref 164–446)
PMV BLD AUTO: 12.1 FL (ref 9–12.9)
PROTHROMBIN TIME: 21.5 SEC (ref 12–14.6)
RBC # BLD AUTO: 3.64 M/UL (ref 4.7–6.1)
WBC # BLD AUTO: 15.7 K/UL (ref 4.8–10.8)

## 2023-09-30 PROCEDURE — 99232 SBSQ HOSP IP/OBS MODERATE 35: CPT | Performed by: INTERNAL MEDICINE

## 2023-09-30 PROCEDURE — A9270 NON-COVERED ITEM OR SERVICE: HCPCS | Performed by: STUDENT IN AN ORGANIZED HEALTH CARE EDUCATION/TRAINING PROGRAM

## 2023-09-30 PROCEDURE — 770006 HCHG ROOM/CARE - MED/SURG/GYN SEMI*

## 2023-09-30 PROCEDURE — 36415 COLL VENOUS BLD VENIPUNCTURE: CPT

## 2023-09-30 PROCEDURE — 700102 HCHG RX REV CODE 250 W/ 637 OVERRIDE(OP): Performed by: INTERNAL MEDICINE

## 2023-09-30 PROCEDURE — 700111 HCHG RX REV CODE 636 W/ 250 OVERRIDE (IP): Performed by: INTERNAL MEDICINE

## 2023-09-30 PROCEDURE — 85610 PROTHROMBIN TIME: CPT

## 2023-09-30 PROCEDURE — 700102 HCHG RX REV CODE 250 W/ 637 OVERRIDE(OP): Performed by: STUDENT IN AN ORGANIZED HEALTH CARE EDUCATION/TRAINING PROGRAM

## 2023-09-30 PROCEDURE — 85027 COMPLETE CBC AUTOMATED: CPT

## 2023-09-30 PROCEDURE — A9270 NON-COVERED ITEM OR SERVICE: HCPCS | Performed by: INTERNAL MEDICINE

## 2023-09-30 RX ORDER — WARFARIN SODIUM 5 MG/1
5 TABLET ORAL DAILY
Status: DISCONTINUED | OUTPATIENT
Start: 2023-09-30 | End: 2023-10-01

## 2023-09-30 RX ADMIN — ATORVASTATIN CALCIUM 80 MG: 40 TABLET, FILM COATED ORAL at 17:09

## 2023-09-30 RX ADMIN — AMOXICILLIN AND CLAVULANATE POTASSIUM 1 TABLET: 875; 125 TABLET, FILM COATED ORAL at 05:00

## 2023-09-30 RX ADMIN — AMOXICILLIN AND CLAVULANATE POTASSIUM 1 TABLET: 875; 125 TABLET, FILM COATED ORAL at 17:09

## 2023-09-30 RX ADMIN — DOCUSATE SODIUM 50 MG AND SENNOSIDES 8.6 MG 2 TABLET: 8.6; 5 TABLET, FILM COATED ORAL at 17:10

## 2023-09-30 RX ADMIN — MENTHOL AND METHYL SALICYLATE: 7.6; 29 OINTMENT TOPICAL at 04:59

## 2023-09-30 RX ADMIN — ENOXAPARIN SODIUM 100 MG: 100 INJECTION SUBCUTANEOUS at 05:01

## 2023-09-30 RX ADMIN — MENTHOL AND METHYL SALICYLATE: 7.6; 29 OINTMENT TOPICAL at 08:25

## 2023-09-30 RX ADMIN — DOCUSATE SODIUM 50 MG AND SENNOSIDES 8.6 MG 2 TABLET: 8.6; 5 TABLET, FILM COATED ORAL at 04:59

## 2023-09-30 RX ADMIN — ASPIRIN 81 MG: 81 TABLET, COATED ORAL at 04:59

## 2023-09-30 RX ADMIN — TAMSULOSIN HYDROCHLORIDE 0.4 MG: 0.4 CAPSULE ORAL at 05:00

## 2023-09-30 RX ADMIN — POLYETHYLENE GLYCOL 3350 1 PACKET: 17 POWDER, FOR SOLUTION ORAL at 05:01

## 2023-09-30 RX ADMIN — LOSARTAN POTASSIUM 50 MG: 50 TABLET, FILM COATED ORAL at 05:00

## 2023-09-30 RX ADMIN — METOPROLOL SUCCINATE 75 MG: 25 TABLET, EXTENDED RELEASE ORAL at 17:10

## 2023-09-30 RX ADMIN — METOPROLOL SUCCINATE 75 MG: 25 TABLET, EXTENDED RELEASE ORAL at 05:00

## 2023-09-30 RX ADMIN — FUROSEMIDE 40 MG: 40 TABLET ORAL at 05:00

## 2023-09-30 RX ADMIN — ENOXAPARIN SODIUM 100 MG: 100 INJECTION SUBCUTANEOUS at 17:10

## 2023-09-30 RX ADMIN — FINASTERIDE 5 MG: 5 TABLET, FILM COATED ORAL at 05:00

## 2023-09-30 RX ADMIN — WARFARIN SODIUM 5 MG: 5 TABLET ORAL at 17:10

## 2023-09-30 ASSESSMENT — ENCOUNTER SYMPTOMS
DIZZINESS: 0
FEVER: 0
HEADACHES: 0
MYALGIAS: 0
VOMITING: 0
DEPRESSION: 0
CHILLS: 0
ABDOMINAL PAIN: 0
NAUSEA: 0
SHORTNESS OF BREATH: 0
BRUISES/BLEEDS EASILY: 1

## 2023-09-30 ASSESSMENT — PAIN DESCRIPTION - PAIN TYPE
TYPE: ACUTE PAIN

## 2023-09-30 NOTE — PROGRESS NOTES
Hospital Medicine Daily Progress Note    Date of Service  9/30/2023    Chief Complaint  Hi Montes De Oca is a 78 y.o. male admitted 9/25/2023 with left arm swelling.    Hospital Course   78 y.o. male w/ PMH of CHF, DVT of the lower extremity, on AC, BPH, HTN, HLD, CAD with stent, PPP who presented 9/25/2023 with worsening left arm swelling. Patient sttes that he had been having worsening left arm swelling for over a month now. He had a caretaker come visit him today and told him to go to [facility]. CT imaging was done and demonstrated a clot of the left internal jugular, subclavian and axillary vein. Outside facility contacted our Vascular surgeon who stated that there is no surgical intervention to be done. They recommend continue his anticoagulation.  On chart review, patient had similar occlusions on ultrasound on May 2023.  He was started on apixaban at that time.  Patient states that he has been compliant with his medication.  However he has stated that over the last month, he has developed worsening swelling.  Denies any other symptoms at this time.    Interval Problem Update  9/26 Patient mildly tachypneic otherwise vital signs stable.  Hemoglobin stable.  INR 1.35 and heparin drip supratherapeutic so has been stopped for now.  Patient has failed outpatient apixaban as his edema and clot have worsened.  Patient reports he has never been on warfarin before.  Discussed what is involved in switching to warfarin including needing INR checks, the therapeutic window and dietary changes, patient is in agreement with this switching to warfarin.  Nutrition consulted to help with diet education.  I have placed a referral for the anticoagulation clinic as he will need outpatient follow-up.  On chart review patient has history of bleeding and at one point was taken off his Eliquis, due to his high risk of rebleeding will monitor for now while inpatient bridging. Will also change heparin ggt to  Lovenox therapeutic dosing in anticipation for home.      9/26 Vitals and hemoglobin stable. INR 1.3. Patient with mild bruising on right arm, per nursing is stable. Patient denies hematuria or melena. Right arm with worsening swelling, denies pain. Will ACE wrap or place compression stocking on left arm to help with edema. Patient asking if clot can be removed, discussed that no indication for thrombectomy still with good pulses, no pain and no skin changes. Nutrition did education with patient today. Discussed with social work, tentative plan for dc on Friday if H&H stable needs AC clinic appointment set up for Monday.     9/27 INR 1.32, Hb 10. No signs of bleeding. Discussed with pharmacy if still no bleeding tomorrow we will plan to discharge patient on a Lovenox bridge with an anticoagulation clinic on Monday.  Dosing to be determined tomorrow after INR check.    9/28 INR 1.43 today.  Discussed with pharmacy recommended discharge on warfarin 5 mg daily.  Patient with new WBC 13.3, he denies any new complaints.  Procalcitonin negative, UA negative.  Reviewed chest x-ray showed bibasilar underinflation, radiology read says superimposed pneumonia not excluded.  Start Augmentin for 5 days.  Patient is medically clear for discharge however discussed with case management and they have been unable to get patient appointment for an INR check thus far.  He will need his INR check set up prior to discharge for safe discharge planning.  He has no new complaints, he is anxious to go home.    9/29 INR 1.84. Hemoglobin stable. Mild increase in WBC 15.7. Vitals stable.  Patient denies any cough, shortness of breath or any new complaints.  Overall the swelling of his left arm has improved since admission.  He is anxious to DC home.  Discussed with social work they will try calling again to see if he can get his INR checked outpatient so that he can DC.    I have discussed this patient's plan of care and discharge plan at IDT  rounds today with Case Management, Nursing, Nursing leadership, and other members of the IDT team.    Consultants/Specialty  N/A    Code Status  Full Code    Disposition  The patient is medically cleared for discharge to home or a post-acute facility.  Anticipate discharge to: home with close outpatient follow-up    I have placed the appropriate orders for post-discharge needs.    Review of Systems  Review of Systems   Constitutional:  Negative for chills and fever.   Respiratory:  Negative for shortness of breath.    Cardiovascular:  Negative for chest pain.   Gastrointestinal:  Negative for abdominal pain, nausea and vomiting.   Genitourinary:  Negative for dysuria.   Musculoskeletal:  Negative for myalgias.        Left arm swelling    Skin:  Negative for rash.   Neurological:  Negative for dizziness and headaches.   Endo/Heme/Allergies:  Bruises/bleeds easily.   Psychiatric/Behavioral:  Negative for depression.    All other systems reviewed and are negative.       Physical Exam  Temp:  [36.9 °C (98.4 °F)-37.3 °C (99.1 °F)] 37.3 °C (99.1 °F)  Pulse:  [60-76] 60  Resp:  [17-19] 19  BP: (129-136)/(59-70) 130/62  SpO2:  [89 %-96 %] 89 %    Physical Exam  Vitals and nursing note reviewed.   Constitutional:       General: He is not in acute distress.  HENT:      Head: Normocephalic and atraumatic.      Ears:      Comments: Hard of hearing   Eyes:      Conjunctiva/sclera: Conjunctivae normal.   Cardiovascular:      Rate and Rhythm: Normal rate and regular rhythm.      Pulses: Normal pulses.   Pulmonary:      Effort: Pulmonary effort is normal. No respiratory distress.      Breath sounds: Normal breath sounds. No wheezing.   Abdominal:      General: Abdomen is flat. There is no distension.      Palpations: Abdomen is soft.      Tenderness: There is no abdominal tenderness.   Musculoskeletal:         General: Swelling (left arm, improving) present. No tenderness. Normal range of motion.      Cervical back: Neck supple.       Right lower leg: No edema.      Left lower leg: No edema.   Skin:     General: Skin is warm and dry.      Findings: Bruising (mild right arm) present. No rash.   Neurological:      General: No focal deficit present.      Mental Status: He is alert and oriented to person, place, and time.      Cranial Nerves: No cranial nerve deficit.   Psychiatric:         Mood and Affect: Mood normal.         Behavior: Behavior normal.         Fluids    Intake/Output Summary (Last 24 hours) at 9/30/2023 1305  Last data filed at 9/30/2023 0900  Gross per 24 hour   Intake --   Output 750 ml   Net -750 ml       Laboratory  Recent Labs     09/28/23  0549 09/29/23  0033 09/30/23  0346   WBC 7.7 13.3* 15.7*   RBC 3.60* 3.58* 3.64*   HEMOGLOBIN 10.5* 10.7* 10.7*   HEMATOCRIT 34.2* 33.5* 33.4*   MCV 95.0 93.6 91.8   MCH 29.2 29.9 29.4   MCHC 30.7* 31.9* 32.0*   RDW 54.9* 55.1* 53.5*   PLATELETCT 607* 671* 707*   MPV 12.0 12.1 12.1     Recent Labs     09/28/23  0549   SODIUM 140   POTASSIUM 3.7   CHLORIDE 103   CO2 24   GLUCOSE 109*   BUN 14   CREATININE 0.91   CALCIUM 8.4*     Recent Labs     09/28/23  0549 09/29/23  0033 09/30/23  0346   INR 1.32* 1.43* 1.84*                 Imaging  DX-CHEST-PORTABLE (1 VIEW)   Final Result      Decreased bibasilar underinflation atelectasis. Superimposed pneumonia not excluded.      IR-US GUIDED PIV   Final Result    Ultrasound-guided PERIPHERAL IV INSERTION performed by    qualified nursing staff as above.           Assessment/Plan  * Arm DVT (deep venous thromboembolism), acute, left (HCC)- (present on admission)  Assessment & Plan  CT imaging shows occlusion of the internal jugular vein, subclavian vein and axillary vein    Patient had an ultrasound done in the past.  May 2023, ultrasound showed: Evidence of acute to subacute occlusive deep venous thrombosis in the    jugular vein, subclavian vein, axillary vein and brachial veins from the  proximal to distal bicep.  Evidence of acute to subacute  occlusive superficial venous thrombosis in  the basilic vein from the proximal to distal bicep.  Evidence of acute to subacute occlusive superficial venous thrombosis in    the cephalic vein from the proximal bicep to the elbow and    Vascular surgery aware - no surgical intervention  Continue lovenox   Patient has failed apixaban therapy, will change to warfarin  -monitor very closely for bleeding, hx of gross hematuria previous on blood thinners   Daily INR, H&H  Dietary consulted for nutrition counseling  Referral placed for outpatient anticoagulation clinic  High risk of bleed, will monitor while bridging on warfarin    Patient medically clear for discharge on warfarin 5 mg with Lovenox bridge, needs INR check appointment prior to discharge    Leukocytosis  Assessment & Plan  WBC 13 today  No signs of sepsis  Work-up shows pneumonia, started augmentin    Pneumonia  Assessment & Plan  New leukocytosis, work-up shows possible basilar pneumonia on x-ray  Start Augmentin  No sepsis    Chronic systolic congestive heart failure (HCC)- (present on admission)  Assessment & Plan  Mildly depressed left ventricular systolic function. The left ventricular ejection fraction is visually estimated to be 45%.Mildly dilated right ventricle. Reduced right ventricular systolic function. Mild tricuspid regurgitation. Right ventricular systolic pressure is  estimated to be 33 mmHg (normal).    Continue with metorprolol, lasix and losartan    AF (atrial fibrillation) (HCC)- (present on admission)  Assessment & Plan  Continue metoprolol  Continue full dose lovenox for bridge   Continue warfarin     NSTEMI (non-ST elevated myocardial infarction), h/o Afib not on anticoag, leukocytosis (HCC)- (present on admission)  Assessment & Plan  5/2023: acute inferior MI late presentation, occluded RCA with collaterals  No interventions done  Continue aspirin and statin    Essential hypertension, benign- (present on admission)  Assessment &  Plan  Continue with metoprolol, losartan, furosemide    Coronary artery disease due to lipid rich plaque- (present on admission)  Assessment & Plan  Continue with aspirin, atorvastatin  Lipid panel with low LDL below goal         VTE prophylaxis: warfarin, therapeutic lovenox     I have performed a physical exam and reviewed and updated ROS and Plan today (9/30/2023). In review of yesterday's note (9/29/2023), there are no changes except as documented above.

## 2023-09-30 NOTE — DISCHARGE PLANNING
Case Management Discharge Planning    Admission Date: 9/25/2023  GMLOS: 3.9  ALOS: 5    6-Clicks ADL Score: 24  6-Clicks Mobility Score: 16  PT and/or OT Eval ordered: Yes  Post-acute Referrals Ordered: Yes      Anticipated Discharge Dispo: Discharge Disposition: Discharged to home/self care (01)  Discharge Address: [address] [facility] [place] [address]    DME Needed: No    Action(s) Taken: JESSICA spoke with Blanche with [facility]. Blanche states she is out of office until Thursday and to try to speak with other manager at [phone] ext [phone]. JESSICA left VM and callback number at this ext regarding pts confirmation of INR checks at [facility].     Escalations Completed: None    Medically Clear: Yes    Next Steps: Pending confirmation for pt that lives in [place].     Barriers to Discharge: INR confirmation from [facility]. Per RNCM, [facility] [place] would take three weeks to set up due to needing to order INR machine.    Is the patient up for discharge tomorrow: Yes

## 2023-09-30 NOTE — PROGRESS NOTES
Assumed care of patient at 1900. Received report from day RN. Patient A&Ox4, on RA, Reporting a pain level of 10/10 in R shoulder and R hand, applied icy hot, pt declines pain medications, will continue to reassess and monitor pain throughout shift. Call light within reach, belongings within reach, Fall precautions in place, bed in lowest position. Patient does not have any other needs at this time.     POC was discussed with patient. All questions were answered. Patient verbalized understanding.

## 2023-09-30 NOTE — CARE PLAN
The patient is Stable - Low risk of patient condition declining or worsening    Shift Goals  Clinical Goals: Monitor labs  Patient Goals: Pain control, comfort    Progress made toward(s) clinical / shift goals:  Pt Hgb/Hct have been stable. Pt has shown no signs of bleeding. Pt stated 10/10 aching and sore pain in his R shoulder and hand, icy hot was applied. When reassessed, pt stated pain decreased and was at an 8/10.    Problem: Knowledge Deficit - Standard  Goal: Patient and family/care givers will demonstrate understanding of plan of care, disease process/condition, diagnostic tests and medications  Outcome: Progressing     Problem: Fall Risk  Goal: Patient will remain free from falls  Outcome: Progressing     Problem: Risk for Bleeding  Goal: Patient will take measures to prevent bleeding and recognizes signs of bleeding that need to be reported immediately to a health care professional  Outcome: Progressing  Goal: Patient will not experience bleeding as evidenced by normal blood pressure, stable hematocrit and hemoglobin levels and desired ranges for coagulation profiles  Outcome: Progressing     Problem: Pain - Standard  Goal: Alleviation of pain or a reduction in pain to the patient’s comfort goal  Outcome: Progressing       Patient is not progressing towards the following goals:

## 2023-09-30 NOTE — PROGRESS NOTES
"Inpatient Anticoagulation Service Note for 9/30/2023      Reason for Anticoagulation: Deep Vein Thrombosis      Hemoglobin Value: (!) 10.7  Hematocrit Value: (!) 33.4  Lab Platelet Value: (!) 707  Target INR: 2.0 to 3.0    INR from last 7 days       Date/Time INR Value    09/30/23 0346 1.84    09/29/23 0033 1.43    09/28/23 0549 1.32    09/27/23 0432 1.33    09/26/23 1106 1.35          Dose from last 7 days       Date/Time Dose (mg)    09/30/23 1355 5    09/29/23 1405 7.5    09/28/23 1120 5    09/27/23 1146 5    09/26/23 1259 5          Average Dose (mg): New start  Significant Interactions: Antiplatelet Medications, Antibiotics  Bridge Therapy: Yes  Date of Last VTE Event: May 2023  Bridge Therapy Start Date: 09/26/23  Days of Overlap Therapy: 4   INR Value Greater than 2 Prior to Discontinuation of Parenteral Anticoagulation: Not Applicable   Reversal Agent Administered: Not Applicable    Comments: Patient continues on new start warfarin for acute/recurrent VTE w/ Lovenox bridge. INR sub-therapeutic. CBC stable and no signs of active bleeding. DDI noted above, none new. Cardiac diet ordered, variable intake per chart reivew (% of meals consumed over the past few days). Previous warfarin dosses received. INR sub-therapeutic, up-trending. Likely delayed response to warfarin, have not seen effects of \"boost dose\" adminsitered yesterday - will continue warfarin 5mg po daily with Lovenox bridge and repeat INR tomorrow. Case management working to set up INR checks for outpatient monitoring, Lovenox to continue until 5 days of overlap and INR within goal (2-3); may be able to stop Lovenox in the next few days pending INR trend. Pharmacy will continue to follow.    Plan:  warfarin 5mg po daily w/ Lovenox 1mg/kg SC bid bridge  Education Material Provided?: No (will need prior to discharge)    Pharmacist suggested discharge dosing: tentative plan for warfarin 5mg po daily with repeat INR within 2-3 days of discharge   "   Vanda Reyna, PharmD, BCCCP

## 2023-09-30 NOTE — CARE PLAN
The patient is Stable - Low risk of patient condition declining or worsening    Shift Goals  Clinical Goals: Monitor labs, discharge planning  Patient Goals: Comfort    Progress made toward(s) clinical / shift goals: Pt's INR getting closer to goal range, CM working on setting up INR checks outpatient so that pt can discharge safely.      Problem: Knowledge Deficit - Standard  Goal: Patient and family/care givers will demonstrate understanding of plan of care, disease process/condition, diagnostic tests and medications  Outcome: Progressing     Problem: Fall Risk  Goal: Patient will remain free from falls  Outcome: Progressing     Problem: Pain - Standard  Goal: Alleviation of pain or a reduction in pain to the patient’s comfort goal  Outcome: Progressing       Patient is not progressing towards the following goals:

## 2023-10-01 LAB
ANION GAP SERPL CALC-SCNC: 9 MMOL/L (ref 7–16)
BUN SERPL-MCNC: 15 MG/DL (ref 8–22)
CALCIUM SERPL-MCNC: 7.8 MG/DL (ref 8.5–10.5)
CHLORIDE SERPL-SCNC: 102 MMOL/L (ref 96–112)
CO2 SERPL-SCNC: 28 MMOL/L (ref 20–33)
CREAT SERPL-MCNC: 0.74 MG/DL (ref 0.5–1.4)
ERYTHROCYTE [DISTWIDTH] IN BLOOD BY AUTOMATED COUNT: 54.6 FL (ref 35.9–50)
GFR SERPLBLD CREATININE-BSD FMLA CKD-EPI: 91 ML/MIN/1.73 M 2
GLUCOSE SERPL-MCNC: 138 MG/DL (ref 65–99)
HCT VFR BLD AUTO: 30.7 % (ref 42–52)
HGB BLD-MCNC: 9.5 G/DL (ref 14–18)
INR PPP: 2.89 (ref 0.87–1.13)
MCH RBC QN AUTO: 29.2 PG (ref 27–33)
MCHC RBC AUTO-ENTMCNC: 30.9 G/DL (ref 32.3–36.5)
MCV RBC AUTO: 94.5 FL (ref 81.4–97.8)
PLATELET # BLD AUTO: 656 K/UL (ref 164–446)
PMV BLD AUTO: 12.2 FL (ref 9–12.9)
POTASSIUM SERPL-SCNC: 3.2 MMOL/L (ref 3.6–5.5)
PROTHROMBIN TIME: 30.7 SEC (ref 12–14.6)
RBC # BLD AUTO: 3.25 M/UL (ref 4.7–6.1)
SODIUM SERPL-SCNC: 139 MMOL/L (ref 135–145)
WBC # BLD AUTO: 15.4 K/UL (ref 4.8–10.8)

## 2023-10-01 PROCEDURE — 700102 HCHG RX REV CODE 250 W/ 637 OVERRIDE(OP): Performed by: INTERNAL MEDICINE

## 2023-10-01 PROCEDURE — 700102 HCHG RX REV CODE 250 W/ 637 OVERRIDE(OP): Performed by: STUDENT IN AN ORGANIZED HEALTH CARE EDUCATION/TRAINING PROGRAM

## 2023-10-01 PROCEDURE — 36415 COLL VENOUS BLD VENIPUNCTURE: CPT

## 2023-10-01 PROCEDURE — 80048 BASIC METABOLIC PNL TOTAL CA: CPT

## 2023-10-01 PROCEDURE — 99232 SBSQ HOSP IP/OBS MODERATE 35: CPT | Performed by: STUDENT IN AN ORGANIZED HEALTH CARE EDUCATION/TRAINING PROGRAM

## 2023-10-01 PROCEDURE — A9270 NON-COVERED ITEM OR SERVICE: HCPCS | Performed by: STUDENT IN AN ORGANIZED HEALTH CARE EDUCATION/TRAINING PROGRAM

## 2023-10-01 PROCEDURE — 700111 HCHG RX REV CODE 636 W/ 250 OVERRIDE (IP): Performed by: INTERNAL MEDICINE

## 2023-10-01 PROCEDURE — 85610 PROTHROMBIN TIME: CPT

## 2023-10-01 PROCEDURE — 85027 COMPLETE CBC AUTOMATED: CPT

## 2023-10-01 PROCEDURE — A9270 NON-COVERED ITEM OR SERVICE: HCPCS | Performed by: INTERNAL MEDICINE

## 2023-10-01 PROCEDURE — 770006 HCHG ROOM/CARE - MED/SURG/GYN SEMI*

## 2023-10-01 RX ORDER — WARFARIN SODIUM 2.5 MG/1
2.5 TABLET ORAL
Status: COMPLETED | OUTPATIENT
Start: 2023-10-01 | End: 2023-10-01

## 2023-10-01 RX ORDER — WARFARIN SODIUM 5 MG/1
5 TABLET ORAL DAILY
Status: DISCONTINUED | OUTPATIENT
Start: 2023-10-02 | End: 2023-10-04

## 2023-10-01 RX ORDER — ACETAMINOPHEN 500 MG
1000 TABLET ORAL 3 TIMES DAILY
Status: DISCONTINUED | OUTPATIENT
Start: 2023-10-01 | End: 2023-10-09 | Stop reason: HOSPADM

## 2023-10-01 RX ORDER — POTASSIUM CHLORIDE 20 MEQ/1
40 TABLET, EXTENDED RELEASE ORAL DAILY
Status: DISCONTINUED | OUTPATIENT
Start: 2023-10-01 | End: 2023-10-09 | Stop reason: HOSPADM

## 2023-10-01 RX ADMIN — POTASSIUM CHLORIDE 40 MEQ: 1500 TABLET, EXTENDED RELEASE ORAL at 15:53

## 2023-10-01 RX ADMIN — ENOXAPARIN SODIUM 100 MG: 100 INJECTION SUBCUTANEOUS at 05:50

## 2023-10-01 RX ADMIN — FUROSEMIDE 40 MG: 40 TABLET ORAL at 08:34

## 2023-10-01 RX ADMIN — METOPROLOL SUCCINATE 75 MG: 25 TABLET, EXTENDED RELEASE ORAL at 17:41

## 2023-10-01 RX ADMIN — DOCUSATE SODIUM 50 MG AND SENNOSIDES 8.6 MG 2 TABLET: 8.6; 5 TABLET, FILM COATED ORAL at 17:42

## 2023-10-01 RX ADMIN — AMOXICILLIN AND CLAVULANATE POTASSIUM 1 TABLET: 875; 125 TABLET, FILM COATED ORAL at 05:48

## 2023-10-01 RX ADMIN — DOCUSATE SODIUM 50 MG AND SENNOSIDES 8.6 MG 2 TABLET: 8.6; 5 TABLET, FILM COATED ORAL at 05:50

## 2023-10-01 RX ADMIN — AMOXICILLIN AND CLAVULANATE POTASSIUM 1 TABLET: 875; 125 TABLET, FILM COATED ORAL at 17:41

## 2023-10-01 RX ADMIN — WARFARIN SODIUM 2.5 MG: 2.5 TABLET ORAL at 17:42

## 2023-10-01 RX ADMIN — ASPIRIN 81 MG: 81 TABLET, COATED ORAL at 05:50

## 2023-10-01 RX ADMIN — TAMSULOSIN HYDROCHLORIDE 0.4 MG: 0.4 CAPSULE ORAL at 05:49

## 2023-10-01 RX ADMIN — MAGNESIUM HYDROXIDE 30 ML: 1200 LIQUID ORAL at 05:50

## 2023-10-01 RX ADMIN — LOSARTAN POTASSIUM 50 MG: 50 TABLET, FILM COATED ORAL at 05:49

## 2023-10-01 RX ADMIN — METOPROLOL SUCCINATE 75 MG: 25 TABLET, EXTENDED RELEASE ORAL at 05:49

## 2023-10-01 RX ADMIN — ATORVASTATIN CALCIUM 80 MG: 40 TABLET, FILM COATED ORAL at 17:41

## 2023-10-01 RX ADMIN — FINASTERIDE 5 MG: 5 TABLET, FILM COATED ORAL at 05:48

## 2023-10-01 RX ADMIN — ACETAMINOPHEN 1000 MG: 500 TABLET, FILM COATED ORAL at 17:45

## 2023-10-01 ASSESSMENT — ENCOUNTER SYMPTOMS
HEADACHES: 0
DIZZINESS: 0
FEVER: 0
NAUSEA: 0
ABDOMINAL PAIN: 0
MYALGIAS: 0
BRUISES/BLEEDS EASILY: 1
CHILLS: 0
SHORTNESS OF BREATH: 0
DEPRESSION: 0
VOMITING: 0

## 2023-10-01 ASSESSMENT — PAIN DESCRIPTION - PAIN TYPE
TYPE: ACUTE PAIN

## 2023-10-01 NOTE — PROGRESS NOTES
Inpatient Anticoagulation Service Note for 10/1/2023      Reason for Anticoagulation: Deep Vein Thrombosis     Hemoglobin Value: (!) 9.5  Hematocrit Value: (!) 30.7  Lab Platelet Value: (!) 656  Target INR: 2.0 to 3.0    INR from last 7 days       Date/Time INR Value    10/01/23 0617 2.89    09/30/23 0346 1.84    09/29/23 0033 1.43    09/28/23 0549 1.32    09/27/23 0432 1.33    09/26/23 1106 1.35          Dose from last 7 days       Date/Time Dose (mg)    10/01/23 1158 2.5    09/30/23 1355 5    09/29/23 1405 7.5    09/28/23 1120 5    09/27/23 1146 5    09/26/23 1259 5          Average Dose (mg): 4 (New start this admission)    Significant Interactions: Antiplatelet Medications, Antibiotics    Bridge Therapy: No  Date of Last VTE Event:  (May 2023)  Bridge Therapy Start Date: 09/26/23  Days of Overlap Therapy: 5   INR Value Greater than 2 Prior to Discontinuation of Parenteral Anticoagulation: Yes     Reversal Agent Administered: Not Applicable    Comments: Large increase in INR overnight following increased warfarin dose on 9/29. Patient's warfarin-drug and dietary interactions stable since last evaluation. Enoxaparin bridge therapy discontinued after morning dose d/t bleeding risk with current INR. Hgb down slightly but no documented concerns for acute bleeding identified. Patient awaiting outpatient INR monitoring clearance for discharge. Will give conservative warfarin dose today and continue to monitor daily coags to guide further dose adjustments.    Plan:  Warfarin 2.5 mg PO today. INR in AM.    Education Material Provided?: No (will need prior to discharge; attempted 9/29)    Pharmacist suggested discharge dosing: Warfarin 2.5 mg PO x1 today, then warfarin 5 mg PO daily starting 10/2 with follow-up INR within 48-72 hours of discharge.      Pharmacy will continue to follow.     Pallavi Herrera, PharmD, BCCCP

## 2023-10-01 NOTE — PROGRESS NOTES
Hospital Medicine Daily Progress Note    Date of Service  10/1/2023    Chief Complaint  Hi Montes De Oca is a 78 y.o. male admitted 9/25/2023 with left arm swelling.    Hospital Course   78 y.o. male w/ PMH of CHF, DVT of the lower extremity, on AC, BPH, HTN, HLD, CAD with stent, PPP who presented 9/25/2023 with worsening left arm swelling. Patient sttes that he had been having worsening left arm swelling for over a month now. He had a caretaker come visit him today and told him to go to [facility], [place]. CT imaging was done and demonstrated a clot of the left internal jugular, subclavian and axillary vein. Outside facility contacted our Vascular surgeon who stated that there is no surgical intervention to be done. They recommend continue his anticoagulation.  On chart review, patient had similar occlusions on ultrasound on May 2023.  He was started on apixaban at that time.  Patient states that he has been compliant with his medication.  However he has stated that over the last month, he has developed worsening swelling.  Denies any other symptoms at this time.    9/26 Patient mildly tachypneic otherwise vital signs stable.  Hemoglobin stable.  INR 1.35 and heparin drip supratherapeutic so has been stopped for now.  Patient has failed outpatient apixaban as his edema and clot have worsened.  Patient reports he has never been on warfarin before.  Discussed what is involved in switching to warfarin including needing INR checks, the therapeutic window and dietary changes, patient is in agreement with this switching to warfarin.  Nutrition consulted to help with diet education.  I have placed a referral for the anticoagulation clinic as he will need outpatient follow-up.  On chart review patient has history of bleeding and at one point was taken off his Eliquis, due to his high risk of rebleeding will monitor for now while inpatient bridging. Will also change heparin ggt to Lovenox therapeutic dosing in  anticipation for home.      9/26 Vitals and hemoglobin stable. INR 1.3. Patient with mild bruising on right arm, per nursing is stable. Patient denies hematuria or melena. Right arm with worsening swelling, denies pain. Will ACE wrap or place compression stocking on left arm to help with edema. Patient asking if clot can be removed, discussed that no indication for thrombectomy still with good pulses, no pain and no skin changes. Nutrition did education with patient today. Discussed with social work, tentative plan for dc on Friday if H&H stable needs AC clinic appointment set up for Monday.     9/27 INR 1.32, Hb 10. No signs of bleeding. Discussed with pharmacy if still no bleeding tomorrow we will plan to discharge patient on a Lovenox bridge with an anticoagulation clinic on Monday.  Dosing to be determined tomorrow after INR check.    9/28 INR 1.43 today.  Discussed with pharmacy recommended discharge on warfarin 5 mg daily.  Patient with new WBC 13.3, he denies any new complaints.  Procalcitonin negative, UA negative.  Reviewed chest x-ray showed bibasilar underinflation, radiology read says superimposed pneumonia not excluded.  Start Augmentin for 5 days.  Patient is medically clear for discharge however discussed with case management and they have been unable to get patient appointment for an INR check thus far.  He will need his INR check set up prior to discharge for safe discharge planning.  He has no new complaints, he is anxious to go home.    9/29 INR 1.84. Hemoglobin stable. Mild increase in WBC 15.7. Vitals stable.  Patient denies any cough, shortness of breath or any new complaints.  Overall the swelling of his left arm has improved since admission.  He is anxious to DC home.  Discussed with social work they will try calling again to see if he can get his INR checked outpatient so that he can DC.    Interval Problem Update  Evaluated at bedside  In no acute distress this morning, laying in  bed  Stable vitals and saturating well room air  Leukocytosis trending down  Continue Augmentin  He completed anticoagulation bridging and on warfarin  Patient will need anticoagulation clinic set up prior to considering discharge  Labs on AM    I have discussed this patient's plan of care and discharge plan at IDT rounds today with Case Management, Nursing, Nursing leadership, and other members of the IDT team.    Consultants/Specialty  N/A    Code Status  Full Code    Disposition  The patient is medically cleared for discharge to home or a post-acute facility.  Anticipate discharge to: home with close outpatient follow-up    I have placed the appropriate orders for post-discharge needs.    Review of Systems  Review of Systems   Constitutional:  Negative for chills and fever.   Respiratory:  Negative for shortness of breath.    Cardiovascular:  Negative for chest pain.   Gastrointestinal:  Negative for abdominal pain, nausea and vomiting.   Genitourinary:  Negative for dysuria.   Musculoskeletal:  Negative for myalgias.        Left arm swelling    Skin:  Negative for rash.   Neurological:  Negative for dizziness and headaches.   Endo/Heme/Allergies:  Bruises/bleeds easily.   Psychiatric/Behavioral:  Negative for depression.    All other systems reviewed and are negative.       Physical Exam  Temp:  [36.3 °C (97.4 °F)-37 °C (98.6 °F)] 36.8 °C (98.3 °F)  Pulse:  [60-85] 79  Resp:  [18-20] 20  BP: (122-146)/(48-72) 122/48  SpO2:  [90 %-92 %] 90 %    Physical Exam  Vitals and nursing note reviewed.   Constitutional:       General: He is not in acute distress.  HENT:      Head: Normocephalic and atraumatic.      Ears:      Comments: Hard of hearing   Eyes:      Conjunctiva/sclera: Conjunctivae normal.   Cardiovascular:      Rate and Rhythm: Normal rate and regular rhythm.      Pulses: Normal pulses.   Pulmonary:      Effort: Pulmonary effort is normal. No respiratory distress.      Breath sounds: Normal breath sounds.  No wheezing.   Abdominal:      General: Abdomen is flat. There is no distension.      Palpations: Abdomen is soft.      Tenderness: There is no abdominal tenderness.   Musculoskeletal:         General: Swelling (left arm, improving) present. No tenderness. Normal range of motion.      Cervical back: Neck supple.      Right lower leg: No edema.      Left lower leg: No edema.   Skin:     General: Skin is warm and dry.      Findings: Bruising (mild right arm) present. No rash.   Neurological:      General: No focal deficit present.      Mental Status: He is alert and oriented to person, place, and time.      Cranial Nerves: No cranial nerve deficit.   Psychiatric:         Mood and Affect: Mood normal.         Behavior: Behavior normal.         Fluids    Intake/Output Summary (Last 24 hours) at 10/1/2023 1315  Last data filed at 10/1/2023 0800  Gross per 24 hour   Intake --   Output 125 ml   Net -125 ml       Laboratory  Recent Labs     09/29/23  0033 09/30/23  0346 10/01/23  0617   WBC 13.3* 15.7* 15.4*   RBC 3.58* 3.64* 3.25*   HEMOGLOBIN 10.7* 10.7* 9.5*   HEMATOCRIT 33.5* 33.4* 30.7*   MCV 93.6 91.8 94.5   MCH 29.9 29.4 29.2   MCHC 31.9* 32.0* 30.9*   RDW 55.1* 53.5* 54.6*   PLATELETCT 671* 707* 656*   MPV 12.1 12.1 12.2     Recent Labs     10/01/23  0617   SODIUM 139   POTASSIUM 3.2*   CHLORIDE 102   CO2 28   GLUCOSE 138*   BUN 15   CREATININE 0.74   CALCIUM 7.8*     Recent Labs     09/29/23  0033 09/30/23  0346 10/01/23  0617   INR 1.43* 1.84* 2.89*                 Imaging  DX-CHEST-PORTABLE (1 VIEW)   Final Result      Decreased bibasilar underinflation atelectasis. Superimposed pneumonia not excluded.      IR-US GUIDED PIV   Final Result    Ultrasound-guided PERIPHERAL IV INSERTION performed by    qualified nursing staff as above.           Assessment/Plan  * Arm DVT (deep venous thromboembolism), acute, left (HCC)- (present on admission)  Assessment & Plan  CT imaging shows occlusion of the internal jugular  vein, subclavian vein and axillary vein    Patient had an ultrasound done in the past.  May 2023, ultrasound showed: Evidence of acute to subacute occlusive deep venous thrombosis in the    jugular vein, subclavian vein, axillary vein and brachial veins from the  proximal to distal bicep.  Evidence of acute to subacute occlusive superficial venous thrombosis in  the basilic vein from the proximal to distal bicep.  Evidence of acute to subacute occlusive superficial venous thrombosis in    the cephalic vein from the proximal bicep to the elbow and    Vascular surgery aware - no surgical intervention  Continue lovenox   Patient has failed apixaban therapy, will change to warfarin  -monitor very closely for bleeding, hx of gross hematuria previous on blood thinners   Daily INR, H&H  Dietary consulted for nutrition counseling  Referral placed for outpatient anticoagulation clinic  High risk of bleed, will monitor while bridging on warfarin    Patient medically clear for discharge on warfarin 5 mg with Lovenox bridge, needs INR check appointment prior to discharge    Leukocytosis  Assessment & Plan  WBC 13 today  No signs of sepsis  Work-up shows pneumonia, started augmentin    Pneumonia  Assessment & Plan  New leukocytosis, work-up shows possible basilar pneumonia on x-ray  Start Augmentin  No sepsis    Chronic systolic congestive heart failure (HCC)- (present on admission)  Assessment & Plan  Mildly depressed left ventricular systolic function. The left ventricular ejection fraction is visually estimated to be 45%.Mildly dilated right ventricle. Reduced right ventricular systolic function. Mild tricuspid regurgitation. Right ventricular systolic pressure is  estimated to be 33 mmHg (normal).    Continue with metorprolol, lasix and losartan    AF (atrial fibrillation) (HCC)- (present on admission)  Assessment & Plan  Continue metoprolol  Continue full dose lovenox for bridge   Continue warfarin     NSTEMI (non-ST  elevated myocardial infarction), h/o Afib not on anticoag, leukocytosis (HCC)- (present on admission)  Assessment & Plan  5/2023: acute inferior MI late presentation, occluded RCA with collaterals  No interventions done  Continue aspirin and statin    Essential hypertension, benign- (present on admission)  Assessment & Plan  Continue with metoprolol, losartan, furosemide    Coronary artery disease due to lipid rich plaque- (present on admission)  Assessment & Plan  Continue with aspirin, atorvastatin  Lipid panel with low LDL below goal         VTE prophylaxis: warfarin, therapeutic lovenox     I have performed a physical exam and reviewed and updated ROS and Plan today (10/1/2023). In review of yesterday's note (9/30/2023), there are no changes except as documented above.

## 2023-10-01 NOTE — PROGRESS NOTES
"Patient son called to get information on his father,when I answered the phone he stated very loudly that he \"wanted to speak to the medical director for [facility]\" because he wanted to get information regarding his father, while he was speaking that sentence I told the  not to send the tubes back because we were sending them to central supply, he then taylor asked me if he should wait until I was done with my conversation, I said for him to go ahead as I was walking to his fathers room to ask him for permission to give his son information, during me asking the patient if it was ok to speak to the son the son very loudly and rudely was yelling that he didn't want to talk to his father, he wanted to speak to his nurse. I asked him to stop yelling into my ear and explained what I was doing in speaking to his father, after getting permission from the father I asked the son what he wanted to know and he said \"well how about the reason he was admitted to the hospital?' I explained the diagnosis was right arm DVT, he then said \"why is his father now saying his arm was more swollen and he couldn't move it?\" I explained that it was more swollen because of dependent swelling, and he said \"Oh, I see, it's his fault\" I explained that is what happens when somebody does not keep the extremity elevated, he snapped \"You are not very helpful\" and hung up the phone.  "

## 2023-10-01 NOTE — CARE PLAN
The patient is Stable - Low risk of patient condition declining or worsening    Shift Goals  Clinical Goals: monitor labs.  Patient Goals: comfort.    Progress made toward(s) clinical / shift goals:  patient verbalizes understanding of plan of care. Patient declines pain during this shift. Using the bedside urinal during the  night. Ice provided for Rt hand swelling. Diuretic provided as per MAR. Pt uses call light appropriately.   Problem: Knowledge Deficit - Standard  Goal: Patient and family/care givers will demonstrate understanding of plan of care, disease process/condition, diagnostic tests and medications  Outcome: Progressing     Problem: Pain - Standard  Goal: Alleviation of pain or a reduction in pain to the patient’s comfort goal  Outcome: Progressing       Patient is not progressing towards the following goals:

## 2023-10-01 NOTE — PROGRESS NOTES
Assumed care of patient 0700. Received Report from [facility] nurse. Patient A&O X 4, on RA, Reporting a pain level of 0. Call light within reach, belongings within reach, Fall precautions in place, and bed alarm is on and bed in lowest position. Patient does not have any other needs at this time.

## 2023-10-01 NOTE — PROGRESS NOTES
Received report from day shift RN, assumed pt care. Upon assessment pt AAOX4, declines pain. Water provided as per request. Bedside urinal emptied. Bed is locked and in low position, call bell within reach. Will continue to monitor.

## 2023-10-01 NOTE — DISCHARGE PLANNING
1545 RNCM left a vm for Shelbi [phone] ext [phone] regarding establishing pt for INR checks. Pending return call.

## 2023-10-02 ENCOUNTER — APPOINTMENT (OUTPATIENT)
Dept: RADIOLOGY | Facility: MEDICAL CENTER | Age: 78
DRG: 299 | End: 2023-10-02
Attending: STUDENT IN AN ORGANIZED HEALTH CARE EDUCATION/TRAINING PROGRAM
Payer: MEDICARE

## 2023-10-02 DIAGNOSIS — I48.0 PAROXYSMAL ATRIAL FIBRILLATION (HCC): ICD-10-CM

## 2023-10-02 LAB
ANION GAP SERPL CALC-SCNC: 8 MMOL/L (ref 7–16)
BUN SERPL-MCNC: 22 MG/DL (ref 8–22)
CALCIUM SERPL-MCNC: 7.8 MG/DL (ref 8.5–10.5)
CHLORIDE SERPL-SCNC: 103 MMOL/L (ref 96–112)
CO2 SERPL-SCNC: 28 MMOL/L (ref 20–33)
CREAT SERPL-MCNC: 0.9 MG/DL (ref 0.5–1.4)
ERYTHROCYTE [DISTWIDTH] IN BLOOD BY AUTOMATED COUNT: 54.9 FL (ref 35.9–50)
GFR SERPLBLD CREATININE-BSD FMLA CKD-EPI: 86 ML/MIN/1.73 M 2
GLUCOSE SERPL-MCNC: 125 MG/DL (ref 65–99)
HCT VFR BLD AUTO: 27.8 % (ref 42–52)
HGB BLD-MCNC: 8.8 G/DL (ref 14–18)
INR PPP: 2.88 (ref 0.87–1.13)
MCH RBC QN AUTO: 29.5 PG (ref 27–33)
MCHC RBC AUTO-ENTMCNC: 31.7 G/DL (ref 32.3–36.5)
MCV RBC AUTO: 93.3 FL (ref 81.4–97.8)
PLATELET # BLD AUTO: 629 K/UL (ref 164–446)
PMV BLD AUTO: 11.9 FL (ref 9–12.9)
POTASSIUM SERPL-SCNC: 4 MMOL/L (ref 3.6–5.5)
PROTHROMBIN TIME: 30.6 SEC (ref 12–14.6)
RBC # BLD AUTO: 2.98 M/UL (ref 4.7–6.1)
SODIUM SERPL-SCNC: 139 MMOL/L (ref 135–145)
WBC # BLD AUTO: 12.4 K/UL (ref 4.8–10.8)

## 2023-10-02 PROCEDURE — 85610 PROTHROMBIN TIME: CPT

## 2023-10-02 PROCEDURE — A9270 NON-COVERED ITEM OR SERVICE: HCPCS | Performed by: STUDENT IN AN ORGANIZED HEALTH CARE EDUCATION/TRAINING PROGRAM

## 2023-10-02 PROCEDURE — 80048 BASIC METABOLIC PNL TOTAL CA: CPT

## 2023-10-02 PROCEDURE — 700102 HCHG RX REV CODE 250 W/ 637 OVERRIDE(OP): Performed by: INTERNAL MEDICINE

## 2023-10-02 PROCEDURE — 36415 COLL VENOUS BLD VENIPUNCTURE: CPT

## 2023-10-02 PROCEDURE — 700102 HCHG RX REV CODE 250 W/ 637 OVERRIDE(OP): Performed by: STUDENT IN AN ORGANIZED HEALTH CARE EDUCATION/TRAINING PROGRAM

## 2023-10-02 PROCEDURE — 93971 EXTREMITY STUDY: CPT | Mod: 26,RT | Performed by: INTERNAL MEDICINE

## 2023-10-02 PROCEDURE — 99232 SBSQ HOSP IP/OBS MODERATE 35: CPT | Performed by: STUDENT IN AN ORGANIZED HEALTH CARE EDUCATION/TRAINING PROGRAM

## 2023-10-02 PROCEDURE — 85027 COMPLETE CBC AUTOMATED: CPT

## 2023-10-02 PROCEDURE — A9270 NON-COVERED ITEM OR SERVICE: HCPCS | Performed by: INTERNAL MEDICINE

## 2023-10-02 PROCEDURE — 93971 EXTREMITY STUDY: CPT | Mod: RT

## 2023-10-02 PROCEDURE — 770006 HCHG ROOM/CARE - MED/SURG/GYN SEMI*

## 2023-10-02 RX ORDER — WARFARIN SODIUM 5 MG/1
5 TABLET ORAL DAILY
Qty: 30 TABLET | Refills: 3 | Status: ACTIVE | OUTPATIENT
Start: 2023-10-02 | End: 2023-10-08

## 2023-10-02 RX ORDER — ACETAMINOPHEN 500 MG
1000 TABLET ORAL 3 TIMES DAILY PRN
Qty: 21 TABLET | Refills: 0 | Status: SHIPPED | OUTPATIENT
Start: 2023-10-02 | End: 2023-10-09

## 2023-10-02 RX ORDER — AMOXICILLIN AND CLAVULANATE POTASSIUM 875; 125 MG/1; MG/1
1 TABLET, FILM COATED ORAL EVERY 12 HOURS
Qty: 4 TABLET | Refills: 0 | Status: ACTIVE | OUTPATIENT
Start: 2023-10-02 | End: 2023-10-04

## 2023-10-02 RX ADMIN — POTASSIUM CHLORIDE 40 MEQ: 1500 TABLET, EXTENDED RELEASE ORAL at 04:52

## 2023-10-02 RX ADMIN — METOPROLOL SUCCINATE 75 MG: 25 TABLET, EXTENDED RELEASE ORAL at 04:52

## 2023-10-02 RX ADMIN — FUROSEMIDE 40 MG: 40 TABLET ORAL at 04:53

## 2023-10-02 RX ADMIN — LOSARTAN POTASSIUM 50 MG: 50 TABLET, FILM COATED ORAL at 04:53

## 2023-10-02 RX ADMIN — DOCUSATE SODIUM 50 MG AND SENNOSIDES 8.6 MG 2 TABLET: 8.6; 5 TABLET, FILM COATED ORAL at 18:00

## 2023-10-02 RX ADMIN — ATORVASTATIN CALCIUM 80 MG: 40 TABLET, FILM COATED ORAL at 18:00

## 2023-10-02 RX ADMIN — METOPROLOL SUCCINATE 75 MG: 25 TABLET, EXTENDED RELEASE ORAL at 18:00

## 2023-10-02 RX ADMIN — WARFARIN SODIUM 5 MG: 5 TABLET ORAL at 18:00

## 2023-10-02 RX ADMIN — DOCUSATE SODIUM 50 MG AND SENNOSIDES 8.6 MG 2 TABLET: 8.6; 5 TABLET, FILM COATED ORAL at 04:51

## 2023-10-02 RX ADMIN — AMOXICILLIN AND CLAVULANATE POTASSIUM 1 TABLET: 875; 125 TABLET, FILM COATED ORAL at 18:00

## 2023-10-02 RX ADMIN — FINASTERIDE 5 MG: 5 TABLET, FILM COATED ORAL at 04:53

## 2023-10-02 RX ADMIN — AMOXICILLIN AND CLAVULANATE POTASSIUM 1 TABLET: 875; 125 TABLET, FILM COATED ORAL at 04:51

## 2023-10-02 RX ADMIN — TAMSULOSIN HYDROCHLORIDE 0.4 MG: 0.4 CAPSULE ORAL at 04:51

## 2023-10-02 RX ADMIN — ACETAMINOPHEN 1000 MG: 500 TABLET, FILM COATED ORAL at 14:19

## 2023-10-02 RX ADMIN — ASPIRIN 81 MG: 81 TABLET, COATED ORAL at 04:51

## 2023-10-02 RX ADMIN — ACETAMINOPHEN 1000 MG: 500 TABLET, FILM COATED ORAL at 20:34

## 2023-10-02 ASSESSMENT — ENCOUNTER SYMPTOMS
NAUSEA: 0
SHORTNESS OF BREATH: 0
DIZZINESS: 0
MYALGIAS: 0
BRUISES/BLEEDS EASILY: 1
CHILLS: 0
FEVER: 0
DEPRESSION: 0
HEADACHES: 0
ABDOMINAL PAIN: 0
VOMITING: 0

## 2023-10-02 ASSESSMENT — PAIN DESCRIPTION - PAIN TYPE
TYPE: ACUTE PAIN
TYPE: ACUTE PAIN

## 2023-10-02 NOTE — CARE PLAN
The patient is Stable - Low risk of patient condition declining or worsening    Shift Goals  Clinical Goals: Monitor vital signs, monitor for active bleeding  Patient Goals: rest  Family Goals: NA    Progress made toward(s) clinical / shift goals:      Patient is not progressing towards the following goals:      Problem: Risk for Bleeding  Goal: Patient will not experience bleeding as evidenced by normal blood pressure, stable hematocrit and hemoglobin levels and desired ranges for coagulation profiles  Description: Target End Date:  Prior to discharge or change in level of care    1.  When laboratory values are abnormal, administer blood products as prescribed  2.  For bleeding linked with excessive anticoagulant use, give appropriate antidotes as prescribed  3.  Monitor patient’s vital signs, especially BP and HR. Look for signs of orthostatic hypotension  4.  Monitor hematocrit (Hct) and hemoglobin (Hgb)  Outcome: Progressing     Problem: Fall Risk  Goal: Patient will remain free from falls  Description: Target End Date:  Prior to discharge or change in level of care    Document interventions on the [place] [facility] Fall Risk Assessment    1.  Assess for fall risk factors  2.  Implement fall precautions  Outcome: Progressing     Problem: Knowledge Deficit - Standard  Goal: Patient and family/care givers will demonstrate understanding of plan of care, disease process/condition, diagnostic tests and medications  Description: Target End Date:  1-3 days or as soon as patient condition allows    Document in Patient Education    1.  Patient and family/caregiver oriented to unit, equipment, visitation policy and means for communicating concern  2.  Complete/review Learning Assessment  3.  Assess knowledge level of disease process/condition, treatment plan, diagnostic tests and medications  4.  Explain disease process/condition, treatment plan, diagnostic tests and medications  Outcome: Progressing

## 2023-10-02 NOTE — PROGRESS NOTES
Attempted to have patient walk however he is very unsteady and his left knee/leg gave out as he was standing so immediately asked patient to sit back down in bed.    Patient states he is not sure if he would make it safely to his doorstep without assistance. He states he feels very deconditioned very weak.

## 2023-10-02 NOTE — PROGRESS NOTES
Inpatient Anticoagulation Service Note for 10/2/2023      Reason for Anticoagulation: Deep Vein Thrombosis           Hemoglobin Value: (!) 8.8  Hematocrit Value: (!) 27.8  Lab Platelet Value: (!) 629  Target INR: 2.0 to 3.0    INR from last 7 days       Date/Time INR Value    10/02/23 0507 2.88    10/01/23 0617 2.89    09/30/23 0346 1.84    09/29/23 0033 1.43    09/28/23 0549 1.32    09/27/23 0432 1.33    09/26/23 1106 1.35          Dose from last 7 days       Date/Time Dose (mg)    10/02/23 1526 5    10/01/23 1158 2.5    09/30/23 1355 5    09/29/23 1405 7.5    09/28/23 1120 5    09/27/23 1146 5    09/26/23 1259 5          Average Dose (mg): 5  Significant Interactions: Antiplatelet Medications, Antibiotics  Bridge Therapy: No  Date of Last VTE Event:  (May 2023)  Bridge Therapy Start Date: 0  INR Value Greater than 2 Prior to Discontinuation of Parenteral Anticoagulation: Yes  Reversal Agent Administered: Not Applicable    Assessment: No interval changes in overall clinical status, diet, or DDI.  INR has now stabilized.  H/H decreased, no overt s/sx of bleeding.      Plan:  Continue with warfarin 5 mg daily and follow up INR in the AM.  Education Material Provided?: No (will need prior to discharge; attempted 9/29)    Pharmacist suggested discharge dosing: consider warfarin 5 mg daily with a recommended follow up INR within 5 days of discharge.     Donita Donato, ManuelD, BCPS

## 2023-10-02 NOTE — DISCHARGE PLANNING
Case Management Discharge Planning    Admission Date: 9/25/2023  GMLOS: 3.9  ALOS: 7    6-Clicks ADL Score: 24  6-Clicks Mobility Score: 16  PT and/or OT Eval ordered: Yes  Post-acute Referrals Ordered: Yes  Post-acute Choice Obtained: Yes  Has referral(s) been sent to post-acute provider:  Yes      Anticipated Discharge Dispo: Discharge Disposition: Discharged to home/self care (01)  Discharge Address: [facility] [address] [facility] [place] [address]    DME Needed: No    Action(s) Taken:   0940  RN CM spoke to Kiara from [facility] in [place] regarding INR checks.  She states that pt needs to be scheduled through [facility] Coumadin [facility]/ Telemedicine. [facility] telemedicine coordinates with them and pt goes there on Monday mornings to get their PT/INR checked. A provider from [place] will see pt through Telehealth and make recommendations based on INR results.   1020 RNCM spoke to Alma from [facility] Coumadin clinic. She put a referral for Telehealth.  Beth from scheduling called stating that first available would be 10/16 at 0930. RNCM spoke to MD [place]. Md States, pt has to have INR checked at least 2 times a week.      1120 Per Alma from Coumadin [facility], they can enter a standard order for PT/INR checks for any lab in [facility].  Coumadin Clinic in [facility] will follow up with pt.  Pt to go to appt on 10/16 as well.     1330 Pending INR order.  RNCM finally got a hold of [facility] Coumadin [facility] regarding standard order for INR/pro Per Alma, they will call back once order is in.     1430 RNCM spoke to Alma.  Pharmacist Aniceto will fax order to [facility]. Pt to get PT/INR  on Wednesday and Fridays until appt on 10/16. Coumadin Clinic will follow pt.     1600 RNCM spoke to Briana MENDIETA leadership to help arrange transportation. GMT is scheduled for 1300 10/3 . Approved services to be sent for $1821.04 for approval from [facility] Dominguez. RNCM called son Jose to see if he could provide transportation to  [place]. Son States he lives 2 hrs away from [place] and that he can't come to [place].        Escalations Completed: None    Medically Clear: Yes    Next Steps: RNCM to continue to follow up with pt and medical team to address dc needs and barriers      Barriers to Discharge: pending Coumadin clinic appointment

## 2023-10-02 NOTE — CARE PLAN
The patient is Stable - Low risk of patient condition declining or worsening    Shift Goals  Clinical Goals: DVT LUE, PE management  Patient Goals: rest, and get better  Family Goals: NA    Progress made toward(s) clinical / shift goals:        Problem: Psychosocial  Goal: Patient's ability to identify and utilize available support systems will improve  Description: Target End Date:  1 to 3 days    1.  Help patient identify available resources and support systems  2.  Collaborate with interdisciplinary team  3.  Collaborate with patient, family/caregiver and other support systems  Outcome: Progressing     Problem: Fall Risk  Goal: Patient will remain free from falls  Description: Target End Date:  Prior to discharge or change in level of care    Document interventions on the [place] Fall Risk Assessment    1.  Assess for fall risk factors  2.  Implement fall precautions  Outcome: Progressing     Problem: Risk for Bleeding  Goal: Patient will take measures to prevent bleeding and recognizes signs of bleeding that need to be reported immediately to a health care professional  Description: Target End Date:  Prior to discharge or change in level of care    1.  Educate the patient and family members about signs of bleeding that need to be reported to a health care provider  2.  Educate the at-risk patient about precautionary measures to prevent tissue trauma or disruption of the normal clotting mechanisms  -- Use a soft-bristled toothbrush and nonabrasive toothpaste. Avoid the use of toothpicks and dental floss  --  Avoid rectal suppositories, thermometers, enemas, vaginal douches, and tampons  --  Limit straining with bowel movements, forceful nose blowing, coughing, or sneezing  --  Be careful when using sharp objects like scissors and knives. Use an electric razor for shaving (not razor blades)  3.  Let the patient use normal saline nasal sprays and emollient lip balms  4.  Explain to a sexually active patient  the use water-soluble lubricants during intercourse  5.  Teach the patient about measures to reduce constipation such as increased fluid intake and dietary fiber  6.  Inform the patient to check the color and consistency of stools  7.  Tell the female patient to inform the health care provider when there is an increase in menstrual bleeding as indicated by an increase in the number of sanitary pads used  8.  Tell the patient to observe skin and mucous membranes for oozing of blood  9.  Educate the patient about over-the-counter drugs and avoid products that contain aspirin or NSAIDs such as ibuprofen and naproxen  10.  Educate the patient and family members about limiting the use of herbal remedies that are linked with an increased risk for bleeding like dongquai, feverfew, socrates, ginkgo biloba, and chamomile  Outcome: Progressing

## 2023-10-02 NOTE — PROGRESS NOTES
Received report from day shift RN. Assumed pt care. On initial rounds and assessment pt AAOX4, hard of hearing, assisted to stand up position to clean his bed, night gown offered, patient refused same. Dinner tray set up, pillows provided for upper extremities support and elevation, bed is locked and in low position, call bell within reach, will continue to monitor.

## 2023-10-02 NOTE — PROGRESS NOTES
Faxed INR SO to [facility] per pt's care coordinator's request. [facility] will f/u w/ pt upon discharge for INR monitoring via lab/telephone until he can establish w/ telemed clinic.    Aniceto Fontana, ManuelD, BCACP

## 2023-10-03 PROBLEM — R53.81 DEBILITY: Status: ACTIVE | Noted: 2023-10-03

## 2023-10-03 LAB
ACANTHOCYTES BLD QL SMEAR: NORMAL
ANION GAP SERPL CALC-SCNC: 5 MMOL/L (ref 7–16)
ANISOCYTOSIS BLD QL SMEAR: ABNORMAL
BASOPHILS # BLD AUTO: 2.6 % (ref 0–1.8)
BASOPHILS # BLD: 0.3 K/UL (ref 0–0.12)
BUN SERPL-MCNC: 28 MG/DL (ref 8–22)
CALCIUM SERPL-MCNC: 8.3 MG/DL (ref 8.5–10.5)
CHLORIDE SERPL-SCNC: 103 MMOL/L (ref 96–112)
CO2 SERPL-SCNC: 30 MMOL/L (ref 20–33)
CREAT SERPL-MCNC: 1.16 MG/DL (ref 0.5–1.4)
EOSINOPHIL # BLD AUTO: 0.1 K/UL (ref 0–0.51)
EOSINOPHIL NFR BLD: 0.9 % (ref 0–6.9)
ERYTHROCYTE [DISTWIDTH] IN BLOOD BY AUTOMATED COUNT: 57.1 FL (ref 35.9–50)
GFR SERPLBLD CREATININE-BSD FMLA CKD-EPI: 63 ML/MIN/1.73 M 2
GLUCOSE SERPL-MCNC: 107 MG/DL (ref 65–99)
HCT VFR BLD AUTO: 27.8 % (ref 42–52)
HGB BLD-MCNC: 8.4 G/DL (ref 14–18)
INR PPP: 2.86 (ref 0.87–1.13)
LG PLATELETS BLD QL SMEAR: NORMAL
LYMPHOCYTES # BLD AUTO: 0.61 K/UL (ref 1–4.8)
LYMPHOCYTES NFR BLD: 5.3 % (ref 22–41)
MANUAL DIFF BLD: NORMAL
MCH RBC QN AUTO: 29.1 PG (ref 27–33)
MCHC RBC AUTO-ENTMCNC: 30.2 G/DL (ref 32.3–36.5)
MCV RBC AUTO: 96.2 FL (ref 81.4–97.8)
MONOCYTES # BLD AUTO: 1.1 K/UL (ref 0–0.85)
MONOCYTES NFR BLD AUTO: 9.6 % (ref 0–13.4)
MORPHOLOGY BLD-IMP: NORMAL
NEUTROPHILS # BLD AUTO: 9.38 K/UL (ref 1.82–7.42)
NEUTROPHILS NFR BLD: 81.6 % (ref 44–72)
NRBC # BLD AUTO: 0 K/UL
NRBC BLD-RTO: 0 /100 WBC (ref 0–0.2)
PLATELET # BLD AUTO: 609 K/UL (ref 164–446)
PLATELET BLD QL SMEAR: NORMAL
PMV BLD AUTO: 12.2 FL (ref 9–12.9)
POIKILOCYTOSIS BLD QL SMEAR: NORMAL
POTASSIUM SERPL-SCNC: 4.5 MMOL/L (ref 3.6–5.5)
PROTHROMBIN TIME: 30.4 SEC (ref 12–14.6)
RBC # BLD AUTO: 2.89 M/UL (ref 4.7–6.1)
RBC BLD AUTO: PRESENT
SODIUM SERPL-SCNC: 138 MMOL/L (ref 135–145)
WBC # BLD AUTO: 11.5 K/UL (ref 4.8–10.8)

## 2023-10-03 PROCEDURE — 85007 BL SMEAR W/DIFF WBC COUNT: CPT

## 2023-10-03 PROCEDURE — 99233 SBSQ HOSP IP/OBS HIGH 50: CPT | Performed by: INTERNAL MEDICINE

## 2023-10-03 PROCEDURE — 36415 COLL VENOUS BLD VENIPUNCTURE: CPT

## 2023-10-03 PROCEDURE — 80048 BASIC METABOLIC PNL TOTAL CA: CPT

## 2023-10-03 PROCEDURE — 97530 THERAPEUTIC ACTIVITIES: CPT

## 2023-10-03 PROCEDURE — 97116 GAIT TRAINING THERAPY: CPT

## 2023-10-03 PROCEDURE — 700102 HCHG RX REV CODE 250 W/ 637 OVERRIDE(OP): Performed by: STUDENT IN AN ORGANIZED HEALTH CARE EDUCATION/TRAINING PROGRAM

## 2023-10-03 PROCEDURE — 700102 HCHG RX REV CODE 250 W/ 637 OVERRIDE(OP): Performed by: INTERNAL MEDICINE

## 2023-10-03 PROCEDURE — 86480 TB TEST CELL IMMUN MEASURE: CPT

## 2023-10-03 PROCEDURE — 85610 PROTHROMBIN TIME: CPT

## 2023-10-03 PROCEDURE — A9270 NON-COVERED ITEM OR SERVICE: HCPCS | Performed by: INTERNAL MEDICINE

## 2023-10-03 PROCEDURE — 770006 HCHG ROOM/CARE - MED/SURG/GYN SEMI*

## 2023-10-03 PROCEDURE — A9270 NON-COVERED ITEM OR SERVICE: HCPCS | Performed by: STUDENT IN AN ORGANIZED HEALTH CARE EDUCATION/TRAINING PROGRAM

## 2023-10-03 PROCEDURE — 85025 COMPLETE CBC W/AUTO DIFF WBC: CPT

## 2023-10-03 RX ADMIN — WARFARIN SODIUM 5 MG: 5 TABLET ORAL at 18:12

## 2023-10-03 RX ADMIN — LOSARTAN POTASSIUM 50 MG: 50 TABLET, FILM COATED ORAL at 05:28

## 2023-10-03 RX ADMIN — FUROSEMIDE 40 MG: 40 TABLET ORAL at 05:27

## 2023-10-03 RX ADMIN — DOCUSATE SODIUM 50 MG AND SENNOSIDES 8.6 MG 2 TABLET: 8.6; 5 TABLET, FILM COATED ORAL at 18:14

## 2023-10-03 RX ADMIN — ATORVASTATIN CALCIUM 80 MG: 40 TABLET, FILM COATED ORAL at 18:12

## 2023-10-03 RX ADMIN — METOPROLOL SUCCINATE 75 MG: 25 TABLET, EXTENDED RELEASE ORAL at 18:12

## 2023-10-03 RX ADMIN — FINASTERIDE 5 MG: 5 TABLET, FILM COATED ORAL at 05:28

## 2023-10-03 RX ADMIN — ACETAMINOPHEN 1000 MG: 500 TABLET, FILM COATED ORAL at 22:09

## 2023-10-03 RX ADMIN — AMOXICILLIN AND CLAVULANATE POTASSIUM 1 TABLET: 875; 125 TABLET, FILM COATED ORAL at 05:27

## 2023-10-03 RX ADMIN — TAMSULOSIN HYDROCHLORIDE 0.4 MG: 0.4 CAPSULE ORAL at 05:28

## 2023-10-03 RX ADMIN — POTASSIUM CHLORIDE 40 MEQ: 1500 TABLET, EXTENDED RELEASE ORAL at 05:28

## 2023-10-03 RX ADMIN — DOCUSATE SODIUM 50 MG AND SENNOSIDES 8.6 MG 2 TABLET: 8.6; 5 TABLET, FILM COATED ORAL at 05:27

## 2023-10-03 RX ADMIN — METOPROLOL SUCCINATE 75 MG: 25 TABLET, EXTENDED RELEASE ORAL at 05:27

## 2023-10-03 RX ADMIN — ACETAMINOPHEN 1000 MG: 500 TABLET, FILM COATED ORAL at 08:56

## 2023-10-03 RX ADMIN — AMOXICILLIN AND CLAVULANATE POTASSIUM 1 TABLET: 875; 125 TABLET, FILM COATED ORAL at 18:11

## 2023-10-03 RX ADMIN — ASPIRIN 81 MG: 81 TABLET, COATED ORAL at 05:27

## 2023-10-03 ASSESSMENT — ENCOUNTER SYMPTOMS
CHILLS: 0
NECK PAIN: 0
HEADACHES: 0
VOMITING: 0
DIZZINESS: 0
BLURRED VISION: 0
MEMORY LOSS: 0
BACK PAIN: 0
NAUSEA: 0
WEAKNESS: 1
NERVOUS/ANXIOUS: 0
FEVER: 0
ABDOMINAL PAIN: 0
MYALGIAS: 1
SHORTNESS OF BREATH: 0
BRUISES/BLEEDS EASILY: 1
DEPRESSION: 0
DOUBLE VISION: 0

## 2023-10-03 ASSESSMENT — COGNITIVE AND FUNCTIONAL STATUS - GENERAL
TURNING FROM BACK TO SIDE WHILE IN FLAT BAD: UNABLE
STANDING UP FROM CHAIR USING ARMS: A LOT
MOBILITY SCORE: 9
MOVING TO AND FROM BED TO CHAIR: UNABLE
SUGGESTED CMS G CODE MODIFIER MOBILITY: CM
MOVING FROM LYING ON BACK TO SITTING ON SIDE OF FLAT BED: UNABLE
WALKING IN HOSPITAL ROOM: A LOT
CLIMB 3 TO 5 STEPS WITH RAILING: A LOT

## 2023-10-03 ASSESSMENT — PAIN DESCRIPTION - PAIN TYPE
TYPE: ACUTE PAIN

## 2023-10-03 ASSESSMENT — GAIT ASSESSMENTS: GAIT LEVEL OF ASSIST: UNABLE TO PARTICIPATE

## 2023-10-03 NOTE — PROGRESS NOTES
Hospital Medicine Daily Progress Note    Date of Service  10/3/2023    Chief Complaint  iH Montes De Oca is a 78 y.o. male admitted 9/25/2023 with left arm swelling.    Hospital Course   78 y.o. male w/ PMH of CHF, DVT of the lower extremity, on AC, BPH, HTN, HLD, CAD with stent, PPP who presented 9/25/2023 with worsening left arm swelling. Patient sttes that he had been having worsening left arm swelling for over a month now. He had a caretaker come visit him today and told him to go to [facility] [place], operated by [facility]. CT imaging was done and demonstrated a clot of the left internal jugular, subclavian and axillary vein. Outside facility contacted our Vascular surgeon who stated that there is no surgical intervention to be done. They recommend continue his anticoagulation.  On chart review, patient had similar occlusions on ultrasound on May 2023.  He was started on apixaban at that time.  Patient states that he has been compliant with his medication.  However he has stated that over the last month, he has developed worsening swelling.  Denies any other symptoms at this time.        Interval Problem Update  9/26 Patient mildly tachypneic otherwise vital signs stable.  Hemoglobin stable.  INR 1.35 and heparin drip supratherapeutic so has been stopped for now.  Patient has failed outpatient apixaban as his edema and clot have worsened.  Patient reports he has never been on warfarin before.  Discussed what is involved in switching to warfarin including needing INR checks, the therapeutic window and dietary changes, patient is in agreement with this switching to warfarin.  Nutrition consulted to help with diet education.  I have placed a referral for the anticoagulation clinic as he will need outpatient follow-up.  On chart review patient has history of bleeding and at one point was taken off his Eliquis, due to his high risk of rebleeding will monitor for now while inpatient bridging. Will also change heparin ggt to  Lovenox therapeutic dosing in anticipation for home.      9/26 Vitals and hemoglobin stable. INR 1.3. Patient with mild bruising on right arm, per nursing is stable. Patient denies hematuria or melena. Right arm with worsening swelling, denies pain. Will ACE wrap or place compression stocking on left arm to help with edema. Patient asking if clot can be removed, discussed that no indication for thrombectomy still with good pulses, no pain and no skin changes. Nutrition did education with patient today. Discussed with social work, tentative plan for dc on Friday if H&H stable needs AC clinic appointment set up for Monday.     9/27 INR 1.32, Hb 10. No signs of bleeding. Discussed with pharmacy if still no bleeding tomorrow we will plan to discharge patient on a Lovenox bridge with an anticoagulation clinic on Monday.  Dosing to be determined tomorrow after INR check.    9/28 INR 1.43 today.  Discussed with pharmacy recommended discharge on warfarin 5 mg daily.  Patient with new WBC 13.3, he denies any new complaints.  Procalcitonin negative, UA negative.  Reviewed chest x-ray showed bibasilar underinflation, radiology read says superimposed pneumonia not excluded.  Start Augmentin for 5 days.  Patient is medically clear for discharge however discussed with case management and they have been unable to get patient appointment for an INR check thus far.  He will need his INR check set up prior to discharge for safe discharge planning.  He has no new complaints, he is anxious to go home.    9/29 INR 1.84. Hemoglobin stable. Mild increase in WBC 15.7. Vitals stable.  Patient denies any cough, shortness of breath or any new complaints.  Overall the swelling of his left arm has improved since admission.  He is anxious to DC home.  Discussed with social work they will try calling again to see if he can get his INR checked outpatient so that he can DC.    10/1/2023  Evaluated at bedside  In no acute distress this morning,  laying in bed  Stable vitals and saturating well room air  Leukocytosis trending down  Continue Augmentin  He completed anticoagulation bridging and on warfarin  Patient will need anticoagulation clinic set up prior to considering discharge  Labs on AM    10/2/2023  Patient no acute distress today.  Case management following and will set up with Coumadin clinic as well as ride.  Patient unable to stand up and was feeling weak.  Will need to be reevaluated by physical therapy and Occupational Therapy.    10/3 patient is hard of hearing, unable to move left lower extremity  Per staff, unable to ambulate on his own, patient lives alone  PT evaluation, placement pending    I have discussed this patient's plan of care and discharge plan at IDT rounds today with Case Management, Nursing, Nursing leadership, and other members of the IDT team.    Consultants/Specialty  N/A    Code Status  Full Code    Disposition  The patient is not medically cleared for discharge to home or a post-acute facility.  Anticipate discharge to: snf    I have placed the appropriate orders for post-discharge needs.    Review of Systems  Review of Systems   Constitutional:  Negative for chills, fever and malaise/fatigue.   HENT:  Positive for hearing loss. Negative for congestion.    Eyes:  Negative for blurred vision and double vision.   Respiratory:  Negative for shortness of breath.    Cardiovascular:  Negative for chest pain.   Gastrointestinal:  Negative for abdominal pain, nausea and vomiting.   Genitourinary:  Negative for dysuria.   Musculoskeletal:  Positive for myalgias. Negative for back pain and neck pain.        Left arm swelling    Skin:  Negative for rash.   Neurological:  Positive for weakness. Negative for dizziness and headaches.   Endo/Heme/Allergies:  Bruises/bleeds easily.   Psychiatric/Behavioral:  Negative for depression and memory loss. The patient is not nervous/anxious.    All other systems reviewed and are negative.        Physical Exam  Temp:  [36.2 °C (97.2 °F)-36.6 °C (97.8 °F)] 36.4 °C (97.5 °F)  Pulse:  [60-68] 62  Resp:  [16-18] 18  BP: (104-121)/(46-58) 119/48  SpO2:  [91 %-93 %] 91 %    Physical Exam  Vitals and nursing note reviewed.   Constitutional:       General: He is not in acute distress.     Appearance: He is ill-appearing. He is not toxic-appearing or diaphoretic.   HENT:      Head: Normocephalic and atraumatic.      Ears:      Comments: Hard of hearing   Eyes:      Extraocular Movements: Extraocular movements intact.      Pupils: Pupils are equal, round, and reactive to light.   Cardiovascular:      Rate and Rhythm: Normal rate and regular rhythm.      Pulses: Normal pulses.   Pulmonary:      Effort: Pulmonary effort is normal. No respiratory distress.      Breath sounds: Normal breath sounds. No wheezing.   Abdominal:      General: Abdomen is flat. There is no distension.      Palpations: Abdomen is soft. There is no mass.      Tenderness: There is no abdominal tenderness.   Musculoskeletal:         General: Swelling (left arm, improving) present. No tenderness. Normal range of motion.      Cervical back: Neck supple.   Skin:     General: Skin is warm and dry.      Coloration: Skin is not pale.      Findings: Bruising (mild right arm) present.   Neurological:      General: No focal deficit present.      Mental Status: He is alert and oriented to person, place, and time.      Cranial Nerves: No cranial nerve deficit.      Sensory: No sensory deficit.      Motor: Weakness present.   Psychiatric:         Mood and Affect: Mood normal.         Behavior: Behavior normal.         Fluids    Intake/Output Summary (Last 24 hours) at 10/3/2023 1509  Last data filed at 10/3/2023 0800  Gross per 24 hour   Intake 120 ml   Output 600 ml   Net -480 ml       Laboratory  Recent Labs     10/01/23  0617 10/02/23  0507 10/03/23  0023   WBC 15.4* 12.4* 11.5*   RBC 3.25* 2.98* 2.89*   HEMOGLOBIN 9.5* 8.8* 8.4*   HEMATOCRIT 30.7* 27.8*  27.8*   MCV 94.5 93.3 96.2   MCH 29.2 29.5 29.1   MCHC 30.9* 31.7* 30.2*   RDW 54.6* 54.9* 57.1*   PLATELETCT 656* 629* 609*   MPV 12.2 11.9 12.2     Recent Labs     10/01/23  0617 10/02/23  0507 10/03/23  0023   SODIUM 139 139 138   POTASSIUM 3.2* 4.0 4.5   CHLORIDE 102 103 103   CO2 28 28 30   GLUCOSE 138* 125* 107*   BUN 15 22 28*   CREATININE 0.74 0.90 1.16   CALCIUM 7.8* 7.8* 8.3*     Recent Labs     10/01/23  0617 10/02/23  0507 10/03/23  0023   INR 2.89* 2.88* 2.86*                 Imaging  US-EXTREMITY VENOUS UPPER UNILAT RIGHT   Final Result      DX-CHEST-PORTABLE (1 VIEW)   Final Result      Decreased bibasilar underinflation atelectasis. Superimposed pneumonia not excluded.      IR-US GUIDED PIV   Final Result    Ultrasound-guided PERIPHERAL IV INSERTION performed by    qualified nursing staff as above.           Assessment/Plan  * Arm DVT (deep venous thromboembolism), acute, left (HCC)- (present on admission)  Assessment & Plan  CT imaging shows occlusion of the internal jugular vein, subclavian vein and axillary vein    Patient had an ultrasound done in the past.  May 2023, ultrasound showed: Evidence of acute to subacute occlusive deep venous thrombosis in the    jugular vein, subclavian vein, axillary vein and brachial veins from the  proximal to distal bicep.  Evidence of acute to subacute occlusive superficial venous thrombosis in  the basilic vein from the proximal to distal bicep.  Evidence of acute to subacute occlusive superficial venous thrombosis in    the cephalic vein from the proximal bicep to the elbow and    Vascular surgery aware - no surgical intervention  Continue lovenox   Patient has failed apixaban therapy, will change to warfarin  -monitor very closely for bleeding, hx of gross hematuria previous on blood thinners   Daily INR, H&H  Dietary consulted for nutrition counseling  Referral placed for outpatient anticoagulation clinic  High risk of bleed, will monitor while bridging on  warfarin    10/2 Patient medically clear for discharge on warfarin 5 mg with Lovenox bridge, needs INR check appointment prior to discharge    10/3 generalized weakness, patient lives alone  Patient's son 2 hours away  Per staff, unable to ambulate  PT OT evaluation pending, will need placement assistance  Patient aware  Concerned about finances,  to assist  Outpatient Coumadin follow-up    Debility  Assessment & Plan  Lives alone, will need further therapy evaluation  May need skilled placement, Quant gold ordered    Leukocytosis  Assessment & Plan  No signs of sepsis  Work-up shows pneumonia, started augmentin    Pneumonia  Assessment & Plan  New leukocytosis, work-up shows possible basilar pneumonia on x-ray  Start Augmentin  No sepsis    Chronic systolic congestive heart failure (HCC)- (present on admission)  Assessment & Plan  Mildly depressed left ventricular systolic function. The left ventricular ejection fraction is visually estimated to be 45%.Mildly dilated right ventricle. Reduced right ventricular systolic function. Mild tricuspid regurgitation. Right ventricular systolic pressure is  estimated to be 33 mmHg (normal).    Continue with metorprolol, lasix and losartan    AF (atrial fibrillation) (HCC)- (present on admission)  Assessment & Plan  Continue metoprolol  Continue full dose lovenox for bridge   Continue warfarin     NSTEMI (non-ST elevated myocardial infarction), h/o Afib not on anticoag, leukocytosis (HCC)- (present on admission)  Assessment & Plan  5/2023: acute inferior MI late presentation, occluded RCA with collaterals  No interventions done  Continue aspirin and statin    Essential hypertension, benign- (present on admission)  Assessment & Plan  Continue with metoprolol, losartan, furosemide    Coronary artery disease due to lipid rich plaque- (present on admission)  Assessment & Plan  Continue with aspirin, atorvastatin  Lipid panel with low LDL below goal         VTE  prophylaxis: warfarin, therapeutic lovenox     I have performed a physical exam and reviewed and updated ROS and Plan today (10/3/2023). In review of yesterday's note (10/2/2023), there are no changes except as documented above.

## 2023-10-03 NOTE — ASSESSMENT & PLAN NOTE
Lives alone, will need further therapy evaluation  May need skilled placement, Quant gold ordered    10/4 SNF placement pending, bleed on Coumadin  We will reevaluate goals of care due to risk of bleeding    10/5 seen by palliative care, concern for decisional capacity, has a son that can assist, CM to f/u  - pt is not making sound decisions, still requesting to dc home to self with his cats, however, unable to clearly state current condition or plan of care  - pt not ready for hospice per palliative  - placement in [place] pending    10/6 reports left lower extremity edema and pain  Appears chronic  We will follow-up CT abdomen pelvis with left lower extremity, rule out fracture    10/7 per staff increasing activity tolerance, snf placement pending, likely transfer 1-2 days  CT with lipoma and edema, no fracture, encourage therapy

## 2023-10-03 NOTE — PROGRESS NOTES
Hospital Medicine Daily Progress Note    Date of Service  10/2/2023    Chief Complaint  Hi Montes De Oca is a 78 y.o. male admitted 9/25/2023 with left arm swelling.    Hospital Course   78 y.o. male w/ PMH of CHF, DVT of the lower extremity, on AC, BPH, HTN, HLD, CAD with stent, PPP who presented 9/25/2023 with worsening left arm swelling. Patient sttes that he had been having worsening left arm swelling for over a month now. He had a caretaker come visit him today and told him to go to [facility]. CT imaging was done and demonstrated a clot of the left internal jugular, subclavian and axillary vein. Outside facility contacted our Vascular surgeon who stated that there is no surgical intervention to be done. They recommend continue his anticoagulation.  On chart review, patient had similar occlusions on ultrasound on May 2023.  He was started on apixaban at that time.  Patient states that he has been compliant with his medication.  However he has stated that over the last month, he has developed worsening swelling.  Denies any other symptoms at this time.        Interval Problem Update  9/26 Patient mildly tachypneic otherwise vital signs stable.  Hemoglobin stable.  INR 1.35 and heparin drip supratherapeutic so has been stopped for now.  Patient has failed outpatient apixaban as his edema and clot have worsened.  Patient reports he has never been on warfarin before.  Discussed what is involved in switching to warfarin including needing INR checks, the therapeutic window and dietary changes, patient is in agreement with this switching to warfarin.  Nutrition consulted to help with diet education.  I have placed a referral for the anticoagulation clinic as he will need outpatient follow-up.  On chart review patient has history of bleeding and at one point was taken off his Eliquis, due to his high risk of rebleeding will monitor for now while inpatient bridging. Will also change heparin ggt to  Lovenox therapeutic dosing in anticipation for home.      9/26 Vitals and hemoglobin stable. INR 1.3. Patient with mild bruising on right arm, per nursing is stable. Patient denies hematuria or melena. Right arm with worsening swelling, denies pain. Will ACE wrap or place compression stocking on left arm to help with edema. Patient asking if clot can be removed, discussed that no indication for thrombectomy still with good pulses, no pain and no skin changes. Nutrition did education with patient today. Discussed with social work, tentative plan for dc on Friday if H&H stable needs AC clinic appointment set up for Monday.     9/27 INR 1.32, Hb 10. No signs of bleeding. Discussed with pharmacy if still no bleeding tomorrow we will plan to discharge patient on a Lovenox bridge with an anticoagulation clinic on Monday.  Dosing to be determined tomorrow after INR check.    9/28 INR 1.43 today.  Discussed with pharmacy recommended discharge on warfarin 5 mg daily.  Patient with new WBC 13.3, he denies any new complaints.  Procalcitonin negative, UA negative.  Reviewed chest x-ray showed bibasilar underinflation, radiology read says superimposed pneumonia not excluded.  Start Augmentin for 5 days.  Patient is medically clear for discharge however discussed with case management and they have been unable to get patient appointment for an INR check thus far.  He will need his INR check set up prior to discharge for safe discharge planning.  He has no new complaints, he is anxious to go home.    9/29 INR 1.84. Hemoglobin stable. Mild increase in WBC 15.7. Vitals stable.  Patient denies any cough, shortness of breath or any new complaints.  Overall the swelling of his left arm has improved since admission.  He is anxious to DC home.  Discussed with social work they will try calling again to see if he can get his INR checked outpatient so that he can DC.    10/1/2023  Evaluated at bedside  In no acute distress this morning,  laying in bed  Stable vitals and saturating well room air  Leukocytosis trending down  Continue Augmentin  He completed anticoagulation bridging and on warfarin  Patient will need anticoagulation clinic set up prior to considering discharge  Labs on AM    10/2/2023  Patient no acute distress today.  Case management following and will set up with Coumadin clinic as well as ride.  Patient unable to stand up and was feeling weak.  Will need to be reevaluated by physical therapy and Occupational Therapy.    I have discussed this patient's plan of care and discharge plan at IDT rounds today with Case Management, Nursing, Nursing leadership, and other members of the IDT team.    Consultants/Specialty  N/A    Code Status  Full Code    Disposition  The patient is medically cleared for discharge to home or a post-acute facility.  Anticipate discharge to: home with close outpatient follow-up    I have placed the appropriate orders for post-discharge needs.    Review of Systems  Review of Systems   Constitutional:  Negative for chills and fever.   Respiratory:  Negative for shortness of breath.    Cardiovascular:  Negative for chest pain.   Gastrointestinal:  Negative for abdominal pain, nausea and vomiting.   Genitourinary:  Negative for dysuria.   Musculoskeletal:  Negative for myalgias.        Left arm swelling    Skin:  Negative for rash.   Neurological:  Negative for dizziness and headaches.   Endo/Heme/Allergies:  Bruises/bleeds easily.   Psychiatric/Behavioral:  Negative for depression.    All other systems reviewed and are negative.       Physical Exam  Temp:  [36.2 °C (97.1 °F)-36.5 °C (97.7 °F)] 36.4 °C (97.5 °F)  Pulse:  [60-61] 60  Resp:  [18-20] 18  BP: ()/(47-76) 142/59  SpO2:  [91 %-95 %] 93 %    Physical Exam  Vitals and nursing note reviewed.   Constitutional:       General: He is not in acute distress.  HENT:      Head: Normocephalic and atraumatic.      Ears:      Comments: Hard of hearing   Eyes:       Conjunctiva/sclera: Conjunctivae normal.   Cardiovascular:      Rate and Rhythm: Normal rate and regular rhythm.      Pulses: Normal pulses.   Pulmonary:      Effort: Pulmonary effort is normal. No respiratory distress.      Breath sounds: Normal breath sounds. No wheezing.   Abdominal:      General: Abdomen is flat. There is no distension.      Palpations: Abdomen is soft.      Tenderness: There is no abdominal tenderness.   Musculoskeletal:         General: Swelling (left arm, improving) present. No tenderness. Normal range of motion.      Cervical back: Neck supple.      Right lower leg: No edema.      Left lower leg: No edema.   Skin:     General: Skin is warm and dry.      Findings: Bruising (mild right arm) present. No rash.   Neurological:      General: No focal deficit present.      Mental Status: He is alert and oriented to person, place, and time.      Cranial Nerves: No cranial nerve deficit.   Psychiatric:         Mood and Affect: Mood normal.         Behavior: Behavior normal.         Fluids    Intake/Output Summary (Last 24 hours) at 10/2/2023 1710  Last data filed at 10/2/2023 1614  Gross per 24 hour   Intake 360 ml   Output --   Net 360 ml       Laboratory  Recent Labs     09/30/23  0346 10/01/23  0617 10/02/23  0507   WBC 15.7* 15.4* 12.4*   RBC 3.64* 3.25* 2.98*   HEMOGLOBIN 10.7* 9.5* 8.8*   HEMATOCRIT 33.4* 30.7* 27.8*   MCV 91.8 94.5 93.3   MCH 29.4 29.2 29.5   MCHC 32.0* 30.9* 31.7*   RDW 53.5* 54.6* 54.9*   PLATELETCT 707* 656* 629*   MPV 12.1 12.2 11.9     Recent Labs     10/01/23  0617 10/02/23  0507   SODIUM 139 139   POTASSIUM 3.2* 4.0   CHLORIDE 102 103   CO2 28 28   GLUCOSE 138* 125*   BUN 15 22   CREATININE 0.74 0.90   CALCIUM 7.8* 7.8*     Recent Labs     09/30/23  0346 10/01/23  0617 10/02/23  0507   INR 1.84* 2.89* 2.88*                 Imaging  US-EXTREMITY VENOUS UPPER UNILAT RIGHT   Final Result      DX-CHEST-PORTABLE (1 VIEW)   Final Result      Decreased bibasilar underinflation  atelectasis. Superimposed pneumonia not excluded.      IR-US GUIDED PIV   Final Result    Ultrasound-guided PERIPHERAL IV INSERTION performed by    qualified nursing staff as above.           Assessment/Plan  * Arm DVT (deep venous thromboembolism), acute, left (HCC)- (present on admission)  Assessment & Plan  CT imaging shows occlusion of the internal jugular vein, subclavian vein and axillary vein    Patient had an ultrasound done in the past.  May 2023, ultrasound showed: Evidence of acute to subacute occlusive deep venous thrombosis in the    jugular vein, subclavian vein, axillary vein and brachial veins from the  proximal to distal bicep.  Evidence of acute to subacute occlusive superficial venous thrombosis in  the basilic vein from the proximal to distal bicep.  Evidence of acute to subacute occlusive superficial venous thrombosis in    the cephalic vein from the proximal bicep to the elbow and    Vascular surgery aware - no surgical intervention  Continue lovenox   Patient has failed apixaban therapy, will change to warfarin  -monitor very closely for bleeding, hx of gross hematuria previous on blood thinners   Daily INR, H&H  Dietary consulted for nutrition counseling  Referral placed for outpatient anticoagulation clinic  High risk of bleed, will monitor while bridging on warfarin    Patient medically clear for discharge on warfarin 5 mg with Lovenox bridge, needs INR check appointment prior to discharge    Leukocytosis  Assessment & Plan  No signs of sepsis  Work-up shows pneumonia, started augmentin    Pneumonia  Assessment & Plan  New leukocytosis, work-up shows possible basilar pneumonia on x-ray  Start Augmentin  No sepsis    Chronic systolic congestive heart failure (HCC)- (present on admission)  Assessment & Plan  Mildly depressed left ventricular systolic function. The left ventricular ejection fraction is visually estimated to be 45%.Mildly dilated right ventricle. Reduced right ventricular  systolic function. Mild tricuspid regurgitation. Right ventricular systolic pressure is  estimated to be 33 mmHg (normal).    Continue with metorprolol, lasix and losartan    AF (atrial fibrillation) (HCC)- (present on admission)  Assessment & Plan  Continue metoprolol  Continue full dose lovenox for bridge   Continue warfarin     NSTEMI (non-ST elevated myocardial infarction), h/o Afib not on anticoag, leukocytosis (HCC)- (present on admission)  Assessment & Plan  5/2023: acute inferior MI late presentation, occluded RCA with collaterals  No interventions done  Continue aspirin and statin    Essential hypertension, benign- (present on admission)  Assessment & Plan  Continue with metoprolol, losartan, furosemide    Coronary artery disease due to lipid rich plaque- (present on admission)  Assessment & Plan  Continue with aspirin, atorvastatin  Lipid panel with low LDL below goal         VTE prophylaxis: warfarin, therapeutic lovenox     I have performed a physical exam and reviewed and updated ROS and Plan today (10/2/2023). In review of yesterday's note (10/1/2023), there are no changes except as documented above.

## 2023-10-03 NOTE — DISCHARGE PLANNING
DC Transport Scheduled    Received request at: 10/3/2023 at 1449    Transport Company Scheduled:  [employer]    Scheduled Date: 10/2/2023  Scheduled Time: [id number]    Destination: Home at [address]     Notified care team of scheduled transport via Vo[employer].     If there are any changes needed to the DC transportation scheduled, please contact [facility] at ext. [phone] between the hours of [phone] Mon-Fri. If outside those hours, contact the ED Case Manager at ext. [phone].

## 2023-10-03 NOTE — PROGRESS NOTES
Report received. Assumed care. Pt in bed sitting up. A/O x4. VSS. Responds appropriately. Denies pain, SOB. Assessment complete. Discussed POC, , pt verbalizes understanding. Explained importance of calling before getting OOB. Call light and belongings within reach. Bed alarm on. Bed in the lowest position. Treaded socks in place. Hourly rounding in progress. Will continue to monitor .     Goal:  PT eval  Ambulate   DC plan

## 2023-10-03 NOTE — PROGRESS NOTES
Got patient up out of bed to ambulate, patient took two steps says his left leg doesn't work.   Patient upon assessment bi-later legs weak and shaky.    PT re eval placed

## 2023-10-03 NOTE — THERAPY
"Physical Therapy   Daily Treatment     Patient Name: Hi Montes De Oca  Age:  78 y.o., Sex:  male  Medical Record #: [id number]  Today's Date: 10/3/2023     Precautions  Precautions: Fall Risk  Comments: R UE DVT    Assessment    Patient seen for follow up PT session and reports that he hasn't gotten OOB since PT eval.  Patient now unable to get to EOB without assist 2/2 L LE pain and needs ModA for LE management. Poor historian and hard to discern if this pain is acute on chronic. Patient with impaired insight into deficits and will benefit from placement for further placement, as he lives alone and is currently unable to mobilize without assist.     Plan    Treatment Plan Status: Continue Current Treatment Plan  Type of Treatment: Bed Mobility, Equipment, Gait Training, Neuro Re-Education / Balance, Self Care / Home Evaluation, Stair Training, Therapeutic Activities, Therapeutic Exercise  Treatment Frequency: 3 Times per Week  Treatment Duration: Until Therapy Goals Met    DC Equipment Recommendations: Unable to determine at this time  Discharge Recommendations: Recommend post-acute placement for additional physical therapy services prior to discharge home      Subjective  \"When can I mow my lawn      Objective       10/03/23 1500   Precautions   Precautions Fall Risk   Comments R UE DVT   Pain 0 - 10 Group   Location Hip;Knee   Location Orientation Left   Pain Rating Scale (NPRS) 10   Therapist Pain Assessment During Activity;10   Cognition    Cognition / Consciousness X   Speech/ Communication Hard of Hearing   Level of Consciousness Alert   Ability To Follow Commands 1 Step   Sequencing Impaired   Initiation Impaired   Comments Patient with poor insight and when asked why he hasn't been getting up he says \"I haven't felt like it and no one can fix me\" He expresses frustration with his situation   Active ROM Lower Body    Active ROM Lower Body  X   Comments L LE limited by pain with knee and hip flexion "   Strength Lower Body   Lower Body Strength  X   Comments L LE grossly 3-/5, limited by pain   Sensation Lower Body   Lower Extremity Sensation   X   Comments L foot numb   Neuro-Muscular Treatments   Neuro-Muscular Treatments Compensatory Strategies;Weight Shift Right;Weight Shift Left;Tactile Cuing;Sequencing;Verbal Cuing   Other Treatments   Other Treatments Provided Educated about the pathologies of bedrest and the use of a FWW for increased stability   Balance   Sitting Balance (Static) Fair   Sitting Balance (Dynamic) Fair   Standing Balance (Static) Poor +   Standing Balance (Dynamic) Poor   Weight Shift Sitting Poor   Weight Shift Standing Poor   Skilled Intervention Tactile Cuing;Verbal Cuing;Sequencing;Postural Facilitation;Compensatory Strategies   Comments w/FWW   Bed Mobility    Supine to Sit Moderate Assist   Sit to Supine Moderate Assist   Scooting Moderate Assist   Skilled Intervention Sequencing;Tactile Cuing;Verbal Cuing   Comments increased time and assist for LEs with all bed mobility   Gait Analysis   Gait Level Of Assist Unable to Participate   Functional Mobility   Sit to Stand Minimal Assist   Bed, Chair, Wheelchair Transfer Unable to Participate   Mobility 3 side shuffles at EOB then needing to sit 2/2 L him pain   How much difficulty does the patient currently have...   Turning over in bed (including adjusting bedclothes, sheets and blankets)? 1   Sitting down on and standing up from a chair with arms (e.g., wheelchair, bedside commode, etc.) 1   Moving from lying on back to sitting on the side of the bed? 1   How much help from another person does the patient currently need...   Moving to and from a bed to a chair (including a wheelchair)? 2   Need to walk in a hospital room? 2   Climbing 3-5 steps with a railing? 2   6 clicks Mobility Score 9   Activity Tolerance   Sitting Edge of Bed 10 min   Standing 1 min   Short Term Goals    Short Term Goal # 1 Pt will ambulate with LRAD for 200 ft  while maintaining Modified Dimple at 5/10 level   Goal Outcome # 1 goal not met   Short Term Goal # 2 Pt will ascend and descend steps x for with rail to enter his home   Goal Outcome # 2 Goal not met   Physical Therapy Treatment Plan   Physical Therapy Treatment Plan Continue Current Treatment Plan   Anticipated Discharge Equipment and Recommendations   DC Equipment Recommendations Unable to determine at this time   Discharge Recommendations Recommend post-acute placement for additional physical therapy services prior to discharge home     Hazel Bradford, PT, DPT, GCS

## 2023-10-03 NOTE — CARE PLAN
Problem: Knowledge Deficit - Standard  Goal: Patient and family/care givers will demonstrate understanding of plan of care, disease process/condition, diagnostic tests and medications  Outcome: Progressing     Problem: Psychosocial  Goal: Patient's ability to identify and develop effective coping behaviors will improve  Outcome: Progressing  Goal: Patient's ability to identify and utilize available support systems will improve  Outcome: Progressing     Problem: Fall Risk  Goal: Patient will remain free from falls  Outcome: Progressing     Problem: Risk for Bleeding  Goal: Patient will take measures to prevent bleeding and recognizes signs of bleeding that need to be reported immediately to a health care professional  Outcome: Progressing  Goal: Patient will not experience bleeding as evidenced by normal blood pressure, stable hematocrit and hemoglobin levels and desired ranges for coagulation profiles  Outcome: Progressing     Problem: Pain - Standard  Goal: Alleviation of pain or a reduction in pain to the patient’s comfort goal  Outcome: Progressing   The patient is Stable - Low risk of patient condition declining or worsening    Shift Goals  Clinical Goals: Monitor VS, monitor for bleeding  Patient Goals: Rest  Family Goals: NA    Progress made toward(s) clinical / shift goals:  Patient resting in bed, bed is locked and in lowest position, call bell within reach of patient, patient compliant with care and medication.    Patient is not progressing towards the following goals:

## 2023-10-03 NOTE — PROGRESS NOTES
Inpatient Anticoagulation Service Note for 10/3/2023    Reason for Anticoagulation: Deep Vein Thrombosis      Hemoglobin Value: (!) 8.4  Hematocrit Value: (!) 27.8  Lab Platelet Value: (!) 609  Target INR: 2.0 to 3.0    INR from last 7 days       Date/Time INR Value    10/03/23 0023 2.86    10/02/23 0507 2.88    10/01/23 0617 2.89    09/30/23 0346 1.84    09/29/23 0033 1.43    09/28/23 0549 1.32    09/27/23 0432 1.33    09/26/23 1106 1.35          Dose from last 7 days       Date/Time Dose (mg)    10/03/23 1046 5    10/02/23 1526 5    10/01/23 1158 2.5    09/30/23 1355 5    09/29/23 1405 7.5    09/28/23 1120 5    09/27/23 1146 5    09/26/23 1259 5          Average Dose (mg): 5  Significant Interactions: Antiplatelet Medications, Antibiotics  Bridge Therapy: No  Date of Last VTE Event:  (May 2023)  Bridge Therapy Start Date: 09/26/23  Days of Overlap Therapy: 5   INR Value Greater than 2 Prior to Discontinuation of Parenteral Anticoagulation: Yes   Reversal Agent Administered: Not Applicable    Assessment/Plan: INR remains therapeutic. No interval changes in overall clinical status, diet, or DDI. H/H is decreased, but no overt concern for bleeding at this time. Continue with warfarin 5 mg daily and follow up INR in the AM.    Education Material Provided?: No (Will need prior to discharge, attempted 9/29.)    Pharmacist suggested discharge dosing: Warfarin 5 mg daily and INR within 3-5 days of discharge.     Mari Galloway, PharmD

## 2023-10-03 NOTE — DISCHARGE PLANNING
Per request, referral sent to [facility] swing bed  Fax- [phone]    Referral sent to [facility]    1115- Per Gideon at [place], accepted    1130- Spoke To: Constance  Agency/Facility Name: [facility] bed  Plan or Request: NEIL left  regarding referral, asked for a return call.    Per epic link, [place], [facility] accepted    Per Naomi at [facility], pending  therapy notes

## 2023-10-03 NOTE — PROGRESS NOTES
Son called and wanted to let staff know that patient has 5-8 steps to take before entering or exiting his home

## 2023-10-03 NOTE — DISCHARGE PLANNING
Case Management Discharge Planning    Admission Date: 9/25/2023  GMLOS: 3.9  ALOS: 8    6-Clicks ADL Score: 24  6-Clicks Mobility Score: 16  PT and/or OT Eval ordered: Yes  Post-acute Referrals Ordered: Yes  Post-acute Choice Obtained: Yes  Has referral(s) been sent to post-acute provider:  Yes      Anticipated Discharge Dispo: Discharge Disposition: Discharged to home/self care (01)  Discharge Address: [address] [facility] [address]    DME Needed: No    Action(s) Taken:     0930 RNCM obtained signature from [employer] for [facility] [employer] for transportation to [facility].  [facility] faxed to Ride Line coordinator. Pt to DC at [phone] via [employer]. Bedside RN notified. Pt given printed script for PT/INR lab draws.    1030  Per bedside RN, pt is very unsteady and unable to walk. Transportation cancelled, leadership notified.  NEIL Guevara will send referral for swing bed at [facility] for swing bed.    1430 Pt  upset about not going home and worried about not having anyone to pay his bills.  Pt agrees to go to [facility] for SNF.  Referall sent earlier.  RNCM had a lengthy conversation with Son via telephone.  RNCM discussed pt's concerns about paying bills and asked pt's son to help him with that.  Also, RNCM discussed ability for son to care for pt after discharging from SNF. RNCM had spoken to Constance MENDIETA in [place].  She is requesting a dc plan for pt, [employer] and updated PT notes.  Pt's son states he is not sure if he can take care of pt after DC since he lives in 1 room apartment in [place]. RNCM educated pt and son that [facility] will be pursued for post-acute placement, but that they might not take the pt since there is no Dc plan.      Escalations Completed: Leadership    Medically Clear: Yes    Next Steps: RNCM to continue to follow up with pt and medical team to address dc needs and barriers      Barriers to Discharge: None

## 2023-10-04 LAB
GAMMA INTERFERON BACKGROUND BLD IA-ACNC: 0.05 IU/ML
INR PPP: 2.99 (ref 0.87–1.13)
M TB IFN-G BLD-IMP: NEGATIVE
M TB IFN-G CD4+ BCKGRND COR BLD-ACNC: -0.01 IU/ML
MITOGEN IGNF BCKGRD COR BLD-ACNC: 1.76 IU/ML
PROTHROMBIN TIME: 31.5 SEC (ref 12–14.6)
QFT TB2 - NIL TBQ2: -0.02 IU/ML

## 2023-10-04 PROCEDURE — 99497 ADVNCD CARE PLAN 30 MIN: CPT | Performed by: STUDENT IN AN ORGANIZED HEALTH CARE EDUCATION/TRAINING PROGRAM

## 2023-10-04 PROCEDURE — A9270 NON-COVERED ITEM OR SERVICE: HCPCS | Mod: JZ | Performed by: STUDENT IN AN ORGANIZED HEALTH CARE EDUCATION/TRAINING PROGRAM

## 2023-10-04 PROCEDURE — 770006 HCHG ROOM/CARE - MED/SURG/GYN SEMI*

## 2023-10-04 PROCEDURE — 85610 PROTHROMBIN TIME: CPT

## 2023-10-04 PROCEDURE — 700102 HCHG RX REV CODE 250 W/ 637 OVERRIDE(OP): Mod: JZ | Performed by: STUDENT IN AN ORGANIZED HEALTH CARE EDUCATION/TRAINING PROGRAM

## 2023-10-04 PROCEDURE — 97168 OT RE-EVAL EST PLAN CARE: CPT

## 2023-10-04 PROCEDURE — 87205 SMEAR GRAM STAIN: CPT

## 2023-10-04 PROCEDURE — A9270 NON-COVERED ITEM OR SERVICE: HCPCS | Performed by: INTERNAL MEDICINE

## 2023-10-04 PROCEDURE — 99233 SBSQ HOSP IP/OBS HIGH 50: CPT | Performed by: INTERNAL MEDICINE

## 2023-10-04 PROCEDURE — 99223 1ST HOSP IP/OBS HIGH 75: CPT | Mod: 25 | Performed by: STUDENT IN AN ORGANIZED HEALTH CARE EDUCATION/TRAINING PROGRAM

## 2023-10-04 PROCEDURE — 700102 HCHG RX REV CODE 250 W/ 637 OVERRIDE(OP): Performed by: INTERNAL MEDICINE

## 2023-10-04 PROCEDURE — A9270 NON-COVERED ITEM OR SERVICE: HCPCS | Performed by: STUDENT IN AN ORGANIZED HEALTH CARE EDUCATION/TRAINING PROGRAM

## 2023-10-04 PROCEDURE — 36415 COLL VENOUS BLD VENIPUNCTURE: CPT

## 2023-10-04 PROCEDURE — 700102 HCHG RX REV CODE 250 W/ 637 OVERRIDE(OP): Performed by: STUDENT IN AN ORGANIZED HEALTH CARE EDUCATION/TRAINING PROGRAM

## 2023-10-04 PROCEDURE — 87070 CULTURE OTHR SPECIMN AEROBIC: CPT

## 2023-10-04 RX ORDER — AMLODIPINE BESYLATE 5 MG/1
5 TABLET ORAL
Status: DISCONTINUED | OUTPATIENT
Start: 2023-10-04 | End: 2023-10-09 | Stop reason: HOSPADM

## 2023-10-04 RX ORDER — WARFARIN SODIUM 2.5 MG/1
2.5 TABLET ORAL
Status: COMPLETED | OUTPATIENT
Start: 2023-10-04 | End: 2023-10-04

## 2023-10-04 RX ADMIN — AMLODIPINE BESYLATE 5 MG: 5 TABLET ORAL at 10:00

## 2023-10-04 RX ADMIN — FINASTERIDE 5 MG: 5 TABLET, FILM COATED ORAL at 06:16

## 2023-10-04 RX ADMIN — WARFARIN SODIUM 2.5 MG: 2.5 TABLET ORAL at 18:18

## 2023-10-04 RX ADMIN — ACETAMINOPHEN 1000 MG: 500 TABLET, FILM COATED ORAL at 20:52

## 2023-10-04 RX ADMIN — FUROSEMIDE 40 MG: 40 TABLET ORAL at 06:15

## 2023-10-04 RX ADMIN — ACETAMINOPHEN 1000 MG: 500 TABLET, FILM COATED ORAL at 08:58

## 2023-10-04 RX ADMIN — LOSARTAN POTASSIUM 50 MG: 50 TABLET, FILM COATED ORAL at 06:14

## 2023-10-04 RX ADMIN — TAMSULOSIN HYDROCHLORIDE 0.4 MG: 0.4 CAPSULE ORAL at 06:16

## 2023-10-04 RX ADMIN — ACETAMINOPHEN 1000 MG: 500 TABLET, FILM COATED ORAL at 16:44

## 2023-10-04 RX ADMIN — AMOXICILLIN AND CLAVULANATE POTASSIUM 1 TABLET: 875; 125 TABLET, FILM COATED ORAL at 06:14

## 2023-10-04 RX ADMIN — ATORVASTATIN CALCIUM 80 MG: 40 TABLET, FILM COATED ORAL at 18:18

## 2023-10-04 RX ADMIN — ASPIRIN 81 MG: 81 TABLET, COATED ORAL at 06:16

## 2023-10-04 RX ADMIN — POTASSIUM CHLORIDE 40 MEQ: 1500 TABLET, EXTENDED RELEASE ORAL at 06:15

## 2023-10-04 RX ADMIN — DOCUSATE SODIUM 50 MG AND SENNOSIDES 8.6 MG 2 TABLET: 8.6; 5 TABLET, FILM COATED ORAL at 18:18

## 2023-10-04 RX ADMIN — DOCUSATE SODIUM 50 MG AND SENNOSIDES 8.6 MG 2 TABLET: 8.6; 5 TABLET, FILM COATED ORAL at 06:14

## 2023-10-04 RX ADMIN — METOPROLOL SUCCINATE 75 MG: 25 TABLET, EXTENDED RELEASE ORAL at 06:14

## 2023-10-04 RX ADMIN — METOPROLOL SUCCINATE 75 MG: 25 TABLET, EXTENDED RELEASE ORAL at 18:18

## 2023-10-04 RX ADMIN — POLYETHYLENE GLYCOL 3350 1 PACKET: 17 POWDER, FOR SOLUTION ORAL at 06:06

## 2023-10-04 ASSESSMENT — ENCOUNTER SYMPTOMS
DIAPHORESIS: 0
CHILLS: 0
HEADACHES: 0
SPEECH CHANGE: 0
MEMORY LOSS: 0
NAUSEA: 0
FEVER: 0
NERVOUS/ANXIOUS: 1
SHORTNESS OF BREATH: 0
ABDOMINAL PAIN: 0
MYALGIAS: 1
BRUISES/BLEEDS EASILY: 1
SENSORY CHANGE: 0
BACK PAIN: 0
WEAKNESS: 1
DIZZINESS: 0
NECK PAIN: 0

## 2023-10-04 ASSESSMENT — COGNITIVE AND FUNCTIONAL STATUS - GENERAL
DRESSING REGULAR LOWER BODY CLOTHING: A LOT
DAILY ACTIVITIY SCORE: 18
SUGGESTED CMS G CODE MODIFIER DAILY ACTIVITY: CK
HELP NEEDED FOR BATHING: A LOT
TOILETING: A LOT

## 2023-10-04 ASSESSMENT — PAIN DESCRIPTION - PAIN TYPE
TYPE: ACUTE PAIN
TYPE: ACUTE PAIN

## 2023-10-04 NOTE — PROGRESS NOTES
Hospital Medicine Daily Progress Note    Date of Service  10/4/2023    Chief Complaint  Hi Montes De Oca is a 78 y.o. male admitted 9/25/2023 with left arm swelling.    Hospital Course   78 y.o. male w/ PMH of CHF, DVT of the lower extremity, on AC, BPH, HTN, HLD, CAD with stent, PPP who presented 9/25/2023 with worsening left arm swelling. Patient sttes that he had been having worsening left arm swelling for over a month now. He had a caretaker come visit him today and told him to go to [facility] [place]. CT imaging was done and demonstrated a clot of the left internal jugular, subclavian and axillary vein. Outside facility contacted our Vascular surgeon who stated that there is no surgical intervention to be done. They recommend continue his anticoagulation.  On chart review, patient had similar occlusions on ultrasound on May 2023.  He was started on apixaban at that time.  Patient states that he has been compliant with his medication.  However he has stated that over the last month, he has developed worsening swelling.  Denies any other symptoms at this time.        Interval Problem Update  9/26 Patient mildly tachypneic otherwise vital signs stable.  Hemoglobin stable.  INR 1.35 and heparin drip supratherapeutic so has been stopped for now.  Patient has failed outpatient apixaban as his edema and clot have worsened.  Patient reports he has never been on warfarin before.  Discussed what is involved in switching to warfarin including needing INR checks, the therapeutic window and dietary changes, patient is in agreement with this switching to warfarin.  Nutrition consulted to help with diet education.  I have placed a referral for the anticoagulation clinic as he will need outpatient follow-up.  On chart review patient has history of bleeding and at one point was taken off his Eliquis, due to his high risk of rebleeding will monitor for now while inpatient bridging. Will also change heparin ggt to  Lovenox therapeutic dosing in anticipation for home.      9/26 Vitals and hemoglobin stable. INR 1.3. Patient with mild bruising on right arm, per nursing is stable. Patient denies hematuria or melena. Right arm with worsening swelling, denies pain. Will ACE wrap or place compression stocking on left arm to help with edema. Patient asking if clot can be removed, discussed that no indication for thrombectomy still with good pulses, no pain and no skin changes. Nutrition did education with patient today. Discussed with social work, tentative plan for dc on Friday if H&H stable needs AC clinic appointment set up for Monday.     9/27 INR 1.32, Hb 10. No signs of bleeding. Discussed with pharmacy if still no bleeding tomorrow we will plan to discharge patient on a Lovenox bridge with an anticoagulation clinic on Monday.  Dosing to be determined tomorrow after INR check.    9/28 INR 1.43 today.  Discussed with pharmacy recommended discharge on warfarin 5 mg daily.  Patient with new WBC 13.3, he denies any new complaints.  Procalcitonin negative, UA negative.  Reviewed chest x-ray showed bibasilar underinflation, radiology read says superimposed pneumonia not excluded.  Start Augmentin for 5 days.  Patient is medically clear for discharge however discussed with case management and they have been unable to get patient appointment for an INR check thus far.  He will need his INR check set up prior to discharge for safe discharge planning.  He has no new complaints, he is anxious to go home.    9/29 INR 1.84. Hemoglobin stable. Mild increase in WBC 15.7. Vitals stable.  Patient denies any cough, shortness of breath or any new complaints.  Overall the swelling of his left arm has improved since admission.  He is anxious to DC home.  Discussed with social work they will try calling again to see if he can get his INR checked outpatient so that he can DC.    10/1/2023  Evaluated at bedside  In no acute distress this morning,  laying in bed  Stable vitals and saturating well room air  Leukocytosis trending down  Continue Augmentin  He completed anticoagulation bridging and on warfarin  Patient will need anticoagulation clinic set up prior to considering discharge  Labs on AM    10/2/2023  Patient no acute distress today.  Case management following and will set up with Coumadin clinic as well as ride.  Patient unable to stand up and was feeling weak.  Will need to be reevaluated by physical therapy and Occupational Therapy.    10/3 patient is hard of hearing, unable to move left lower extremity  Per staff, unable to ambulate on his own, patient lives alone  PT evaluation, placement pending    10/4 patient requiring skilled placement, unable to ambulate, able to pivot with assistance  Patient noted to have ecchymosis and easy bleeding, on Coumadin  To discuss goals of care, palliative care consult pending    I have discussed this patient's plan of care and discharge plan at IDT rounds today with Case Management, Nursing, Nursing leadership, and other members of the IDT team.    Consultants/Specialty  N/A    Code Status  Full Code    Disposition  The patient is not medically cleared for discharge to home or a post-acute facility.  Anticipate discharge to: snf    I have placed the appropriate orders for post-discharge needs.    Review of Systems  Review of Systems   Constitutional:  Negative for chills, diaphoresis, fever and malaise/fatigue.   HENT:  Positive for hearing loss. Negative for congestion.    Respiratory:  Negative for shortness of breath.    Cardiovascular:  Negative for chest pain and leg swelling.   Gastrointestinal:  Negative for abdominal pain and nausea.   Genitourinary:  Negative for dysuria.   Musculoskeletal:  Positive for myalgias. Negative for back pain and neck pain.        Left arm swelling    Skin:  Negative for rash.   Neurological:  Positive for weakness. Negative for dizziness, sensory change, speech change and  headaches.   Endo/Heme/Allergies:  Bruises/bleeds easily.   Psychiatric/Behavioral:  Negative for memory loss. The patient is nervous/anxious.    All other systems reviewed and are negative.       Physical Exam  Temp:  [36.7 °C (98 °F)-36.9 °C (98.4 °F)] 36.9 °C (98.4 °F)  Pulse:  [61-63] 63  Resp:  [17-19] 17  BP: (143-150)/(56-66) 150/58  SpO2:  [91 %-93 %] 93 %    Physical Exam  Vitals and nursing note reviewed.   Constitutional:       General: He is not in acute distress.     Appearance: He is ill-appearing.   HENT:      Head: Normocephalic and atraumatic.      Ears:      Comments: Hard of hearing      Mouth/Throat:      Mouth: Mucous membranes are moist.   Eyes:      Extraocular Movements: Extraocular movements intact.      Pupils: Pupils are equal, round, and reactive to light.   Cardiovascular:      Rate and Rhythm: Normal rate and regular rhythm.      Pulses: Normal pulses.   Pulmonary:      Effort: Pulmonary effort is normal. No respiratory distress.      Breath sounds: No stridor. No wheezing.   Abdominal:      General: Abdomen is flat. There is no distension.      Palpations: Abdomen is soft. There is no mass.   Musculoskeletal:         General: Swelling (left arm, improving) present. No tenderness. Normal range of motion.      Cervical back: Neck supple.   Skin:     General: Skin is warm and dry.      Findings: Bruising (mild right arm) present.   Neurological:      General: No focal deficit present.      Mental Status: He is alert and oriented to person, place, and time.      Cranial Nerves: No cranial nerve deficit.      Sensory: No sensory deficit.      Motor: Weakness present.      Coordination: Coordination normal.   Psychiatric:         Mood and Affect: Mood normal.         Behavior: Behavior normal.         Fluids    Intake/Output Summary (Last 24 hours) at 10/4/2023 1322  Last data filed at 10/4/2023 0756  Gross per 24 hour   Intake 800 ml   Output 600 ml   Net 200 ml       Laboratory  Recent  Labs     10/02/23  0507 10/03/23  0023   WBC 12.4* 11.5*   RBC 2.98* 2.89*   HEMOGLOBIN 8.8* 8.4*   HEMATOCRIT 27.8* 27.8*   MCV 93.3 96.2   MCH 29.5 29.1   MCHC 31.7* 30.2*   RDW 54.9* 57.1*   PLATELETCT 629* 609*   MPV 11.9 12.2     Recent Labs     10/02/23  0507 10/03/23  0023   SODIUM 139 138   POTASSIUM 4.0 4.5   CHLORIDE 103 103   CO2 28 30   GLUCOSE 125* 107*   BUN 22 28*   CREATININE 0.90 1.16   CALCIUM 7.8* 8.3*     Recent Labs     10/02/23  0507 10/03/23  0023 10/04/23  0440   INR 2.88* 2.86* 2.99*                 Imaging  US-EXTREMITY VENOUS UPPER UNILAT RIGHT   Final Result      DX-CHEST-PORTABLE (1 VIEW)   Final Result      Decreased bibasilar underinflation atelectasis. Superimposed pneumonia not excluded.      IR-US GUIDED PIV   Final Result    Ultrasound-guided PERIPHERAL IV INSERTION performed by    qualified nursing staff as above.           Assessment/Plan  * Arm DVT (deep venous thromboembolism), acute, left (HCC)- (present on admission)  Assessment & Plan  CT imaging shows occlusion of the internal jugular vein, subclavian vein and axillary vein    Patient had an ultrasound done in the past.  May 2023, ultrasound showed: Evidence of acute to subacute occlusive deep venous thrombosis in the    jugular vein, subclavian vein, axillary vein and brachial veins from the  proximal to distal bicep.  Evidence of acute to subacute occlusive superficial venous thrombosis in  the basilic vein from the proximal to distal bicep.  Evidence of acute to subacute occlusive superficial venous thrombosis in    the cephalic vein from the proximal bicep to the elbow and    Vascular surgery aware - no surgical intervention  Continue lovenox   Patient has failed apixaban therapy, will change to warfarin  -monitor very closely for bleeding, hx of gross hematuria previous on blood thinners   Daily INR, H&H  Dietary consulted for nutrition counseling  Referral placed for outpatient anticoagulation clinic  High risk of  bleed, will monitor while bridging on warfarin    10/2 Patient medically clear for discharge on warfarin 5 mg with Lovenox bridge, needs INR check appointment prior to discharge    10/3 generalized weakness, patient lives alone  Patient's son 2 hours away  Per staff, unable to ambulate  PT OT evaluation pending, will need placement assistance  Patient aware  Concerned about finances,  to assist  Outpatient Coumadin follow-up    10/4 skilled placement pending, patient wants discharge to [place], has minimal support,  to follow-up with patient's son  PT recommending skilled placement, quant gold pending  Palliative care consult, for goals of care    Debility  Assessment & Plan  Lives alone, will need further therapy evaluation  May need skilled placement, Quant gold ordered    10/4 SNF placement pending, bleed on Coumadin  We will reevaluate goals of care due to risk of bleeding    Leukocytosis  Assessment & Plan  No signs of sepsis  Work-up shows pneumonia, started augmentin    Pneumonia  Assessment & Plan  New leukocytosis, work-up shows possible basilar pneumonia on x-ray  Start Augmentin  No sepsis    Chronic systolic congestive heart failure (HCC)- (present on admission)  Assessment & Plan  Mildly depressed left ventricular systolic function. The left ventricular ejection fraction is visually estimated to be 45%.Mildly dilated right ventricle. Reduced right ventricular systolic function. Mild tricuspid regurgitation. Right ventricular systolic pressure is  estimated to be 33 mmHg (normal).    Continue with metorprolol, lasix and losartan    AF (atrial fibrillation) (HCC)- (present on admission)  Assessment & Plan  Continue metoprolol  Continue full dose lovenox for bridge   Continue warfarin     NSTEMI (non-ST elevated myocardial infarction), h/o Afib not on anticoag, leukocytosis (HCC)- (present on admission)  Assessment & Plan  5/2023: acute inferior MI late presentation, occluded RCA  with collaterals  No interventions done  Continue aspirin and statin    Essential hypertension, benign- (present on admission)  Assessment & Plan  Continue with metoprolol, losartan, furosemide    Coronary artery disease due to lipid rich plaque- (present on admission)  Assessment & Plan  Continue with aspirin, atorvastatin  Lipid panel with low LDL below goal         VTE prophylaxis: warfarin, therapeutic lovenox     I have performed a physical exam and reviewed and updated ROS and Plan today (10/4/2023). In review of yesterday's note (10/3/2023), there are no changes except as documented above.

## 2023-10-04 NOTE — PROGRESS NOTES
"Pt's previous IV line site on RIGHT wrist looks more swollen than the last time this nurse assessed the pt(9/27), noted site with compression wrap, and pt complaints of pain on that arm more than the left arm. Pt says\"are they gonna do anything about this?\" Pointing to his right wrist area\"  pt reassured that this nurse will consult on call about the matter and give update to incoming care team       0400 hrs spoke to PAGE Whelan the situation on pt's Right arm, we discussed about the vinita result on the rt arm and to maintain the compression wrap on the area.  "

## 2023-10-04 NOTE — PROGRESS NOTES
Inpatient Anticoagulation Service Note for 10/4/2023    Reason for Anticoagulation: Deep Vein Thrombosis      Hemoglobin Value: (!) 8.4  Hematocrit Value: (!) 27.8  Lab Platelet Value: (!) 609  Target INR: 2.0 to 3.0    INR from last 7 days       Date/Time INR Value    10/04/23 0440 2.99    10/03/23 0023 2.86    10/02/23 0507 2.88    10/01/23 0617 2.89    09/30/23 0346 1.84    09/29/23 0033 1.43    09/28/23 0549 1.32          Dose from last 7 days       Date/Time Dose (mg)    10/04/23 1628 2.5    10/03/23 1046 5    10/02/23 1526 5    10/01/23 1158 2.5    09/30/23 1355 5    09/29/23 1405 7.5    09/28/23 1120 5          Average Dose (mg): 5  Significant Interactions: Antiplatelet Medications, Antibiotics  Bridge Therapy: No  Date of Last VTE Event:  (May 2023)  Bridge Therapy Start Date: 09/26/23  Days of Overlap Therapy: 5   INR Value Greater than 2 Prior to Discontinuation of Parenteral Anticoagulation: Yes   Reversal Agent Administered: Not Applicable    Assessment/Plan: No interval changes in overall clinical status, diet, or DDI. H/H decreased, no overt s/sx of bleeding. INR is at higher end of therapeutic range. Will provide warfarin 2.5 mg tonight and follow up INR in the AM.    Education Material Provided?: Yes (Provided 10/3.)    Pharmacist suggested discharge dosing: Consider 5 mg daily, but will likely need combination of 5 and 2.5 mg to maintain therapeutic range. INR within 72 hours of discharge.      Mari Galloway, PharmD

## 2023-10-04 NOTE — PROGRESS NOTES
Assumed care of patient.Patient A&O3 hard of hearing, on room air, not in distress, Reporting a pain level of 4 on his right  Call light within reach, belongings within reach, Fall precautions in place, bed in lowest position. Patient verbalized disappointment of not being able to go home this  morning  POC was discussed with patient. All questions were answered. Patient verbalized understanding.

## 2023-10-04 NOTE — THERAPY
Occupational Therapy  Daily Treatment     Patient Name: Hi Montes De Oca  Age:  78 y.o., Sex:  male  Medical Record #: [id number]  Today's Date: 10/4/2023     Precautions  Precautions: Fall Risk  Comments: R UE DVT    Assessment    .Pt currently limited by decreased functional mobility, activity tolerance, strength, balance, which are affecting pt's ability to complete ADLs/IADLs at baseline. Pt would benefit from OT services in the acute care setting to maximize functional recovery.      Plan    Treatment Plan Status: (P) Continue Current Treatment Plan  Type of Treatment: (P) Self Care / Activities of Daily Living, Therapeutic Activity  Treatment Frequency: (P) 3 Times per Week  Treatment Duration: (P) Until Therapy Goals Met       Discharge Recommendations: (P) Recommend post-acute placement for additional occupational therapy services prior to discharge home         10/04/23 0754   Activities of Daily Living   Grooming Supervision   Upper Body Dressing Supervision   Lower Body Dressing Moderate Assist   Functional Mobility   Sit to Stand Minimal Assist   Bed, Chair, Wheelchair Transfer Minimal Assist   Short Term Goals   Short Term Goal # 1 supervised with LB dressing   Short Term Goal # 2 supervised with ADL txfs   Occupational Therapy Treatment Plan    O.T. Treatment Plan Continue Current Treatment Plan   Treatment Interventions Self Care / Activities of Daily Living;Therapeutic Activity   Treatment Frequency 3 Times per Week   Anticipated Discharge Equipment and Recommendations   Discharge Recommendations Recommend post-acute placement for additional occupational therapy services prior to discharge home

## 2023-10-04 NOTE — PROGRESS NOTES
Report received. Assumed care. Pt in bed, piovet to the chair with OT. A/O x4. VSS. Responds appropriately. Denies pain, SOB. Assessment complete. Discussed POC,  Explained importance of calling before getting OOB. Call light and belongings within reach. Bed alarm on. Bed in the lowest position. Treaded socks in place. Hourly rounding in progress. Will continue to monitor .     Goal:  Reposition   Sitting chair for meals (refused)

## 2023-10-04 NOTE — CARE PLAN
The patient is Stable - Low risk of patient condition declining or worsening    Shift Goals  Clinical Goals: pain control  Patient Goals: comfort  Family Goals: na    Progress made toward(s) clinical / shift goals:  PAIN Reduced from 5/10 to 3/10 with scheduled pain meds, right wrist area kept wrapped to reduced swelling, instructed to keep elevated, bed alarm on and reminded on use of ca  Problem: Risk for Bleeding  Goal: Patient will take measures to prevent bleeding and recognizes signs of bleeding that need to be reported immediately to a health care professional  Outcome: Progressing     Problem: Pain - Standard  Goal: Alleviation of pain or a reduction in pain to the patient’s comfort goal  Outcome: Progressing   ll bells for help    Patient is not progressing towards the following goals:

## 2023-10-04 NOTE — DISCHARGE PLANNING
Case Management Discharge Planning    Admission Date: 9/25/2023  GMLOS: 3.9  ALOS: 9    6-Clicks ADL Score: 18  6-Clicks Mobility Score: 9  PT and/or OT Eval ordered: Yes  Post-acute Referrals Ordered: Yes  Post-acute Choice Obtained: Yes  Has referral(s) been sent to post-acute provider:  Yes      Anticipated Discharge Dispo: Discharge Disposition: Discharged to home/self care (01)  Discharge Address: [facility] [address] [facility] [place] [address]    DME Needed: No    Action(s) Taken: Patient discuss in IDT rounds. Pt understands that he needs SNF per PT/OT and anticoagulation monitoring.  RNCM to fax PT/OT and progress notes to Con[facility] from [facility] [place] at [phone]. Pending quant gold.     1500 RNCM offered assistance to call animal control at [place] to check on pt's cats.  Pt is really frustrated and upset about his current situation.  He states that his neighbors are watching over the cats and that he doesn't need CM to call anyone.    Escalations Completed: None    Medically Clear: No    Next Steps: RNCM to continue to follow up with pt and medical team to address dc needs and barriers      Barriers to Discharge: Pending Placement

## 2023-10-05 ENCOUNTER — APPOINTMENT (OUTPATIENT)
Dept: RADIOLOGY | Facility: MEDICAL CENTER | Age: 78
DRG: 299 | End: 2023-10-05
Attending: INTERNAL MEDICINE
Payer: MEDICARE

## 2023-10-05 PROBLEM — R60.0 LOCALIZED EDEMA: Status: ACTIVE | Noted: 2023-10-05

## 2023-10-05 LAB
ACANTHOCYTES BLD QL SMEAR: NORMAL
ANION GAP SERPL CALC-SCNC: 10 MMOL/L (ref 7–16)
ANISOCYTOSIS BLD QL SMEAR: ABNORMAL
BASOPHILS # BLD AUTO: 1.7 % (ref 0–1.8)
BASOPHILS # BLD: 0.16 K/UL (ref 0–0.12)
BUN SERPL-MCNC: 18 MG/DL (ref 8–22)
CALCIUM SERPL-MCNC: 7.8 MG/DL (ref 8.5–10.5)
CHLORIDE SERPL-SCNC: 103 MMOL/L (ref 96–112)
CO2 SERPL-SCNC: 25 MMOL/L (ref 20–33)
CREAT SERPL-MCNC: 0.69 MG/DL (ref 0.5–1.4)
EOSINOPHIL # BLD AUTO: 0.08 K/UL (ref 0–0.51)
EOSINOPHIL NFR BLD: 0.9 % (ref 0–6.9)
ERYTHROCYTE [DISTWIDTH] IN BLOOD BY AUTOMATED COUNT: 53.1 FL (ref 35.9–50)
GFR SERPLBLD CREATININE-BSD FMLA CKD-EPI: 93 ML/MIN/1.73 M 2
GLUCOSE SERPL-MCNC: 100 MG/DL (ref 65–99)
GRAM STN SPEC: NORMAL
HCT VFR BLD AUTO: 25.9 % (ref 42–52)
HGB BLD-MCNC: 8.3 G/DL (ref 14–18)
INR PPP: 3.36 (ref 0.87–1.13)
LYMPHOCYTES # BLD AUTO: 0.66 K/UL (ref 1–4.8)
LYMPHOCYTES NFR BLD: 7 % (ref 22–41)
MANUAL DIFF BLD: NORMAL
MCH RBC QN AUTO: 29.5 PG (ref 27–33)
MCHC RBC AUTO-ENTMCNC: 32 G/DL (ref 32.3–36.5)
MCV RBC AUTO: 92.2 FL (ref 81.4–97.8)
MONOCYTES # BLD AUTO: 0.17 K/UL (ref 0–0.85)
MONOCYTES NFR BLD AUTO: 1.8 % (ref 0–13.4)
MORPHOLOGY BLD-IMP: NORMAL
NEUTROPHILS # BLD AUTO: 8.33 K/UL (ref 1.82–7.42)
NEUTROPHILS NFR BLD: 88.6 % (ref 44–72)
NRBC # BLD AUTO: 0 K/UL
NRBC BLD-RTO: 0 /100 WBC (ref 0–0.2)
PLATELET # BLD AUTO: 595 K/UL (ref 164–446)
PLATELET BLD QL SMEAR: NORMAL
PMV BLD AUTO: 11.7 FL (ref 9–12.9)
POIKILOCYTOSIS BLD QL SMEAR: NORMAL
POTASSIUM SERPL-SCNC: 4.4 MMOL/L (ref 3.6–5.5)
PROTHROMBIN TIME: 34.5 SEC (ref 12–14.6)
RBC # BLD AUTO: 2.81 M/UL (ref 4.7–6.1)
RBC BLD AUTO: PRESENT
SIGNIFICANT IND 70042: NORMAL
SITE SITE: NORMAL
SODIUM SERPL-SCNC: 138 MMOL/L (ref 135–145)
SOURCE SOURCE: NORMAL
WBC # BLD AUTO: 9.4 K/UL (ref 4.8–10.8)

## 2023-10-05 PROCEDURE — A9270 NON-COVERED ITEM OR SERVICE: HCPCS | Mod: JZ | Performed by: STUDENT IN AN ORGANIZED HEALTH CARE EDUCATION/TRAINING PROGRAM

## 2023-10-05 PROCEDURE — 700102 HCHG RX REV CODE 250 W/ 637 OVERRIDE(OP): Mod: JZ | Performed by: STUDENT IN AN ORGANIZED HEALTH CARE EDUCATION/TRAINING PROGRAM

## 2023-10-05 PROCEDURE — A9270 NON-COVERED ITEM OR SERVICE: HCPCS | Performed by: INTERNAL MEDICINE

## 2023-10-05 PROCEDURE — 85025 COMPLETE CBC W/AUTO DIFF WBC: CPT

## 2023-10-05 PROCEDURE — 700102 HCHG RX REV CODE 250 W/ 637 OVERRIDE(OP): Performed by: INTERNAL MEDICINE

## 2023-10-05 PROCEDURE — 93971 EXTREMITY STUDY: CPT | Mod: RT

## 2023-10-05 PROCEDURE — 700102 HCHG RX REV CODE 250 W/ 637 OVERRIDE(OP): Performed by: STUDENT IN AN ORGANIZED HEALTH CARE EDUCATION/TRAINING PROGRAM

## 2023-10-05 PROCEDURE — 97602 WOUND(S) CARE NON-SELECTIVE: CPT

## 2023-10-05 PROCEDURE — 99232 SBSQ HOSP IP/OBS MODERATE 35: CPT | Performed by: STUDENT IN AN ORGANIZED HEALTH CARE EDUCATION/TRAINING PROGRAM

## 2023-10-05 PROCEDURE — 770006 HCHG ROOM/CARE - MED/SURG/GYN SEMI*

## 2023-10-05 PROCEDURE — 85610 PROTHROMBIN TIME: CPT

## 2023-10-05 PROCEDURE — A9270 NON-COVERED ITEM OR SERVICE: HCPCS | Performed by: STUDENT IN AN ORGANIZED HEALTH CARE EDUCATION/TRAINING PROGRAM

## 2023-10-05 PROCEDURE — 93971 EXTREMITY STUDY: CPT | Mod: 26,RT | Performed by: INTERNAL MEDICINE

## 2023-10-05 PROCEDURE — 99233 SBSQ HOSP IP/OBS HIGH 50: CPT | Performed by: INTERNAL MEDICINE

## 2023-10-05 PROCEDURE — 80048 BASIC METABOLIC PNL TOTAL CA: CPT

## 2023-10-05 PROCEDURE — 85007 BL SMEAR W/DIFF WBC COUNT: CPT

## 2023-10-05 PROCEDURE — 36415 COLL VENOUS BLD VENIPUNCTURE: CPT

## 2023-10-05 RX ORDER — WARFARIN SODIUM 2.5 MG/1
2.5 TABLET ORAL DAILY
Status: DISCONTINUED | OUTPATIENT
Start: 2023-10-05 | End: 2023-10-09 | Stop reason: HOSPADM

## 2023-10-05 RX ADMIN — FUROSEMIDE 40 MG: 40 TABLET ORAL at 04:56

## 2023-10-05 RX ADMIN — MAGNESIUM HYDROXIDE 30 ML: 1200 LIQUID ORAL at 04:55

## 2023-10-05 RX ADMIN — AMLODIPINE BESYLATE 5 MG: 5 TABLET ORAL at 04:57

## 2023-10-05 RX ADMIN — DOCUSATE SODIUM 50 MG AND SENNOSIDES 8.6 MG 2 TABLET: 8.6; 5 TABLET, FILM COATED ORAL at 05:01

## 2023-10-05 RX ADMIN — WARFARIN SODIUM 2.5 MG: 2.5 TABLET ORAL at 18:14

## 2023-10-05 RX ADMIN — ACETAMINOPHEN 1000 MG: 500 TABLET, FILM COATED ORAL at 08:52

## 2023-10-05 RX ADMIN — METOPROLOL SUCCINATE 75 MG: 25 TABLET, EXTENDED RELEASE ORAL at 04:56

## 2023-10-05 RX ADMIN — POTASSIUM CHLORIDE 40 MEQ: 1500 TABLET, EXTENDED RELEASE ORAL at 04:55

## 2023-10-05 RX ADMIN — DOCUSATE SODIUM 50 MG AND SENNOSIDES 8.6 MG 2 TABLET: 8.6; 5 TABLET, FILM COATED ORAL at 18:13

## 2023-10-05 RX ADMIN — ATORVASTATIN CALCIUM 80 MG: 40 TABLET, FILM COATED ORAL at 18:13

## 2023-10-05 RX ADMIN — METOPROLOL SUCCINATE 75 MG: 25 TABLET, EXTENDED RELEASE ORAL at 18:13

## 2023-10-05 RX ADMIN — ASPIRIN 81 MG: 81 TABLET, COATED ORAL at 04:55

## 2023-10-05 RX ADMIN — ACETAMINOPHEN 1000 MG: 500 TABLET, FILM COATED ORAL at 21:11

## 2023-10-05 RX ADMIN — TAMSULOSIN HYDROCHLORIDE 0.4 MG: 0.4 CAPSULE ORAL at 04:56

## 2023-10-05 RX ADMIN — ACETAMINOPHEN 1000 MG: 500 TABLET, FILM COATED ORAL at 16:05

## 2023-10-05 RX ADMIN — FINASTERIDE 5 MG: 5 TABLET, FILM COATED ORAL at 04:56

## 2023-10-05 RX ADMIN — LOSARTAN POTASSIUM 50 MG: 50 TABLET, FILM COATED ORAL at 04:56

## 2023-10-05 ASSESSMENT — ENCOUNTER SYMPTOMS
FEVER: 0
COUGH: 0
NAUSEA: 0
SHORTNESS OF BREATH: 0
HEADACHES: 0
CHILLS: 0
WEAKNESS: 1
BRUISES/BLEEDS EASILY: 1
HEARTBURN: 0
MEMORY LOSS: 0
NECK PAIN: 0
MYALGIAS: 1
BACK PAIN: 0
NERVOUS/ANXIOUS: 1
ABDOMINAL PAIN: 0
DIZZINESS: 0

## 2023-10-05 ASSESSMENT — PAIN DESCRIPTION - PAIN TYPE
TYPE: ACUTE PAIN

## 2023-10-05 NOTE — PROGRESS NOTES
Received report from day shift RN. Assumed pt care. Upon initial rounds and assessment pt AAOX4, declines pain. Assisted to bedside chair. Dinner tray set up. Overlay waffle to bed and linen changed. Pt's Rt arm weeping yellowish fluid, culture taken and sent to lab. Pt back to bed, same locked and in low position, call bell within reach, will continue to monitor.

## 2023-10-05 NOTE — PROGRESS NOTES
Hospital Medicine Daily Progress Note    Date of Service  10/5/2023    Chief Complaint  Hi Montes De Oca is a 78 y.o. male admitted 9/25/2023 with left arm swelling.    Hospital Course   78 y.o. male w/ PMH of CHF, DVT of the lower extremity, on AC, BPH, HTN, HLD, CAD with stent, PPP who presented 9/25/2023 with worsening left arm swelling. Patient sttes that he had been having worsening left arm swelling for over a month now. He had a caretaker come visit him today and told him to go to [facility]. CT imaging was done and demonstrated a clot of the left internal jugular, subclavian and axillary vein. Outside facility contacted our Vascular surgeon who stated that there is no surgical intervention to be done. They recommend continue his anticoagulation.  On chart review, patient had similar occlusions on ultrasound on May 2023.  He was started on apixaban at that time.  Patient states that he has been compliant with his medication.  However he has stated that over the last month, he has developed worsening swelling.  Denies any other symptoms at this time.        Interval Problem Update  9/26 Patient mildly tachypneic otherwise vital signs stable.  Hemoglobin stable.  INR 1.35 and heparin drip supratherapeutic so has been stopped for now.  Patient has failed outpatient apixaban as his edema and clot have worsened.  Patient reports he has never been on warfarin before.  Discussed what is involved in switching to warfarin including needing INR checks, the therapeutic window and dietary changes, patient is in agreement with this switching to warfarin.  Nutrition consulted to help with diet education.  I have placed a referral for the anticoagulation clinic as he will need outpatient follow-up.  On chart review patient has history of bleeding and at one point was taken off his Eliquis, due to his high risk of rebleeding will monitor for now while inpatient bridging. Will also change heparin ggt to  Lovenox therapeutic dosing in anticipation for home.      9/26 Vitals and hemoglobin stable. INR 1.3. Patient with mild bruising on right arm, per nursing is stable. Patient denies hematuria or melena. Right arm with worsening swelling, denies pain. Will ACE wrap or place compression stocking on left arm to help with edema. Patient asking if clot can be removed, discussed that no indication for thrombectomy still with good pulses, no pain and no skin changes. Nutrition did education with patient today. Discussed with social work, tentative plan for dc on Friday if H&H stable needs AC clinic appointment set up for Monday.     9/27 INR 1.32, Hb 10. No signs of bleeding. Discussed with pharmacy if still no bleeding tomorrow we will plan to discharge patient on a Lovenox bridge with an anticoagulation clinic on Monday.  Dosing to be determined tomorrow after INR check.    9/28 INR 1.43 today.  Discussed with pharmacy recommended discharge on warfarin 5 mg daily.  Patient with new WBC 13.3, he denies any new complaints.  Procalcitonin negative, UA negative.  Reviewed chest x-ray showed bibasilar underinflation, radiology read says superimposed pneumonia not excluded.  Start Augmentin for 5 days.  Patient is medically clear for discharge however discussed with case management and they have been unable to get patient appointment for an INR check thus far.  He will need his INR check set up prior to discharge for safe discharge planning.  He has no new complaints, he is anxious to go home.    9/29 INR 1.84. Hemoglobin stable. Mild increase in WBC 15.7. Vitals stable.  Patient denies any cough, shortness of breath or any new complaints.  Overall the swelling of his left arm has improved since admission.  He is anxious to DC home.  Discussed with social work they will try calling again to see if he can get his INR checked outpatient so that he can DC.    10/1/2023  Evaluated at bedside  In no acute distress this morning,  laying in bed  Stable vitals and saturating well room air  Leukocytosis trending down  Continue Augmentin  He completed anticoagulation bridging and on warfarin  Patient will need anticoagulation clinic set up prior to considering discharge  Labs on AM    10/2/2023  Patient no acute distress today.  Case management following and will set up with Coumadin clinic as well as ride.  Patient unable to stand up and was feeling weak.  Will need to be reevaluated by physical therapy and Occupational Therapy.    10/3 patient is hard of hearing, unable to move left lower extremity  Per staff, unable to ambulate on his own, patient lives alone  PT evaluation, placement pending    10/4 patient requiring skilled placement, unable to ambulate, able to pivot with assistance  Patient noted to have ecchymosis and easy bleeding, on Coumadin  To discuss goals of care, palliative care consult pending    10/5 right arm edema with serous drainage  Follow-up ultrasound, nontender to palpation  Patient aware of current condition, unable to ambulate on his own  Agreeable to skilled placement  Remains agitated, frequently stating that he is going to die in the hospital  Discussed with palliative care, to follow-up CODE STATUS      I have discussed this patient's plan of care and discharge plan at IDT rounds today with Case Management, Nursing, Nursing leadership, and other members of the IDT team.    Consultants/Specialty  N/A    Code Status  Full Code    Disposition  The patient is not medically cleared for discharge to home or a post-acute facility.  Anticipate discharge to: snf    I have placed the appropriate orders for post-discharge needs.    Review of Systems  Review of Systems   Constitutional:  Negative for chills and fever.   HENT:  Positive for hearing loss. Negative for congestion.    Respiratory:  Negative for cough and shortness of breath.    Cardiovascular:  Negative for chest pain and leg swelling.   Gastrointestinal:   Negative for abdominal pain, heartburn and nausea.   Genitourinary:  Negative for dysuria.   Musculoskeletal:  Positive for myalgias. Negative for back pain and neck pain.        Left arm swelling    Skin:  Negative for rash.   Neurological:  Positive for weakness. Negative for dizziness and headaches.   Endo/Heme/Allergies:  Bruises/bleeds easily.   Psychiatric/Behavioral:  Negative for memory loss. The patient is nervous/anxious.    All other systems reviewed and are negative.       Physical Exam  Temp:  [36 °C (96.8 °F)-37 °C (98.6 °F)] 36.7 °C (98 °F)  Pulse:  [60-62] 62  Resp:  [15-18] 16  BP: (129-145)/(54-64) 129/54  SpO2:  [92 %-94 %] 94 %    Physical Exam  Vitals and nursing note reviewed.   Constitutional:       Appearance: He is ill-appearing. He is not diaphoretic.   HENT:      Head: Normocephalic and atraumatic.      Ears:      Comments: Hard of hearing      Mouth/Throat:      Mouth: Mucous membranes are moist.   Eyes:      Extraocular Movements: Extraocular movements intact.      Pupils: Pupils are equal, round, and reactive to light.   Cardiovascular:      Rate and Rhythm: Normal rate and regular rhythm.      Pulses: Normal pulses.   Pulmonary:      Effort: Pulmonary effort is normal. No respiratory distress.      Breath sounds: No stridor. No wheezing.   Abdominal:      General: Abdomen is flat. There is no distension.      Palpations: Abdomen is soft. There is no mass.   Musculoskeletal:         General: Swelling (left arm, improving) present. No tenderness. Normal range of motion.      Cervical back: Neck supple.   Skin:     General: Skin is warm and dry.      Findings: Bruising (mild right arm) present.   Neurological:      General: No focal deficit present.      Mental Status: He is alert and oriented to person, place, and time.      Cranial Nerves: No cranial nerve deficit.      Sensory: No sensory deficit.      Motor: Weakness present.   Psychiatric:         Mood and Affect: Mood normal.          Behavior: Behavior normal.         Fluids    Intake/Output Summary (Last 24 hours) at 10/5/2023 1342  Last data filed at 10/5/2023 0943  Gross per 24 hour   Intake 540 ml   Output 1675 ml   Net -1135 ml       Laboratory  Recent Labs     10/03/23  0023 10/05/23  0433   WBC 11.5* 9.4   RBC 2.89* 2.81*   HEMOGLOBIN 8.4* 8.3*   HEMATOCRIT 27.8* 25.9*   MCV 96.2 92.2   MCH 29.1 29.5   MCHC 30.2* 32.0*   RDW 57.1* 53.1*   PLATELETCT 609* 595*   MPV 12.2 11.7     Recent Labs     10/03/23  0023 10/05/23  0433   SODIUM 138 138   POTASSIUM 4.5 4.4   CHLORIDE 103 103   CO2 30 25   GLUCOSE 107* 100*   BUN 28* 18   CREATININE 1.16 0.69   CALCIUM 8.3* 7.8*     Recent Labs     10/03/23  0023 10/04/23  0440 10/05/23  0433   INR 2.86* 2.99* 3.36*                 Imaging  US-EXTREMITY VENOUS UPPER UNILAT RIGHT   Final Result      DX-CHEST-PORTABLE (1 VIEW)   Final Result      Decreased bibasilar underinflation atelectasis. Superimposed pneumonia not excluded.      IR-US GUIDED PIV   Final Result    Ultrasound-guided PERIPHERAL IV INSERTION performed by    qualified nursing staff as above.      US-EXTREMITY VENOUS UPPER UNILAT RIGHT    (Results Pending)        Assessment/Plan  * Arm DVT (deep venous thromboembolism), acute, left (HCC)- (present on admission)  Assessment & Plan  CT imaging shows occlusion of the internal jugular vein, subclavian vein and axillary vein    Patient had an ultrasound done in the past.  May 2023, ultrasound showed: Evidence of acute to subacute occlusive deep venous thrombosis in the    jugular vein, subclavian vein, axillary vein and brachial veins from the  proximal to distal bicep.  Evidence of acute to subacute occlusive superficial venous thrombosis in  the basilic vein from the proximal to distal bicep.  Evidence of acute to subacute occlusive superficial venous thrombosis in    the cephalic vein from the proximal bicep to the elbow and    Vascular surgery aware - no surgical intervention  Continue  lovenox   Patient has failed apixaban therapy, will change to warfarin  -monitor very closely for bleeding, hx of gross hematuria previous on blood thinners   Daily INR, H&H  Dietary consulted for nutrition counseling  Referral placed for outpatient anticoagulation clinic  High risk of bleed, will monitor while bridging on warfarin    10/2 Patient medically clear for discharge on warfarin 5 mg with Lovenox bridge, needs INR check appointment prior to discharge    10/3 generalized weakness, patient lives alone  Patient's son 2 hours away  Per staff, unable to ambulate  PT OT evaluation pending, will need placement assistance  Patient aware  Concerned about finances,  to assist  Outpatient Coumadin follow-up    10/4 skilled placement pending, patient wants discharge to [place], has minimal support,  to follow-up with patient's son  PT recommending skilled placement, quant gold pending  Palliative care consult, for goals of care    10/5 skilled placement pending,  to follow-up with son regarding discharge plan  May need to discharge to skilled facility in [place]  Quant gold negative  Discussed with palliative care, at this point, patient appears to understand his medical condition, and aware that he cannot return home alone  Patient agitated, but however would like conservative management  Palliative care to follow-up, remains full code, to discuss CODE STATUS given comorbidities  Should patient's condition decline, patient will have poor outcome if he requires CPR given multiple core morbidities including anticoagulation use  Consideration of futility of care and transition to DNR, we will follow-up with palliative care assistance    Localized edema  Assessment & Plan  10/5 us r/p abscess, f/u cx  Leukocytosis resolved  Monitor, 2nd edema, on lasix    Debility  Assessment & Plan  Lives alone, will need further therapy evaluation  May need skilled placement, [facility]  ordered    10/4 SNF placement pending, bleed on Coumadin  We will reevaluate goals of care due to risk of bleeding    10/5 seen by palliative care, concern for decisional capacity, has a son that can assist, CM to f/u  - pt is not making sound decisions, still requesting to dc home to self with his cats, however, unable to clearly state current condition or plan of care  - pt not ready for hospice per palliative  - placement in [place] pending    Leukocytosis  Assessment & Plan  No signs of sepsis  Work-up shows pneumonia, started augmentin    Pneumonia  Assessment & Plan  New leukocytosis, work-up shows possible basilar pneumonia on x-ray  Start Augmentin  No sepsis    Chronic systolic congestive heart failure (HCC)- (present on admission)  Assessment & Plan  Mildly depressed left ventricular systolic function. The left ventricular ejection fraction is visually estimated to be 45%.Mildly dilated right ventricle. Reduced right ventricular systolic function. Mild tricuspid regurgitation. Right ventricular systolic pressure is  estimated to be 33 mmHg (normal).    Continue with metorprolol, lasix and losartan    AF (atrial fibrillation) (HCC)- (present on admission)  Assessment & Plan  Continue metoprolol  Continue full dose lovenox for bridge   Continue warfarin     NSTEMI (non-ST elevated myocardial infarction), h/o Afib not on anticoag, leukocytosis (HCC)- (present on admission)  Assessment & Plan  5/2023: acute inferior MI late presentation, occluded RCA with collaterals  No interventions done  Continue aspirin and statin    Essential hypertension, benign- (present on admission)  Assessment & Plan  Continue with metoprolol, losartan, furosemide    Coronary artery disease due to lipid rich plaque- (present on admission)  Assessment & Plan  Continue with aspirin, atorvastatin  Lipid panel with low LDL below goal         VTE prophylaxis: warfarin, therapeutic lovenox     I have performed a physical exam and  reviewed and updated ROS and Plan today (10/5/2023). In review of yesterday's note (10/4/2023), there are no changes except as documented above.

## 2023-10-05 NOTE — ASSESSMENT & PLAN NOTE
10/5 us r/p abscess, f/u cx  Leukocytosis resolved  Monitor, 2nd edema, on lasix    10/6 right upper extremity, ultrasound with acute and subacute thrombus  Wound culture negative

## 2023-10-05 NOTE — WOUND TEAM
[facility]  Inpatient Services  Initial Wound and Skin Care Evaluation    Admission Date: 9/25/2023     Last order of IP CONSULT TO WOUND CARE was found on 10/4/2023 from Hospital Encounter on 9/25/2023     HPI, PMH, SH: Reviewed    Past Surgical History:   Procedure Laterality Date    VEIN LIGATION Right 7/1/2023    Procedure: LIGATION, VEIN;  Surgeon: Marissa Rae M.D.;  Location: SURGERY [facility];  Service: General    RECOVERY  12/21/2012    Performed by Cath-Recovery Surgery at SURGERY SAME DAY [facility]    OTHER ORTHOPEDIC SURGERY  3/1/2011    left knee total    KNEE ARTHROPLASTY TOTAL  3/1/2011    Performed by KHALIF TREJO at SURGERY [facility] ORS    OH LAP,CHOLECYSTECTOMY  2001    VASECTOMY  1984    APPENDECTOMY  1958    ARTHROSCOPY, KNEE      2 x left, 1 x right    TONSILLECTOMY       Social History     Tobacco Use    Smoking status: Never    Smokeless tobacco: Current     Types: Chew    Tobacco comments:     chews snuff / tobacco   Substance Use Topics    Alcohol use: No     No chief complaint on file.    Diagnosis: Arm DVT (deep venous thromboembolism), acute, left (HCC) [I82.622]    Unit where seen by Wound Team: S521/02     WOUND CONSULT RELATED TO:  R wrist    WOUND TEAM PLAN OF CARE - Frequency of Follow-up:   Nursing to follow dressing orders written for wound care. Contact wound team if area fails to progress, deteriorates or with any questions/concerns if something comes up before next scheduled follow up (See below as to whether wound is following and frequency of wound follow up)   Not following, consult as needed  - R wrist    WOUND HISTORY:   78 y.o. male with PMH DVTs. Per notes, R wrist was previously an IV site.       WOUND ASSESSMENT/LDA  Wound 10/04/23 Other (comment) Arm Distal;Dorsal Right previous IV line site, hematoma on surrounding area (Active)   Date First Assessed/Time First Assessed: 10/04/23 0558   Present on Original Admission: No  Wound  Approximate Age at First Assessment (Weeks): 1 weeks  Hand Hygiene Completed: Yes  Primary Wound Type: (c) Other (comment)  Location: Arm  Wound Orientat...      Assessments 10/5/2023  4:00 PM   Wound Image     Site Assessment Bleeding;Purple   Periwound Assessment Ecchymosis;Edema   Margins Defined edges;Attached edges   Closure Secondary intention   Drainage Amount Small   Drainage Description Sanguineous   Treatments Cleansed   Wound Cleansing Normal Saline Irrigation   Periwound Protectant Skin Protectant Wipes to Periwound   Dressing Status Clean;Dry;Intact   Dressing Changed Changed   Dressing Cleansing/Solutions Not Applicable   Dressing Options Hydrofera Blue Ready;Offloading Dressing - Sacral   Dressing Change/Treatment Frequency Every 48 hrs, and As Needed   NEXT Dressing Change/Treatment Date 10/07/23   NEXT Weekly Photo (Inpatient Only) 10/12/23   Wound Team Following Not following   Non-staged Wound Description Partial thickness        Vascular:    COLTEN:   No results found.    Lab Values:    Lab Results   Component Value Date/Time    WBC 9.4 10/05/2023 04:33 AM    RBC 2.81 (L) 10/05/2023 04:33 AM    HEMOGLOBIN 8.3 (L) 10/05/2023 04:33 AM    HEMATOCRIT 25.9 (L) 10/05/2023 04:33 AM    CREACTPROT 20.14 (H) 05/18/2023 03:48 PM    HBA1C 5.7 (H) 02/02/2021 05:22 AM         Culture Results show:  No results found for this or any previous visit (from the past 720 hour(s)).    Pain Level/Medicated:  None, Tolerated without pain medication       INTERVENTIONS BY WOUND TEAM:  Chart and images reviewed. Discussed with bedside RN. All areas of concern (based on picture review, LDA review and discussion with bedside RN) have been thoroughly assessed. Documentation of areas based on significant findings. This RN in to assess patient. Performed standard wound care which includes appropriate positioning, dressing removal and non-selective debridement. Pictures and measurements obtained weekly if/when required.    Wound:   R wrist  Preparation for Dressing removal: Removed without difficulty  Cleansed/Non-selectively Debrided with:  Normal Saline and Gauze  Yazmin wound: Cleansed with Normal Saline and Gauze, Prepped with No Sting  Primary Dressing:  Hydrofera blue  Secondary (Outer) Dressing: Offloading adhesive foam    Advanced Wound Care Discharge Planning  Number of Clinicians necessary to complete wound care: 1  Is patient requiring IV pain medications for dressing changes:  No   Length of time for dressing change 10 min. (This does not include chart review, pre-medication time, set up, clean up or time spent charting.  Interdisciplinary consultation: Patient, Bedside RN, Thad PLEITEZ (Wound RN).     EVALUATION / RATIONALE FOR TREATMENT:     Date:  10/05/23  Wound Status:  Initial evaluation    Oozing hematoma to R wrist. Appears boggy with gelatinous sanguinous drainage. Hydrofera blue applied for drainage absorption and antimicrobial properties. Recommend allowing hematoma to naturally re-absorb on its own, deferring from applying compression at this time d/t pt history of DVTs.         Goals: Steady decrease in wound area and depth weekly.    NURSING PLAN OF CARE ORDERS:  Dressing changes: See Dressing Care orders    NUTRITION RECOMMENDATIONS   Wound Team Recommendations:  N/A    DIET ORDERS (From admission to next 24h)       Start     Ordered    10/02/23 0755  Diet Order Diet: Cardiac; Fluid modifications: (optional): 1500 ml Fluid Restriction  ALL MEALS        Question Answer Comment   Diet: Cardiac    Fluid modifications: (optional) 1500 ml Fluid Restriction        10/02/23 0754                    PREVENTATIVE INTERVENTIONS:    Q shift Rafael - performed per nursing policy  Q shift pressure point assessments - performed per nursing policy    Surface/Positioning  Standard/trauma mattress - Currently in Place  Reposition q 2 hours - Currently in Place    Anticipated discharge plans:  TBD        Vac Discharge Needs:  Vac Discharge  plan is purely a recommendation from wound team and not a requirement for discharge unless otherwise stated by physician.  Not Applicable Pt not on a wound vac

## 2023-10-05 NOTE — CARE PLAN
The patient is Stable - Low risk of patient condition declining or worsening    Shift Goals  Clinical Goals: up to bedside chair.Rt arm  US  Patient Goals: rest. go home.  Family Goals: HEATHER    Progress made toward(s) clinical / shift goals:  Patient verbalizes understanding of plan of care. Bear weight as per tolerance. Able to mobilize to bedside chair with 2 staff assistance. Pt uses the call bell appropriately when in need. Independent with bedside urinal. Compliant with 1500 fluid restriction.     Problem: Knowledge Deficit - Standard  Goal: Patient and family/care givers will demonstrate understanding of plan of care, disease process/condition, diagnostic tests and medications  Outcome: Progressing     Problem: Fall Risk  Goal: Patient will remain free from falls  Outcome: Progressing       Patient is not progressing towards the following goals:

## 2023-10-05 NOTE — CONSULTS
Inpatient Palliative Medicine Evaluation     Hi Montes De Oca  78 y.o. male  [id number]    Location: [facility]  PCP: Mario Lindquist M.D.  Referral Source: Jesika Rae D.o.     Reason for palliative medicine consultation and/or visit: ACP         Assessment and Plan:     GOAL(S) OF CARE = LONGEVITY    SYMPTOMS ETIOLOGY/CAUSES =     PROGNOSIS = Life-limited & deconditioned by cardiac history, though uncertain if strictly less than 6 months (thus uncertain if meeting strictest hospice admission criteria)  - borderline hospice eligibility, fit for 6mo trial under cardiac disease criteria if GOC aligns  - however NOT YET HOSPICE-APPROPRIATE, due to GOC remaining LONGEVITY      CODE STATUS = FULL    ADVANCE CARE PLANNING =   Relevant history review  Updates to patient and his son Stevie Galindo.   Explored potential disposition options with Stevie = unfortunately financially limited from assisted living / group homes / placements      PALLIATIVE CARE TEAM INTERVENTIONS =   Advance care planning  Extensive conversation with patient's son to learn more about patient, while his cognition waxed and waned today  Prognostication shared with son = likely months to 1-2 years top, fit for hospice trial if goc aligns  Emotional support and therapeutic presence  Judging this is ultimately a disposition issue, which is NOT a palliative care core mission, regardless hof patient's election of HH vs rehab vs hospice, palliative care team will only follow peripherally going forward, unless stronger clinical indications develop.          Summary:   Hi Montes De Oca is 78 y.o.  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  Patient has remained weak and low functioning, and minimally mobile basically bed-bound this whole admission.  ---------------------------------------------------------------------------------------------------------------    HPM  "fellow Dr. Delgado, myself, and MS4 Luis Alberto Veronica and I visited patient twice this afternoon.  Patient was hard of hearing, forgetful today, and unable to recall salient medical history.  He was only oriented to persons this afternoon.    Per chart review, patient's mentation does seem to wax and wane.  He was not able to contribute much to history today.  Insufficient medical decision making capacity today.  ----------------------------------------------------------------------------------------------------------------------    I later spoke to patient's son Stevie for well over an hour, to try to learn more about patient.  Introduced my role and practice as PCMD, and Stevie was amenable.    Stevie informed me many things about patient, summarized below:    1) Patient prefers being referred to as his middle name ANDREW, rather than Hi.  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  3) Patient can be \"cranky and bullying at times, UNLESS you wear a hard hat and tell him straight.\"  Stevie recommends a direct communication style with patient.  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 (\"Double days\")    5) Stevie is aware of patient's steady physical & mental decline and immobility last 3-4 months, but just STARTING to learn about patient's terminal clinical picture.  6) Stevie is UNCERTAIN if patient did complete a POLST or advance directive in the past.  7) Stevie expressed his concern that patient's pension income likely will NOT be enough to afford assisted living / group home/ or other placements.  8) Stevie current lives in [place], [place], and is NOT YET able to live with patient... this complicates disposition after short term discharge.    9) Stevie hopes Medical Team can continue to try to communicate with patient, and be direct and " assertive when doing so.           Advance care planning:    Total time spent in ACP discussion 30 minutes, which is separate from the time spent completing the evaluation and management visit.     Thank you for allowing me the opportunity to participate in the care of Hi Montes De Oca     I spent a total of 80 minutes reviewing medical records, direct face-to-face time with the patient and/or family, documentation and coordination of care. This is separate from the time spent on advance care planning, which is documented above.         SHAMAR (NISHDO Rigoberto GARGWVU [facility]   [address] [place]  [address]  P: [phone]  F: [phone]  C: [phone]

## 2023-10-05 NOTE — PROGRESS NOTES
Inpatient Anticoagulation Service Note for 10/5/2023    Reason for Anticoagulation: Deep Vein Thrombosis      Hemoglobin Value: (!) 8.3  Hematocrit Value: (!) 25.9  Lab Platelet Value: (!) 595  Target INR: 2.0 to 3.0    INR from last 7 days       Date/Time INR Value    10/05/23 0433 3.36    10/04/23 0440 2.99    10/03/23 0023 2.86    10/02/23 0507 2.88    10/01/23 0617 2.89    09/30/23 0346 1.84    09/29/23 0033 1.43          Dose from last 7 days       Date/Time Dose (mg)    10/05/23 1646 2.5    10/04/23 1628 2.5    10/03/23 1046 5    10/02/23 1526 5    10/01/23 1158 2.5    09/30/23 1355 5    09/29/23 1405 7.5          Average Dose (mg): 5  Significant Interactions: Antiplatelet Medications  Bridge Therapy: No  Date of Last VTE Event:  (May 2023)  Bridge Therapy Start Date: 09/26/23  Days of Overlap Therapy: 5   INR Value Greater than 2 Prior to Discontinuation of Parenteral Anticoagulation: Yes   Reversal Agent Administered: Not Applicable    Assessment/Plan: No interval changes in overall clinical status, diet, or DDI. H/H decreased, no overt s/sx of bleeding. INR is slightly supratherapeutic. Reduced oral intake. Given acute on chronic complications of DVT, reasonable to provide a decreased dose of warfarin 2.5 mg tonight and evaluate INR tomorrow morning. If INR continues to trend upwards will hold warfarin.    Education Material Provided?: Yes (Provided 10/3.)    Pharmacist suggested discharge dosing: Consider reduced regimen of 2.5 mg daily and INR within 72 hours of discharge.      Mari Galloway, PharmD

## 2023-10-05 NOTE — PROGRESS NOTES
"Inpatient Palliative Medicine Evaluation     Hi Montes De Oca  78 y.o. male  [id number]    Location: [facility]  PCP: Mario Lindquist M.D.  Referral Source: Jesika Rae D.o.      Reason for palliative medicine consultation and/or visit: ACP       Assessment and Plan:     GOAL(S) OF CARE = LONGEVITY    \"NO, I am not interested to learn more about hospice care.\" Patient 10/5/2023      SYMPTOMS ETIOLOGY/CAUSES = fatigue, immobility, intermittent confusion secondary to advanced cardiovascular disease    PROGNOSIS = Life-limited & deconditioned by cardiac history, though uncertain if strictly less than 6 months (thus uncertain if meeting strictest hospice admission criteria)  - borderline hospice eligibility, fit for 6mo trial under cardiac disease criteria if GOC aligns  - however NOT YET HOSPICE-APPROPRIATE, due to GOC remaining LONGEVITY     \"I know I am dying.\"  Patient 10/5/2023      CODE STATUS = DNAR, INTUBATION-OK  10/5/2023 Discussed with patient myself. Reviewed risks and lack of definitive benefits of CPR/ACLS in patient's current clinical situation. Recommended DNAR status to him myself.  Patient was amenable to this status change.  10/5/2023 Also discussed code status with attending physician Dr. Rae, who also shares the same concerns about performing CPR/ACLS to an anticoagulated, frail cardiac patient like this patient.      PALLIATIVE CARE TEAM INTERVENTIONS =   Medical updates.  Code status discussion with patient, and then status clarification on chart.  Assessed patient's understanding of clinical picture - fair, though with intermittent bouts of confusion/encephalopathy.  Patient does exhibit significant grief and existential distress from his failing health, likely one of the contributors to his cantankerous behaviors.  Pt apparently agreed to placement earlier today with primary team, though significant barriers remain still.          Summary:   Hi Montes De Oca is 78 y.o.  male with HFrEF, " "DVT of Les on warfarin, HTN, CAD sp sents who was originally admitted 9/25/2023 due to worsening left arm swelling.  Patient has remained weak and low functioning, and minimally mobile basically bed-bound this whole admission.  ---------------------------------------------------------------------------------------------------------------  Brief but emotional conversations between patient and myself today, joined by [employer] MS4 Luis Alberto Martin and bedside RN.      Patient complained about being labeled as immobile, but unfortunately unable to demonstrate otherwise physically himself.  He had concerns about blood clots and needed repeated reminder about the fact that he is on warfarin as a treatment, and no longer a safe surgical candidate at this time.    Patient appeared to understand his overall poor prognosis.  \"I am dying, I know that,\" he admitted to us at one time.  He was visibly upset when I informed him that there is no treatment to reverse someone's natural dying trajectory when a disease is terminal.      \"You are a doctor and you cannot do anything about this?\" He asked me.  \"No, not for the natural dying process,\" I gave him the truth.    I explained the risk of warfarin use for any aggressive procedures.  Explained the low benefits and high risks of CPR/ACLS and lack of ultimate benefit for someone in his clinical scenario.  Recommended DNAR status at the very least.  Patient was reluctant and thought briefly but did ultimately acknowledge me and agree with my assessment.  He was amenable to a DNAR, INTUBATION-OK status.    I did previously discuss my thoughts on code status with hospitalist Dr. Rae, who shared this very same concern.    Patient became more and more cantankerous as this discussion progressed.  I offered hospice care introduction to patient.  Patient simply indicated to us that he \"does not know what hospice is, and is not interested to find out more.\"    We thanked him for his time and " audience.  We concluded our meeting at this point today.           Advance care planning:  The patient and/or legal decision maker has provided voluntary consent to discuss advance care planning. We discussed code status.   DNAR, INTUBATION-OK    Total time spent in ACP discussion 15 minutes, which is separate from the time spent completing the evaluation and management visit.     Thank you for allowing me the opportunity to participate in the care of Hi Montes De Oca     I spent a total of 35 minutes reviewing medical records, direct face-to-face time with the patient and/or family, documentation and coordination of care. This is separate from the time spent on advance care planning, which is documented above.           SHAMAR MUNOZ DO (TIM)  [facility]   [address] [place]  [address]  P: [phone]  F: [phone]  C: [phone]

## 2023-10-05 NOTE — DISCHARGE PLANNING
"Case Management Discharge Planning    Admission Date: 9/25/2023  GMLOS: 3.9  ALOS: 10    6-Clicks ADL Score: 18  6-Clicks Mobility Score: 9  PT and/or OT Eval ordered: Yes  Post-acute Referrals Ordered: Yes  Post-acute Choice Obtained: Yes  Has referral(s) been sent to post-acute provider:  Yes      Anticipated Discharge Dispo: Discharge Disposition: D/T to SNF with Medicare cert in anticipation of skilled care (03)  Discharge Address: [address] [facility] [place] [address]    DME Needed: No    Action(s) Taken: Updated Provider/Nurse on Discharge Plan    RNCM faxed quant gold to Constance  from [facility] in [place].  RNCM left a vm for Constance MENDIETA requesting a call back.    1200 RNCM refaxed SNF referral, quant gold, progress  and PT/OT notes to Constance MENDIETA fax [phone] per her request.      RNCM spoke to son Stevie about pt unable to live alone and care for himself anymore.  Stevie aware that pt might need a long term care facility.  Stevie is unsure of what to do at the moment and is concerned  about finances not covering the cost for long term care.  He hopes to come up with a plan while the pt states in a SNF.  Meanwhile, he will assist the pt with handling bills.  He forwarded the pt's address to his to assist with this.     1415 Per Constance MENDIETA, pt is \"too high acuity\" to be accepted to the swing bed program.  They also don't have a long term bed at this time.     Escalations Completed: None    Medically Clear: No    Next Steps: RNCM to continue to follow up with pt and medical team to address dc needs and barriers      Barriers to Discharge: None      "

## 2023-10-05 NOTE — CARE PLAN
The patient is Stable - Low risk of patient condition declining or worsening    Shift Goals  Clinical Goals: up to chair, ambulate, r arm US  Patient Goals: go home, rest  Family Goals: HEATHER    Progress made toward(s) clinical / shift goals:  Pt received US on right arm. Palliative consult completed, pts code status updated to DNR, I OK.  Pt remains free from falls, fall precautions including bed alarm in place. Pt remains independent with bedside urinal. Pt denied any pain throughout shift. Wound care preformed by wound team on skin tear on R arm. Dressing currently CDI.    Problem: Fall Risk  Goal: Patient will remain free from falls  10/5/2023 1627 by Amanda Corado R.N.  Outcome: Progressing    Problem: Pain - Standard  Goal: Alleviation of pain or a reduction in pain to the patient’s comfort goal  10/5/2023 1627 by Amanda Corado, R.N.  Outcome: Progressing    Problem: Skin Integrity  Goal: Skin integrity is maintained or improved  Outcome: Progressing       Patient is not progressing towards the following goals: Pt demonstrates irritability and anxiety towards team members. Pt refused to stand or ambulate despite encouragement and education. Pt needs reminders of treatment interventions.      Problem: Psychosocial  Goal: Patient's ability to identify and develop effective coping behaviors will improve  Outcome: Not Progressing     Problem: Mobility  Goal: Patient's capacity to carry out activities will improve  Outcome: Not Progressing

## 2023-10-05 NOTE — PROGRESS NOTES
Report received from NOC RN and assumed patient care at 0700. Patient is A&Ox 4, on RA with bed alarm in place. Pt denies any pain at this time. BUE redness and edema noted, BLE edema noted. Open skin tear noted on distal right arm. Wound cleaned and dressing changed. Pt reports last bowel movement on 9/27. Normoactive bowel sounds in all 4 quadrants. Reinforced the need to call for assistance. Call light within reach and bed in lowest position.

## 2023-10-06 ENCOUNTER — APPOINTMENT (OUTPATIENT)
Dept: RADIOLOGY | Facility: MEDICAL CENTER | Age: 78
DRG: 299 | End: 2023-10-06
Attending: INTERNAL MEDICINE
Payer: MEDICARE

## 2023-10-06 LAB
BACTERIA WND AEROBE CULT: NORMAL
GRAM STN SPEC: NORMAL
INR PPP: 3 (ref 0.87–1.13)
PROTHROMBIN TIME: 31.6 SEC (ref 12–14.6)
SIGNIFICANT IND 70042: NORMAL
SITE SITE: NORMAL
SOURCE SOURCE: NORMAL

## 2023-10-06 PROCEDURE — 700102 HCHG RX REV CODE 250 W/ 637 OVERRIDE(OP): Mod: JZ | Performed by: STUDENT IN AN ORGANIZED HEALTH CARE EDUCATION/TRAINING PROGRAM

## 2023-10-06 PROCEDURE — A9270 NON-COVERED ITEM OR SERVICE: HCPCS | Performed by: STUDENT IN AN ORGANIZED HEALTH CARE EDUCATION/TRAINING PROGRAM

## 2023-10-06 PROCEDURE — 700102 HCHG RX REV CODE 250 W/ 637 OVERRIDE(OP): Performed by: INTERNAL MEDICINE

## 2023-10-06 PROCEDURE — 770006 HCHG ROOM/CARE - MED/SURG/GYN SEMI*

## 2023-10-06 PROCEDURE — A9270 NON-COVERED ITEM OR SERVICE: HCPCS | Performed by: INTERNAL MEDICINE

## 2023-10-06 PROCEDURE — 73700 CT LOWER EXTREMITY W/O DYE: CPT | Mod: LT

## 2023-10-06 PROCEDURE — A9270 NON-COVERED ITEM OR SERVICE: HCPCS | Mod: JZ | Performed by: STUDENT IN AN ORGANIZED HEALTH CARE EDUCATION/TRAINING PROGRAM

## 2023-10-06 PROCEDURE — 85610 PROTHROMBIN TIME: CPT

## 2023-10-06 PROCEDURE — 700102 HCHG RX REV CODE 250 W/ 637 OVERRIDE(OP): Performed by: STUDENT IN AN ORGANIZED HEALTH CARE EDUCATION/TRAINING PROGRAM

## 2023-10-06 PROCEDURE — 36415 COLL VENOUS BLD VENIPUNCTURE: CPT

## 2023-10-06 PROCEDURE — 74176 CT ABD & PELVIS W/O CONTRAST: CPT

## 2023-10-06 PROCEDURE — 99233 SBSQ HOSP IP/OBS HIGH 50: CPT | Performed by: INTERNAL MEDICINE

## 2023-10-06 PROCEDURE — 97116 GAIT TRAINING THERAPY: CPT

## 2023-10-06 RX ADMIN — ASPIRIN 81 MG: 81 TABLET, COATED ORAL at 04:38

## 2023-10-06 RX ADMIN — ATORVASTATIN CALCIUM 80 MG: 40 TABLET, FILM COATED ORAL at 18:19

## 2023-10-06 RX ADMIN — AMLODIPINE BESYLATE 5 MG: 5 TABLET ORAL at 04:37

## 2023-10-06 RX ADMIN — POTASSIUM CHLORIDE 40 MEQ: 1500 TABLET, EXTENDED RELEASE ORAL at 04:37

## 2023-10-06 RX ADMIN — DOCUSATE SODIUM 50 MG AND SENNOSIDES 8.6 MG 2 TABLET: 8.6; 5 TABLET, FILM COATED ORAL at 18:18

## 2023-10-06 RX ADMIN — WARFARIN SODIUM 2.5 MG: 2.5 TABLET ORAL at 18:18

## 2023-10-06 RX ADMIN — FINASTERIDE 5 MG: 5 TABLET, FILM COATED ORAL at 04:38

## 2023-10-06 RX ADMIN — DOCUSATE SODIUM 50 MG AND SENNOSIDES 8.6 MG 2 TABLET: 8.6; 5 TABLET, FILM COATED ORAL at 04:36

## 2023-10-06 RX ADMIN — ACETAMINOPHEN 1000 MG: 500 TABLET, FILM COATED ORAL at 20:58

## 2023-10-06 RX ADMIN — METOPROLOL SUCCINATE 75 MG: 25 TABLET, EXTENDED RELEASE ORAL at 04:37

## 2023-10-06 RX ADMIN — METOPROLOL SUCCINATE 75 MG: 25 TABLET, EXTENDED RELEASE ORAL at 18:19

## 2023-10-06 RX ADMIN — LOSARTAN POTASSIUM 50 MG: 50 TABLET, FILM COATED ORAL at 04:37

## 2023-10-06 RX ADMIN — ACETAMINOPHEN 1000 MG: 500 TABLET, FILM COATED ORAL at 09:28

## 2023-10-06 RX ADMIN — TAMSULOSIN HYDROCHLORIDE 0.4 MG: 0.4 CAPSULE ORAL at 04:37

## 2023-10-06 RX ADMIN — FUROSEMIDE 40 MG: 40 TABLET ORAL at 04:37

## 2023-10-06 ASSESSMENT — COGNITIVE AND FUNCTIONAL STATUS - GENERAL
SUGGESTED CMS G CODE MODIFIER MOBILITY: CK
MOVING TO AND FROM BED TO CHAIR: A LITTLE
STANDING UP FROM CHAIR USING ARMS: A LITTLE
MOBILITY SCORE: 17
MOVING FROM LYING ON BACK TO SITTING ON SIDE OF FLAT BED: A LITTLE
CLIMB 3 TO 5 STEPS WITH RAILING: TOTAL
WALKING IN HOSPITAL ROOM: A LITTLE

## 2023-10-06 ASSESSMENT — ENCOUNTER SYMPTOMS
MEMORY LOSS: 0
BRUISES/BLEEDS EASILY: 1
NECK PAIN: 0
HEADACHES: 0
COUGH: 0
BACK PAIN: 0
HEARTBURN: 0
MYALGIAS: 1
ABDOMINAL PAIN: 0
SHORTNESS OF BREATH: 0
VOMITING: 0
DIZZINESS: 0
DIAPHORESIS: 0
WEAKNESS: 1
NAUSEA: 0
FEVER: 0
DEPRESSION: 1
NERVOUS/ANXIOUS: 1

## 2023-10-06 ASSESSMENT — PAIN DESCRIPTION - PAIN TYPE: TYPE: ACUTE PAIN

## 2023-10-06 ASSESSMENT — GAIT ASSESSMENTS
GAIT LEVEL OF ASSIST: CONTACT GUARD ASSIST
ASSISTIVE DEVICE: SINGLE POINT CANE
DEVIATION: ANTALGIC;STEP TO;SHUFFLED GAIT;DECREASED HEEL STRIKE;DECREASED TOE OFF
DISTANCE (FEET): 150

## 2023-10-06 NOTE — CARE PLAN
The patient is Stable - Low risk of patient condition declining or worsening    Shift Goals  Clinical Goals: maintain skin integrity  Patient Goals: sleep  Family Goals: HEATHER    Progress made toward(s) clinical / shift goals:    Problem: Risk for Bleeding  Goal: Patient will take measures to prevent bleeding and recognizes signs of bleeding that need to be reported immediately to a health care professional  Outcome: Progressing     Problem: Fall Risk  Goal: Patient will remain free from falls  Outcome: Progressing       Patient is not progressing towards the following goals:

## 2023-10-06 NOTE — PROGRESS NOTES
Inpatient Anticoagulation Service Note for 10/6/2023    Reason for Anticoagulation: Deep Vein Thrombosis     Hemoglobin Value: (!) 8.3  Hematocrit Value: (!) 25.9  Lab Platelet Value: (!) 595  Target INR: 2.0 to 3.0    INR from last 7 days       Date/Time INR Value    10/06/23 0422 3    10/05/23 0433 3.36    10/04/23 0440 2.99    10/03/23 0023 2.86    10/02/23 0507 2.88    10/01/23 0617 2.89    09/30/23 0346 1.84          Dose from last 7 days       Date/Time Dose (mg)    10/06/23 1030 2.5    10/05/23 1646 2.5    10/04/23 1628 2.5    10/03/23 1046 5    10/02/23 1526 5    10/01/23 1158 2.5    09/30/23 1355 5    09/29/23 1405 7.5          Average Dose (mg): 4.3    Significant Interactions: Antiplatelet Medications    Bridge Therapy: No  Date of Last VTE Event:  (May 2023)  Bridge Therapy Start Date: 09/26/23  Days of Overlap Therapy: 5   INR Value Greater than 2 Prior to Discontinuation of Parenteral Anticoagulation: Yes     Reversal Agent Administered: Not Applicable    Comments: INR back within therapeutic range today following multiple days of reduced warfarin dosing. Patient's warfarin-drug interactions stable since last evaluation. He remains on oral diet with somewhat inconsistent intake documented - anywhere from 25-75% of meals noted. Patient's Hgb remains stable and there are no documented concerns for acute bleeding present. Will continue conservative warfarin dosing today with frequent INR monitoring until warfarin dose & INR stabilize.    Plan:  Warfarin 2.5 mg PO today. INR in AM.    Education Material Provided?: Yes (Completed by Pharm Intern 10/3)    Pharmacist suggested discharge dosing: Consider warfarin 2.5 mg PO daily with follow-up INR within 72 hours of discharge.     Pharmacy will continue to follow.     Pallavi Herrera, ManuelD, BCCCP     Statement Selected

## 2023-10-06 NOTE — THERAPY
Physical Therapy   Daily Treatment     Patient Name: Hi Montes De Oca  Age:  78 y.o., Sex:  male  Medical Record #: [id number]  Today's Date: 10/6/2023     Precautions  Precautions: (P) Fall Risk    Assessment    Pt seen for PT treatment with focus on activity tolerance and gait training. Pt limited by decreased BLE strength, decreased balance and c/o left hip/back pain. Recommend continued inpatient as well as post acute placement prior to home.    Plan    Treatment Plan Status: (P) Continue Current Treatment Plan  Type of Treatment: Bed Mobility, Equipment, Gait Training, Neuro Re-Education / Balance, Self Care / Home Evaluation, Stair Training, Therapeutic Activities, Therapeutic Exercise  Treatment Frequency: 3 Times per Week  Treatment Duration: Until Therapy Goals Met    DC Equipment Recommendations: (P) Unable to determine at this time  Discharge Recommendations: (P) Recommend post-acute placement for additional physical therapy services prior to discharge home        Precautions   Precautions Fall Risk   Pain 0 - 10 Group   Location Arm;Back   Location Orientation Left;Proximal   Description Aching;Sore   Therapist Pain Assessment Post Activity Pain Same as Prior to Activity;Nurse Notified;5   Cognition    Level of Consciousness Alert   Comments A & O x 4, pleasant, cooperative and able to follow commands,   Strength Lower Body   Gross Strength Generalized Weakness, Equal Bilaterally   Comments grossly 3 +/5. Requires asssitance to lift BLE into bed.   Balance   Sitting Balance (Static) Good   Sitting Balance (Dynamic) Fair +   Standing Balance (Static) Fair   Standing Balance (Dynamic) Fair -   Weight Shift Sitting Good   Weight Shift Standing Fair   Skilled Intervention Tactile Cuing;Verbal Cuing   Comments w/SPC   Bed Mobility    Supine to Sit Standby Assist   Sit to Supine Moderate Assist   Skilled Intervention Verbal Cuing;Tactile Cuing   Gait Analysis   Gait Level Of Assist Contact Guard Assist    Assistive Device Single Point Cane  (periodic use of hallway railing.)   Distance (Feet) 150   # of Times Distance was Traveled 1   Deviation Antalgic;Step To;Shuffled Gait;Decreased Heel Strike;Decreased Toe Off  (forward flexed posture, lateral list)   Weight Bearing Status no restrictions.   Skilled Intervention Tactile Cuing;Sequencing;Verbal Cuing   Comments Educated on benefit of FWW vs SPC, delined use   Functional Mobility   Sit to Stand Contact Guard Assist   Mobility supine> gait in room and hallway>BTB   Skilled Intervention Verbal Cuing;Tactile Cuing   How much difficulty does the patient currently have...   Turning over in bed (including adjusting bedclothes, sheets and blankets)? 4   Sitting down on and standing up from a chair with arms (e.g., wheelchair, bedside commode, etc.) 3   Moving from lying on back to sitting on the side of the bed? 3   How much help from another person does the patient currently need...   Moving to and from a bed to a chair (including a wheelchair)? 3   Need to walk in a hospital room? 3   Climbing 3-5 steps with a railing? 1   6 clicks Mobility Score 17   Short Term Goals    Short Term Goal # 1 Pt will ambulate with LRAD for 200 ft while maintaining Modified Dimple at 5/10 level   Goal Outcome # 1 Progressing as expected   Short Term Goal # 2 Pt will ascend and descend steps x for with rail to enter his home   Goal Outcome # 2 Progressing slower than expected   Short Term Goal # 3 Pt will perform sit to supine at CGA level by tx 6   Education Group   Education Provided Role of Physical Therapist;Gait Training   Role of Physical Therapist Patient Response Patient;Acceptance;Explanation;Verbal Demonstration   Gait Training Patient Response Patient;Acceptance;Explanation;Reinforcement Needed   Physical Therapy Treatment Plan   Physical Therapy Treatment Plan Continue Current Treatment Plan   Anticipated Discharge Equipment and Recommendations   DC Equipment Recommendations  Unable to determine at this time   Discharge Recommendations Recommend post-acute placement for additional physical therapy services prior to discharge home   Interdisciplinary Plan of Care Collaboration   IDT Collaboration with  Nursing   Patient Position at End of Therapy In Bed;Phone within Reach;Tray Table within Reach;Call Light within Reach  (lunch tray set up)   Collaboration Comments Staff updated   Session Information   Date / Session Number  10/6 -3 ( 1/3,10/10)

## 2023-10-06 NOTE — PROGRESS NOTES
Report received from NOC RN and assumed patient care at 0700. Patient is A&Ox 4, on RA with bed alarm in place. Pt denies any pain at this time. BUE redness and edema noted, BLE edema noted. Round mass noted on L hip. Dressing on right wrist CDI. Reinforced the need to call for assistance. Call light within reach and bed in lowest position.

## 2023-10-06 NOTE — DISCHARGE PLANNING
Case Management Discharge Planning    Admission Date: 9/25/2023  GMLOS: 3.9  ALOS: 11    6-Clicks ADL Score: 18  6-Clicks Mobility Score: 9  PT and/or OT Eval ordered: Yes  Post-acute Referrals Ordered: Yes  Post-acute Choice Obtained: No  Has referral(s) been sent to post-acute provider:  Yes      Anticipated Discharge Dispo: Discharge Disposition: D/T to SNF with Medicare cert in anticipation of skilled care (03)  Discharge Address: [facility] [address] [facility] [address]    DME Needed: No    Action(s) Taken:   RN CM notified pt that [facility] Hern[facility] declined him. Pt unable to give choice for [facility].  He is agreeable to go to any facility with available beds when dc orders are placed.      Escalations Completed: None    Medically Clear: No    Next Steps: RNCM to continue to follow up with pt and medical team to address dc needs and barriers      Barriers to Discharge: None

## 2023-10-06 NOTE — PROGRESS NOTES
Hospital Medicine Daily Progress Note    Date of Service  10/6/2023    Chief Complaint  Hi Montes De Oca is a 78 y.o. male admitted 9/25/2023 with left arm swelling.    Hospital Course   78 y.o. male w/ PMH of CHF, DVT of the lower extremity, on AC, BPH, HTN, HLD, CAD with stent, PPP who presented 9/25/2023 with worsening left arm swelling. Patient sttes that he had been having worsening left arm swelling for over a month now. He had a caretaker come visit him today and told him to go to [facility] [place] imaging was done and demonstrated a clot of the left internal jugular, subclavian and axillary vein. Outside facility contacted our Vascular surgeon who stated that there is no surgical intervention to be done. They recommend continue his anticoagulation.  On chart review, patient had similar occlusions on ultrasound on May 2023.  He was started on apixaban at that time.  Patient states that he has been compliant with his medication.  However he has stated that over the last month, he has developed worsening swelling.  Denies any other symptoms at this time.        Interval Problem Update  9/26 Patient mildly tachypneic otherwise vital signs stable.  Hemoglobin stable.  INR 1.35 and heparin drip supratherapeutic so has been stopped for now.  Patient has failed outpatient apixaban as his edema and clot have worsened.  Patient reports he has never been on warfarin before.  Discussed what is involved in switching to warfarin including needing INR checks, the therapeutic window and dietary changes, patient is in agreement with this switching to warfarin.  Nutrition consulted to help with diet education.  I have placed a referral for the anticoagulation clinic as he will need outpatient follow-up.  On chart review patient has history of bleeding and at one point was taken off his Eliquis, due to his high risk of rebleeding will monitor for now while inpatient bridging. Will also change heparin ggt to  Lovenox therapeutic dosing in anticipation for home.      9/26 Vitals and hemoglobin stable. INR 1.3. Patient with mild bruising on right arm, per nursing is stable. Patient denies hematuria or melena. Right arm with worsening swelling, denies pain. Will ACE wrap or place compression stocking on left arm to help with edema. Patient asking if clot can be removed, discussed that no indication for thrombectomy still with good pulses, no pain and no skin changes. Nutrition did education with patient today. Discussed with social work, tentative plan for dc on Friday if H&H stable needs AC clinic appointment set up for Monday.     9/27 INR 1.32, Hb 10. No signs of bleeding. Discussed with pharmacy if still no bleeding tomorrow we will plan to discharge patient on a Lovenox bridge with an anticoagulation clinic on Monday.  Dosing to be determined tomorrow after INR check.    9/28 INR 1.43 today.  Discussed with pharmacy recommended discharge on warfarin 5 mg daily.  Patient with new WBC 13.3, he denies any new complaints.  Procalcitonin negative, UA negative.  Reviewed chest x-ray showed bibasilar underinflation, radiology read says superimposed pneumonia not excluded.  Start Augmentin for 5 days.  Patient is medically clear for discharge however discussed with case management and they have been unable to get patient appointment for an INR check thus far.  He will need his INR check set up prior to discharge for safe discharge planning.  He has no new complaints, he is anxious to go home.    9/29 INR 1.84. Hemoglobin stable. Mild increase in WBC 15.7. Vitals stable.  Patient denies any cough, shortness of breath or any new complaints.  Overall the swelling of his left arm has improved since admission.  He is anxious to DC home.  Discussed with social work they will try calling again to see if he can get his INR checked outpatient so that he can DC.    10/1/2023  Evaluated at bedside  In no acute distress this morning,  laying in bed  Stable vitals and saturating well room air  Leukocytosis trending down  Continue Augmentin  He completed anticoagulation bridging and on warfarin  Patient will need anticoagulation clinic set up prior to considering discharge  Labs on AM    10/2/2023  Patient no acute distress today.  Case management following and will set up with Coumadin clinic as well as ride.  Patient unable to stand up and was feeling weak.  Will need to be reevaluated by physical therapy and Occupational Therapy.    10/3 patient is hard of hearing, unable to move left lower extremity  Per staff, unable to ambulate on his own, patient lives alone  PT evaluation, placement pending    10/4 patient requiring skilled placement, unable to ambulate, able to pivot with assistance  Patient noted to have ecchymosis and easy bleeding, on Coumadin  To discuss goals of care, palliative care consult pending    10/5 right arm edema with serous drainage  Follow-up ultrasound, nontender to palpation  Patient aware of current condition, unable to ambulate on his own  Agreeable to skilled placement  Remains agitated, frequently stating that he is going to die in the hospital  Discussed with palliative care, to follow-up CODE STATUS    10/6 now reports left hip and abdominal pain with edema  Per staff, patient with improved activity tolerance last evening, encourage activity  We will follow-up CT of the hip  Patient now agreeable for skilled placement      I have discussed this patient's plan of care and discharge plan at IDT rounds today with Case Management, Nursing, Nursing leadership, and other members of the IDT team.    Consultants/Specialty  N/A    Code Status  DNAR, I OK    Disposition  The patient is not medically cleared for discharge to home or a post-acute facility.  Anticipate discharge to: snf    I have placed the appropriate orders for post-discharge needs.    Review of Systems  Review of Systems   Constitutional:  Negative for  diaphoresis, fever and malaise/fatigue.   HENT:  Positive for hearing loss.    Respiratory:  Negative for cough and shortness of breath.    Cardiovascular:  Negative for chest pain and leg swelling.   Gastrointestinal:  Negative for abdominal pain, heartburn, nausea and vomiting.   Genitourinary:  Negative for dysuria.   Musculoskeletal:  Positive for joint pain and myalgias. Negative for back pain and neck pain.        Left arm swelling    Skin:  Negative for rash.   Neurological:  Positive for weakness. Negative for dizziness and headaches.   Endo/Heme/Allergies:  Bruises/bleeds easily.   Psychiatric/Behavioral:  Positive for depression. Negative for memory loss. The patient is nervous/anxious.    All other systems reviewed and are negative.       Physical Exam  Temp:  [36.4 °C (97.5 °F)-36.9 °C (98.4 °F)] 36.7 °C (98.1 °F)  Pulse:  [60-61] 60  Resp:  [17-18] 18  BP: (114-136)/(49-62) 134/60  SpO2:  [90 %-94 %] 90 %    Physical Exam  Vitals and nursing note reviewed.   Constitutional:       General: He is not in acute distress.     Appearance: He is ill-appearing.   HENT:      Head: Normocephalic and atraumatic.      Ears:      Comments: Hard of hearing      Mouth/Throat:      Mouth: Mucous membranes are moist.   Eyes:      Extraocular Movements: Extraocular movements intact.      Pupils: Pupils are equal, round, and reactive to light.   Cardiovascular:      Rate and Rhythm: Normal rate and regular rhythm.      Pulses: Normal pulses.   Pulmonary:      Effort: Pulmonary effort is normal. No respiratory distress.      Breath sounds: No stridor. No rhonchi.   Abdominal:      General: Abdomen is flat. There is no distension.      Palpations: Abdomen is soft. There is no mass.   Musculoskeletal:         General: Swelling (left arm, improving) present. No tenderness. Normal range of motion.      Cervical back: Neck supple.   Skin:     General: Skin is warm and dry.      Findings: Bruising (mild right arm) present.    Neurological:      General: No focal deficit present.      Mental Status: He is alert and oriented to person, place, and time.      Cranial Nerves: No cranial nerve deficit.      Sensory: No sensory deficit.      Motor: Weakness present.      Coordination: Coordination normal.   Psychiatric:         Mood and Affect: Mood normal.         Behavior: Behavior normal.         Fluids    Intake/Output Summary (Last 24 hours) at 10/6/2023 1153  Last data filed at 10/6/2023 1000  Gross per 24 hour   Intake 340 ml   Output 1800 ml   Net -1460 ml       Laboratory  Recent Labs     10/05/23  0433   WBC 9.4   RBC 2.81*   HEMOGLOBIN 8.3*   HEMATOCRIT 25.9*   MCV 92.2   MCH 29.5   MCHC 32.0*   RDW 53.1*   PLATELETCT 595*   MPV 11.7     Recent Labs     10/05/23  0433   SODIUM 138   POTASSIUM 4.4   CHLORIDE 103   CO2 25   GLUCOSE 100*   BUN 18   CREATININE 0.69   CALCIUM 7.8*     Recent Labs     10/04/23  0440 10/05/23  0433 10/06/23  0422   INR 2.99* 3.36* 3.00*                 Imaging  US-EXTREMITY VENOUS UPPER UNILAT RIGHT   Final Result      US-EXTREMITY VENOUS UPPER UNILAT RIGHT   Final Result      DX-CHEST-PORTABLE (1 VIEW)   Final Result      Decreased bibasilar underinflation atelectasis. Superimposed pneumonia not excluded.      IR-US GUIDED PIV   Final Result    Ultrasound-guided PERIPHERAL IV INSERTION performed by    qualified nursing staff as above.      CT-ABDOMEN-PELVIS W/O    (Results Pending)   CT-EXTREMITY, LOWER W/O LEFT    (Results Pending)        Assessment/Plan  * Arm DVT (deep venous thromboembolism), acute, left (HCC)- (present on admission)  Assessment & Plan  CT imaging shows occlusion of the internal jugular vein, subclavian vein and axillary vein    Patient had an ultrasound done in the past.  May 2023, ultrasound showed: Evidence of acute to subacute occlusive deep venous thrombosis in the    jugular vein, subclavian vein, axillary vein and brachial veins from the  proximal to distal bicep.  Evidence of  acute to subacute occlusive superficial venous thrombosis in  the basilic vein from the proximal to distal bicep.  Evidence of acute to subacute occlusive superficial venous thrombosis in    the cephalic vein from the proximal bicep to the elbow and    Vascular surgery aware - no surgical intervention  Continue lovenox   Patient has failed apixaban therapy, will change to warfarin  -monitor very closely for bleeding, hx of gross hematuria previous on blood thinners   Daily INR, H&H  Dietary consulted for nutrition counseling  Referral placed for outpatient anticoagulation clinic  High risk of bleed, will monitor while bridging on warfarin    10/2 Patient medically clear for discharge on warfarin 5 mg with Lovenox bridge, needs INR check appointment prior to discharge    10/3 generalized weakness, patient lives alone  Patient's son 2 hours away  Per staff, unable to ambulate  PT OT evaluation pending, will need placement assistance  Patient aware  Concerned about finances,  to assist  Outpatient Coumadin follow-up    10/4 skilled placement pending, patient wants discharge to [place], has minimal support,  to follow-up with patient's son  PT recommending skilled placement, quant gold pending  Palliative care consult, for goals of care    10/5 skilled placement pending,  to follow-up with son regarding discharge plan  May need to discharge to skilled facility in [facility] negative  Discussed with palliative care, at this point, patient appears to understand his medical condition, and aware that he cannot return home alone  Patient agitated, but however would like conservative management  Palliative care to follow-up, remains full code, to discuss CODE STATUS given comorbidities  Should patient's condition decline, patient will have poor outcome if he requires CPR given multiple core morbidities including anticoagulation use  Consideration of futility of care and transition  to DNR, we will follow-up with palliative care assistance    10/6 continue Coumadin, patient now agreeable for medical management  Agreeable for skilled placement, will follow-up with local skilled facilities  Not medically cleared  Palliative care following  Now DNR  Encourage activity, per staff, now able to ambulate with standby assist, continue to monitor    Localized edema  Assessment & Plan  10/5 us r/p abscess, f/u cx  Leukocytosis resolved  Monitor, 2nd edema, on lasix    10/6 right upper extremity, ultrasound with acute and subacute thrombus  Wound culture negative    Debility  Assessment & Plan  Lives alone, will need further therapy evaluation  May need skilled placement, Quant gold ordered    10/4 SNF placement pending, bleed on Coumadin  We will reevaluate goals of care due to risk of bleeding    10/5 seen by palliative care, concern for decisional capacity, has a son that can assist, CM to f/u  - pt is not making sound decisions, still requesting to dc home to self with his cats, however, unable to clearly state current condition or plan of care  - pt not ready for hospice per palliative  - placement in [place] pending    10/6 reports left lower extremity edema and pain  Appears chronic  We will follow-up CT abdomen pelvis with left lower extremity, rule out fracture    Leukocytosis  Assessment & Plan  No signs of sepsis  Work-up shows pneumonia, started augmentin    Pneumonia  Assessment & Plan  New leukocytosis, work-up shows possible basilar pneumonia on x-ray  Start Augmentin  No sepsis    Chronic systolic congestive heart failure (HCC)- (present on admission)  Assessment & Plan  Mildly depressed left ventricular systolic function. The left ventricular ejection fraction is visually estimated to be 45%.Mildly dilated right ventricle. Reduced right ventricular systolic function. Mild tricuspid regurgitation. Right ventricular systolic pressure is  estimated to be 33 mmHg (normal).    Continue with  metorprolol, lasix and losartan    AF (atrial fibrillation) (HCC)- (present on admission)  Assessment & Plan  Continue metoprolol  Continue full dose lovenox for bridge   Continue warfarin     NSTEMI (non-ST elevated myocardial infarction), h/o Afib not on anticoag, leukocytosis (HCC)- (present on admission)  Assessment & Plan  5/2023: acute inferior MI late presentation, occluded RCA with collaterals  No interventions done  Continue aspirin and statin    Essential hypertension, benign- (present on admission)  Assessment & Plan  Continue with metoprolol, losartan, furosemide    Coronary artery disease due to lipid rich plaque- (present on admission)  Assessment & Plan  Continue with aspirin, atorvastatin  Lipid panel with low LDL below goal         VTE prophylaxis: warfarin, therapeutic lovenox     I have performed a physical exam and reviewed and updated ROS and Plan today (10/6/2023). In review of yesterday's note (10/5/2023), there are no changes except as documented above.

## 2023-10-06 NOTE — CARE PLAN
The patient is Stable - Low risk of patient condition declining or worsening    Shift Goals  Clinical Goals: ambulate around unit, wound care, bowel movement, improvement in coping behaviors, CT of hip  Patient Goals: ambulate, rest  Family Goals: HEATHER    Progress made toward(s) clinical / shift goals:  Pt more motivated and cooperative with plan of care. Pt anxiety levels decreased when communicating with team members. Pt ambulated around unit as well as multiple trips to bathroom with SBA while using cane. Pt able to rest without pain. Pt had large bowel movement without need of suppository or enema. Pt scheduled to receive CT to address swelling on hip and abd this evening. Pt wound dressing CDI.      Problem: Knowledge Deficit - Standard  Goal: Patient and family/care givers will demonstrate understanding of plan of care, disease process/condition, diagnostic tests and medications  Outcome: Progressing     Problem: Psychosocial  Goal: Patient's ability to identify and develop effective coping behaviors will improve  Outcome: Progressing     Problem: Fall Risk  Goal: Patient will remain free from falls  Outcome: Progressing     Problem: Pain - Standard  Goal: Alleviation of pain or a reduction in pain to the patient’s comfort goal  Outcome: Progressing     Problem: Skin Integrity  Goal: Skin integrity is maintained or improved  Outcome: Progressing     Problem: Mobility  Goal: Patient's capacity to carry out activities will improve  Outcome: Progressing       Patient is not progressing towards the following goals:

## 2023-10-07 LAB
ACANTHOCYTES BLD QL SMEAR: NORMAL
ALBUMIN SERPL BCP-MCNC: 2.5 G/DL (ref 3.2–4.9)
ALBUMIN/GLOB SERPL: 0.8 G/DL
ALP SERPL-CCNC: 78 U/L (ref 30–99)
ALT SERPL-CCNC: 29 U/L (ref 2–50)
ANION GAP SERPL CALC-SCNC: 8 MMOL/L (ref 7–16)
ANISOCYTOSIS BLD QL SMEAR: ABNORMAL
AST SERPL-CCNC: 18 U/L (ref 12–45)
BASOPHILS # BLD AUTO: 6.1 % (ref 0–1.8)
BASOPHILS # BLD: 0.5 K/UL (ref 0–0.12)
BILIRUB SERPL-MCNC: 0.7 MG/DL (ref 0.1–1.5)
BUN SERPL-MCNC: 16 MG/DL (ref 8–22)
CALCIUM ALBUM COR SERPL-MCNC: 9.3 MG/DL (ref 8.5–10.5)
CALCIUM SERPL-MCNC: 8.1 MG/DL (ref 8.5–10.5)
CHLORIDE SERPL-SCNC: 106 MMOL/L (ref 96–112)
CO2 SERPL-SCNC: 25 MMOL/L (ref 20–33)
CREAT SERPL-MCNC: 0.69 MG/DL (ref 0.5–1.4)
EOSINOPHIL # BLD AUTO: 0.43 K/UL (ref 0–0.51)
EOSINOPHIL NFR BLD: 5.2 % (ref 0–6.9)
ERYTHROCYTE [DISTWIDTH] IN BLOOD BY AUTOMATED COUNT: 54.3 FL (ref 35.9–50)
GFR SERPLBLD CREATININE-BSD FMLA CKD-EPI: 93 ML/MIN/1.73 M 2
GLOBULIN SER CALC-MCNC: 3.1 G/DL (ref 1.9–3.5)
GLUCOSE SERPL-MCNC: 98 MG/DL (ref 65–99)
HCT VFR BLD AUTO: 27.7 % (ref 42–52)
HGB BLD-MCNC: 8.6 G/DL (ref 14–18)
INR PPP: 3.06 (ref 0.87–1.13)
LG PLATELETS BLD QL SMEAR: NORMAL
LYMPHOCYTES # BLD AUTO: 0.71 K/UL (ref 1–4.8)
LYMPHOCYTES NFR BLD: 8.7 % (ref 22–41)
MANUAL DIFF BLD: NORMAL
MCH RBC QN AUTO: 29.1 PG (ref 27–33)
MCHC RBC AUTO-ENTMCNC: 31 G/DL (ref 32.3–36.5)
MCV RBC AUTO: 93.6 FL (ref 81.4–97.8)
MONOCYTES # BLD AUTO: 0.79 K/UL (ref 0–0.85)
MONOCYTES NFR BLD AUTO: 9.6 % (ref 0–13.4)
MORPHOLOGY BLD-IMP: NORMAL
NEUTROPHILS # BLD AUTO: 5.77 K/UL (ref 1.82–7.42)
NEUTROPHILS NFR BLD: 70.4 % (ref 44–72)
NRBC # BLD AUTO: 0 K/UL
NRBC BLD-RTO: 0 /100 WBC (ref 0–0.2)
OVALOCYTES BLD QL SMEAR: NORMAL
PLATELET # BLD AUTO: 763 K/UL (ref 164–446)
PLATELET BLD QL SMEAR: NORMAL
PMV BLD AUTO: 11.1 FL (ref 9–12.9)
POIKILOCYTOSIS BLD QL SMEAR: NORMAL
POTASSIUM SERPL-SCNC: 4.3 MMOL/L (ref 3.6–5.5)
PROT SERPL-MCNC: 5.6 G/DL (ref 6–8.2)
PROTHROMBIN TIME: 32 SEC (ref 12–14.6)
RBC # BLD AUTO: 2.96 M/UL (ref 4.7–6.1)
RBC BLD AUTO: PRESENT
SODIUM SERPL-SCNC: 139 MMOL/L (ref 135–145)
WBC # BLD AUTO: 8.2 K/UL (ref 4.8–10.8)

## 2023-10-07 PROCEDURE — A9270 NON-COVERED ITEM OR SERVICE: HCPCS | Performed by: INTERNAL MEDICINE

## 2023-10-07 PROCEDURE — A9270 NON-COVERED ITEM OR SERVICE: HCPCS | Mod: JZ | Performed by: STUDENT IN AN ORGANIZED HEALTH CARE EDUCATION/TRAINING PROGRAM

## 2023-10-07 PROCEDURE — 99232 SBSQ HOSP IP/OBS MODERATE 35: CPT | Performed by: INTERNAL MEDICINE

## 2023-10-07 PROCEDURE — 36415 COLL VENOUS BLD VENIPUNCTURE: CPT

## 2023-10-07 PROCEDURE — 85610 PROTHROMBIN TIME: CPT

## 2023-10-07 PROCEDURE — A9270 NON-COVERED ITEM OR SERVICE: HCPCS | Performed by: STUDENT IN AN ORGANIZED HEALTH CARE EDUCATION/TRAINING PROGRAM

## 2023-10-07 PROCEDURE — 700102 HCHG RX REV CODE 250 W/ 637 OVERRIDE(OP): Performed by: INTERNAL MEDICINE

## 2023-10-07 PROCEDURE — 770006 HCHG ROOM/CARE - MED/SURG/GYN SEMI*

## 2023-10-07 PROCEDURE — 80053 COMPREHEN METABOLIC PANEL: CPT

## 2023-10-07 PROCEDURE — 85007 BL SMEAR W/DIFF WBC COUNT: CPT

## 2023-10-07 PROCEDURE — 85025 COMPLETE CBC W/AUTO DIFF WBC: CPT

## 2023-10-07 PROCEDURE — 700102 HCHG RX REV CODE 250 W/ 637 OVERRIDE(OP): Mod: JZ | Performed by: STUDENT IN AN ORGANIZED HEALTH CARE EDUCATION/TRAINING PROGRAM

## 2023-10-07 PROCEDURE — 700102 HCHG RX REV CODE 250 W/ 637 OVERRIDE(OP): Performed by: STUDENT IN AN ORGANIZED HEALTH CARE EDUCATION/TRAINING PROGRAM

## 2023-10-07 RX ADMIN — ACETAMINOPHEN 1000 MG: 500 TABLET, FILM COATED ORAL at 21:13

## 2023-10-07 RX ADMIN — ACETAMINOPHEN 1000 MG: 500 TABLET, FILM COATED ORAL at 15:18

## 2023-10-07 RX ADMIN — FINASTERIDE 5 MG: 5 TABLET, FILM COATED ORAL at 05:36

## 2023-10-07 RX ADMIN — TAMSULOSIN HYDROCHLORIDE 0.4 MG: 0.4 CAPSULE ORAL at 05:36

## 2023-10-07 RX ADMIN — METOPROLOL SUCCINATE 75 MG: 25 TABLET, EXTENDED RELEASE ORAL at 18:42

## 2023-10-07 RX ADMIN — FUROSEMIDE 40 MG: 40 TABLET ORAL at 05:35

## 2023-10-07 RX ADMIN — WARFARIN SODIUM 2.5 MG: 2.5 TABLET ORAL at 18:42

## 2023-10-07 RX ADMIN — POTASSIUM CHLORIDE 40 MEQ: 1500 TABLET, EXTENDED RELEASE ORAL at 05:36

## 2023-10-07 RX ADMIN — AMLODIPINE BESYLATE 5 MG: 5 TABLET ORAL at 05:35

## 2023-10-07 RX ADMIN — ACETAMINOPHEN 1000 MG: 500 TABLET, FILM COATED ORAL at 09:07

## 2023-10-07 RX ADMIN — LOSARTAN POTASSIUM 50 MG: 50 TABLET, FILM COATED ORAL at 05:36

## 2023-10-07 RX ADMIN — DOCUSATE SODIUM 50 MG AND SENNOSIDES 8.6 MG 2 TABLET: 8.6; 5 TABLET, FILM COATED ORAL at 05:35

## 2023-10-07 RX ADMIN — ATORVASTATIN CALCIUM 80 MG: 40 TABLET, FILM COATED ORAL at 18:42

## 2023-10-07 RX ADMIN — DOCUSATE SODIUM 50 MG AND SENNOSIDES 8.6 MG 2 TABLET: 8.6; 5 TABLET, FILM COATED ORAL at 18:42

## 2023-10-07 RX ADMIN — METOPROLOL SUCCINATE 75 MG: 25 TABLET, EXTENDED RELEASE ORAL at 05:36

## 2023-10-07 RX ADMIN — ASPIRIN 81 MG: 81 TABLET, COATED ORAL at 05:36

## 2023-10-07 ASSESSMENT — ENCOUNTER SYMPTOMS
BRUISES/BLEEDS EASILY: 1
VOMITING: 0
NERVOUS/ANXIOUS: 1
WEAKNESS: 1
HEADACHES: 0
DEPRESSION: 1
NAUSEA: 0
BACK PAIN: 0
SHORTNESS OF BREATH: 0
MEMORY LOSS: 0
HEARTBURN: 0
NECK PAIN: 0
DIZZINESS: 0
MYALGIAS: 1
CHILLS: 0
ABDOMINAL PAIN: 0
FEVER: 0

## 2023-10-07 ASSESSMENT — FIBROSIS 4 INDEX: FIB4 SCORE: 0.35

## 2023-10-07 ASSESSMENT — PAIN DESCRIPTION - PAIN TYPE
TYPE: ACUTE PAIN
TYPE: ACUTE PAIN

## 2023-10-07 NOTE — PROGRESS NOTES
Assumed care of patient 0700. Received Report from [facility] nurse. Patient A&O X 4, on RA, Reporting a pain level of 0. Call light within reach, belongings within reach, Fall precautions in place, and bed alarm is on and bed in lowest position. Patient does not have any other needs at this time.

## 2023-10-07 NOTE — CARE PLAN
Problem: Knowledge Deficit - Standard  Goal: Patient and family/care givers will demonstrate understanding of plan of care, disease process/condition, diagnostic tests and medications  Outcome: Progressing     Problem: Psychosocial  Goal: Patient's ability to identify and develop effective coping behaviors will improve  Outcome: Progressing  Goal: Patient's ability to identify and utilize available support systems will improve  Outcome: Progressing     Problem: Fall Risk  Goal: Patient will remain free from falls  Outcome: Progressing     Problem: Risk for Bleeding  Goal: Patient will take measures to prevent bleeding and recognizes signs of bleeding that need to be reported immediately to a health care professional  Outcome: Progressing  Goal: Patient will not experience bleeding as evidenced by normal blood pressure, stable hematocrit and hemoglobin levels and desired ranges for coagulation profiles  Outcome: Progressing     Problem: Pain - Standard  Goal: Alleviation of pain or a reduction in pain to the patient’s comfort goal  Outcome: Progressing     Problem: Skin Integrity  Goal: Skin integrity is maintained or improved  Outcome: Progressing     Problem: Mobility  Goal: Patient's capacity to carry out activities will improve  Outcome: Progressing   The patient is Stable - Low risk of patient condition declining or worsening    Shift Goals  Clinical Goals: Sleep, comfort  Patient Goals: Rest  Family Goals: HEATHER    Progress made toward(s) clinical / shift goals:  Patient resting in bed, bed is locked and in lowest position, call bell within reach of patient. Patient is complaint with care and medications.    Patient is not progressing towards the following goals:

## 2023-10-07 NOTE — PROGRESS NOTES
Hospital Medicine Daily Progress Note    Date of Service  10/7/2023    Chief Complaint  Hi Montes De Oca is a 78 y.o. male admitted 9/25/2023 with left arm swelling.    Hospital Course   78 y.o. male w/ PMH of CHF, DVT of the lower extremity, on AC, BPH, HTN, HLD, CAD with stent, PPP who presented 9/25/2023 with worsening left arm swelling. Patient sttes that he had been having worsening left arm swelling for over a month now. He had a caretaker come visit him today and told him to go to [facility]. CT imaging was done and demonstrated a clot of the left internal jugular, subclavian and axillary vein. Outside facility contacted our Vascular surgeon who stated that there is no surgical intervention to be done. They recommend continue his anticoagulation.  On chart review, patient had similar occlusions on ultrasound on May 2023.  He was started on apixaban at that time.  Patient states that he has been compliant with his medication.  However he has stated that over the last month, he has developed worsening swelling.  Denies any other symptoms at this time.        Interval Problem Update  9/26 Patient mildly tachypneic otherwise vital signs stable.  Hemoglobin stable.  INR 1.35 and heparin drip supratherapeutic so has been stopped for now.  Patient has failed outpatient apixaban as his edema and clot have worsened.  Patient reports he has never been on warfarin before.  Discussed what is involved in switching to warfarin including needing INR checks, the therapeutic window and dietary changes, patient is in agreement with this switching to warfarin.  Nutrition consulted to help with diet education.  I have placed a referral for the anticoagulation clinic as he will need outpatient follow-up.  On chart review patient has history of bleeding and at one point was taken off his Eliquis, due to his high risk of rebleeding will monitor for now while inpatient bridging. Will also change heparin ggt to  Lovenox therapeutic dosing in anticipation for home.      9/26 Vitals and hemoglobin stable. INR 1.3. Patient with mild bruising on right arm, per nursing is stable. Patient denies hematuria or melena. Right arm with worsening swelling, denies pain. Will ACE wrap or place compression stocking on left arm to help with edema. Patient asking if clot can be removed, discussed that no indication for thrombectomy still with good pulses, no pain and no skin changes. Nutrition did education with patient today. Discussed with social work, tentative plan for dc on Friday if H&H stable needs AC clinic appointment set up for Monday.     9/27 INR 1.32, Hb 10. No signs of bleeding. Discussed with pharmacy if still no bleeding tomorrow we will plan to discharge patient on a Lovenox bridge with an anticoagulation clinic on Monday.  Dosing to be determined tomorrow after INR check.    9/28 INR 1.43 today.  Discussed with pharmacy recommended discharge on warfarin 5 mg daily.  Patient with new WBC 13.3, he denies any new complaints.  Procalcitonin negative, UA negative.  Reviewed chest x-ray showed bibasilar underinflation, radiology read says superimposed pneumonia not excluded.  Start Augmentin for 5 days.  Patient is medically clear for discharge however discussed with case management and they have been unable to get patient appointment for an INR check thus far.  He will need his INR check set up prior to discharge for safe discharge planning.  He has no new complaints, he is anxious to go home.    9/29 INR 1.84. Hemoglobin stable. Mild increase in WBC 15.7. Vitals stable.  Patient denies any cough, shortness of breath or any new complaints.  Overall the swelling of his left arm has improved since admission.  He is anxious to DC home.  Discussed with social work they will try calling again to see if he can get his INR checked outpatient so that he can DC.    10/1/2023  Evaluated at bedside  In no acute distress this morning,  laying in bed  Stable vitals and saturating well room air  Leukocytosis trending down  Continue Augmentin  He completed anticoagulation bridging and on warfarin  Patient will need anticoagulation clinic set up prior to considering discharge  Labs on AM    10/2/2023  Patient no acute distress today.  Case management following and will set up with Coumadin clinic as well as ride.  Patient unable to stand up and was feeling weak.  Will need to be reevaluated by physical therapy and Occupational Therapy.    10/3 patient is hard of hearing, unable to move left lower extremity  Per staff, unable to ambulate on his own, patient lives alone  PT evaluation, placement pending    10/4 patient requiring skilled placement, unable to ambulate, able to pivot with assistance  Patient noted to have ecchymosis and easy bleeding, on Coumadin  To discuss goals of care, palliative care consult pending    10/5 right arm edema with serous drainage  Follow-up ultrasound, nontender to palpation  Patient aware of current condition, unable to ambulate on his own  Agreeable to skilled placement  Remains agitated, frequently stating that he is going to die in the hospital  Discussed with palliative care, to follow-up CODE STATUS    10/6 now reports left hip and abdominal pain with edema  Per staff, patient with improved activity tolerance last evening, encourage activity  We will follow-up CT of the hip  Patient now agreeable for skilled placement    10/7 sleeping, arousable, no new complaints      I have discussed this patient's plan of care and discharge plan at IDT rounds today with Case Management, Nursing, Nursing leadership, and other members of the IDT team.    Consultants/Specialty  N/A    Code Status  DNAR, I OK    Disposition  The patient is not medically cleared for discharge to home or a post-acute facility.  Anticipate discharge to: snf    I have placed the appropriate orders for post-discharge needs.    Review of Systems  Review  of Systems   Constitutional:  Negative for chills and fever.   HENT:  Positive for hearing loss.    Respiratory:  Negative for shortness of breath.    Cardiovascular:  Negative for chest pain and leg swelling.   Gastrointestinal:  Negative for abdominal pain, heartburn, nausea and vomiting.   Genitourinary:  Negative for dysuria.   Musculoskeletal:  Positive for joint pain and myalgias. Negative for back pain and neck pain.        Left arm swelling    Skin:  Negative for rash.   Neurological:  Positive for weakness. Negative for dizziness and headaches.   Endo/Heme/Allergies:  Bruises/bleeds easily.   Psychiatric/Behavioral:  Positive for depression. Negative for memory loss. The patient is nervous/anxious.    All other systems reviewed and are negative.       Physical Exam  Temp:  [36.4 °C (97.6 °F)-37.2 °C (98.9 °F)] 36.8 °C (98.2 °F)  Pulse:  [60-62] 60  Resp:  [16-18] 18  BP: (109-165)/(52-69) 165/69  SpO2:  [88 %-92 %] 88 %    Physical Exam  Vitals and nursing note reviewed.   Constitutional:       General: He is not in acute distress.     Appearance: He is ill-appearing. He is not toxic-appearing or diaphoretic.   HENT:      Head: Normocephalic and atraumatic.      Ears:      Comments: Hard of hearing      Mouth/Throat:      Mouth: Mucous membranes are moist.   Eyes:      Extraocular Movements: Extraocular movements intact.      Pupils: Pupils are equal, round, and reactive to light.   Cardiovascular:      Rate and Rhythm: Normal rate and regular rhythm.      Pulses: Normal pulses.   Pulmonary:      Effort: Pulmonary effort is normal. No respiratory distress.      Breath sounds: No stridor. No rhonchi.   Abdominal:      General: Abdomen is flat.      Palpations: Abdomen is soft. There is no mass.   Musculoskeletal:         General: Swelling (left arm, improving) present. No tenderness. Normal range of motion.      Cervical back: Neck supple.   Skin:     General: Skin is warm and dry.      Coloration: Skin is  not pale.      Findings: Bruising (mild right arm) present. No erythema.   Neurological:      General: No focal deficit present.      Mental Status: He is alert and oriented to person, place, and time.      Cranial Nerves: No cranial nerve deficit.      Sensory: No sensory deficit.      Motor: Weakness present.   Psychiatric:         Mood and Affect: Mood normal.         Behavior: Behavior normal.         Fluids    Intake/Output Summary (Last 24 hours) at 10/7/2023 1417  Last data filed at 10/7/2023 0900  Gross per 24 hour   Intake 440 ml   Output 1050 ml   Net -610 ml       Laboratory  Recent Labs     10/05/23  0433 10/07/23  0827   WBC 9.4 8.2   RBC 2.81* 2.96*   HEMOGLOBIN 8.3* 8.6*   HEMATOCRIT 25.9* 27.7*   MCV 92.2 93.6   MCH 29.5 29.1   MCHC 32.0* 31.0*   RDW 53.1* 54.3*   PLATELETCT 595* 763*   MPV 11.7 11.1     Recent Labs     10/05/23  0433 10/07/23  0827   SODIUM 138 139   POTASSIUM 4.4 4.3   CHLORIDE 103 106   CO2 25 25   GLUCOSE 100* 98   BUN 18 16   CREATININE 0.69 0.69   CALCIUM 7.8* 8.1*     Recent Labs     10/05/23  0433 10/06/23  0422 10/07/23  0827   INR 3.36* 3.00* 3.06*                 Imaging  CT-EXTREMITY, LOWER W/O LEFT   Final Result      1.  No evidence of left femoral fracture.      2.  Prior left knee arthroplasty. No evidence of displacement or fracture of the arthroplasty components. Evaluation however is limited due to streak artifact.      3.  Knee joint effusion.      4.  Edema of the subcutaneous fat of the left thigh most prominently laterally. No focal fluid collection to suggest presence of abscess.      5.  Subcutaneous lipoma involving the left upper lateral thigh measuring 3 x 2 cm in size.      CT-ABDOMEN-PELVIS W/O   Final Result      1.  No acute abnormality identified within the abdomen or pelvis      2.  Left colon diverticulosis      3.  Prior cholecystectomy      4.  Small left pleural effusion      5.  Cardiac device wire present      US-EXTREMITY VENOUS UPPER UNILAT  RIGHT   Final Result      US-EXTREMITY VENOUS UPPER UNILAT RIGHT   Final Result      DX-CHEST-PORTABLE (1 VIEW)   Final Result      Decreased bibasilar underinflation atelectasis. Superimposed pneumonia not excluded.      IR-US GUIDED PIV   Final Result    Ultrasound-guided PERIPHERAL IV INSERTION performed by    qualified nursing staff as above.           Assessment/Plan  * Arm DVT (deep venous thromboembolism), acute, left (HCC)- (present on admission)  Assessment & Plan  CT imaging shows occlusion of the internal jugular vein, subclavian vein and axillary vein    Patient had an ultrasound done in the past.  May 2023, ultrasound showed: Evidence of acute to subacute occlusive deep venous thrombosis in the    jugular vein, subclavian vein, axillary vein and brachial veins from the  proximal to distal bicep.  Evidence of acute to subacute occlusive superficial venous thrombosis in  the basilic vein from the proximal to distal bicep.  Evidence of acute to subacute occlusive superficial venous thrombosis in    the cephalic vein from the proximal bicep to the elbow and    Vascular surgery aware - no surgical intervention  Continue lovenox   Patient has failed apixaban therapy, will change to warfarin  -monitor very closely for bleeding, hx of gross hematuria previous on blood thinners   Daily INR, H&H  Dietary consulted for nutrition counseling  Referral placed for outpatient anticoagulation clinic  High risk of bleed, will monitor while bridging on warfarin    10/2 Patient medically clear for discharge on warfarin 5 mg with Lovenox bridge, needs INR check appointment prior to discharge    10/3 generalized weakness, patient lives alone  Patient's son 2 hours away  Per staff, unable to ambulate  PT OT evaluation pending, will need placement assistance  Patient aware  Concerned about finances,  to assist  Outpatient Coumadin follow-up    10/4 skilled placement pending, patient wants discharge to [place]  has minimal support,  to follow-up with patient's son  PT recommending skilled placement, quant gold pending  Palliative care consult, for goals of care    10/5 skilled placement pending,  to follow-up with son regarding discharge plan  May need to discharge to skilled facility in [place]  Quant gold negative  Discussed with palliative care, at this point, patient appears to understand his medical condition, and aware that he cannot return home alone  Patient agitated, but however would like conservative management  Palliative care to follow-up, remains full code, to discuss CODE STATUS given comorbidities  Should patient's condition decline, patient will have poor outcome if he requires CPR given multiple core morbidities including anticoagulation use  Consideration of futility of care and transition to DNR, we will follow-up with palliative care assistance    10/6 continue Coumadin, patient now agreeable for medical management  Agreeable for skilled placement, will follow-up with local skilled facilities  Not medically cleared  Palliative care following  Now DNR  Encourage activity, per staff, now able to ambulate with standby assist, continue to monitor    Localized edema  Assessment & Plan  10/5 us r/p abscess, f/u cx  Leukocytosis resolved  Monitor, 2nd edema, on lasix    10/6 right upper extremity, ultrasound with acute and subacute thrombus  Wound culture negative    Debility  Assessment & Plan  Lives alone, will need further therapy evaluation  May need skilled placement, [facility] ordered    10/4 SNF placement pending, bleed on Coumadin  We will reevaluate goals of care due to risk of bleeding    10/5 seen by palliative care, concern for decisional capacity, has a son that can assist, CM to f/u  - pt is not making sound decisions, still requesting to dc home to self with his cats, however, unable to clearly state current condition or plan of care  - pt not ready for hospice per  palliative  - placement in [place] pending    10/6 reports left lower extremity edema and pain  Appears chronic  We will follow-up CT abdomen pelvis with left lower extremity, rule out fracture    10/7 per staff increasing activity tolerance, snf placement pending, likely transfer 1-2 days  CT with lipoma and edema, no fracture, encourage therapy    Leukocytosis  Assessment & Plan  No signs of sepsis  Work-up shows pneumonia, started augmentin    Pneumonia  Assessment & Plan  New leukocytosis, work-up shows possible basilar pneumonia on x-ray  Start Augmentin  No sepsis    Chronic systolic congestive heart failure (HCC)- (present on admission)  Assessment & Plan  Mildly depressed left ventricular systolic function. The left ventricular ejection fraction is visually estimated to be 45%.Mildly dilated right ventricle. Reduced right ventricular systolic function. Mild tricuspid regurgitation. Right ventricular systolic pressure is  estimated to be 33 mmHg (normal).    Continue with metorprolol, lasix and losartan    AF (atrial fibrillation) (HCC)- (present on admission)  Assessment & Plan  Continue metoprolol  Continue full dose lovenox for bridge   Continue warfarin     NSTEMI (non-ST elevated myocardial infarction), h/o Afib not on anticoag, leukocytosis (HCC)- (present on admission)  Assessment & Plan  5/2023: acute inferior MI late presentation, occluded RCA with collaterals  No interventions done  Continue aspirin and statin    Essential hypertension, benign- (present on admission)  Assessment & Plan  Continue with metoprolol, losartan, furosemide    Coronary artery disease due to lipid rich plaque- (present on admission)  Assessment & Plan  Continue with aspirin, atorvastatin  Lipid panel with low LDL below goal         VTE prophylaxis: warfarin, therapeutic lovenox     I have performed a physical exam and reviewed and updated ROS and Plan today (10/7/2023). In review of yesterday's note (10/6/2023), there are  no changes except as documented above.

## 2023-10-07 NOTE — CARE PLAN
The patient is Stable - Low risk of patient condition declining or worsening    Shift Goals  Clinical Goals:  (safe discharge)  Patient Goals:  (rest)  Family Goals: HEATHER    Progress made toward(s) clinical / shift goals:    Problem: Knowledge Deficit - Standard  Goal: Patient and family/care givers will demonstrate understanding of plan of care, disease process/condition, diagnostic tests and medications  Outcome: Progressing  Note: Patient understands POC     Problem: Fall Risk  Goal: Patient will remain free from falls  Outcome: Progressing  Note: Patient OOB with assist of one and single tip cane, bed alarm in place, instructed to call for assistance     Problem: Pain - Standard  Goal: Alleviation of pain or a reduction in pain to the patient’s comfort goal  Outcome: Progressing  Flowsheets (Taken 10/7/2023 2768)  Pain Rating Scale (NPRS): 0  Note: Patient had no complaints of pain this am       Patient is not progressing towards the following goals:

## 2023-10-07 NOTE — PROGRESS NOTES
Inpatient Anticoagulation Service Note for 10/7/2023    Reason for Anticoagulation: Deep Vein Thrombosis     Hemoglobin Value: (!) 8.6  Hematocrit Value: (!) 27.7  Lab Platelet Value: (!) 763  Target INR: 2.0 to 3.0    INR from last 7 days       Date/Time INR Value    10/07/23 0827 3.06    10/06/23 0422 3    10/05/23 0433 3.36    10/04/23 0440 2.99    10/03/23 0023 2.86    10/02/23 0507 2.88    10/01/23 0617 2.89          Dose from last 7 days       Date/Time Dose (mg)    10/07/23 1143 2.5    10/06/23 1030 2.5    10/05/23 1646 2.5    10/04/23 1628 2.5    10/03/23 1046 5    10/02/23 1526 5    10/01/23 1158 2.5    09/30/23 1355 5          Average Dose (mg): 4.3  Significant Interactions: Antiplatelet Medications  Bridge Therapy: No  Date of Last VTE Event:  (May 2023)  Bridge Therapy Start Date: 09/26/23  Days of Overlap Therapy: 5 INR Value Greater than 2 Prior to Discontinuation of Parenteral Anticoagulation: Yes   Reversal Agent Administered: Not Applicable    Assessment/Plan: INR at top end of range with 3.0. Patient's warfarin-drug interactions stable since last evaluation. He remains on oral diet with somewhat inconsistent intake documented - anywhere from 25-75% of meals noted. Patient's Hgb remains stable and there are no documented concerns for acute bleeding present. Will continue conservative warfarin dosing today with frequent INR monitoring until warfarin dose & INR stabilize. Warfarin 2.5 mg tonight, INR tomorrow AM.    Education Material Provided?: Yes (10/3)    Pharmacist suggested discharge dosing: Consider reduced regimen of 2.5 mg daily and INR within 72 hours of discharge.      Mari Galloway, PharmD

## 2023-10-08 LAB
INR PPP: 3.23 (ref 0.87–1.13)
PROTHROMBIN TIME: 33.5 SEC (ref 12–14.6)

## 2023-10-08 PROCEDURE — 99232 SBSQ HOSP IP/OBS MODERATE 35: CPT | Performed by: INTERNAL MEDICINE

## 2023-10-08 PROCEDURE — 700102 HCHG RX REV CODE 250 W/ 637 OVERRIDE(OP): Mod: JZ | Performed by: STUDENT IN AN ORGANIZED HEALTH CARE EDUCATION/TRAINING PROGRAM

## 2023-10-08 PROCEDURE — 700102 HCHG RX REV CODE 250 W/ 637 OVERRIDE(OP): Performed by: INTERNAL MEDICINE

## 2023-10-08 PROCEDURE — 85610 PROTHROMBIN TIME: CPT

## 2023-10-08 PROCEDURE — 700102 HCHG RX REV CODE 250 W/ 637 OVERRIDE(OP): Performed by: STUDENT IN AN ORGANIZED HEALTH CARE EDUCATION/TRAINING PROGRAM

## 2023-10-08 PROCEDURE — A9270 NON-COVERED ITEM OR SERVICE: HCPCS | Performed by: INTERNAL MEDICINE

## 2023-10-08 PROCEDURE — A9270 NON-COVERED ITEM OR SERVICE: HCPCS | Performed by: STUDENT IN AN ORGANIZED HEALTH CARE EDUCATION/TRAINING PROGRAM

## 2023-10-08 PROCEDURE — 36415 COLL VENOUS BLD VENIPUNCTURE: CPT

## 2023-10-08 PROCEDURE — 770006 HCHG ROOM/CARE - MED/SURG/GYN SEMI*

## 2023-10-08 PROCEDURE — A9270 NON-COVERED ITEM OR SERVICE: HCPCS | Mod: JZ | Performed by: STUDENT IN AN ORGANIZED HEALTH CARE EDUCATION/TRAINING PROGRAM

## 2023-10-08 RX ORDER — AMLODIPINE BESYLATE 5 MG/1
5 TABLET ORAL DAILY
Qty: 30 TABLET | Refills: 0 | Status: SHIPPED | OUTPATIENT
Start: 2023-10-09

## 2023-10-08 RX ORDER — WARFARIN SODIUM 2.5 MG/1
2.5 TABLET ORAL DAILY
Qty: 30 TABLET | Refills: 3 | Status: ACTIVE | OUTPATIENT
Start: 2023-10-08 | End: 2023-11-20

## 2023-10-08 RX ADMIN — METOPROLOL SUCCINATE 75 MG: 25 TABLET, EXTENDED RELEASE ORAL at 05:56

## 2023-10-08 RX ADMIN — FUROSEMIDE 40 MG: 40 TABLET ORAL at 05:56

## 2023-10-08 RX ADMIN — METOPROLOL SUCCINATE 75 MG: 25 TABLET, EXTENDED RELEASE ORAL at 18:20

## 2023-10-08 RX ADMIN — POTASSIUM CHLORIDE 40 MEQ: 1500 TABLET, EXTENDED RELEASE ORAL at 05:45

## 2023-10-08 RX ADMIN — ACETAMINOPHEN 1000 MG: 500 TABLET, FILM COATED ORAL at 07:49

## 2023-10-08 RX ADMIN — FINASTERIDE 5 MG: 5 TABLET, FILM COATED ORAL at 05:45

## 2023-10-08 RX ADMIN — ASPIRIN 81 MG: 81 TABLET, COATED ORAL at 05:45

## 2023-10-08 RX ADMIN — DOCUSATE SODIUM 50 MG AND SENNOSIDES 8.6 MG 2 TABLET: 8.6; 5 TABLET, FILM COATED ORAL at 18:20

## 2023-10-08 RX ADMIN — AMLODIPINE BESYLATE 5 MG: 5 TABLET ORAL at 05:56

## 2023-10-08 RX ADMIN — LOSARTAN POTASSIUM 50 MG: 50 TABLET, FILM COATED ORAL at 05:56

## 2023-10-08 RX ADMIN — ATORVASTATIN CALCIUM 80 MG: 40 TABLET, FILM COATED ORAL at 18:20

## 2023-10-08 RX ADMIN — ACETAMINOPHEN 1000 MG: 500 TABLET, FILM COATED ORAL at 16:11

## 2023-10-08 RX ADMIN — DOCUSATE SODIUM 50 MG AND SENNOSIDES 8.6 MG 2 TABLET: 8.6; 5 TABLET, FILM COATED ORAL at 05:45

## 2023-10-08 RX ADMIN — TAMSULOSIN HYDROCHLORIDE 0.4 MG: 0.4 CAPSULE ORAL at 05:45

## 2023-10-08 RX ADMIN — ACETAMINOPHEN 1000 MG: 500 TABLET, FILM COATED ORAL at 21:03

## 2023-10-08 RX ADMIN — WARFARIN SODIUM 2.5 MG: 2.5 TABLET ORAL at 18:21

## 2023-10-08 ASSESSMENT — PAIN DESCRIPTION - PAIN TYPE
TYPE: ACUTE PAIN

## 2023-10-08 ASSESSMENT — ENCOUNTER SYMPTOMS
BACK PAIN: 0
NAUSEA: 0
ABDOMINAL PAIN: 0
MEMORY LOSS: 0
MYALGIAS: 1
FEVER: 0
DEPRESSION: 1
NECK PAIN: 0
WEAKNESS: 1
SHORTNESS OF BREATH: 0
BRUISES/BLEEDS EASILY: 1
HEADACHES: 0
NERVOUS/ANXIOUS: 1
HEARTBURN: 0
DIZZINESS: 0

## 2023-10-08 NOTE — CARE PLAN
The patient is Stable - Low risk of patient condition declining or worsening    Shift Goals  Clinical Goals:  (safety, skin integrity)  Patient Goals:  (comfort, rest)  Family Goals: not present    Progress made toward(s) clinical / shift goals:    Problem: Knowledge Deficit - Standard  Goal: Patient and family/care givers will demonstrate understanding of plan of care, disease process/condition, diagnostic tests and medications  Outcome: Progressing  Note: Patient understands POC     Problem: Fall Risk  Goal: Patient will remain free from falls  Outcome: Progressing  Note: Bed alarm in place, patient calls appropriately for assistance OOB     Problem: Pain - Standard  Goal: Alleviation of pain or a reduction in pain to the patient’s comfort goal  Outcome: Progressing  Flowsheets (Taken 10/8/2023 0919)  OB Pain Intervention: Medication - See MAR  Pain Rating Scale (NPRS): 3  Note: Tylenol 650 mg PO tid       Patient is not progressing towards the following goals:

## 2023-10-08 NOTE — PROGRESS NOTES
Inpatient Anticoagulation Service Note for 10/8/2023    Reason for Anticoagulation: Deep Vein Thrombosis      Hemoglobin Value: (!) 8.6  Hematocrit Value: (!) 27.7  Lab Platelet Value: (!) 763  Target INR: 2.0 to 3.0    INR from last 7 days       Date/Time INR Value    10/08/23 0610 3.23    10/07/23 0827 3.06    10/06/23 0422 3    10/05/23 0433 3.36    10/04/23 0440 2.99    10/03/23 0023 2.86    10/02/23 0507 2.88          Dose from last 7 days       Date/Time Dose (mg)    10/08/23 1104 2.5    10/07/23 1143 2.5    10/06/23 1030 2.5    10/05/23 1646 2.5    10/04/23 1628 2.5    10/03/23 1046 5    10/02/23 1526 5    10/01/23 1158 2.5          Average Dose (mg): 3.2  Significant Interactions: Antiplatelet Medications  Bridge Therapy: No  Date of Last VTE Event:  (May 2023)  Bridge Therapy Start Date: 09/26/23  Days of Overlap Therapy: 5   INR Value Greater than 2 Prior to Discontinuation of Parenteral Anticoagulation: Yes  Reversal Agent Administered: Not Applicable    Assessment/Plan: INR slighltly supratherapuetic at 3.23. Patient's warfarin-drug interactions stable since last evaluation. He remains on oral diet with somewhat inconsistent intake documented - anywhere from 25-75% of meals noted. Patient's Hgb remains stable and there are no documented concerns for acute bleeding present. Patient is likely close to stable regimen. Patient would benefit from a regimen that provides close to weekly total dose of 22.5 mg. Potential regimen includes 5 mg twice weekly, 2.5 mg all other days. Will provide warfarin 2.5 mg tonight, and INR tomorrow AM.    Education Material Provided?: Yes (Performed 10/3.)    Pharmacist suggested discharge dosing: Warfarin 5 mg Monday/Friday, 2.5 mg all other days. INR within 72 hours of discharge.      Mari Galloway, ManuelD

## 2023-10-08 NOTE — PROGRESS NOTES
Assumed care of pt at 1900H. Report received from rao RN. Pt is A&O x 4, hard of hearing,  on room air breathing is even and unlabored no complains of SOB. Patient currently denies having any pain.   Pt educated on how to use the call light, pt verbalized understanding. Bed in lowest locked position, call light within reach, hourly rounding in place. Labs reviewed, orders reviewed, communication board updated.     Plan of care ongoing, patient declines any further needs at this time.

## 2023-10-08 NOTE — PROGRESS NOTES
Hospital Medicine Daily Progress Note    Date of Service  10/8/2023    Chief Complaint  Hi Montes De Oca is a 78 y.o. male admitted 9/25/2023 with left arm swelling.    Hospital Course   78 y.o. male w/ PMH of CHF, DVT of the lower extremity, on AC, BPH, HTN, HLD, CAD with stent, PPP who presented 9/25/2023 with worsening left arm swelling. Patient sttes that he had been having worsening left arm swelling for over a month now. He had a caretaker come visit him today and told him to go to [facility]. CT imaging was done and demonstrated a clot of the left internal jugular, subclavian and axillary vein. Outside facility contacted our Vascular surgeon who stated that there is no surgical intervention to be done. They recommend continue his anticoagulation.  On chart review, patient had similar occlusions on ultrasound on May 2023.  He was started on apixaban at that time.  Patient states that he has been compliant with his medication.  However he has stated that over the last month, he has developed worsening swelling.  Denies any other symptoms at this time.        Interval Problem Update  9/26 Patient mildly tachypneic otherwise vital signs stable.  Hemoglobin stable.  INR 1.35 and heparin drip supratherapeutic so has been stopped for now.  Patient has failed outpatient apixaban as his edema and clot have worsened.  Patient reports he has never been on warfarin before.  Discussed what is involved in switching to warfarin including needing INR checks, the therapeutic window and dietary changes, patient is in agreement with this switching to warfarin.  Nutrition consulted to help with diet education.  I have placed a referral for the anticoagulation clinic as he will need outpatient follow-up.  On chart review patient has history of bleeding and at one point was taken off his Eliquis, due to his high risk of rebleeding will monitor for now while inpatient bridging. Will also change heparin ggt to  Lovenox therapeutic dosing in anticipation for home.      9/26 Vitals and hemoglobin stable. INR 1.3. Patient with mild bruising on right arm, per nursing is stable. Patient denies hematuria or melena. Right arm with worsening swelling, denies pain. Will ACE wrap or place compression stocking on left arm to help with edema. Patient asking if clot can be removed, discussed that no indication for thrombectomy still with good pulses, no pain and no skin changes. Nutrition did education with patient today. Discussed with social work, tentative plan for dc on Friday if H&H stable needs AC clinic appointment set up for Monday.     9/27 INR 1.32, Hb 10. No signs of bleeding. Discussed with pharmacy if still no bleeding tomorrow we will plan to discharge patient on a Lovenox bridge with an anticoagulation clinic on Monday.  Dosing to be determined tomorrow after INR check.    9/28 INR 1.43 today.  Discussed with pharmacy recommended discharge on warfarin 5 mg daily.  Patient with new WBC 13.3, he denies any new complaints.  Procalcitonin negative, UA negative.  Reviewed chest x-ray showed bibasilar underinflation, radiology read says superimposed pneumonia not excluded.  Start Augmentin for 5 days.  Patient is medically clear for discharge however discussed with case management and they have been unable to get patient appointment for an INR check thus far.  He will need his INR check set up prior to discharge for safe discharge planning.  He has no new complaints, he is anxious to go home.    9/29 INR 1.84. Hemoglobin stable. Mild increase in WBC 15.7. Vitals stable.  Patient denies any cough, shortness of breath or any new complaints.  Overall the swelling of his left arm has improved since admission.  He is anxious to DC home.  Discussed with social work they will try calling again to see if he can get his INR checked outpatient so that he can DC.    10/1/2023  Evaluated at bedside  In no acute distress this morning,  laying in bed  Stable vitals and saturating well room air  Leukocytosis trending down  Continue Augmentin  He completed anticoagulation bridging and on warfarin  Patient will need anticoagulation clinic set up prior to considering discharge  Labs on AM    10/2/2023  Patient no acute distress today.  Case management following and will set up with Coumadin clinic as well as ride.  Patient unable to stand up and was feeling weak.  Will need to be reevaluated by physical therapy and Occupational Therapy.    10/3 patient is hard of hearing, unable to move left lower extremity  Per staff, unable to ambulate on his own, patient lives alone  PT evaluation, placement pending    10/4 patient requiring skilled placement, unable to ambulate, able to pivot with assistance  Patient noted to have ecchymosis and easy bleeding, on Coumadin  To discuss goals of care, palliative care consult pending    10/5 right arm edema with serous drainage  Follow-up ultrasound, nontender to palpation  Patient aware of current condition, unable to ambulate on his own  Agreeable to skilled placement  Remains agitated, frequently stating that he is going to die in the hospital  Discussed with palliative care, to follow-up CODE STATUS    10/6 now reports left hip and abdominal pain with edema  Per staff, patient with improved activity tolerance last evening, encourage activity  We will follow-up CT of the hip  Patient now agreeable for skilled placement    10/7 sleeping, arousable, no new complaints    10/8 appropriate on exam, per staff min assist for transfers, improved activity tolerance  - pain controlled      I have discussed this patient's plan of care and discharge plan at IDT rounds today with Case Management, Nursing, Nursing leadership, and other members of the IDT team.    Consultants/Specialty  N/A    Code Status  DNAR, I OK    Disposition  The patient is not medically cleared for discharge to home or a post-acute facility.  Anticipate  discharge to: snf    I have placed the appropriate orders for post-discharge needs.    Review of Systems  Review of Systems   Constitutional:  Negative for fever and malaise/fatigue.   HENT:  Positive for hearing loss. Negative for congestion.    Respiratory:  Negative for shortness of breath.    Cardiovascular:  Negative for chest pain and leg swelling.   Gastrointestinal:  Negative for abdominal pain, heartburn and nausea.   Genitourinary:  Negative for dysuria.   Musculoskeletal:  Positive for joint pain and myalgias. Negative for back pain and neck pain.        Left arm swelling    Skin:  Negative for rash.   Neurological:  Positive for weakness. Negative for dizziness and headaches.   Endo/Heme/Allergies:  Bruises/bleeds easily.   Psychiatric/Behavioral:  Positive for depression. Negative for memory loss. The patient is nervous/anxious.    All other systems reviewed and are negative.       Physical Exam  Temp:  [36.2 °C (97.2 °F)-36.5 °C (97.7 °F)] 36.5 °C (97.7 °F)  Pulse:  [60-61] 60  Resp:  [20] 20  BP: (118-165)/(51-67) 165/67  SpO2:  [90 %-91 %] 90 %    Physical Exam  Vitals and nursing note reviewed.   Constitutional:       General: He is not in acute distress.     Appearance: He is ill-appearing.   HENT:      Head: Normocephalic and atraumatic.      Ears:      Comments: Hard of hearing      Mouth/Throat:      Mouth: Mucous membranes are moist.   Eyes:      General: No scleral icterus.     Extraocular Movements: Extraocular movements intact.      Pupils: Pupils are equal, round, and reactive to light.   Cardiovascular:      Rate and Rhythm: Normal rate and regular rhythm.      Pulses: Normal pulses.   Pulmonary:      Effort: Pulmonary effort is normal. No respiratory distress.      Breath sounds: No stridor. No rhonchi.   Abdominal:      General: Abdomen is flat.      Palpations: Abdomen is soft. There is no mass.   Musculoskeletal:         General: Swelling (left arm, improving) present. No tenderness.  Normal range of motion.      Cervical back: Normal range of motion.   Skin:     General: Skin is warm and dry.      Coloration: Skin is not pale.      Findings: Bruising (mild right arm) present.   Neurological:      General: No focal deficit present.      Mental Status: He is alert and oriented to person, place, and time.      Cranial Nerves: No cranial nerve deficit.      Sensory: No sensory deficit.      Motor: Weakness present.      Coordination: Coordination normal.   Psychiatric:         Mood and Affect: Mood normal.         Behavior: Behavior normal.         Fluids    Intake/Output Summary (Last 24 hours) at 10/8/2023 1359  Last data filed at 10/8/2023 0919  Gross per 24 hour   Intake 660 ml   Output 900 ml   Net -240 ml       Laboratory  Recent Labs     10/07/23  0827   WBC 8.2   RBC 2.96*   HEMOGLOBIN 8.6*   HEMATOCRIT 27.7*   MCV 93.6   MCH 29.1   MCHC 31.0*   RDW 54.3*   PLATELETCT 763*   MPV 11.1     Recent Labs     10/07/23  0827   SODIUM 139   POTASSIUM 4.3   CHLORIDE 106   CO2 25   GLUCOSE 98   BUN 16   CREATININE 0.69   CALCIUM 8.1*     Recent Labs     10/06/23  0422 10/07/23  0827 10/08/23  0610   INR 3.00* 3.06* 3.23*                 Imaging  CT-EXTREMITY, LOWER W/O LEFT   Final Result      1.  No evidence of left femoral fracture.      2.  Prior left knee arthroplasty. No evidence of displacement or fracture of the arthroplasty components. Evaluation however is limited due to streak artifact.      3.  Knee joint effusion.      4.  Edema of the subcutaneous fat of the left thigh most prominently laterally. No focal fluid collection to suggest presence of abscess.      5.  Subcutaneous lipoma involving the left upper lateral thigh measuring 3 x 2 cm in size.      CT-ABDOMEN-PELVIS W/O   Final Result      1.  No acute abnormality identified within the abdomen or pelvis      2.  Left colon diverticulosis      3.  Prior cholecystectomy      4.  Small left pleural effusion      5.  Cardiac device wire  present      US-EXTREMITY VENOUS UPPER UNILAT RIGHT   Final Result      US-EXTREMITY VENOUS UPPER UNILAT RIGHT   Final Result      DX-CHEST-PORTABLE (1 VIEW)   Final Result      Decreased bibasilar underinflation atelectasis. Superimposed pneumonia not excluded.      IR-US GUIDED PIV   Final Result    Ultrasound-guided PERIPHERAL IV INSERTION performed by    qualified nursing staff as above.           Assessment/Plan  * Arm DVT (deep venous thromboembolism), acute, left (HCC)- (present on admission)  Assessment & Plan  CT imaging shows occlusion of the internal jugular vein, subclavian vein and axillary vein    Patient had an ultrasound done in the past.  May 2023, ultrasound showed: Evidence of acute to subacute occlusive deep venous thrombosis in the    jugular vein, subclavian vein, axillary vein and brachial veins from the  proximal to distal bicep.  Evidence of acute to subacute occlusive superficial venous thrombosis in  the basilic vein from the proximal to distal bicep.  Evidence of acute to subacute occlusive superficial venous thrombosis in    the cephalic vein from the proximal bicep to the elbow and    Vascular surgery aware - no surgical intervention  Continue lovenox   Patient has failed apixaban therapy, will change to warfarin  -monitor very closely for bleeding, hx of gross hematuria previous on blood thinners   Daily INR, H&H  Dietary consulted for nutrition counseling  Referral placed for outpatient anticoagulation clinic  High risk of bleed, will monitor while bridging on warfarin    10/2 Patient medically clear for discharge on warfarin 5 mg with Lovenox bridge, needs INR check appointment prior to discharge    10/3 generalized weakness, patient lives alone  Patient's son 2 hours away  Per staff, unable to ambulate  PT OT evaluation pending, will need placement assistance  Patient aware  Concerned about finances,  to assist  Outpatient Coumadin follow-up    10/4 skilled placement  pending, patient wants discharge to [place], has minimal support,  to follow-up with patient's son  PT recommending skilled placement, quant gold pending  Palliative care consult, for goals of care    10/5 skilled placement pending,  to follow-up with son regarding discharge plan  May need to discharge to skilled facility in [place]  Quant gold negative  Discussed with palliative care, at this point, patient appears to understand his medical condition, and aware that he cannot return home alone  Patient agitated, but however would like conservative management  Palliative care to follow-up, remains full code, to discuss CODE STATUS given comorbidities  Should patient's condition decline, patient will have poor outcome if he requires CPR given multiple core morbidities including anticoagulation use  Consideration of futility of care and transition to DNR, we will follow-up with palliative care assistance    10/6 continue Coumadin, patient now agreeable for medical management  Agreeable for skilled placement, will follow-up with local skilled facilities  Not medically cleared  Palliative care following  Now DNR  Encourage activity, per staff, now able to ambulate with standby assist, continue to monitor    10/8 stable, plan for transfer to snf in am, pt with improved activity tolerance  Wants to go home, to reeval if pt independent, updates with pt's son    Localized edema  Assessment & Plan  10/5 us r/p abscess, f/u cx  Leukocytosis resolved  Monitor, 2nd edema, on lasix    10/6 right upper extremity, ultrasound with acute and subacute thrombus  Wound culture negative    Debility  Assessment & Plan  Lives alone, will need further therapy evaluation  May need skilled placement, Quant gold ordered    10/4 SNF placement pending, bleed on Coumadin  We will reevaluate goals of care due to risk of bleeding    10/5 seen by palliative care, concern for decisional capacity, has a son that can assist, [facility]  to f/u  - pt is not making sound decisions, still requesting to dc home to self with his cats, however, unable to clearly state current condition or plan of care  - pt not ready for hospice per palliative  - placement in [place] pending    10/6 reports left lower extremity edema and pain  Appears chronic  We will follow-up CT abdomen pelvis with left lower extremity, rule out fracture    10/7 per staff increasing activity tolerance, snf placement pending, likely transfer 1-2 days  CT with lipoma and edema, no fracture, encourage therapy    Leukocytosis  Assessment & Plan  No signs of sepsis  Work-up shows pneumonia, started augmentin    Pneumonia  Assessment & Plan  New leukocytosis, work-up shows possible basilar pneumonia on x-ray  Start Augmentin  No sepsis    Chronic systolic congestive heart failure (HCC)- (present on admission)  Assessment & Plan  Mildly depressed left ventricular systolic function. The left ventricular ejection fraction is visually estimated to be 45%.Mildly dilated right ventricle. Reduced right ventricular systolic function. Mild tricuspid regurgitation. Right ventricular systolic pressure is  estimated to be 33 mmHg (normal).    Continue with metorprolol, lasix and losartan    AF (atrial fibrillation) (HCC)- (present on admission)  Assessment & Plan  Continue metoprolol  Continue full dose lovenox for bridge   Continue warfarin     NSTEMI (non-ST elevated myocardial infarction), h/o Afib not on anticoag, leukocytosis (HCC)- (present on admission)  Assessment & Plan  5/2023: acute inferior MI late presentation, occluded RCA with collaterals  No interventions done  Continue aspirin and statin    Essential hypertension, benign- (present on admission)  Assessment & Plan  Continue with metoprolol, losartan, furosemide    Coronary artery disease due to lipid rich plaque- (present on admission)  Assessment & Plan  Continue with aspirin, atorvastatin  Lipid panel with low LDL below goal          VTE prophylaxis: warfarin, therapeutic lovenox     I have performed a physical exam and reviewed and updated ROS and Plan today (10/8/2023). In review of yesterday's note (10/7/2023), there are no changes except as documented above.

## 2023-10-09 VITALS
HEART RATE: 74 BPM | WEIGHT: 222.44 LBS | OXYGEN SATURATION: 92 % | TEMPERATURE: 98.2 F | BODY MASS INDEX: 28.55 KG/M2 | RESPIRATION RATE: 19 BRPM | SYSTOLIC BLOOD PRESSURE: 126 MMHG | HEIGHT: 74 IN | DIASTOLIC BLOOD PRESSURE: 68 MMHG

## 2023-10-09 LAB
INR PPP: 3.31 (ref 0.87–1.13)
PROTHROMBIN TIME: 34.1 SEC (ref 12–14.6)

## 2023-10-09 PROCEDURE — A9270 NON-COVERED ITEM OR SERVICE: HCPCS | Mod: JZ | Performed by: STUDENT IN AN ORGANIZED HEALTH CARE EDUCATION/TRAINING PROGRAM

## 2023-10-09 PROCEDURE — 85610 PROTHROMBIN TIME: CPT

## 2023-10-09 PROCEDURE — 36415 COLL VENOUS BLD VENIPUNCTURE: CPT

## 2023-10-09 PROCEDURE — 700102 HCHG RX REV CODE 250 W/ 637 OVERRIDE(OP): Performed by: STUDENT IN AN ORGANIZED HEALTH CARE EDUCATION/TRAINING PROGRAM

## 2023-10-09 PROCEDURE — A9270 NON-COVERED ITEM OR SERVICE: HCPCS | Performed by: STUDENT IN AN ORGANIZED HEALTH CARE EDUCATION/TRAINING PROGRAM

## 2023-10-09 PROCEDURE — A9270 NON-COVERED ITEM OR SERVICE: HCPCS | Performed by: INTERNAL MEDICINE

## 2023-10-09 PROCEDURE — 700102 HCHG RX REV CODE 250 W/ 637 OVERRIDE(OP): Mod: JZ | Performed by: STUDENT IN AN ORGANIZED HEALTH CARE EDUCATION/TRAINING PROGRAM

## 2023-10-09 PROCEDURE — 99239 HOSP IP/OBS DSCHRG MGMT >30: CPT | Performed by: INTERNAL MEDICINE

## 2023-10-09 PROCEDURE — 700102 HCHG RX REV CODE 250 W/ 637 OVERRIDE(OP): Performed by: INTERNAL MEDICINE

## 2023-10-09 RX ADMIN — ASPIRIN 81 MG: 81 TABLET, COATED ORAL at 05:13

## 2023-10-09 RX ADMIN — LOSARTAN POTASSIUM 50 MG: 50 TABLET, FILM COATED ORAL at 05:12

## 2023-10-09 RX ADMIN — AMLODIPINE BESYLATE 5 MG: 5 TABLET ORAL at 05:12

## 2023-10-09 RX ADMIN — FUROSEMIDE 40 MG: 40 TABLET ORAL at 05:12

## 2023-10-09 RX ADMIN — DOCUSATE SODIUM 50 MG AND SENNOSIDES 8.6 MG 2 TABLET: 8.6; 5 TABLET, FILM COATED ORAL at 05:12

## 2023-10-09 RX ADMIN — POTASSIUM CHLORIDE 40 MEQ: 1500 TABLET, EXTENDED RELEASE ORAL at 05:12

## 2023-10-09 RX ADMIN — FINASTERIDE 5 MG: 5 TABLET, FILM COATED ORAL at 05:12

## 2023-10-09 RX ADMIN — ACETAMINOPHEN 1000 MG: 500 TABLET, FILM COATED ORAL at 10:32

## 2023-10-09 RX ADMIN — METOPROLOL SUCCINATE 75 MG: 25 TABLET, EXTENDED RELEASE ORAL at 05:11

## 2023-10-09 RX ADMIN — TAMSULOSIN HYDROCHLORIDE 0.4 MG: 0.4 CAPSULE ORAL at 05:12

## 2023-10-09 ASSESSMENT — PAIN DESCRIPTION - PAIN TYPE
TYPE: ACUTE PAIN
TYPE: ACUTE PAIN

## 2023-10-09 NOTE — PROGRESS NOTES
Report received from NOC RN and assumed patient care at 0700. Patient is A&Ox 4, on room air, and has a bed alarm on.  Patient reporting a pain level of 0/10. Call light within reach and bed in lowest position. Reinforced the need to call for assistance. Plan of care discussed and patient does not have any further needs at this time.

## 2023-10-09 NOTE — DISCHARGE SUMMARY
Discharge Summary    CHIEF COMPLAINT ON ADMISSION  No chief complaint on file.      Reason for Admission  DVT LUE    Admission Date  9/25/2023     CODE STATUS  DNAR, I OK    HPI & HOSPITAL COURSE    78 y.o. male w/ PMH of CHF, DVT of the lower extremity, on AC, BPH, HTN, HLD, CAD with stent, PPP who presented 9/25/2023 with worsening left arm swelling. Patient sttes that he had been having worsening left arm swelling for over a month now. He had a caretaker come visit him today and told him to go to [facility]. CT imaging was done and demonstrated a clot of the left internal jugular, subclavian and axillary vein. Outside facility contacted our Vascular surgeon who stated that there is no surgical intervention to be done. They recommend continue his anticoagulation.  On chart review, patient had similar occlusions on ultrasound on May 2023.  He was started on apixaban at that time.  Patient states that he has been compliant with his medication.  However he has stated that over the last month, he has developed worsening swelling.  Denies any other symptoms at this time.    Patient has now been started on Coumadin and is therapeutic with INR of 3 on discharge.  Right upper extremity swelling and pain has improved.  Of note patient does have serous drainage from right upper extremity, ultrasound completed which shows acute and subacute superficial thrombus in the right cephalic vein at the distal bicep and and half of the median antecubital vein.  No abscess or fluid collection identified.  Swelling has improved.    Patient had complaints of generalized weakness with inability to ambulate and left abdomen and left lower extremity swelling and pain.  Ultrasound was done which shows lipoma as well as edema.  He is on Lasix and is diuresing well.  During the course of his stay, care has been coordinated with patient's son.  Weakness has improved.  Patient is now able to ambulate with single-point cane and  minimal assist.  He will benefit from ongoing therapy at skilled facility.  Patient's goal is to discharge home where he lives independently.    He will need continued INR monitoring as he is on Coumadin.  We have discussed risk of bleeding with falls.  He will need to be reevaluated for ongoing anticoagulation therapy if there is concern for increased risk of falling.  At this point, patient has significantly improved and may be able to discharge to home to self if he continues to improve with social support.  Patient's son has been updated regarding recommendations.  He will need assistance with finances and other social issues once discharged.    Of note, during the course of the stay, patient has met with palliative care.  Due to Patient's chronic conditions and now on Coumadin with high risk of deterioration, should patient's condition decline he is unlikely to benefit from aggressive therapy.  Per discussion with palliative care, patient is now DNR, intubation okay.  This has been updated regarding patient's CODE STATUS.        Therefore, he is discharged in good and stable condition to skilled nursing facility.    The patient met 2-midnight criteria for an inpatient stay at the time of discharge.      FOLLOW UP ITEMS POST DISCHARGE  Pcp in 2 days    DISCHARGE DIAGNOSES  Principal Problem:    Arm DVT (deep venous thromboembolism), acute, left (HCC) (POA: Yes)  Active Problems:    Coronary artery disease due to lipid rich plaque (Chronic) (POA: Yes)    Essential hypertension, benign (POA: Yes)    NSTEMI (non-ST elevated myocardial infarction), h/o Afib not on anticoag, leukocytosis (HCC) (POA: Yes)    AF (atrial fibrillation) (HCC) (POA: Yes)    Chronic systolic congestive heart failure (HCC) (POA: Yes)    Pneumonia (POA: No)    Leukocytosis (POA: No)    Debility (POA: Unknown)    Localized edema (POA: Unknown)  Resolved Problems:    * No resolved hospital problems. *      FOLLOW UP  Future Appointments   Date  Time Provider Department Center   10/16/2023  9:30 AM [facility] EXAM 7 [facility] None     Mario Lindquist M.D.  [place] [address]  [phone]    Follow up  Left message with Mario Lindquist M.D. office. The  will call you to schedule your follow up appointment. If you do not hear in 1 week please call the office to schedule.    [facility]  [address]  [place] [address]  [phone]          MEDICATIONS ON DISCHARGE     Medication List        START taking these medications        Instructions   acetaminophen 500 MG Tabs  Commonly known as: Tylenol   Take 2 Tablets by mouth 3 times a day as needed for Mild Pain for up to 7 days.  Dose: 1,000 mg     amLODIPine 5 MG Tabs  Commonly known as: Norvasc   Take 1 Tablet by mouth every day.  Dose: 5 mg     warfarin 2.5 MG Tabs  Commonly known as: Coumadin   Take 1 Tablet by mouth every day at 6 PM.  Dose: 2.5 mg            CONTINUE taking these medications        Instructions   amiodarone 200 MG Tabs  Commonly known as: Cordarone   Take 200 mg by mouth every day.  Dose: 200 mg     ascorbic acid 500 MG tablet  Commonly known as: Vitamin C   Take 500 mg by mouth every day.  Dose: 500 mg     aspirin 81 MG EC tablet   Take 81 mg by mouth every day.  Dose: 81 mg     atorvastatin 80 MG tablet  Commonly known as: Lipitor   Take 1 Tab by mouth every evening.  Dose: 80 mg     clotrimazole 10 MG Troc janny  Commonly known as: Mycelex   Take 10 mg by mouth every day.  Dose: 10 mg     ferrous sulfate 325 (65 Fe) MG tablet   Take 325 mg by mouth every day.  Dose: 325 mg     finasteride 5 MG Tabs  Commonly known as: Proscar   Take 5 mg by mouth every day.  Dose: 5 mg     furosemide 40 MG Tabs  Commonly known as: Lasix   Take 1.5 Tablets by mouth every day.  Dose: 60 mg     hydroCHLOROthiazide 12.5 MG tablet  Commonly known as: Hydrodiuril   Take 12.5 mg by mouth every day.  Dose: 12.5 mg     losartan 50 MG Tabs  Commonly known as: Cozaar   Take 1  Tablet by mouth every day.  Dose: 50 mg     metoprolol SR 25 MG Tb24  Commonly known as: Toprol XL   Take 3 Tablets by mouth 2 times a day.  Dose: 75 mg     nitroglycerin 0.4 MG Subl  Commonly known as: Nitrostat   Place 0.4 mg under tongue every 5 minutes as needed.  Dose: 0.4 mg     potassium chloride 20 MEQ Pack  Commonly known as: Klor-Con   Take 40 mEq by mouth 2 times a day.  Dose: 40 mEq     tamsulosin 0.4 MG capsule  Commonly known as: Flomax   Take 0.4 mg by mouth every day.  Dose: 0.4 mg     Vitamin D3 50 MCG (2000 UT) Tabs   Take 2,000 Units by mouth every day.  Dose: 2,000 Units            STOP taking these medications      Eliquis 5mg Tabs  Generic drug: apixaban            ASK your doctor about these medications        Instructions   amoxicillin-clavulanate 875-125 MG Tabs  Commonly known as: Augmentin  Ask about: Should I take this medication?   Take 1 Tablet by mouth every 12 hours for 2 days.  Dose: 1 Tablet              Allergies  No Known Allergies    DIET  Orders Placed This Encounter   Procedures    Diet Order Diet: Cardiac; Fluid modifications: (optional): 1500 ml Fluid Restriction     Standing Status:   Standing     Number of Occurrences:   1     Order Specific Question:   Diet:     Answer:   Cardiac [6]     Order Specific Question:   Fluid modifications: (optional)     Answer:   1500 ml Fluid Restriction [9]       ACTIVITY  As tolerated.  Weight bearing as tolerated    LINES, DRAINS, AND WOUNDS  This is an automated list. Peripheral IVs will be removed prior to discharge.  Peripheral IV 09/28/23 20 G Anterior;Right Upper arm (Active)   Site Assessment Clean;Dry;Intact 10/08/23 2100   Dressing Type Transparent 10/08/23 2100   Line Status Saline locked 10/08/23 2100   Dressing Status Clean;Dry;Intact 10/08/23 2100   Dressing Intervention Dressing changed per protocol 10/08/23 2100   Dressing Change Due 10/14/23 10/08/23 2100   Date Primary Tubing Changed 10/03/23 10/03/23 2100   Date Secondary  Tubing Changed 10/03/23 10/03/23 2100   Date IV Connector(s) Changed 09/28/23 09/29/23 2000   Infiltration Grading ([facility], [facility]) 0 10/08/23 2100   Phlebitis Scale ([facility] Only) 0 10/08/23 2100       Wound 10/04/23 Other (comment) Arm Distal;Dorsal Right previous IV line site, hematoma on surrounding area (Active)   Wound Image   10/05/23 1600   Site Assessment HEATHER 10/08/23 2100   Periwound Assessment HEATHER 10/08/23 2100   Margins HEATHER 10/08/23 2100   Closure HEATHER 10/08/23 2100   Drainage Amount HEATHER 10/08/23 2100   Drainage Description HEATHER 10/08/23 2100   Treatments Offloading 10/08/23 2100   Wound Cleansing Approved Wound Cleanser 10/08/23 2100   Periwound Protectant Cavilon Advanced 10/08/23 0745   Dressing Status Clean;Dry;New drainage 10/08/23 2100   Dressing Changed Observed 10/08/23 2100   Dressing Cleansing/Solutions Not Applicable 10/08/23 0745   Dressing Options Hydrofera Blue Ready 10/08/23 0745   Dressing Change/Treatment Frequency Every 48 hrs, and As Needed 10/08/23 2100   NEXT Dressing Change/Treatment Date 10/10/23 10/08/23 2100   NEXT Weekly Photo (Inpatient Only) 10/12/23 10/05/23 1600   Wound Team Following Not following 10/05/23 1600   Non-staged Wound Description Partial thickness 10/05/23 1600       Peripheral IV 09/28/23 20 G Anterior;Right Upper arm (Active)   Site Assessment Clean;Dry;Intact 10/08/23 2100   Dressing Type Transparent 10/08/23 2100   Line Status Saline locked 10/08/23 2100   Dressing Status Clean;Dry;Intact 10/08/23 2100   Dressing Intervention Dressing changed per protocol 10/08/23 2100   Dressing Change Due 10/14/23 10/08/23 2100   Date Primary Tubing Changed 10/03/23 10/03/23 2100   Date Secondary Tubing Changed 10/03/23 10/03/23 2100   Date IV Connector(s) Changed 09/28/23 09/29/23 2000   Infiltration Grading ([facility], [facility]) 0 10/08/23 2100   Phlebitis Scale ([facility] Only) 0 10/08/23 2100               MENTAL STATUS ON TRANSFER             CONSULTATIONS  Palliative  care    PROCEDURES  none    LABORATORY  Lab Results   Component Value Date    SODIUM 139 10/07/2023    POTASSIUM 4.3 10/07/2023    CHLORIDE 106 10/07/2023    CO2 25 10/07/2023    GLUCOSE 98 10/07/2023    BUN 16 10/07/2023    CREATININE 0.69 10/07/2023        Lab Results   Component Value Date    WBC 8.2 10/07/2023    HEMOGLOBIN 8.6 (L) 10/07/2023    HEMATOCRIT 27.7 (L) 10/07/2023    PLATELETCT 763 (H) 10/07/2023        Total time of the discharge process exceeds 45 minutes.

## 2023-10-09 NOTE — DISCHARGE PLANNING
Case Management Discharge Planning    Admission Date: 9/25/2023  GMLOS: 3.9  ALOS: 14    6-Clicks ADL Score: 18  6-Clicks Mobility Score: 17  PT and/or OT Eval ordered: Yes  Post-acute Referrals Ordered: Yes  Post-acute Choice Obtained: Yes  Has referral(s) been sent to post-acute provider:  Yes      Anticipated Discharge Dispo: Discharge Disposition: D/T to SNF with Medicare cert in anticipation of skilled care (03)  Discharge Address: [facility] [address]    [place] [address]    DME Needed: No    Action(s) Taken: Choice obtained    0900 RNCM spoke to pt regarding SNF choice. Pt wants to go home. RNCM educated pt that the only way to go home is AMA. Pt agreeable to go to any SNF with availability today.  Paola TREJO called [facility] and no answer.  [facility] was also called and they do have a bed at 1300.     1200 RNCM and DPA obtained IMM signature.  Pt notified that he will leave at 1300 to [facility].  RNCM left a  for Stevie Son to notify him on dc plan. Transfer packet given to bedside RN        Escalations Completed: None    Medically Clear: Yes    Next Steps: RNCM to continue to follow up with pt and medical team to address dc needs and barriers    Barriers to Discharge: None

## 2023-10-09 NOTE — PROGRESS NOTES
Assumed care of pt at 1900H. Report received from dayshift RN. Pt is A&O x 4, mild hard of hearing, pleasant and cooperative follows commands as well. On room air breathing is even and unlabored, no complains of SOB. Patient stated that their pain is 2/10 on the neck and right arm medicated per MAR, took pills without any problems. Pt's pain comfort goal is to sleep comfortably. Per patient, overall he feels better and felt like his more steady on his feet with the use of cane.    Pt educated on how to use the call light, pt verbalized understanding. Bed in lowest locked position, bed alarm on, call light within reach, hourly rounding in place. Labs reviewed, orders reviewed, communication board updated.     Plan of care ongoing, patient declines any further needs at this time.

## 2023-10-09 NOTE — DISCHARGE PLANNING
Agency/Facility Name: [facility]  Plan or Request: DPA left vm for Peggy asking for an open bed.    0928- DPA left second vm for [facility].    0930- Spoke To: Gideon  Agency/Facility Name: [facility]  Plan or Request: Bed available today, transport is at 1300 with  becca.

## 2023-10-09 NOTE — CARE PLAN
The patient is Stable - Low risk of patient condition declining or worsening    Shift Goals  Clinical Goals: no falls and/or injuries for this shift    Patient Goals:  comfort, rest  Family Goals: not present    Progress made toward(s) clinical / shift goals:    Remained free from falls and/or injuries fall precautions in place at all times.     Problem: Knowledge Deficit - Standard  Goal: Patient and family/care givers will demonstrate understanding of plan of care, disease process/condition, diagnostic tests and medications  10/9/2023 0522 by Alida Ramirez R.N.  Outcome: Progressing  10/9/2023 0256 by Alida Ramirez RBlankaN.  Outcome: Progressing     Problem: Fall Risk  Goal: Patient will remain free from falls  10/9/2023 0522 by Alida Ramirez R.N.  Outcome: Progressing  10/9/2023 0256 by Alida Ramirez R.N.  Outcome: Progressing     Problem: Risk for Bleeding  Goal: Patient will take measures to prevent bleeding and recognizes signs of bleeding that need to be reported immediately to a health care professional  Outcome: Progressing     Problem: Pain - Standard  Goal: Alleviation of pain or a reduction in pain to the patient’s comfort goal  Outcome: Progressing     Problem: Mobility  Goal: Patient's capacity to carry out activities will improve  Outcome: Progressing       Patient is not progressing towards the following goals:

## 2023-10-10 ENCOUNTER — HOSPITAL ENCOUNTER (EMERGENCY)
Facility: MEDICAL CENTER | Age: 78
End: 2023-10-10
Attending: EMERGENCY MEDICINE
Payer: MEDICARE

## 2023-10-10 VITALS
HEART RATE: 68 BPM | SYSTOLIC BLOOD PRESSURE: 113 MMHG | OXYGEN SATURATION: 95 % | HEIGHT: 74 IN | RESPIRATION RATE: 15 BRPM | WEIGHT: 222 LBS | TEMPERATURE: 97.4 F | DIASTOLIC BLOOD PRESSURE: 62 MMHG | BODY MASS INDEX: 28.49 KG/M2

## 2023-10-10 DIAGNOSIS — S61.501A OPEN WOUND OF RIGHT WRIST, INITIAL ENCOUNTER: ICD-10-CM

## 2023-10-10 PROCEDURE — 99284 EMERGENCY DEPT VISIT MOD MDM: CPT

## 2023-10-10 PROCEDURE — 306591 TRAY SUTURE REMOVAL DISP: Performed by: EMERGENCY MEDICINE

## 2023-10-10 ASSESSMENT — FIBROSIS 4 INDEX: FIB4 SCORE: 0.35

## 2023-10-10 NOTE — ED TRIAGE NOTES
Hi Hari Falguni  78 y.o. male  Chief Complaint   Patient presents with    Wound Re-Check     Open wound on the right forearm. Recently discharged for right arm superficial thrombus. Patient denies pain. Came from an assisted living facility.     Pt BIB EMS for above complaint.     Pt is GCS 15, speaking in full sentences, follows commands and responds appropriately to questions. Resp are even and unlabored. Patient denies pain.     Pt placed in ED lobby. Pt educated on triage process. Pt encouraged to alert staff for any changes.       Vitals:    10/10/23 1514   BP: 95/66   Pulse: 74   Resp: 14   Temp: 36.3 °C (97.4 °F)   SpO2: 90%

## 2023-10-11 ENCOUNTER — TELEPHONE (OUTPATIENT)
Dept: HEALTH INFORMATION MANAGEMENT | Facility: OTHER | Age: 78
End: 2023-10-11

## 2023-10-11 ENCOUNTER — PATIENT OUTREACH (OUTPATIENT)
Dept: SCHEDULING | Facility: IMAGING CENTER | Age: 78
End: 2023-10-11
Payer: MEDICARE

## 2023-10-11 NOTE — ED NOTES
Probiotic such as culturelle or align    KEFER probiotic smootie   Wound cleansed with sterile saline and gauze. Wound packed with hydrofiber per wound care recommendation. Covered with silicone gauze dressing. Updated pt's son, Stevie, on plan of care. Called nurse at [facility] to inform of condition and plan of care. Informed pt will be returning to [facility]

## 2023-10-11 NOTE — ED NOTES
Pt discharged to [facility]. Discharge paperwork provided. Education provided by ERP. Reinforced discharge instructions.  Pt was given follow up instructions  Pt verbalized understanding of all instructions for discharge. Pt alert and oriented x 4, with all belongings.

## 2023-10-11 NOTE — DISCHARGE PLANNING
SW notified Pt is medically cleared and can return to [facility]. Pt requires WC Transportation.     GMT contacted  Trip # [id number]  Cost $137.76  Time [id number]    RN updated on transfer and transfer time  COBRA and transfer packet completed  SW attempted to contact [facility] to notify of transfer time back with no answer, HIMA will continue to try to contact [facility].

## 2023-10-11 NOTE — ED NOTES
Bedside report received from off going RN Marlin, assumed care of patient.  POC discussed with patient. Call light within reach, all needs addressed at this time.       Fall risk interventions in place: Move the patient closer to the nurse's station, Patient's personal possessions are with in their safe reach, Place socks on patient, Place fall risk sign on patient's door, Give patient urinal if applicable, and Keep floor surfaces clean and dry (all applicable per [facility] Fall risk assessment)   Continuous monitoring: Cardiac Leads, Pulse Ox, or Blood Pressure  IVF/IV medications: Not Applicable   Oxygen: Room Air  Bedside sitter: Not Applicable   Isolation: Not Applicable

## 2023-10-11 NOTE — ED NOTES
Tried to call [facility] x [phone].  Phone rings and no answer to inform ETA GMT.

## 2023-10-11 NOTE — DISCHARGE INSTRUCTIONS
Patient has good distal pulses and capillary refill in all digits. No evidence of vascular compromise.

## 2023-10-11 NOTE — ED PROVIDER NOTES
ER Provider Note    Scribed for Jesika Johnson M.D. by Gerry Tracy. 10/10/2023  5:00 PM    Primary Care Provider: Mario Lindquist M.D.    CHIEF COMPLAINT  Chief Complaint   Patient presents with    Wound Re-Check     Open wound on the right forearm. Recently discharged for right arm superficial thrombus. Patient denies pain. Came from an assisted living facility.     LIMITATION TO HISTORY   Select: : None    HPI/ROS  OUTSIDE HISTORIAN(S):  None    EXTERNAL RECORDS REVIEWED  Inpatient Notes discharge summary from October 9, yesterday reviewed.  Patient has history of CHF, DVT of the lower extremity on anticoagulation he presented on 25 September with worsening left arm swelling.  He had been having it for over a month.  There was a left internal jugular subclavian axillary vein clot.  At time of discharge it was noted that he has serous drainage from right upper extremity and it showed an acute and subacute superficial thrombus in the right cephalic vein at the distal bicep    Hi Montes De Oca is a 78 y.o. male who presents to the ED via EMS for evaluation of a right forearm wound reopening, onset one day ago. Following the patient's discharge one day ago, the patient states he was treated at [facility] for his wound, and notes a doctor at [facility] packed the wound with a piece of foam. The patient states the staff at the facility examined his wound yesterday, and sent him to [facility] ED. The patient is unable to state why he was sent by [facility] to present to ED. The patient has no known drug allergies.     PAST MEDICAL HISTORY  Past Medical History:   Diagnosis Date    Arthritis     all over    CATARACT 2006    removed    Coronary artery disease due to lipid rich plaque     Heart murmur 1974    Hypertension     Myocardial infarct (HCC) 7/11/2012    Pain     Renal disorder     Unspecified urinary incontinence     prostate issues, self cathing currently    Urinary bladder disorder        SURGICAL  HISTORY  Past Surgical History:   Procedure Laterality Date    VEIN LIGATION Right 7/1/2023    Procedure: LIGATION, VEIN;  Surgeon: Marissa Rae M.D.;  Location: SURGERY [facility];  Service: General    RECOVERY  12/21/2012    Performed by Cath-Recovery Surgery at SURGERY SAME DAY [facility]    OTHER ORTHOPEDIC SURGERY  3/1/2011    left knee total    KNEE ARTHROPLASTY TOTAL  3/1/2011    Performed by KHALIF TREJO at SURGERY [facility]    FL LAP,CHOLECYSTECTOMY  2001    VASECTOMY  1984    APPENDECTOMY  1958    ARTHROSCOPY, KNEE      2 x left, 1 x right    TONSILLECTOMY         FAMILY HISTORY  Family History   Problem Relation Age of Onset    Heart Failure Mother     Heart Attack Father        SOCIAL HISTORY   reports that he has never smoked. His smokeless tobacco use includes chew. He reports that he does not drink alcohol and does not use drugs.    CURRENT MEDICATIONS  Discharge Medication List as of 10/10/2023  8:05 PM        CONTINUE these medications which have NOT CHANGED    Details   amLODIPine (NORVASC) 5 MG Tab Take 1 Tablet by mouth every day., Disp-30 Tablet, R-0, Normal      warfarin (COUMADIN) 2.5 MG Tab Take 1 Tablet by mouth every day at 6 PM., Disp-30 Tablet, R-3, Normal      furosemide (LASIX) 40 MG Tab Take 1.5 Tablets by mouth every day., Disp-30 Tablet, R-0, No Print      amiodarone (CORDARONE) 200 MG Tab Take 200 mg by mouth every day., Historical Med      aspirin 81 MG EC tablet Take 81 mg by mouth every day., Historical Med      potassium chloride (KLOR-CON) 20 MEQ Pack Take 40 mEq by mouth 2 times a day., Historical Med      metoprolol SR (TOPROL XL) 25 MG TABLET SR 24 HR Take 3 Tablets by mouth 2 times a day., Disp-30 Tablet, R-0, No Print      losartan (COZAAR) 50 MG Tab Take 1 Tablet by mouth every day., Disp-30 Tablet, R-0, No Print      ascorbic acid (VITAMIN C) 500 MG tablet Take 500 mg by mouth every day., Historical Med      Cholecalciferol (VITAMIN D3) 50 MCG (2000 UT)  "Tab Take 2,000 Units by mouth every day., Historical Med      ferrous sulfate 325 (65 Fe) MG tablet Take 325 mg by mouth every day., Historical Med      hydroCHLOROthiazide (HYDRODIURIL) 12.5 MG tablet Take 12.5 mg by mouth every day., Historical Med      tamsulosin (FLOMAX) 0.4 MG capsule Take 0.4 mg by mouth every day., Historical Med      clotrimazole (MYCELEX) 10 MG Tye tye Take 10 mg by mouth every day., Historical Med      atorvastatin (LIPITOR) 80 MG tablet Take 1 Tab by mouth every evening., Disp-30 Tab, R-0, Normal      finasteride (PROSCAR) 5 MG TABS Take 5 mg by mouth every day., Historical Med      nitroglycerin (NITROSTAT) 0.4 MG SUBL Place 0.4 mg under tongue every 5 minutes as needed.  , Historical Med             ALLERGIES  Patient has no known allergies.    PHYSICAL EXAM  BP 95/66   Pulse 74   Temp 36.3 °C (97.4 °F) (Temporal)   Resp 14   Ht 1.88 m (6' 2\")   Wt 101 kg (222 lb)   SpO2 90%   BMI 28.50 kg/m²   Constitutional: Alert in no apparent distress. Well appearing  HENT: Normocephalic, Atraumatic, Bilateral external ears normal. Nose normal.   Eyes:  Conjunctiva normal, non-icteric.   Lungs: Non-labored respirations  Skin: Warm, Dry, No erythema, No rash.   Neurologic: Alert, Grossly non-focal.   Psychiatric: Affect normal, Judgment normal, Mood normal, Appears appropriate and not intoxicated.   Extremities: Right wrist 2 cm round defect without any surrounding cellulitis.      COURSE & MEDICAL DECISION MAKING    ED Observation Status? Yes; I am placing the patient in to an observation status due to a diagnostic uncertainty as well as therapeutic intensity. Patient placed in observation status at 5:02 PM, 10/10/2023.     Observation plan is as follows: clean the wound on right forearm and dress with clean bandage.    Upon Reevaluation, the patient's condition has: Improved; and will be discharged.    Patient discharged from ED Observation status at 5:42 PM (Time) 10/10/2023  (Date). "     5:00 PM - Patient seen and evaluated at bedside. Patient will have the wound on his right forearm cleaned and redressed. We will call [facility] to figure out why they sent the patient to the ED. He understands and agrees to the plan of care.     5:18 PM - I spoke with the inpatient wound care team who advises close patient follow-up due to how exposed his wound is currently.     5:34 PM - Patient was reevaluated at bedside. I took another picture of the wound, and looks like underlying fascia. There is no evidence of tendon exposure at the wound, good radial pulses, capillary refill in 5 digits intact, intact sensation of distal fingers. There is no evidence of neurovascular compromise.     8:04 PM - I informed the patient of my plan to discharge and to follow up with [facility] for ongoing wound care. I instructed him to return to [facility] ED for increasing pain, swelling, fever or other concerns. Patient was given the opportunity for questions. I addressed all questions or concerns at this time and they verbalize agreement to the plan for discharge.      INITIAL ASSESSMENT AND PLAN  Care Narrative: Patient presents for reevaluation of his open wound on his right arm.  It looks like it is a little bit larger than it was when he was discharged last seen by wound care.  Per wound care there was some necrotic skin and perhaps this is now come off.  I do not see any surrounding cellulitis.  It looks like there is some fascia at the base of the wound no obvious tendon involvement.  We spoke with wound care who had wound care recommendations.  He will be referred to outpatient wound care I do think he can be discharged back to his care facility.  At this point he did not see any vascular injury he has good pulses good distal capillary refill.  Patient is agreeable.      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    Discussion of management with other [facility] or appropriate source(s):  inpatient wound care team       Escalation of care considered, and ultimately not performed: acute inpatient care management, however at this time, the patient is most appropriate for outpatient management.    Barriers to care at this time, including but not limited to:  None .     Decision tools and prescription drugs considered including, but not limited to:  antibiotics, not indicated at this time .    The patient will return for new or worsening symptoms and is stable at the time of discharge. Patient was given return precautions. Patient and/or family member verbalizes understanding and will comply.    DISPOSITION:  Patient will be discharged home in stable condition.    FOLLOW UP:  Mario Lindquist M.D.  [address]  [facility] [address]  [phone]          [facility]  [address]  [place] [address]  [phone]    follow up for ongoing wound care    [facility], Emergency Dept  [address] [facility] [id number] [phone]    Return for increasing pain, swelling, fevers or other concerns      FINAL IMPRESSION   1. Open wound of right wrist, initial encounter        IGerry (Clayton), am scribing for, and in the presence of, Jesika Johnson M.D..    Electronically signed by: Gerry Tracy (Clayton), 10/10/2023    IJesika M.D. personally performed the services described in this documentation, as scribed by Gerry Tracy in my presence, and it is both accurate and complete.    The note accurately reflects work and decisions made by me.  Jesika Johnson M.D.  10/11/2023  12:48 AM

## 2023-10-16 ENCOUNTER — DOCUMENTATION (OUTPATIENT)
Dept: VASCULAR LAB | Facility: MEDICAL CENTER | Age: 78
End: 2023-10-16
Payer: MEDICARE

## 2023-10-16 NOTE — PROGRESS NOTES
[facility]     Received anticoagulation referral from hospital team on 9/26.     Pt currently @ [facility]. Defer OAC management to SNF until discharge. Will f/u in 2 weeks to determine disposition @ SNF    Insurance: Medicare  PCP: External  Locations to be seen:  [facility]    If no response by 10/26 OR 2 no shows/cancellations, will remove from referral list     [facility] Antico[facility]/Pharmacotherapy Clinic at [phone], fax [phone]    Shivani Lund, ManuelD

## 2023-10-30 NOTE — PROGRESS NOTES
Called [facility], patient remains admitted. No anticipated d/c date at this time. Will f/u in ~2 weeks.    Pau Gagnon, PharmD, BCACP

## 2023-11-13 ENCOUNTER — TELEPHONE (OUTPATIENT)
Dept: VASCULAR LAB | Facility: MEDICAL CENTER | Age: 78
End: 2023-11-13
Payer: MEDICARE

## 2023-11-13 DIAGNOSIS — I48.0 PAROXYSMAL ATRIAL FIBRILLATION (HCC): ICD-10-CM

## 2023-11-13 LAB — INR PPP: 1.2 (ref 2–3.5)

## 2023-11-13 NOTE — TELEPHONE ENCOUNTER
[facility]     Received anticoagulation referral from hospital team on 9/26.     Pt no longer in [facility]    S/w pt - he is a poor historian. He states home health is visiting him today to check his blood. He gave me contact information for Michelle Ventura ph: [phone].    S/w Michelle, she is with [facility] and can draw his INR, however she is also willing to help pt establish w/ our Telemed Clinic.    She states pt is on the 5th day of Lovenox/warfarin bridging. She can draw his INR today at 2pm. Faxed lab order to: Fax # [phone]    Will await INR results   Also scheduled Telemed appt for 11/20 @ 6851     Insurance: Medicare  PCP: External  Locations to be seen:  [facility]     If no response by 10/26 OR 2 no shows/cancellations, will remove from referral list     [facility] Anticoagulation/Pharmacotherapy Clinic at [phone], fax [phone]     Shivani Lund, ManuelD

## 2023-11-15 ENCOUNTER — ANTICOAGULATION MONITORING (OUTPATIENT)
Dept: VASCULAR LAB | Facility: MEDICAL CENTER | Age: 78
End: 2023-11-15
Payer: MEDICARE

## 2023-11-15 ENCOUNTER — TELEPHONE (OUTPATIENT)
Dept: VASCULAR LAB | Facility: MEDICAL CENTER | Age: 78
End: 2023-11-15
Payer: MEDICARE

## 2023-11-15 DIAGNOSIS — I82.629 ACUTE DEEP VEIN THROMBOSIS (DVT) OF UPPER EXTREMITY, UNSPECIFIED LATERALITY, UNSPECIFIED VEIN (HCC): ICD-10-CM

## 2023-11-15 DIAGNOSIS — I82.622 ARM DVT (DEEP VENOUS THROMBOEMBOLISM), ACUTE, LEFT (HCC): ICD-10-CM

## 2023-11-15 DIAGNOSIS — I48.0 PAROXYSMAL ATRIAL FIBRILLATION (HCC): ICD-10-CM

## 2023-11-15 NOTE — TELEPHONE ENCOUNTER
INR was expected to be drawn 11/13  We have not received result  Called [facility] medical records - they will fax over INR result    Shivani Lund, PharmD

## 2023-11-16 NOTE — PROGRESS NOTES
Date: 11/15/23 Time: 1611    1st call    Attempted to contact pt regarding subtherapeutic INR.  S/w pt - he did not have time for me to talk today, requested me to call in the morning and hung up    Will attempt again later    Shivani Lund, PharmD

## 2023-11-17 NOTE — PROGRESS NOTES
Attempted to contact pt regarding warfarin management and INR monitoring (INR currently subtherapeutic) - no answer. Will try back at a later time.    2nd call.    Aniceto Fontana, PharmD, BCACP

## 2023-11-18 NOTE — PROGRESS NOTES
"OP   Telephone Anticoagulation Service Note      Anticoagulation Summary  As of 11/15/2023      INR goal:  2.0-3.0   TTR:  --   INR used for dosin.20 (2023)   Warfarin maintenance plan:  No maintenance plan   Next INR check:  2023   Target end date:      Indications    AF (atrial fibrillation) ([facility]) [I48.91]  DVT of upper extremity (deep vein thrombosis) ([facility]) [I82.629]  Arm DVT (deep venous thromboembolism)  acute  left ([facility]) [I82.622]                 Anticoagulation Episode Summary       INR check location:      Preferred lab:      Send INR reminders to:      Comments:            Anticoagulation Patient Findings  Patient Findings       Negatives:  Signs/symptoms of thrombosis, Signs/symptoms of bleeding, Laboratory test error suspected, Change in health, Change in alcohol use, Change in activity, Upcoming invasive procedure, Emergency department visit, Upcoming dental procedure, Missed doses, Extra doses, Change in medications, Change in diet/appetite, Hospital admission, Bruising, Other complaints          Spoke with the patient on the phone today, reporting a SUB-therapeutic INR of 1.2 from Monday this week.     He was not able to hear me clearly and therefore patient was getting frustrated. Patient education will be postponed and completed when he has his telemed appt on Monday instead.     Patient reports being on \"a bunch of new medications\" and unable to tell which medication I am calling about or how he has been taking his warfarin.   Since the INR is from 4 days ago, no dosing changes will be made today. Will make any necessary changes on Monday after a current INR is obtained.       Anant Camejo  PharmD    "

## 2023-11-20 ENCOUNTER — APPOINTMENT (OUTPATIENT)
Dept: VASCULAR LAB | Facility: MEDICAL CENTER | Age: 78
End: 2023-11-20
Payer: MEDICARE

## 2023-11-20 ENCOUNTER — ANTICOAGULATION MONITORING (OUTPATIENT)
Dept: VASCULAR LAB | Facility: MEDICAL CENTER | Age: 78
End: 2023-11-20
Payer: MEDICARE

## 2023-11-20 VITALS — SYSTOLIC BLOOD PRESSURE: 114 MMHG | DIASTOLIC BLOOD PRESSURE: 56 MMHG | HEART RATE: 60 BPM | OXYGEN SATURATION: 95 %

## 2023-11-20 DIAGNOSIS — I82.629 ACUTE DEEP VEIN THROMBOSIS (DVT) OF UPPER EXTREMITY, UNSPECIFIED LATERALITY, UNSPECIFIED VEIN (HCC): ICD-10-CM

## 2023-11-20 DIAGNOSIS — I82.622 ARM DVT (DEEP VENOUS THROMBOEMBOLISM), ACUTE, LEFT (HCC): ICD-10-CM

## 2023-11-20 DIAGNOSIS — I48.0 PAROXYSMAL ATRIAL FIBRILLATION (HCC): ICD-10-CM

## 2023-11-20 LAB — INR PPP: 2.3 (ref 2–3.5)

## 2023-11-20 PROCEDURE — 3078F DIAST BP <80 MM HG: CPT | Performed by: NURSE PRACTITIONER

## 2023-11-20 PROCEDURE — 99214 OFFICE O/P EST MOD 30 MIN: CPT | Performed by: NURSE PRACTITIONER

## 2023-11-20 PROCEDURE — 3074F SYST BP LT 130 MM HG: CPT | Performed by: NURSE PRACTITIONER

## 2023-11-20 RX ORDER — WARFARIN SODIUM 3 MG/1
3 TABLET ORAL DAILY
COMMUNITY

## 2023-11-20 NOTE — PROGRESS NOTES
Addendum - left vm with PAGE Rios at [facility] ([phone]) to inquire if pt needs to continue taking aspirin. Awaiting call back.                                                                                                               [facility] Anticoagulation Service Note    23    This evaluation was conducted via telemedicine visit using secure and encrypted videoconferencing technology. The patient was physically located at [facility] in [place], [place]. The patient was presented by [facility].  The patient's identity was confirmed and verbal consent for the telemedicine encounter was obtained.    Anticoagulation Summary  As of 2023      INR goal:  2.0-3.0   TTR:  --   INR used for dosin.30 (2023)   Warfarin maintenance plan:  3 mg (3 mg x 1) every day   Weekly warfarin total:  21 mg   Plan last modified:  MARGARITA Stewart (2023)   Next INR check:  2023   Target end date:  Indefinite    Indications    AF (atrial fibrillation) ([facility]) [I48.91]  DVT of upper extremity (deep vein thrombosis) ([facility]) [I82.629]  Arm DVT (deep venous thromboembolism)  acute  left ([facility]) [I82.622]                 Anticoagulation Episode Summary       INR check location:      Preferred lab:      Send INR reminders to:      Comments:  10/2023 - new LUE DVT while on Eliquis, now on warfarin; has INRs drawn from [facility] (they go to his house and do venapuncture INRs and then result processed at [facility])                Refer to Patient Findings for HPI:  Patient Findings       Negatives:  Signs/symptoms of thrombosis, Signs/symptoms of bleeding, Laboratory test error suspected, Change in health, Change in alcohol use, Change in activity, Upcoming invasive procedure, Emergency department visit, Upcoming dental procedure, Missed doses, Extra doses, Change in diet/appetite, Hospital admission, Bruising, Other  complaints    Comments:  He did not bring his medications with him today to review. Confirmed he is taking warfarin 3 mg daily.            Vitals:    11/20/23 0923   BP: 114/56   Pulse: 60   SpO2: 95%       Verified current warfarin dosing schedule (taking 3 mg daily; uses 3 mg tablets)    On ASA 81 mg for CAD to be reviewed next visit with cardiology. Sees Karen ABEL at [facility]. I will reach out to her at [phone] to make sure pt is to continue taking.    Review of Systems   HENT: Negative for nosebleeds.   Respiratory: Negative for shortness of breath.  Gastrointestinal: Negative for blood in stool.   Genitourinary: Negative for hematuria.   Psychiatric/Behavioral: The patient is not nervous/anxious.     Physical Exam   Constitutional: Oriented to person, place, and time. Appears well-developed and well-nourished.   Pulmonary/Chest: Effort normal. No respiratory distress.   Skin: Not diaphoretic.  Psychiatric: Normal mood and affect. Behavior is normal.    A/P   INR is therapeutic. He would like to have his blood drawn with [facility] (fax [phone]). He has a standing order.    Will send msg to [facility] to watch for INR on 11/27/23 since telemed closed that day.    Warfarin dosing recommendation: Continue 3 mg daily.    Follow up appointment in 1 week(s).    Next INR: Monday, November 27, 2023.  Next telemed Appointment: Monday, December 18, 2023 at 9:30 am.    Pt educated to contact our clinic with any changes in medications or s/s of bleeding or thrombosis. Pt is aware to seek immediate medical attention for falls, head injury or deep cuts. Review all of your home medications and newly ordered medications with your doctor and / or pharmacist. Follow medication instructions as directed by your doctor and / or pharmacist. Please keep your medication list with you and share with your physician. Update the information when medications are discontinued, doses are  changed, or new medications (including over-the-counter products) are added; and carry medication information at all times in the event of emergency situations.    CHEST guidelines recommend frequent INR monitoring at regular intervals (a few days up to a max of 12 weeks) to ensure they are on the proper dose of warfarin and not having any complications from therapy.  INRs can dramatically change over a short time period due to diet, medications, and medical conditions.   The patient is instructed to go to the ER for falls with a head injury, blood in urine or stool or any bleeding that last longer than 20 min.   For questions, please contact Outpatient Anticoagulation Service [phone].         PARAMJIT Stewart.  [facility]ticoagulation [facility]  [phone]

## 2023-11-20 NOTE — Clinical Note
Waiting to hear back from cardiology APN (Karen Hughes [phone]) about if pt needs to cont asa 81 mg

## 2023-11-21 ENCOUNTER — TELEPHONE (OUTPATIENT)
Dept: VASCULAR LAB | Facility: MEDICAL CENTER | Age: 78
End: 2023-11-21
Payer: MEDICARE

## 2023-11-21 NOTE — TELEPHONE ENCOUNTER
Received VM from Radha @ [facility] that pt can stop aspirin 81mg daily.    S/w pt - instructed him to STOP aspirin.    Shivani Lund, PharmD  CC Deb ABEL

## 2023-11-22 ENCOUNTER — TELEPHONE (OUTPATIENT)
Dept: VASCULAR LAB | Facility: MEDICAL CENTER | Age: 78
End: 2023-11-22
Payer: MEDICARE

## 2023-11-22 NOTE — TELEPHONE ENCOUNTER
Received call from Michelle Ventura ph: [phone] with [facility] at [facility]. She is returning our call as Deb ABEL requested them to check INR on 11/27.    She will email me a referral for them to draw weekly INRs for Dr Bloch to sign.     Also, she requests we email them with warfarin dosing changes.    Will await email.  Faxed standing order to [phone]     Shivani Lund, PharmD     Ambulatory

## 2023-11-27 LAB — INR PPP: 1.4 (ref 2–3.5)

## 2023-11-28 ENCOUNTER — ANTICOAGULATION MONITORING (OUTPATIENT)
Dept: VASCULAR LAB | Facility: MEDICAL CENTER | Age: 78
End: 2023-11-28
Payer: MEDICARE

## 2023-11-28 DIAGNOSIS — I82.622 ARM DVT (DEEP VENOUS THROMBOEMBOLISM), ACUTE, LEFT (HCC): ICD-10-CM

## 2023-11-28 DIAGNOSIS — I82.629 ACUTE DEEP VEIN THROMBOSIS (DVT) OF UPPER EXTREMITY, UNSPECIFIED LATERALITY, UNSPECIFIED VEIN (HCC): ICD-10-CM

## 2023-11-28 DIAGNOSIS — I48.0 PAROXYSMAL ATRIAL FIBRILLATION (HCC): ICD-10-CM

## 2023-11-28 NOTE — PROGRESS NOTES
Anticoagulation Summary  As of 2023      INR goal:  2.0-3.0   TTR:  0.0 % (2 d)   INR used for dosin.40 (2023)   Warfarin maintenance plan:  9 mg (3 mg x 3) every Tue; 6 mg (3 mg x 2) all other days   Weekly warfarin total:  45 mg   Plan last modified:  Shivani Lund PharmD (2023)   Next INR check:  2023   Target end date:  Indefinite    Indications    AF (atrial fibrillation) ([facility]) [I48.91]  DVT of upper extremity (deep vein thrombosis) ([facility]) [I82.629]  Arm DVT (deep venous thromboembolism)  acute  left ([facility]) [I82.622]                 Anticoagulation Episode Summary       INR check location:      Preferred lab:      Send INR reminders to:      Comments:  10/2023 - new LUE DVT while on Eliquis, now on warfarin; has INRs drawn from [facility] (program with [facility] - fax [phone]          Anticoagulation Patient Findings  Patient Findings       Negatives:  Signs/symptoms of thrombosis, Signs/symptoms of bleeding, Laboratory test error suspected, Change in health, Change in alcohol use, Change in activity, Upcoming invasive procedure, Emergency department visit, Upcoming dental procedure, Missed doses, Extra doses, Change in medications, Change in diet/appetite, Hospital admission, Bruising, Other complaints            INR is subtherapeutic    Called and spoke to patient.    Warfarin Plan: Increase to 9mg Tues, 6mg AOD    Next INR in 1 week(s). Orders faxed to [facility]    Shivani Lund, Jane

## 2023-12-04 ENCOUNTER — HOSPITAL ENCOUNTER (OUTPATIENT)
Dept: RADIOLOGY | Facility: MEDICAL CENTER | Age: 78
End: 2023-12-04
Payer: MEDICARE

## 2023-12-06 ENCOUNTER — DOCUMENTATION (OUTPATIENT)
Dept: VASCULAR LAB | Facility: MEDICAL CENTER | Age: 78
End: 2023-12-06
Payer: MEDICARE

## 2023-12-06 ENCOUNTER — TELEPHONE (OUTPATIENT)
Dept: SCHEDULING | Facility: IMAGING CENTER | Age: 78
End: 2023-12-06
Payer: MEDICARE

## 2023-12-06 NOTE — PROGRESS NOTES
Received message from Constance ( @ [facility]) in regards to care coordination for this patient.  Attempted to reach by phone, had to leave VM to c/b to discuss further.  Rony Zabala, PharmD, BCACP

## 2023-12-06 NOTE — TELEPHONE ENCOUNTER
Caller: Constance-  with [facility]    Topic/issue: Asking for someone to reach out to Griselda, [facility]. The have questions regarding his warfarin and his test results.  Pt is admitted with DVT on left upper extremity.   Need to coordinate his care.      Callback Number: please see routing comments    Thank You    Deb JOAQUIN

## 2023-12-07 NOTE — TELEPHONE ENCOUNTER
Caller: Constance with [facility]    Topic/issue: Constance is returning Rony's call and requesting that he call Griselda at number listed below.     Callback Number: [phone]     Thank you,  Paulina VILLAVICENCIO

## 2023-12-11 ENCOUNTER — TELEPHONE (OUTPATIENT)
Dept: VASCULAR LAB | Facility: MEDICAL CENTER | Age: 78
End: 2023-12-11
Payer: MEDICARE

## 2023-12-11 ENCOUNTER — ANTICOAGULATION MONITORING (OUTPATIENT)
Dept: VASCULAR LAB | Facility: MEDICAL CENTER | Age: 78
End: 2023-12-11
Payer: MEDICARE

## 2023-12-11 DIAGNOSIS — I48.0 PAROXYSMAL ATRIAL FIBRILLATION (HCC): ICD-10-CM

## 2023-12-11 DIAGNOSIS — I82.629 ACUTE DEEP VEIN THROMBOSIS (DVT) OF UPPER EXTREMITY, UNSPECIFIED LATERALITY, UNSPECIFIED VEIN (HCC): ICD-10-CM

## 2023-12-11 DIAGNOSIS — I82.622 ARM DVT (DEEP VENOUS THROMBOEMBOLISM), ACUTE, LEFT (HCC): ICD-10-CM

## 2023-12-11 LAB — INR PPP: 5.6 (ref 2–3.5)

## 2023-12-11 NOTE — TELEPHONE ENCOUNTER
Called Kevin back.  INR was 5.6 drawn on 12/11.  See anticoag encounter for details.    Shivani Lund, PharmD

## 2023-12-11 NOTE — PROGRESS NOTES
Anticoagulation Summary  As of 2023      INR goal:  2.0-3.0   TTR:  20.8 % (2.3 wk)   INR used for dosin.60 (2023)   Warfarin maintenance plan:  9 mg (3 mg x 3) every Tue; 6 mg (3 mg x 2) all other days   Weekly warfarin total:  45 mg   Plan last modified:  Shivani Lund, Jane (2023)   Next INR check:  2023   Target end date:  Indefinite    Indications    AF (atrial fibrillation) ([facility]) [I48.91]  DVT of upper extremity (deep vein thrombosis) ([facility]) [I82.629]  Arm DVT (deep venous thromboembolism)  acute  left ([facility]) [I82.622]                 Anticoagulation Episode Summary       INR check location:      Preferred lab:      Send INR reminders to:      Comments:  10/2023 - new LUE DVT while on Eliquis, now on warfarin; has INRs drawn from [facility] (program with [facility] - fax [phone]          Anticoagulation Patient Findings      INR is supratherapeutic    Faxed to [facility]  as pt has dementia  [phone] Attn: Michelle    Warfarin Plan:  HOLD warfarin x 2 days    Next INR in 2 day(s).    Shivani Lund, PharmD

## 2023-12-11 NOTE — TELEPHONE ENCOUNTER
Caller:  Rom Brown - From [facility]    Topic/issue: Reporting Critical Results    Callback Number: [phone]    Thank you,   Carolyn GONZALEZ

## 2023-12-13 ENCOUNTER — TELEPHONE (OUTPATIENT)
Dept: VASCULAR LAB | Facility: MEDICAL CENTER | Age: 78
End: 2023-12-13
Payer: MEDICARE

## 2023-12-13 ENCOUNTER — ANTICOAGULATION MONITORING (OUTPATIENT)
Dept: VASCULAR LAB | Facility: MEDICAL CENTER | Age: 78
End: 2023-12-13
Payer: MEDICARE

## 2023-12-13 DIAGNOSIS — I48.0 PAROXYSMAL ATRIAL FIBRILLATION (HCC): ICD-10-CM

## 2023-12-13 DIAGNOSIS — I82.622 ARM DVT (DEEP VENOUS THROMBOEMBOLISM), ACUTE, LEFT (HCC): ICD-10-CM

## 2023-12-13 DIAGNOSIS — I82.629 ACUTE DEEP VEIN THROMBOSIS (DVT) OF UPPER EXTREMITY, UNSPECIFIED LATERALITY, UNSPECIFIED VEIN (HCC): ICD-10-CM

## 2023-12-13 LAB — INR PPP: 6 (ref 2–3.5)

## 2023-12-13 NOTE — PROGRESS NOTES
Anticoagulation Summary  As of 2023      INR goal:  2.0-3.0   TTR:  18.5 % (2.6 wk)   INR used for dosin.00 (2023)   Warfarin maintenance plan:  9 mg (3 mg x 3) every Tue; 6 mg (3 mg x 2) all other days   Weekly warfarin total:  45 mg   Plan last modified:  Manuel GarciaD (2023)   Next INR check:  12/15/2023   Target end date:  Indefinite    Indications    AF (atrial fibrillation) ([facility]) [I48.91]  DVT of upper extremity (deep vein thrombosis) ([facility]) [I82.629]  Arm DVT (deep venous thromboembolism)  acute  left ([facility]) [I82.622]                 Anticoagulation Episode Summary       INR check location:      Preferred lab:      Send INR reminders to:      Comments:  10/2023 - new LUE DVT while on Eliquis, now on warfarin; Has [facility]Rs drawn with [facility] [place] (Fax dosing changes to [phone] Attwolfgang Martinez )            Refer to Anticoagulation Patient Findings for HPI  Patient Findings       Positives:  Extra doses (Pt unsure if he took his medication yesterday.)    Negatives:  Signs/symptoms of thrombosis, Signs/symptoms of bleeding, Laboratory test error suspected, Change in health, Change in alcohol use, Change in activity, Upcoming invasive procedure, Emergency department visit, Upcoming dental procedure, Missed doses, Change in medications, Change in diet/appetite, Hospital admission, Bruising, Other complaints            Spoke with pt.  INR is SUPRA therapeutic.     Pt verifies warfarin weekly dosing.     Will have pt HOLD x 2 doses.    Repeat INR in 2 days.     Aniceto Fontana, PharmD, BCACP

## 2023-12-13 NOTE — TELEPHONE ENCOUNTER
INR READING     Caller: Gutierrez khoury/Jeaneth Briseno ([facility])    Topic/issue: INR READING     Per Gutierrez;    Gutierrez is expecting a call back at our earliest convenience. Please advise.    VALUE: 6.0    Thank you,  Jonas ORDONEZ    Callback Number: [phone] (home)

## 2023-12-13 NOTE — PROGRESS NOTES
Attempted to reach patient at requested number below on three occasions this morning.  Each time the phone is answered and immediately hung up.  Will attempt to reach later today.    INR READING      Caller: Gutierrez khoury/Jeaneth Briseno ([facility])     Topic/issue: INR READING      Per Gutierrez;     Gutierrez is expecting a call back at our earliest convenience. Please advise.     VALUE: 6.0     Thank you,  Jonsa ORDONEZ     Callback Number: [phone] (home)

## 2023-12-14 NOTE — TELEPHONE ENCOUNTER
Attempted to call Holger x2 but unable to leave VM  Will attempt again later    Shivani Lund, ManuelD

## 2023-12-14 NOTE — TELEPHONE ENCOUNTER
Griselda returned my call.  Relayed instructions from yesterday's anticoag encounter to hold warfarin x 2 days, then recheck INR tomorrow 12/15    Noted on pt's Anticoag chart to call AND fax Griselda (phone [phone]; fax [phone] Attn Michelle) for any dosing changes and INR checks.    Shivani Lund, PharmD

## 2023-12-14 NOTE — TELEPHONE ENCOUNTER
Caller: Griselda w/ [facility]    Topic/issue: They are making sure that you are not needing another INR check tomorrow but that you are wanting him to go back on his Warfarin?   They need to speak to someone re: holding medication on the 11th and the 12th.from the first faxed received.  But on the 2nd fax from yesterday it is stating that the pt should have 9 mg on the 12th and that was not correct and that was not given.     Callback Number: PH:   [phone]    Thank you,  Deb JOAQUIN

## 2023-12-15 ENCOUNTER — ANTICOAGULATION MONITORING (OUTPATIENT)
Dept: VASCULAR LAB | Facility: MEDICAL CENTER | Age: 78
End: 2023-12-15
Payer: MEDICARE

## 2023-12-15 ENCOUNTER — TELEPHONE (OUTPATIENT)
Dept: VASCULAR LAB | Facility: MEDICAL CENTER | Age: 78
End: 2023-12-15
Payer: MEDICARE

## 2023-12-15 ENCOUNTER — TELEPHONE (OUTPATIENT)
Dept: CARDIOLOGY | Facility: MEDICAL CENTER | Age: 78
End: 2023-12-15

## 2023-12-15 DIAGNOSIS — I82.622 ARM DVT (DEEP VENOUS THROMBOEMBOLISM), ACUTE, LEFT (HCC): ICD-10-CM

## 2023-12-15 DIAGNOSIS — I82.629 ACUTE DEEP VEIN THROMBOSIS (DVT) OF UPPER EXTREMITY, UNSPECIFIED LATERALITY, UNSPECIFIED VEIN (HCC): ICD-10-CM

## 2023-12-15 DIAGNOSIS — I48.0 PAROXYSMAL ATRIAL FIBRILLATION (HCC): ICD-10-CM

## 2023-12-15 LAB — INR PPP: 4.4 (ref 2–3.5)

## 2023-12-15 NOTE — TELEPHONE ENCOUNTER
Caller: Lucero with [facility]    Topic/issue: calling to report patient's INR    INR: 4.4  Date: 12-15-23    Callback Number: see routing comments    Thank you,  Paulina VILLAVICENCIO

## 2023-12-18 ENCOUNTER — ANTICOAGULATION MONITORING (OUTPATIENT)
Dept: VASCULAR LAB | Facility: MEDICAL CENTER | Age: 78
End: 2023-12-18
Payer: MEDICARE

## 2023-12-18 ENCOUNTER — TELEPHONE (OUTPATIENT)
Dept: VASCULAR LAB | Facility: MEDICAL CENTER | Age: 78
End: 2023-12-18
Payer: MEDICARE

## 2023-12-18 ENCOUNTER — ANTICOAGULATION MONITORING (OUTPATIENT)
Dept: MEDICAL GROUP | Facility: PHYSICIAN GROUP | Age: 78
End: 2023-12-18
Payer: MEDICARE

## 2023-12-18 DIAGNOSIS — I48.0 PAROXYSMAL ATRIAL FIBRILLATION (HCC): ICD-10-CM

## 2023-12-18 DIAGNOSIS — I82.622 ARM DVT (DEEP VENOUS THROMBOEMBOLISM), ACUTE, LEFT (HCC): ICD-10-CM

## 2023-12-18 DIAGNOSIS — I82.629 ACUTE DEEP VEIN THROMBOSIS (DVT) OF UPPER EXTREMITY, UNSPECIFIED LATERALITY, UNSPECIFIED VEIN (HCC): ICD-10-CM

## 2023-12-18 LAB
INR PPP: 1.8 (ref 2–3.5)
INR PPP: 1.8 (ref 2–3.5)

## 2023-12-18 NOTE — PROGRESS NOTES
Pt did not come to his telemed INR appt today in [place], however, the MA there, Sasha Sánchez, contacted the pt and he is supposed to have an INR with [facility] today. Order for INR placed and Sasha will fax to Griselda at [phone]. Will await INR result.    Deb ABEL

## 2023-12-19 NOTE — PROGRESS NOTES
OP Anticoagulation Service Note    Date: 2023    Anticoagulation Summary  As of 2023      INR goal:  2.0-3.0   TTR:  19.5 % (3.3 wk)   INR used for dosin.80 (2023)   Warfarin maintenance plan:  6 mg (3 mg x 2) every Sun, Tue; 3 mg (3 mg x 1) all other days   Weekly warfarin total:  27 mg   Plan last modified:  Jeromy Hodgson, PharmD (2023)   Next INR check:  2023   Target end date:  Indefinite    Indications    AF (atrial fibrillation) ([facility]) [I48.91]  DVT of upper extremity (deep vein thrombosis) ([facility]) [I82.629]  Arm DVT (deep venous thromboembolism)  acute  left ([facility]) [I82.622]                 Anticoagulation Episode Summary       INR check location:      Preferred lab:      Send INR reminders to:      Comments:  Has INRs drawn with [facility] in [place] (Pt has dementia; please CALL Griselda at [phone] AND FAX dosing changes to [phone] Attn Michelle)          Anticoagulation Patient Findings        Patient's preferred phone number:  [phone]        HPI:   The reason for today's call is to prevent morbidity and mortality from a blood clot and/or stroke and to reduce the risk of bleeding while on a anticoagulant.     PCP:  Mario Lindquist M.D.  [address] [place] [id number]    Assessment:     INR goal for this PT: 2.0-3.0  INR   Date Value Ref Range Status   2023 1.80 (A) 2 - 3.5 Final   2023 1.80 (A) 2 - 3.5 Final       Lab Results   Component Value Date/Time    BUN 16 10/07/2023 08:27 AM    CREATININE 0.69 10/07/2023 08:27 AM     Lab Results   Component Value Date/Time    HEMOGLOBIN 8.6 (L) 10/07/2023 08:27 AM    HEMATOCRIT 27.7 (L) 10/07/2023 08:27 AM    PLATELETCT 763 (H) 10/07/2023 08:27 AM    ALKPHOSPHAT 78 10/07/2023 08:27 AM    ASTSGOT 18 10/07/2023 08:27 AM    ALTSGPT 29 10/07/2023 08:27 AM          Current Outpatient Medications:     warfarin, 3 mg, Oral, DAILY    amLODIPine, 5 mg, Oral, DAILY    furosemide, 60 mg,  Oral, DAILY    amiodarone, 200 mg, Oral, DAILY    potassium chloride, 40 mEq, Oral, BID    metoprolol SR, 75 mg, Oral, BID    losartan, 50 mg, Oral, DAILY    ascorbic acid, 500 mg, Oral, DAILY    Vitamin D3, 2,000 Units, Oral, DAILY    ferrous sulfate, 325 mg, Oral, DAILY    hydroCHLOROthiazide, 12.5 mg, Oral, DAILY    tamsulosin, 0.4 mg, Oral, DAILY    clotrimazole, 10 mg, Oral, DAILY    atorvastatin, 80 mg, Oral, Q EVENING    finasteride, 5 mg, Oral, DAILY    nitroglycerin, 0.4 mg, Sublingual, Q5 MIN PRN      Plan:     3mg today   6mg tomorrow   Retest INR12/20/23      Follow-up:     On date seen above    Additional information discussed with patient:     Asked patient to please call the anticoagulation clinic if they have any signs/symptoms of bleeding and/or thrombosis or any changes to diet or medications.      National recommendations regarding anticoagulation therapy:     The CHEST guidelines recommends frequent INR monitoring at regular intervals (a few days up to a max of 12 weeks) to ensure patients are on the proper dose of warfarin, and patients are not having any complications from therapy.  INRs can dramatically change over a short time period due to diet, medications, and medical conditions.       [facility]  Phone: [phone]  Fax: [phone]  On call: [phone]  General scheduling/information [phone]  For emergencies please dial 911  Please do not use [email or web address] for urgent matters, call the phone numbers listed above.    This note was created using voice recognition software (Dragon). The accuracy of the dictation is limited by the abilities of the software. I have reviewed the note prior to signing, however some errors in grammar and context are still possible. If you have any questions related to this note please do not hesitate to contact our office.

## 2023-12-19 NOTE — TELEPHONE ENCOUNTER
Caller:   Griselda [facility]    Topic/issue: Would like a call back to go over INR sent over today for medication purposes.     Callback Number: [phone]    Thank you,  Carolyn GONZALEZ

## 2023-12-20 ENCOUNTER — ANTICOAGULATION MONITORING (OUTPATIENT)
Dept: VASCULAR LAB | Facility: MEDICAL CENTER | Age: 78
End: 2023-12-20
Payer: MEDICARE

## 2023-12-20 DIAGNOSIS — I48.0 PAROXYSMAL ATRIAL FIBRILLATION (HCC): ICD-10-CM

## 2023-12-20 DIAGNOSIS — I82.629 ACUTE DEEP VEIN THROMBOSIS (DVT) OF UPPER EXTREMITY, UNSPECIFIED LATERALITY, UNSPECIFIED VEIN (HCC): ICD-10-CM

## 2023-12-20 DIAGNOSIS — I82.622 ARM DVT (DEEP VENOUS THROMBOEMBOLISM), ACUTE, LEFT (HCC): ICD-10-CM

## 2023-12-20 LAB — INR PPP: 1.9 (ref 2–3.5)

## 2023-12-20 NOTE — TELEPHONE ENCOUNTER
INR READING    Caller: Griselda [facility]    Topic/issue: INR READING    Griselda is requesting a call back to discuss this patients INR. Please review the reading below:    WED 12/20  VALUE: 1.9    She would like to know if anything needs to be changed. Please advise.    Thank you,  Jonas ORDONEZ    Callback Number: [phone]

## 2023-12-21 NOTE — PROGRESS NOTES
Anticoagulation Summary  As of 2023      INR goal:  2.0-3.0   TTR:  17.9 % (3.6 wk)   INR used for dosin.90 (2023)   Warfarin maintenance plan:  6 mg (3 mg x 2) every Sun, Tue; 3 mg (3 mg x 1) all other days   Weekly warfarin total:  27 mg   Plan last modified:  Manuel BachD (2023)   Next INR check:  2023   Target end date:  Indefinite    Indications    AF (atrial fibrillation) ([facility]) [I48.91]  DVT of upper extremity (deep vein thrombosis) ([facility]) [I82.629]  Arm DVT (deep venous thromboembolism)  acute  left ([facility]) [I82.622]                 Anticoagulation Episode Summary       INR check location:      Preferred lab:      Send INR reminders to:      Comments:  Has INRs drawn with [facility] in [place] (Pt has dementia; please CALL Griselda at [phone] AND FAX dosing changes to [phone] Attn Michelle)            Refer to Anticoagulation Patient Findings for HPI  Patient Findings       Negatives:  Signs/symptoms of thrombosis, Signs/symptoms of bleeding, Laboratory test error suspected, Change in health, Change in alcohol use, Change in activity, Upcoming invasive procedure, Emergency department visit, Upcoming dental procedure, Missed doses, Extra doses, Change in medications, Change in diet/appetite, Hospital admission, Bruising, Other complaints            Spoke with patient's caregiver - Griselda.  INR is SUB therapeutic. Slightly decreased from last INR.          Pt verifies warfarin weekly dosing.     Pt is NOT on antiplatelet therapy     Will have pt increase dose to 4.5 mg today and then resume 3 mg daily.    Repeat INR in 2 day(s) due to lability of patient's INR.     Carly Mayer, ManuelD

## 2023-12-22 ENCOUNTER — ANTICOAGULATION MONITORING (OUTPATIENT)
Dept: VASCULAR LAB | Facility: MEDICAL CENTER | Age: 78
End: 2023-12-22
Payer: MEDICARE

## 2023-12-22 ENCOUNTER — TELEPHONE (OUTPATIENT)
Dept: CARDIOLOGY | Facility: MEDICAL CENTER | Age: 78
End: 2023-12-22
Payer: MEDICARE

## 2023-12-22 DIAGNOSIS — I82.622 ARM DVT (DEEP VENOUS THROMBOEMBOLISM), ACUTE, LEFT (HCC): ICD-10-CM

## 2023-12-22 DIAGNOSIS — I48.0 PAROXYSMAL ATRIAL FIBRILLATION (HCC): ICD-10-CM

## 2023-12-22 DIAGNOSIS — I82.629 ACUTE DEEP VEIN THROMBOSIS (DVT) OF UPPER EXTREMITY, UNSPECIFIED LATERALITY, UNSPECIFIED VEIN (HCC): ICD-10-CM

## 2023-12-22 LAB — INR PPP: 2.2 (ref 2–3.5)

## 2023-12-22 NOTE — TELEPHONE ENCOUNTER
Caller: Lucero at [facility]     Topic/issue: She will be leaving the office at 2:00 today and she needs medication dosage update before the weekend.  Please advise     Callback Number: [phone]    Thank You   Lacy PUENTE

## 2023-12-22 NOTE — TELEPHONE ENCOUNTER
INR READING    Caller: Hi Montes De Oca    Topic/issue: INR AMBROSIO    Hi is calling in to report his INR. Please review the following:    FRI 12/22  VALUE: 2.2    Please be advised.    Thank you,  Jonas ORDONEZ    Callback Number: [phone] (home)

## 2023-12-23 NOTE — PROGRESS NOTES
OP   Telephone Anticoagulation Service Note      Anticoagulation Summary  As of 2023      INR goal:  2.0-3.0   TTR:  21.6 % (3.9 wk)   INR used for dosin.20 (2023)   Warfarin maintenance plan:  6 mg (3 mg x 2) every Sun, Tue; 3 mg (3 mg x 1) all other days   Weekly warfarin total:  27 mg   Plan last modified:  Manuel BachD (2023)   Next INR check:  2023   Target end date:  Indefinite    Indications    AF (atrial fibrillation) ([facility]) [I48.91]  DVT of upper extremity (deep vein thrombosis) ([facility]) [I82.629]  Arm DVT (deep venous thromboembolism)  acute  left ([facility]) [I82.622]                 Anticoagulation Episode Summary       INR check location:      Preferred lab:      Send INR reminders to:      Comments:  Has INRs drawn with [facility] in [place] (Pt has dementia; please CALL Griselda at [phone] AND FAX dosing changes to [phone] Attn Michelle)          Anticoagulation Patient Findings  Patient Findings       Negatives:  Signs/symptoms of thrombosis, Signs/symptoms of bleeding, Laboratory test error suspected, Change in health, Change in alcohol use, Change in activity, Upcoming invasive procedure, Emergency department visit, Upcoming dental procedure, Missed doses, Extra doses, Change in medications, Change in diet/appetite, Hospital admission, Bruising, Other complaints          Spoke with the patient's nurse, Lucero on the phone today, reporting a therapeutic INR of 2.2.   INR been labile.   Confirmed the current warfarin dosing regimen and patient compliance.  Patient denies any interval changes to diet and/or medications. Patient denies any signs/symptoms of bleeding or clotting.  Patient will continue with 6mg alternating with 3mg QOD until he retests INR again on Tuesday.     Anant Camejo  PharmD

## 2023-12-26 ENCOUNTER — TELEPHONE (OUTPATIENT)
Dept: VASCULAR LAB | Facility: MEDICAL CENTER | Age: 78
End: 2023-12-26
Payer: MEDICARE

## 2023-12-26 ENCOUNTER — ANTICOAGULATION MONITORING (OUTPATIENT)
Dept: VASCULAR LAB | Facility: MEDICAL CENTER | Age: 78
End: 2023-12-26
Payer: MEDICARE

## 2023-12-26 DIAGNOSIS — I48.0 PAROXYSMAL ATRIAL FIBRILLATION (HCC): ICD-10-CM

## 2023-12-26 DIAGNOSIS — I82.629 ACUTE DEEP VEIN THROMBOSIS (DVT) OF UPPER EXTREMITY, UNSPECIFIED LATERALITY, UNSPECIFIED VEIN (HCC): ICD-10-CM

## 2023-12-26 DIAGNOSIS — I82.622 ARM DVT (DEEP VENOUS THROMBOEMBOLISM), ACUTE, LEFT (HCC): ICD-10-CM

## 2023-12-26 LAB — INR PPP: 2.2 (ref 2–3.5)

## 2023-12-26 NOTE — PROGRESS NOTES
OP   Telephone Anticoagulation Service Note      Anticoagulation Summary  As of 2023      INR goal:  2.0-3.0   TTR:  31.7 % (1 mo)   INR used for dosin.20 (2023)   Warfarin maintenance plan:  6 mg (3 mg x 2) every Sun, Tu, Fri; 3 mg (3 mg x 1) all other days   Weekly warfarin total:  30 mg   Plan last modified:  Chaya Camejo (2023)   Next INR check:  2024   Target end date:  Indefinite    Indications    AF (atrial fibrillation) ([facility]) [I48.91]  DVT of upper extremity (deep vein thrombosis) ([facility]) [I82.629]  Arm DVT (deep venous thromboembolism)  acute  left ([facility]) [I82.622]                 Anticoagulation Episode Summary       INR check location:      Preferred lab:      Send INR reminders to:      Comments:  Has INRs drawn with [facility] in [place] (Pt has dementia; please CALL Griselda at [phone] AND FAX dosing changes to [phone] Attn Michelle)          Anticoagulation Patient Findings  Patient Findings       Negatives:  Signs/symptoms of thrombosis, Signs/symptoms of bleeding, Laboratory test error suspected, Change in health, Change in alcohol use, Change in activity, Upcoming invasive procedure, Emergency department visit, Upcoming dental procedure, Missed doses, Extra doses, Change in medications, Change in diet/appetite, Hospital admission, Bruising, Other complaints          Spoke with the patient's caregiver on the phone today, reporting a therapeutic INR of 2.2.  Confirmed the current warfarin dosing regimen and patient compliance.  Patient denies any interval changes to diet and/or medications. Patient denies any signs/symptoms of bleeding or clotting.  Patient instructed to continue with the current warfarin dosing regimen, and asked to follow up again in 1 week.   Orders faxed to Griselda and [facility].     Annat JacksonD

## 2023-12-26 NOTE — TELEPHONE ENCOUNTER
Caller: Lucero from [facility]     Topic/issue: INR Reading    INR 2.2    Callback Number: [phone]     Thank you,     Rhona JONES

## 2024-01-02 ENCOUNTER — TELEPHONE (OUTPATIENT)
Dept: VASCULAR LAB | Facility: MEDICAL CENTER | Age: 79
End: 2024-01-02

## 2024-01-02 LAB — INR PPP: 2.5 (ref 2–3.5)

## 2024-01-02 NOTE — TELEPHONE ENCOUNTER
Caller: Lucero @ [facility]     Topic/issue: Reporting INR 2.5    Callback Number: [phone]    Thank You   Lacy PUENTE

## 2024-01-03 ENCOUNTER — ANTICOAGULATION MONITORING (OUTPATIENT)
Dept: VASCULAR LAB | Facility: MEDICAL CENTER | Age: 79
End: 2024-01-03
Payer: MEDICARE

## 2024-01-03 DIAGNOSIS — I82.622 ARM DVT (DEEP VENOUS THROMBOEMBOLISM), ACUTE, LEFT (HCC): ICD-10-CM

## 2024-01-03 DIAGNOSIS — I82.629 ACUTE DEEP VEIN THROMBOSIS (DVT) OF UPPER EXTREMITY, UNSPECIFIED LATERALITY, UNSPECIFIED VEIN (HCC): ICD-10-CM

## 2024-01-03 DIAGNOSIS — I48.0 PAROXYSMAL ATRIAL FIBRILLATION (HCC): ICD-10-CM

## 2024-01-04 ENCOUNTER — TELEPHONE (OUTPATIENT)
Dept: VASCULAR LAB | Facility: MEDICAL CENTER | Age: 79
End: 2024-01-04

## 2024-01-04 ENCOUNTER — ANTICOAGULATION MONITORING (OUTPATIENT)
Dept: VASCULAR LAB | Facility: MEDICAL CENTER | Age: 79
End: 2024-01-04
Payer: MEDICARE

## 2024-01-04 DIAGNOSIS — I82.629 ACUTE DEEP VEIN THROMBOSIS (DVT) OF UPPER EXTREMITY, UNSPECIFIED LATERALITY, UNSPECIFIED VEIN (HCC): ICD-10-CM

## 2024-01-04 DIAGNOSIS — I82.622 ARM DVT (DEEP VENOUS THROMBOEMBOLISM), ACUTE, LEFT (HCC): ICD-10-CM

## 2024-01-04 DIAGNOSIS — I48.0 PAROXYSMAL ATRIAL FIBRILLATION (HCC): ICD-10-CM

## 2024-01-04 NOTE — PROGRESS NOTES
Caller: Hari Montes De Oca     Topic/issue: Patient wants to let the office know a Dr in [place] is going to take over his medications      Callback Number: [phone]        Thank You   Lacy PUENTE     ____________________________________________________________________________________________________________________________________    Pt Dc'd from [facility] given the above.    Aniceot Fontana PharmD, BCACP

## 2024-01-04 NOTE — PROGRESS NOTES
Anticoagulation Summary  As of 1/3/2024      INR goal:  2.0-3.0   TTR:  44.3 % (1.3 mo)   INR used for dosin.50 (2024)   Warfarin maintenance plan:  6 mg (3 mg x 2) every Sun, Tue, Fri; 3 mg (3 mg x 1) all other days   Weekly warfarin total:  30 mg   Plan last modified:  Chaya Camejo (2023)   Next INR check:  2024   Target end date:  Indefinite    Indications    AF (atrial fibrillation) ([facility]) [I48.91]  DVT of upper extremity (deep vein thrombosis) ([facility]) [I82.629]  Arm DVT (deep venous thromboembolism)  acute  left ([facility]) [I82.622]                 Anticoagulation Episode Summary       INR check location:      Preferred lab:      Send INR reminders to:      Comments:  Has INRs drawn with [facility] in [place] (Pt has dementia; please CALL Griselda at [phone] AND FAX dosing changes to [phone] Attn Michelle)            Refer to Anticoagulation Patient Findings for HPI  Patient Findings       Negatives:  Signs/symptoms of thrombosis, Signs/symptoms of bleeding, Laboratory test error suspected, Change in health, Change in alcohol use, Change in activity, Upcoming invasive procedure, Emergency department visit, Upcoming dental procedure, Missed doses, Extra doses, Change in medications, Change in diet/appetite, Hospital admission, Bruising, Other complaints          Spoke with patient caregiver to report a therapeutic INR.      Pt instructed to continue with current warfarin dosing regimen, confirms dosing.     Will follow up in 1 week(s).      Aniceto Fontana, PharmD, BCACP

## 2024-01-04 NOTE — TELEPHONE ENCOUNTER
Caller: Hari Montes De Oca    Topic/issue: Patient wants to let the office know a Dr in [place] is going to take over his medications     Callback Number: [phone]      Thank You   Lacy PUENTE

## 2024-01-08 ENCOUNTER — TELEPHONE (OUTPATIENT)
Dept: VASCULAR LAB | Facility: MEDICAL CENTER | Age: 79
End: 2024-01-08
Payer: MEDICARE

## 2024-01-08 NOTE — TELEPHONE ENCOUNTER
Caller:  Rajiv Membreno     Topic/issue: Needs order form re-faxed with Patient correct information.     Callback Number: [phone] opt 2  Fax - [phone]    Thank you,   Carolyn GONZALEZ  
Faxed communication to [facility] that pt no longer following w/ [facility].    Aniceto Fontana, PharmD, BCACP   
72.6

## 2024-01-23 ENCOUNTER — ANTICOAGULATION MONITORING (OUTPATIENT)
Dept: CARDIOLOGY | Facility: MEDICAL CENTER | Age: 79
End: 2024-01-23
Payer: MEDICARE

## 2024-01-23 LAB — INR PPP: 2.1 (ref 2–3.5)

## 2024-02-09 ENCOUNTER — TELEPHONE (OUTPATIENT)
Dept: VASCULAR LAB | Facility: MEDICAL CENTER | Age: 79
End: 2024-02-09
Payer: MEDICARE

## 2024-02-09 NOTE — TELEPHONE ENCOUNTER
Called [facility] to notify them that this pt is no longer following w/ [facility].    Aniceto Fontana, ManuelD, BCACP

## 2024-02-15 ENCOUNTER — ANTICOAGULATION MONITORING (OUTPATIENT)
Dept: VASCULAR LAB | Facility: MEDICAL CENTER | Age: 79
End: 2024-02-15
Payer: MEDICARE

## 2024-03-11 ENCOUNTER — TELEPHONE (OUTPATIENT)
Dept: CARDIOLOGY | Facility: MEDICAL CENTER | Age: 79
End: 2024-03-11
Payer: MEDICARE

## 2024-03-11 NOTE — TELEPHONE ENCOUNTER
Caller: Hi Montes De Oca     Topic/issue: Patient states his left leg is swollen, calf and groin . Patient states this started 1-2 months ago.   He is looking for a medication that could resolve this issue.    Callback Number: [phone]     Thank you  Randa WISDOM

## 2024-03-11 NOTE — TELEPHONE ENCOUNTER
Spoke with pt - I advised him that we are not managing him. See encounter 01/04/2024. Pt to follow up with PCP.    Shivani Lund, PharmD

## 2024-03-20 ENCOUNTER — TELEPHONE (OUTPATIENT)
Dept: CARDIOLOGY | Facility: MEDICAL CENTER | Age: 79
End: 2024-03-20
Payer: MEDICARE

## 2024-03-20 NOTE — TELEPHONE ENCOUNTER
CW    Caller: Hi Montes De Oca     Topic/issue: pt was last seen  03/22/2021. Hsi legs. Feet, calf are swollen. States his Primary care said there wasn't anything they could do. Is asking for a call back. Advised to set up an appt as well but wants a call back first.    Callback Number: [phone] (home)       Thank You    Deb JOAQUIN

## 2024-03-21 NOTE — TELEPHONE ENCOUNTER
"To Schedulers, Last OV 3/2021 please schedule new patient [facility] for lower leg swelling, thank you!    ========================    Returned pt call, [phone], unable to reach.  Unable to leave voicemail as voicemail is full.    Called pt jonathon Hubbard, [phone], Left voicemail to return this call at their earliest convenience.    2nd attempt to call pt, [phone], and offered pt to be seen in office tomorrow afternoon.  He states he is unable to schedule for tomorrow as he's \"got things to do.\"  ED precautions given.  Pt states he lives in [place] and does not want to \"drive 3 hours\" to be told \"there is nothing we can do.\"  Advised pt we are unable to safely advise on his concerns as he was last seen 3 years ago.     "

## 2024-04-01 ENCOUNTER — TELEPHONE (OUTPATIENT)
Dept: VASCULAR LAB | Facility: MEDICAL CENTER | Age: 79
End: 2024-04-01
Payer: MEDICARE

## 2024-04-01 NOTE — TELEPHONE ENCOUNTER
Pt's warfarin is not managed by [facility]. He is managed by his PCP Mario Lindquist M.D. - see note 01/04/2024    Shivani Lund, ManuelD

## 2024-04-01 NOTE — TELEPHONE ENCOUNTER
Patient: Hi Montes De Oca    INR: 2.8    Date Taken: 04/01/2024    Any Questions/Comments: N/A    Phone Number: [phone]    Thank you,  Cheryl MEHTA

## 2024-12-30 NOTE — PROGRESS NOTES
OP   Telephone Anticoagulation Service Note      Anticoagulation Summary  As of 12/15/2023      INR goal:  2.0-3.0   TTR:  16.7 % (2.9 wk)   INR used for dosin.40 (12/15/2023)   Warfarin maintenance plan:  9 mg (3 mg x 3) every Tue; 6 mg (3 mg x 2) all other days   Weekly warfarin total:  45 mg   Plan last modified:  Manuel GarciaD (2023)   Next INR check:  2023   Target end date:  Indefinite    Indications    AF (atrial fibrillation) ([facility]) [I48.91]  DVT of upper extremity (deep vein thrombosis) ([facility]) [I82.629]  Arm DVT (deep venous thromboembolism)  acute  left ([facility]) [I82.622]                 Anticoagulation Episode Summary       INR check location:      Preferred lab:      Send INR reminders to:      Comments:  Has [facility] drawn with [facility] [place] (Pt has dementia; please CALL Griselda at [phone] AND FAX dosing changes to [phone] Attn Michelle)          Anticoagulation Patient Findings    Spoke with the patient's Nurse, Griselda, on the phone today, reporting a SUPRA-therapeutic INR of 4.4.   Confirmed the current warfarin dosing regimen and patient compliance.  Patient denies any interval changes to diet and/or medications. Patient denies any signs/symptoms of bleeding or clotting.  Patient instructed to HOLD warfarin dose again TONIGHT ONLY, then will reduce dose to 3mg on Sat and 6mg on Sun and retest INR again on Monday.     Will fax Griselda the warfarin dosing changes and dosing calendar to fax provided [phone]    Plan:  HOLD warfarin TONIGHT ONLY, then take 3mg on Sat and 6mg on Sun. Retest INR again on Monday.      Medication: Warfarin (Coumadin)          6 mg     test INR     HOLD 3mg      2 tab(s)                    0 tab(s)  1 tab(s)      Next Appointment: Monday, Dec 18, 2023           Anant JacksonD     
Universal Safety Interventions

## 2025-07-25 NOTE — PROGRESS NOTES
[place]'s INR today is subtherapeutic at 1.9 on 4 mg of Warfarin over the past 4 days, totaling 6 mg of Warfarin over the past week.  Reason for anticoagulation: atrial flutter and pulmonary embolism  INR Range: 2.0-3.0  Last INR: 1.6 on 25    Patient reports no abnormal findings related to being on Warfarin. Reports no medication or dietary changes. Verified correct previous Warfarin dosing.  Patient was advised to take the following Warfarin dosin mg daily over the next 5 days (totaling 5 mg of warfarin over the next 5 days, and 7 mg of warfarin over 7 days).  See Anticoagulation Flowsheet.  Date of next INR: 25 (in 5 days, on home monitor if she receives supplies).  No further questions/concerns.    Onsite billing provider is Dr. Durdevic.    Advised to contact the clinic at [phone] with the following:   Medication changes: New, dose change, advised to stop, especially antibiotics/steroids  Procedures planned: Surgical/dental/injections, or if you are advised to hold your Warfarin  Bruising/bleeding  ER visits/Urgent Care visits    Call your physician immediately if you notice any of the following symptoms of a blood clot:   Sudden weakness in any limb  Numbness or tingling anywhere  Visual changes or loss of sight in either eye  Sudden onset of slurred speech or inability to speak  Dizziness or faintness  New pain, swelling, redness or heat in any extremity  New SOB or chest pain  Symptoms associated with blood clotting/low INR reviewed and verbalizes understanding.    Timed staff reminder sent to Anticoag message pool for 25.    Spoke with [facility] PT Sasha.  States will be able to obtain INR during PT visit next week on either Thursday or Friday, if patient unable to obtain INR on home monitor on Wednesday.   This RN spoke to BK Nicole in person regarding pt's medication of Aspirin and Eliquis.Pt has a clot in his leg, arm, and is also A-Fib. However, pt has been oozing blood from his old IV site all night. This pt has changed the dressing numerous times. This RN was just wondering if PA wanted the Eliquis and Aspirin given.    0608- Per BK Nicole move the medication to be due at a later time. That way day shift can re-evaluate.

## (undated) DEVICE — SUCTION INSTRUMENT YANKAUER BULBOUS TIP W/O VENT (50EA/CA)

## (undated) DEVICE — SYRINGE NON SAFETY 10 CC 20 GA X 1-1/2 IN (100/BX 4BX/CA)

## (undated) DEVICE — SYSTEM PEEL & PLACE 13CM INCISIONS

## (undated) DEVICE — VEIN STRIPPER DISP. X-RAY - DETECT (12PK/BX)

## (undated) DEVICE — GOWN WARMING STANDARD FLEX - (30/CA)

## (undated) DEVICE — SPONGE GAUZESTER 4 X 4 4PLY - (128PK/CA)

## (undated) DEVICE — COVER LIGHT HANDLE ALC PLUS DISP (18EA/BX)

## (undated) DEVICE — SET EXTENSION WITH 2 PORTS (48EA/CA) ***PART #2C8610 IS A SUBSTITUTE*****

## (undated) DEVICE — STERI STRIP COMPOUND BENZOIN - TINCTURE 0.6ML WITH APPLICATOR (40EA/BX)

## (undated) DEVICE — TOWEL STOP TIMEOUT SAFETY FLAG (40EA/CA)

## (undated) DEVICE — GLOVE BIOGEL INDICATOR SZ 7.5 SURGICAL PF LTX - (50PR/BX 4BX/CA)

## (undated) DEVICE — GAUZE PACKING STRIP PLAIN STERILE - 1 IN X 5 YD (12/CA)

## (undated) DEVICE — BANDAGE ROLL STERILE BULKEE 4.5 IN X 4 YD (100EA/CA)

## (undated) DEVICE — CLIP SM INTNL HRZN TI ESCP LGT - (24EA/PK 25PK/BX)

## (undated) DEVICE — PACK MINOR BASIN - (2EA/CA)

## (undated) DEVICE — SUTURE GENERAL

## (undated) DEVICE — GLOVE BIOGEL SZ 7 SURGICAL PF LTX - (50PR/BX 4BX/CA)

## (undated) DEVICE — SET LEADWIRE 5 LEAD BEDSIDE DISPOSABLE ECG (1SET OF 5/EA)

## (undated) DEVICE — SUTURE 3-0 SILK 12 X 18 IN - (36/BX)

## (undated) DEVICE — BANDAGE ELASTIC 4 HONEYCOMB - 4"X5YD LF (20/CA)"

## (undated) DEVICE — CANISTER SUCTION 3000ML MECHANICAL FILTER AUTO SHUTOFF MEDI-VAC NONSTERILE LF DISP  (40EA/CA)

## (undated) DEVICE — BLADE SURGICAL #11 - (50/BX)

## (undated) DEVICE — STAPLER 35MM SKIN WIDE ROTATING HEAD (6EA/BX)

## (undated) DEVICE — LACTATED RINGERS INJ 1000 ML - (14EA/CA 60CA/PF)

## (undated) DEVICE — CLIP MED INTNL HRZN TI ESCP - (25/BX)

## (undated) DEVICE — ELECTRODE DUAL RETURN W/ CORD - (50/PK)

## (undated) DEVICE — PAD PREP 24 X 48 CUFFED - (100/CA)

## (undated) DEVICE — CHLORAPREP 26 ML APPLICATOR - ORANGE TINT(25/CA)

## (undated) DEVICE — SENSOR OXIMETER ADULT SPO2 RD SET (20EA/BX)

## (undated) DEVICE — DRAPE SURGICAL U 77X120 - (10/CA)

## (undated) DEVICE — BANDAGE ELASTIC 6 HONEYCOMB - 6X5YD LF (20/CA)"

## (undated) DEVICE — TUBING CLEARLINK DUO-VENT - C-FLO (48EA/CA)

## (undated) DEVICE — SODIUM CHL IRRIGATION 0.9% 1000ML (12EA/CA)